# Patient Record
Sex: FEMALE | Race: WHITE | NOT HISPANIC OR LATINO
[De-identification: names, ages, dates, MRNs, and addresses within clinical notes are randomized per-mention and may not be internally consistent; named-entity substitution may affect disease eponyms.]

---

## 2017-12-07 ENCOUNTER — APPOINTMENT (OUTPATIENT)
Dept: ULTRASOUND IMAGING | Facility: CLINIC | Age: 62
End: 2017-12-07
Payer: COMMERCIAL

## 2017-12-07 ENCOUNTER — OUTPATIENT (OUTPATIENT)
Dept: OUTPATIENT SERVICES | Facility: HOSPITAL | Age: 62
LOS: 1 days | End: 2017-12-07
Payer: COMMERCIAL

## 2017-12-07 DIAGNOSIS — Z00.8 ENCOUNTER FOR OTHER GENERAL EXAMINATION: ICD-10-CM

## 2017-12-07 PROCEDURE — 76700 US EXAM ABDOM COMPLETE: CPT

## 2017-12-07 PROCEDURE — 76700 US EXAM ABDOM COMPLETE: CPT | Mod: 26

## 2018-04-17 ENCOUNTER — APPOINTMENT (OUTPATIENT)
Dept: ULTRASOUND IMAGING | Facility: CLINIC | Age: 63
End: 2018-04-17
Payer: COMMERCIAL

## 2018-04-17 ENCOUNTER — OUTPATIENT (OUTPATIENT)
Dept: OUTPATIENT SERVICES | Facility: HOSPITAL | Age: 63
LOS: 1 days | End: 2018-04-17
Payer: COMMERCIAL

## 2018-04-17 DIAGNOSIS — Z00.8 ENCOUNTER FOR OTHER GENERAL EXAMINATION: ICD-10-CM

## 2018-04-17 PROCEDURE — 76856 US EXAM PELVIC COMPLETE: CPT | Mod: 26

## 2018-04-17 PROCEDURE — 76856 US EXAM PELVIC COMPLETE: CPT

## 2019-10-21 ENCOUNTER — APPOINTMENT (OUTPATIENT)
Dept: CARDIOLOGY | Facility: CLINIC | Age: 64
End: 2019-10-21
Payer: COMMERCIAL

## 2019-10-21 VITALS
SYSTOLIC BLOOD PRESSURE: 125 MMHG | HEIGHT: 61 IN | BODY MASS INDEX: 27.19 KG/M2 | DIASTOLIC BLOOD PRESSURE: 78 MMHG | WEIGHT: 144 LBS | HEART RATE: 70 BPM | RESPIRATION RATE: 15 BRPM

## 2019-10-21 VITALS — BODY MASS INDEX: 27.19 KG/M2 | HEIGHT: 61 IN | WEIGHT: 144 LBS

## 2019-10-21 PROCEDURE — 99204 OFFICE O/P NEW MOD 45 MIN: CPT

## 2019-10-21 NOTE — REASON FOR VISIT
[Consultation] : a consultation regarding [Hyperlipidemia] : hyperlipidemia [FreeTextEntry2] : Lipid Consult

## 2019-10-21 NOTE — PHYSICAL EXAM
[General Appearance - Well Developed] : well developed [Well Groomed] : well groomed [Normal Appearance] : normal appearance [General Appearance - Well Nourished] : well nourished [No Deformities] : no deformities [General Appearance - In No Acute Distress] : no acute distress [Normal Conjunctiva] : the conjunctiva exhibited no abnormalities [Normal Oral Mucosa] : normal oral mucosa [Normal Oropharynx] : normal oropharynx [No Jugular Venous Daniel A Waves] : no jugular venous daniel A waves [Normal Jugular Venous V Waves Present] : normal jugular venous V waves present [Normal Jugular Venous A Waves Present] : normal jugular venous A waves present [Normal] : normal [No Precordial Heave] : no precordial heave was noted [Rhythm Regular] : regular [Normal Rate] : normal [Normal S1] : normal S1 [No Gallop] : no gallop heard [Normal S2] : normal S2 [S3] : no S3 [S4] : no S4 [I] : a grade 1 [Right Carotid Bruit] : no bruit heard over the right carotid [Left Carotid Bruit] : no bruit heard over the left carotid [Right Femoral Bruit] : no bruit heard over the right femoral artery [Left Femoral Bruit] : no bruit heard over the left femoral artery [No Abnormalities] : the abdominal aorta was not enlarged and no bruit was heard [2+] : right 2+ [No Pitting Edema] : no pitting edema present [Respiration, Rhythm And Depth] : normal respiratory rhythm and effort [Exaggerated Use Of Accessory Muscles For Inspiration] : no accessory muscle use [Auscultation Breath Sounds / Voice Sounds] : lungs were clear to auscultation bilaterally [Bowel Sounds] : normal bowel sounds [Gait - Sufficient For Exercise Testing] : the gait was sufficient for exercise testing [Abdomen Soft] : soft [Abnormal Walk] : normal gait [Cyanosis, Localized] : no localized cyanosis [Nail Clubbing] : no clubbing of the fingernails [Skin Color & Pigmentation] : normal skin color and pigmentation [Skin Turgor] : normal skin turgor [FreeTextEntry1] : No xanthomas [] : no rash [Oriented To Time, Place, And Person] : oriented to person, place, and time [Affect] : the affect was normal [Mood] : the mood was normal [Impaired Insight] : insight and judgment were intact [No Anxiety] : not feeling anxious

## 2019-10-21 NOTE — DISCUSSION/SUMMARY
[FreeTextEntry1] : This is a 64-year-old female with past medical history significant for hypothyroidism, dyspepsia, hypercholesterolemia, who comes in for a lipid consultation.  She reports her mother had high cholesterol.\par She has no history of rheumatic fever.  She does not drink excessive caffeine or alcohol.  She reports that her diet is reasonable.\par She has been on Zocor, Crestor, Lipitor and Pravachol associated with significant incapacitating muscle aches.  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 Normal TSH.\par The patient had blood work done April 16, 2013 which demonstrated an elevated C-reactive protein of 4.6, total cholesterol 280 mg/dL, LDL cholesterol calculated 173 mg/dL, HDL 51 mg/dL, triglycerides 281 mg/dL.  \par The patient has evidence for familial hypercholesterolemia. She has no physical stigmata of FH. I have advised the patient and  to have her children, and her own siblings tested for lipids.  She promises to do so.\par I recommended she start on Zetia 10 mg per day and will start her on PCSK-9 therapy based on her insurance coverage.  She does not wish to start on Zetia. She wants to be on one intervention at that time.  She understands she must have followup blood work done week 5, which is after the 2nd dose of PCSK 9 therapy.  Further recommendations will be made at that time.\par Her LDL cholesterol goal is less than 100 mg/dL.\par The patient is instructed to followup with her cardiologist and primary care physician.\par The patient will followup with me after her blood work is completed.

## 2019-10-22 ENCOUNTER — RX RENEWAL (OUTPATIENT)
Age: 64
End: 2019-10-22

## 2019-11-12 RX ORDER — ALIROCUMAB 150 MG/ML
150 INJECTION, SOLUTION SUBCUTANEOUS
Qty: 3 | Refills: 1 | Status: DISCONTINUED | COMMUNITY
Start: 2019-10-22 | End: 2019-11-12

## 2019-11-20 ENCOUNTER — APPOINTMENT (OUTPATIENT)
Dept: CARDIOLOGY | Facility: CLINIC | Age: 64
End: 2019-11-20
Payer: COMMERCIAL

## 2019-11-20 VITALS
SYSTOLIC BLOOD PRESSURE: 128 MMHG | WEIGHT: 144 LBS | HEART RATE: 68 BPM | DIASTOLIC BLOOD PRESSURE: 79 MMHG | RESPIRATION RATE: 15 BRPM | HEIGHT: 61 IN | BODY MASS INDEX: 27.19 KG/M2

## 2019-11-20 PROCEDURE — 99214 OFFICE O/P EST MOD 30 MIN: CPT

## 2019-12-04 ENCOUNTER — APPOINTMENT (OUTPATIENT)
Dept: CARDIOLOGY | Facility: CLINIC | Age: 64
End: 2019-12-04
Payer: COMMERCIAL

## 2019-12-04 VITALS
SYSTOLIC BLOOD PRESSURE: 126 MMHG | RESPIRATION RATE: 15 BRPM | HEIGHT: 61 IN | HEART RATE: 70 BPM | BODY MASS INDEX: 27.19 KG/M2 | DIASTOLIC BLOOD PRESSURE: 81 MMHG | WEIGHT: 144 LBS

## 2019-12-04 PROCEDURE — 99214 OFFICE O/P EST MOD 30 MIN: CPT

## 2019-12-09 ENCOUNTER — APPOINTMENT (OUTPATIENT)
Dept: CARDIOLOGY | Facility: CLINIC | Age: 64
End: 2019-12-09

## 2019-12-12 ENCOUNTER — LABORATORY RESULT (OUTPATIENT)
Age: 64
End: 2019-12-12

## 2020-05-10 ENCOUNTER — TRANSCRIPTION ENCOUNTER (OUTPATIENT)
Age: 65
End: 2020-05-10

## 2020-06-01 ENCOUNTER — APPOINTMENT (OUTPATIENT)
Dept: CARDIOLOGY | Facility: CLINIC | Age: 65
End: 2020-06-01

## 2020-06-12 ENCOUNTER — APPOINTMENT (OUTPATIENT)
Dept: CARDIOLOGY | Facility: CLINIC | Age: 65
End: 2020-06-12
Payer: COMMERCIAL

## 2020-06-12 VITALS
RESPIRATION RATE: 15 BRPM | HEART RATE: 74 BPM | HEIGHT: 61 IN | WEIGHT: 146 LBS | DIASTOLIC BLOOD PRESSURE: 79 MMHG | BODY MASS INDEX: 27.56 KG/M2 | SYSTOLIC BLOOD PRESSURE: 125 MMHG

## 2020-06-12 PROCEDURE — 99214 OFFICE O/P EST MOD 30 MIN: CPT

## 2020-07-07 ENCOUNTER — RESULT REVIEW (OUTPATIENT)
Age: 65
End: 2020-07-07

## 2020-07-15 ENCOUNTER — APPOINTMENT (OUTPATIENT)
Dept: MRI IMAGING | Facility: IMAGING CENTER | Age: 65
End: 2020-07-15
Payer: COMMERCIAL

## 2020-07-15 ENCOUNTER — OUTPATIENT (OUTPATIENT)
Dept: OUTPATIENT SERVICES | Facility: HOSPITAL | Age: 65
LOS: 1 days | End: 2020-07-15
Payer: COMMERCIAL

## 2020-07-15 ENCOUNTER — APPOINTMENT (OUTPATIENT)
Dept: SURGERY | Facility: CLINIC | Age: 65
End: 2020-07-15
Payer: COMMERCIAL

## 2020-07-15 DIAGNOSIS — Z00.8 ENCOUNTER FOR OTHER GENERAL EXAMINATION: ICD-10-CM

## 2020-07-15 PROCEDURE — 77049 MRI BREAST C-+ W/CAD BI: CPT | Mod: 26

## 2020-07-15 PROCEDURE — C8908: CPT

## 2020-07-15 PROCEDURE — 99205K: CUSTOM

## 2020-07-15 PROCEDURE — A9585: CPT

## 2020-07-15 PROCEDURE — C8937: CPT

## 2020-07-18 ENCOUNTER — RESULT REVIEW (OUTPATIENT)
Age: 65
End: 2020-07-18

## 2020-07-18 ENCOUNTER — TRANSCRIPTION ENCOUNTER (OUTPATIENT)
Age: 65
End: 2020-07-18

## 2020-07-18 ENCOUNTER — OUTPATIENT (OUTPATIENT)
Dept: OUTPATIENT SERVICES | Facility: HOSPITAL | Age: 65
LOS: 1 days | End: 2020-07-18
Payer: COMMERCIAL

## 2020-07-18 ENCOUNTER — APPOINTMENT (OUTPATIENT)
Dept: ULTRASOUND IMAGING | Facility: IMAGING CENTER | Age: 65
End: 2020-07-18
Payer: COMMERCIAL

## 2020-07-18 DIAGNOSIS — Z00.8 ENCOUNTER FOR OTHER GENERAL EXAMINATION: ICD-10-CM

## 2020-07-18 PROCEDURE — 88360 TUMOR IMMUNOHISTOCHEM/MANUAL: CPT

## 2020-07-18 PROCEDURE — 77065 DX MAMMO INCL CAD UNI: CPT | Mod: 26,RT

## 2020-07-18 PROCEDURE — 88305 TISSUE EXAM BY PATHOLOGIST: CPT | Mod: 26

## 2020-07-18 PROCEDURE — 88307 TISSUE EXAM BY PATHOLOGIST: CPT | Mod: 26

## 2020-07-18 PROCEDURE — 88307 TISSUE EXAM BY PATHOLOGIST: CPT

## 2020-07-18 PROCEDURE — 88305 TISSUE EXAM BY PATHOLOGIST: CPT

## 2020-07-18 PROCEDURE — 19084 BX BREAST ADD LESION US IMAG: CPT | Mod: RT

## 2020-07-18 PROCEDURE — 77065 DX MAMMO INCL CAD UNI: CPT

## 2020-07-18 PROCEDURE — 19084 BX BREAST ADD LESION US IMAG: CPT

## 2020-07-18 PROCEDURE — 19083 BX BREAST 1ST LESION US IMAG: CPT | Mod: RT

## 2020-07-18 PROCEDURE — A4648: CPT

## 2020-07-18 PROCEDURE — 19083 BX BREAST 1ST LESION US IMAG: CPT

## 2020-07-18 PROCEDURE — 88360 TUMOR IMMUNOHISTOCHEM/MANUAL: CPT | Mod: 26

## 2020-07-20 LAB — SURGICAL PATHOLOGY STUDY: SIGNIFICANT CHANGE UP

## 2020-07-21 DIAGNOSIS — Z01.818 ENCOUNTER FOR OTHER PREPROCEDURAL EXAMINATION: ICD-10-CM

## 2020-07-23 ENCOUNTER — RESULT REVIEW (OUTPATIENT)
Age: 65
End: 2020-07-23

## 2020-07-23 ENCOUNTER — APPOINTMENT (OUTPATIENT)
Dept: ULTRASOUND IMAGING | Facility: IMAGING CENTER | Age: 65
End: 2020-07-23
Payer: COMMERCIAL

## 2020-07-23 ENCOUNTER — OUTPATIENT (OUTPATIENT)
Dept: OUTPATIENT SERVICES | Facility: HOSPITAL | Age: 65
LOS: 1 days | End: 2020-07-23
Payer: COMMERCIAL

## 2020-07-23 VITALS
SYSTOLIC BLOOD PRESSURE: 126 MMHG | HEIGHT: 60 IN | DIASTOLIC BLOOD PRESSURE: 78 MMHG | HEART RATE: 63 BPM | OXYGEN SATURATION: 98 % | RESPIRATION RATE: 16 BRPM | WEIGHT: 141.98 LBS | TEMPERATURE: 98 F

## 2020-07-23 DIAGNOSIS — C50.911 MALIGNANT NEOPLASM OF UNSPECIFIED SITE OF RIGHT FEMALE BREAST: ICD-10-CM

## 2020-07-23 DIAGNOSIS — C50.919 MALIGNANT NEOPLASM OF UNSPECIFIED SITE OF UNSPECIFIED FEMALE BREAST: ICD-10-CM

## 2020-07-23 DIAGNOSIS — Z98.890 OTHER SPECIFIED POSTPROCEDURAL STATES: Chronic | ICD-10-CM

## 2020-07-23 DIAGNOSIS — Z90.710 ACQUIRED ABSENCE OF BOTH CERVIX AND UTERUS: Chronic | ICD-10-CM

## 2020-07-23 DIAGNOSIS — Z00.8 ENCOUNTER FOR OTHER GENERAL EXAMINATION: ICD-10-CM

## 2020-07-23 LAB
ANION GAP SERPL CALC-SCNC: 10 MMO/L — SIGNIFICANT CHANGE UP (ref 7–14)
BUN SERPL-MCNC: 11 MG/DL — SIGNIFICANT CHANGE UP (ref 7–23)
CALCIUM SERPL-MCNC: 10 MG/DL — SIGNIFICANT CHANGE UP (ref 8.4–10.5)
CHLORIDE SERPL-SCNC: 106 MMOL/L — SIGNIFICANT CHANGE UP (ref 98–107)
CO2 SERPL-SCNC: 27 MMOL/L — SIGNIFICANT CHANGE UP (ref 22–31)
CREAT SERPL-MCNC: 0.64 MG/DL — SIGNIFICANT CHANGE UP (ref 0.5–1.3)
GLUCOSE SERPL-MCNC: 98 MG/DL — SIGNIFICANT CHANGE UP (ref 70–99)
HCT VFR BLD CALC: 44.2 % — SIGNIFICANT CHANGE UP (ref 34.5–45)
HGB BLD-MCNC: 14.2 G/DL — SIGNIFICANT CHANGE UP (ref 11.5–15.5)
MCHC RBC-ENTMCNC: 28.6 PG — SIGNIFICANT CHANGE UP (ref 27–34)
MCHC RBC-ENTMCNC: 32.1 % — SIGNIFICANT CHANGE UP (ref 32–36)
MCV RBC AUTO: 89.1 FL — SIGNIFICANT CHANGE UP (ref 80–100)
NRBC # FLD: 0 K/UL — SIGNIFICANT CHANGE UP (ref 0–0)
PLATELET # BLD AUTO: 213 K/UL — SIGNIFICANT CHANGE UP (ref 150–400)
PMV BLD: 12.5 FL — SIGNIFICANT CHANGE UP (ref 7–13)
POTASSIUM SERPL-MCNC: 4.4 MMOL/L — SIGNIFICANT CHANGE UP (ref 3.5–5.3)
POTASSIUM SERPL-SCNC: 4.4 MMOL/L — SIGNIFICANT CHANGE UP (ref 3.5–5.3)
RBC # BLD: 4.96 M/UL — SIGNIFICANT CHANGE UP (ref 3.8–5.2)
RBC # FLD: 13.1 % — SIGNIFICANT CHANGE UP (ref 10.3–14.5)
SODIUM SERPL-SCNC: 143 MMOL/L — SIGNIFICANT CHANGE UP (ref 135–145)
WBC # BLD: 6.16 K/UL — SIGNIFICANT CHANGE UP (ref 3.8–10.5)
WBC # FLD AUTO: 6.16 K/UL — SIGNIFICANT CHANGE UP (ref 3.8–10.5)

## 2020-07-23 PROCEDURE — 19286 PERQ DEV BREAST ADD US IMAG: CPT | Mod: RT

## 2020-07-23 PROCEDURE — 19285 PERQ DEV BREAST 1ST US IMAG: CPT

## 2020-07-23 PROCEDURE — 19285 PERQ DEV BREAST 1ST US IMAG: CPT | Mod: RT

## 2020-07-23 PROCEDURE — 93010 ELECTROCARDIOGRAM REPORT: CPT

## 2020-07-23 PROCEDURE — C1739: CPT

## 2020-07-23 PROCEDURE — 19286 PERQ DEV BREAST ADD US IMAG: CPT

## 2020-07-23 RX ORDER — SODIUM CHLORIDE 9 MG/ML
1000 INJECTION, SOLUTION INTRAVENOUS
Refills: 0 | Status: DISCONTINUED | OUTPATIENT
Start: 2020-07-28 | End: 2020-08-12

## 2020-07-23 NOTE — H&P PST ADULT - HISTORY OF PRESENT ILLNESS
65 yrs old female with h/o mammo in July 2020 that showed ? mass in her right breast. Pt had sono, MRI, and biopsy that showed malignant neoplasm of right breast. Pt presents for preop eval to have right partial mastectomy with seed localization and axillary dissection.

## 2020-07-23 NOTE — H&P PST ADULT - NSANTHOSAYNRD_GEN_A_CORE
denies/No. VIET screening performed.  STOP BANG Legend: 0-2 = LOW Risk; 3-4 = INTERMEDIATE Risk; 5-8 = HIGH Risk

## 2020-07-23 NOTE — H&P PST ADULT - NSANTHBMIRD_ENT_A_CORE
Asthma, Pediatric  Asthma is a long-term (chronic) condition that causes recurrent swelling and narrowing of the airways. The airways are the passages that lead from the nose and mouth down into the lungs. When asthma symptoms get worse, it is called an asthma flare. When this happens, it can be difficult for your child to breathe. Asthma flares can range from minor to life-threatening.    Asthma cannot be cured, but medicines and lifestyle changes can help to control your child's asthma symptoms. It is important to keep your child's asthma well controlled in order to decrease how much this condition interferes with his or her daily life.    What are the causes?  The exact cause of asthma is not known. It is most likely caused by family (genetic) inheritance and exposure to a combination of environmental factors early in life.    There are many things that can bring on an asthma flare or make asthma symptoms worse (triggers). Common triggers include:    Mold.  Dust.  Smoke.  Outdoor air pollutants, such as engine exhaust.  Indoor air pollutants, such as aerosol sprays and fumes from household .  Strong odors.  Very cold, dry, or humid air.  Things that can cause allergy symptoms (allergens), such as pollen from grasses or trees and animal dander.  Household pests, including dust mites and cockroaches.  Stress or strong emotions.  Infections that affect the airways, such as common cold or flu.    What increases the risk?  Your child may have an increased risk of asthma if:    He or she has had certain types of repeated lung (respiratory) infections.  He or she has seasonal allergies or an allergic skin condition (eczema).  One or both parents have allergies or asthma.    What are the signs or symptoms?  Symptoms may vary depending on the child and his or her asthma flare triggers. Common symptoms include:    Wheezing.  Trouble breathing (shortness of breath).  Nighttime or early morning coughing.  Frequent or severe coughing with a common cold.  Chest tightness.  Difficulty talking in complete sentences during an asthma flare.  Straining to breathe.  Poor exercise tolerance.    How is this diagnosed?  Asthma is diagnosed with a medical history and physical exam. Tests that may be done include:    Lung function studies (spirometry).  Allergy tests.    How is this treated?  Treatment for asthma involves:    Identifying and avoiding your child’s asthma triggers.  Medicines. Two types of medicines are commonly used to treat asthma:    Controller medicines. These help prevent asthma symptoms from occurring. They are usually taken every day.  Fast-acting reliever or rescue medicines. These quickly relieve asthma symptoms. They are used as needed and provide short-term relief.    Your child’s health care provider will help you create a written plan for managing and treating your child's asthma flares (asthma action plan). This plan includes:    A list of your child’s asthma triggers and how to avoid them.  Information on when medicines should be taken and when to change their dosage.    An action plan also involves using a device that measures how well your child’s lungs are working (peak flow meter). Often, your child’s peak flow number will start to go down before you or your child recognizes asthma flare symptoms.    Follow these instructions at home:  General instructions     Give over-the-counter and prescription medicines only as told by your child’s health care provider.  Use a peak flow meter as told by your child’s health care provider. Record and keep track of your child's peak flow readings.  Understand and use the asthma action plan to address an asthma flare. Make sure that all people providing care for your child:    Have a copy of the asthma action plan.  Understand what to do during an asthma flare.  Have access to any needed medicines, if this applies.    Trigger Avoidance     Once your child’s asthma triggers have been identified, take actions to avoid them. This may include avoiding excessive or prolonged exposure to:    Dust and mold.    Dust and vacuum your home 1–2 times per week while your child is not home. Use a high-efficiency particulate arrestance (HEPA) vacuum, if possible.  Replace carpet with wood, tile, or vinyl sid, if possible.  Change your heating and air conditioning filter at least once a month. Use a HEPA filter, if possible.  Throw away plants if you see mold on them.  Clean bathrooms and arturo with bleach. Repaint the walls in these rooms with mold-resistant paint. Keep your child out of these rooms while you are cleaning and painting.  Limit your child's plush toys or stuffed animals to 1–2. Wash them monthly with hot water and dry them in a dryer.  Use allergy-proof bedding, including pillows, mattress covers, and box spring covers.  Wash bedding every week in hot water and dry it in a dryer.  Use blankets that are made of polyester or cotton.    Pet dander. Have your child avoid contact with any animals that he or she is allergic to.  Allergens and pollens from any grasses, trees, or other plants that your child is allergic to. Have your child avoid spending a lot of time outdoors when pollen counts are high, and on very windy days.  Foods that contain high amounts of sulfites.  Strong odors, chemicals, and fumes.  Smoke.    Do not allow your child to smoke. Talk to your child about the risks of smoking.  Have your child avoid exposure to smoke. This includes campfire smoke, forest fire smoke, and secondhand smoke from tobacco products. Do not smoke or allow others to smoke in your home or around your child.    Household pests and pest droppings, including dust mites and cockroaches.  Certain medicines, including NSAIDs. Always talk to your child’s health care provider before stopping or starting any new medicines.    Making sure that you, your child, and all household members wash their hands frequently will also help to control some triggers. If soap and water are not available, use hand .    Contact a health care provider if:  Image   Your child has wheezing, shortness of breath, or a cough that is not responding to medicines.  The mucus your child coughs up (sputum) is yellow, green, gray, bloody, or thicker than usual.  Your child’s medicines are causing side effects, such as a rash, itching, swelling, or trouble breathing.  Your child needs reliever medicines more often than 2–3 times per week.  Your child's peak flow measurement is at 50–79% of his or her personal best (yellow zone) after following his or her asthma action plan for 1 hour.  Your child has a fever.  Get help right away if:  Your child's peak flow is less than 50% of his or her personal best (red zone).  Your child is getting worse and does not respond to treatment during an asthma flare.  Your child is short of breath at rest or when doing very little physical activity.  Your child has difficulty eating, drinking, or talking.  Your child has chest pain.  Your child’s lips or fingernails look bluish.  Your child is light-headed or dizzy, or your child faints.  Your child who is younger than 3 months has a temperature of 100°F (38°C) or higher.  This information is not intended to replace advice given to you by your health care provider. Make sure you discuss any questions you have with your health care provider.
No

## 2020-07-23 NOTE — H&P PST ADULT - NSICDXPROBLEM_GEN_ALL_CORE_FT
PROBLEM DIAGNOSES  Problem: Breast cancer  Assessment and Plan: PROBLEM DIAGNOSES  Problem: Breast cancer  Assessment and Plan: Pt scheduled for right partial mastectomy with seed localization and axillary dissection.  labs pending, EKG in chart.   Preop instructions provided to pt   Famotidine and chlorhexidine scrubs provided to pt with instructions.

## 2020-07-24 PROBLEM — E66.9 OBESITY, UNSPECIFIED: Chronic | Status: ACTIVE | Noted: 2020-07-23

## 2020-07-24 PROBLEM — E55.9 VITAMIN D DEFICIENCY, UNSPECIFIED: Chronic | Status: ACTIVE | Noted: 2020-07-23

## 2020-07-24 PROBLEM — E03.9 HYPOTHYROIDISM, UNSPECIFIED: Chronic | Status: ACTIVE | Noted: 2020-07-23

## 2020-07-25 ENCOUNTER — APPOINTMENT (OUTPATIENT)
Dept: DISASTER EMERGENCY | Facility: CLINIC | Age: 65
End: 2020-07-25

## 2020-07-26 LAB — SARS-COV-2 N GENE NPH QL NAA+PROBE: NOT DETECTED

## 2020-07-28 ENCOUNTER — APPOINTMENT (OUTPATIENT)
Dept: NUCLEAR MEDICINE | Facility: HOSPITAL | Age: 65
End: 2020-07-28

## 2020-07-28 ENCOUNTER — RESULT REVIEW (OUTPATIENT)
Age: 65
End: 2020-07-28

## 2020-07-28 ENCOUNTER — OUTPATIENT (OUTPATIENT)
Dept: OUTPATIENT SERVICES | Facility: HOSPITAL | Age: 65
LOS: 1 days | Discharge: HOME | End: 2020-07-28
Payer: COMMERCIAL

## 2020-07-28 ENCOUNTER — APPOINTMENT (OUTPATIENT)
Dept: SURGERY | Facility: HOSPITAL | Age: 65
End: 2020-07-28

## 2020-07-28 VITALS
WEIGHT: 141.98 LBS | HEIGHT: 60 IN | RESPIRATION RATE: 18 BRPM | SYSTOLIC BLOOD PRESSURE: 139 MMHG | DIASTOLIC BLOOD PRESSURE: 77 MMHG | HEART RATE: 70 BPM | OXYGEN SATURATION: 97 % | TEMPERATURE: 99 F

## 2020-07-28 VITALS
TEMPERATURE: 98 F | RESPIRATION RATE: 14 BRPM | OXYGEN SATURATION: 98 % | SYSTOLIC BLOOD PRESSURE: 133 MMHG | DIASTOLIC BLOOD PRESSURE: 72 MMHG | HEART RATE: 50 BPM

## 2020-07-28 DIAGNOSIS — C50.911 MALIGNANT NEOPLASM OF UNSPECIFIED SITE OF RIGHT FEMALE BREAST: ICD-10-CM

## 2020-07-28 DIAGNOSIS — Z98.890 OTHER SPECIFIED POSTPROCEDURAL STATES: Chronic | ICD-10-CM

## 2020-07-28 DIAGNOSIS — Z90.710 ACQUIRED ABSENCE OF BOTH CERVIX AND UTERUS: Chronic | ICD-10-CM

## 2020-07-28 PROCEDURE — 19302K: CUSTOM | Mod: RT

## 2020-07-28 PROCEDURE — 76098 X-RAY EXAM SURGICAL SPECIMEN: CPT | Mod: 26,76

## 2020-07-28 PROCEDURE — 88342 IMHCHEM/IMCYTCHM 1ST ANTB: CPT | Mod: 26

## 2020-07-28 PROCEDURE — 88305 TISSUE EXAM BY PATHOLOGIST: CPT | Mod: 26

## 2020-07-28 PROCEDURE — 88341 IMHCHEM/IMCYTCHM EA ADD ANTB: CPT | Mod: 26

## 2020-07-28 PROCEDURE — 88307 TISSUE EXAM BY PATHOLOGIST: CPT | Mod: 26

## 2020-07-28 RX ORDER — FENTANYL CITRATE 50 UG/ML
50 INJECTION INTRAVENOUS
Refills: 0 | Status: DISCONTINUED | OUTPATIENT
Start: 2020-07-28 | End: 2020-07-28

## 2020-07-28 RX ORDER — SODIUM CHLORIDE 9 MG/ML
1000 INJECTION, SOLUTION INTRAVENOUS
Refills: 0 | Status: DISCONTINUED | OUTPATIENT
Start: 2020-07-28 | End: 2020-08-12

## 2020-07-28 RX ORDER — FENTANYL CITRATE 50 UG/ML
25 INJECTION INTRAVENOUS
Refills: 0 | Status: DISCONTINUED | OUTPATIENT
Start: 2020-07-28 | End: 2020-07-28

## 2020-07-28 RX ADMIN — FENTANYL CITRATE 50 MICROGRAM(S): 50 INJECTION INTRAVENOUS at 10:44

## 2020-07-28 RX ADMIN — SODIUM CHLORIDE 30 MILLILITER(S): 9 INJECTION, SOLUTION INTRAVENOUS at 08:07

## 2020-07-28 RX ADMIN — FENTANYL CITRATE 50 MICROGRAM(S): 50 INJECTION INTRAVENOUS at 10:23

## 2020-07-28 RX ADMIN — SODIUM CHLORIDE 30 MILLILITER(S): 9 INJECTION, SOLUTION INTRAVENOUS at 10:26

## 2020-07-28 NOTE — ASU DISCHARGE PLAN (ADULT/PEDIATRIC) - CARE PROVIDER_API CALL
Liza Navarrete  [facility]  [address]  [place], [place] [id number]  Phone: [phone]  Fax: [phone]  Follow Up Time:

## 2020-07-28 NOTE — ASU DISCHARGE PLAN (ADULT/PEDIATRIC) - ASU DC SPECIAL INSTRUCTIONSFT
ACTIVITY: Full activity, including driving next day as tolerated. No vigorous exercise. Wear bra as desired or tolerated.  BATHING: Remove outer bandage next day. Shower allowed. Pat wound dry.  MEDICATIONS: You may take one or two of the prescribed pain pills every four hours as needed. The discomfort should improve steadily. Switch to Extra-Strength Tylenol or similar medication (aspirin or ibuprofen) as needed.   BLEEDING: After surgery, some blood may escape from under the tape. It is of no consequence. The paper tape may fall off after several days. If not, Dr. Navarrete will remove it in her office.  BRUISING: As the days go by, black and blue areas may become more ticeable. These areas will disappear within several weeks. In addition, the area around the incision may feel very hard and look swollen. It is normal and it may take several months to subside.     CALL OFFICE:  1) If brisk bleeding is occuring through several new bandages  2) If you breast becomes warm or red, with or without pus  3) If you have a fever greater than 101F    FOLLOW UP: Please call Dr. Navarrete's office and make an appointment for approximately one week from now. The number is: [phone] ACTIVITY: Full activity, including driving next day as tolerated. No vigorous exercise. Wear bra as desired or tolerated.  BATHING: Remove outer bandage next day. Shower allowed. Pat wound dry.  MEDICATIONS: You may take one or two of the prescribed pain pills every four hours as needed. The discomfort should improve steadily. Switch to Extra-Strength Tylenol or similar medication (aspirin or ibuprofen) as needed.   BLEEDING: After surgery, some blood may escape from under the tape. It is of no consequence. The paper tape may fall off after several days. If not, Dr. Navarrete will remove it in her office.  BRUISING: As the days go by, black and blue areas may become more noticeable. These areas will disappear within several weeks. In addition, the area around the incision may feel very hard and look swollen. It is normal and it may take several months to subside.     CALL OFFICE:  1) If brisk bleeding is occuring through several new bandages  2) If you breast becomes warm or red, with or without pus  3) If you have a fever greater than 101F    FOLLOW UP: Please call Dr. Navarrete's office and make an appointment for approximately one week from now. The number is: [phone] ACTIVITY: Full activity, including driving next day as tolerated. No vigorous exercise. Wear bra as desired or tolerated.  BATHING: Remove outer bandage next day. Shower allowed. Pat wound dry.  MEDICATIONS: You may take one or two of the prescribed pain pills every four hours as needed. The discomfort should improve steadily. Switch to Extra-Strength Tylenol or similar medication (aspirin or ibuprofen) as needed.   BLEEDING: After surgery, some blood may escape from under the tape. It is of no consequence. The paper tape may fall off after several days. If not, Dr. Navarrete will remove it in her office.  BRUISING: As the days go by, black and blue areas may become more noticeable. These areas will disappear within several weeks. In addition, the area around the incision may feel very hard and look swollen. It is normal and it may take several months to subside.     You will be discharged with a LILLIAM drain.  You will need to empty it and record output accurately.  This was taught to you by the nursing staff.  Please DO NOT remove the LILLIAM drain.  It will be removed in the office by Dr. Navarrete.  Please bring accurate records of output to the office at your follow up appointment.     CALL OFFICE:  1) If brisk bleeding is occurring through several new bandages  2) If you breast becomes warm or red, with or without pus  3) If you have a fever greater than 101F    FOLLOW UP: Please call Dr. Navarrete's office and make an appointment within one week from now.  The number is: [phone]

## 2020-07-28 NOTE — ASU DISCHARGE PLAN (ADULT/PEDIATRIC) - NURSING INSTRUCTIONS
NO TYLENOL PRODUCTS BEFORE 3PM NO TYLENOL PRODUCTS BEFORE 3PM Wear bra for comfort and support (24/7). Use ice to reduce pain and swelling. Remove dressing as instructed by your doctor. Leave steri strips intact.  LILLIAM drain teaching and written instructions given. Patient with good understanding

## 2020-07-28 NOTE — ASU DISCHARGE PLAN (ADULT/PEDIATRIC) - PATIENT EDUCATION MATERIALS PROVIED
Other (specify)/Dr Navarrete's, LILLIAM literature Dr Navarrete's, LILLIAM literature and log sheet/Other (specify) Dr Navarrete's, LILLIAM literature and log sheet/Pre-printed instructions given for other (specify)/Other (specify)/Pre-printed instructions given for drain care

## 2020-07-28 NOTE — ASU DISCHARGE PLAN (ADULT/PEDIATRIC) - CALL YOUR DOCTOR IF YOU HAVE ANY OF THE FOLLOWING:
Pain not relieved by Medications/Swelling that gets worse/Bleeding that does not stop/Fever greater than (need to indicate Fahrenheit or Celsius)/Wound/Surgical Site with redness, or foul smelling discharge or pus Nausea and vomiting that does not stop/Inability to tolerate liquids or foods/Fever greater than (need to indicate Fahrenheit or Celsius)/Wound/Surgical Site with redness, or foul smelling discharge or pus/Pain not relieved by Medications/Swelling that gets worse/Bleeding that does not stop Unable to urinate/Wound/Surgical Site with redness, or foul smelling discharge or pus/Inability to tolerate liquids or foods/Fever greater than (need to indicate Fahrenheit or Celsius)/Pain not relieved by Medications/Nausea and vomiting that does not stop/Swelling that gets worse/Bleeding that does not stop

## 2020-07-30 ENCOUNTER — TRANSCRIPTION ENCOUNTER (OUTPATIENT)
Age: 65
End: 2020-07-30

## 2020-07-31 ENCOUNTER — OUTPATIENT (OUTPATIENT)
Dept: OUTPATIENT SERVICES | Facility: HOSPITAL | Age: 65
LOS: 1 days | Discharge: HOME | End: 2020-07-31
Payer: COMMERCIAL

## 2020-07-31 ENCOUNTER — OUTPATIENT (OUTPATIENT)
Dept: OUTPATIENT SERVICES | Facility: HOSPITAL | Age: 65
LOS: 1 days | Discharge: HOME | End: 2020-07-31

## 2020-07-31 DIAGNOSIS — Z90.710 ACQUIRED ABSENCE OF BOTH CERVIX AND UTERUS: Chronic | ICD-10-CM

## 2020-07-31 DIAGNOSIS — C50.919 MALIGNANT NEOPLASM OF UNSPECIFIED SITE OF UNSPECIFIED FEMALE BREAST: ICD-10-CM

## 2020-07-31 DIAGNOSIS — Z98.890 OTHER SPECIFIED POSTPROCEDURAL STATES: Chronic | ICD-10-CM

## 2020-07-31 LAB — SURGICAL PATHOLOGY STUDY: SIGNIFICANT CHANGE UP

## 2020-08-03 ENCOUNTER — APPOINTMENT (OUTPATIENT)
Dept: SURGERY | Facility: CLINIC | Age: 65
End: 2020-08-03
Payer: COMMERCIAL

## 2020-08-03 ENCOUNTER — APPOINTMENT (OUTPATIENT)
Dept: HEMATOLOGY ONCOLOGY | Facility: CLINIC | Age: 65
End: 2020-08-03
Payer: COMMERCIAL

## 2020-08-03 VITALS
WEIGHT: 143.74 LBS | TEMPERATURE: 98.9 F | HEART RATE: 75 BPM | HEIGHT: 60.98 IN | SYSTOLIC BLOOD PRESSURE: 133 MMHG | BODY MASS INDEX: 27.14 KG/M2 | DIASTOLIC BLOOD PRESSURE: 81 MMHG | OXYGEN SATURATION: 98 % | RESPIRATION RATE: 17 BRPM

## 2020-08-03 PROCEDURE — 99205 OFFICE O/P NEW HI 60 MIN: CPT

## 2020-08-03 PROCEDURE — 99024 POSTOP FOLLOW-UP VISIT: CPT

## 2020-08-04 NOTE — HISTORY OF PRESENT ILLNESS
[T: ___] : T[unfilled] [N: ___] : N[unfilled] [Disease: _____________________] : Disease: [unfilled] [AJCC Stage: ____] : AJCC Stage: [unfilled] [M: ___] : M[unfilled] [de-identified] : Please see dictated initial consult note scanned into [facility] [de-identified] : ER+ PA+ her 2 charlotte -

## 2020-08-10 ENCOUNTER — APPOINTMENT (OUTPATIENT)
Dept: SURGERY | Facility: CLINIC | Age: 65
End: 2020-08-10
Payer: COMMERCIAL

## 2020-08-10 PROCEDURE — 99024 POSTOP FOLLOW-UP VISIT: CPT

## 2020-08-16 ENCOUNTER — RESULT REVIEW (OUTPATIENT)
Age: 65
End: 2020-08-16

## 2020-08-18 ENCOUNTER — APPOINTMENT (OUTPATIENT)
Dept: HEMATOLOGY ONCOLOGY | Facility: CLINIC | Age: 65
End: 2020-08-18

## 2020-08-19 ENCOUNTER — APPOINTMENT (OUTPATIENT)
Dept: HEMATOLOGY ONCOLOGY | Facility: CLINIC | Age: 65
End: 2020-08-19
Payer: COMMERCIAL

## 2020-08-19 VITALS
RESPIRATION RATE: 17 BRPM | OXYGEN SATURATION: 98 % | HEIGHT: 60.98 IN | BODY MASS INDEX: 27.55 KG/M2 | SYSTOLIC BLOOD PRESSURE: 139 MMHG | TEMPERATURE: 98.7 F | HEART RATE: 80 BPM | DIASTOLIC BLOOD PRESSURE: 81 MMHG | WEIGHT: 145.95 LBS

## 2020-08-19 PROCEDURE — 99215 OFFICE O/P EST HI 40 MIN: CPT

## 2020-08-20 NOTE — PHYSICAL EXAM
[Fully active, able to carry on all pre-disease performance without restriction] : Status 0 - Fully active, able to carry on all pre-disease performance without restriction [Normal] : grossly intact [de-identified] : The right breast is status post reduction mastopexy as well as lumpectomy with well-healed scars; there is chronic nipple retraction w/o change, no skin dimpling, or palpable masses.  The left breast is status post reduction mastopexy with well-healed scar; there is chronic nipple retraction w/o change, no skin dimpling, or palpable masses.  The bilateral axillae are without adenopathy. Of note my initial consult note specified no nipple retraction and that was entered in error.

## 2020-08-20 NOTE — HISTORY OF PRESENT ILLNESS
[Disease: _____________________] : Disease: [unfilled] [M: ___] : M[unfilled] [T: ___] : T[unfilled] [N: ___] : N[unfilled] [AJCC Stage: ____] : AJCC Stage: [unfilled] [de-identified] : The patient's history of present illness began on 06/30/2020 when she had a routine mammogram with a finding of postsurgical changes compatible with her history of reduction mammoplasty; there was a focus of architectural distortion in the slightly lateral right breast at the level of the nipple, measuring approximately 10 mm with no associated microcalcifications, and no abnormal findings in the left breast; no axillary adenopathy was seen.  A right breast ultrasound performed on that same date demonstrated at the 9 o'clock axis 5 cm from the nipple, an irregularly marginated mass measuring 7 x 8 x 11 mm with ultrasound-guided core biopsy recommended; the left breast was unremarkable.  The patient then went on to have an ultrasound-guided core biopsy of the right breast on 07/18/2020 with a finding at the 8 o'clock axis 5 cm from nipple of an invasive moderately differentiated ductal carcinoma, Mp score 6/9 with invasive tumor measuring at least 1 cm with evidence of DCIS, with microcalcifications identified in the invasive carcinoma and in the DCIS, with no evidence of lymphovascular invasion; the right axillary biopsy on that same date demonstrated invasive mammary carcinoma with lobular features, with necrosis and scattered lymphocytic infiltrate.  Estrogen receptor returned positive greater than 90%, progesterone receptor positive greater than 90% HER-2/charlotte negative.  An MRI of the bilateral breasts demonstrated a 3 cm enlarged lymph node in the right axilla and no significant left axillary or internal mammary adenopathy; in the right breast, there was a 15 mm irregular enhancing mass with a second enhancing irregular mass located 2.5 cm anterior, medial, inferior from the index mass at the 8 o'clock axis, anterior depth and measuring 11 mm.  The patient ultimately saw Dr. Liza Navarrete and on 07/28/2020 underwent a right lumpectomy/sentinel lymph node biopsy with a finding of multifocal invasive carcinoma, moderately differentiated with tubulolobular features measuring 1.1 cm in addition to an invasive moderately differentiated carcinoma with tubulolobular features measuring 1.5 cm with multiple separate small foci of invasive carcinoma with single file arrangement of tumor cells E-cadherin positive, DCIS, LCIS, with further shave margins specifically in the right superior breast margin returning negative for carcinoma but with evidence of DCIS and a single isolated duct measuring 0.2 cm with margins negative for DCIS, an additional shave margins returning negative; in the right axilla, metastatic ductal carcinoma involved 3/7 lymph nodes with a carcinoma being E-cadherin positive. \par \par The patient was seen in initial consultation on 8/3/20 regarding further treatment recommendations.  \par \par A Mammaprint was sent off and returned with a high risk score. [de-identified] : ER+ MD+ Her 2 charlotte - [de-identified] : Pt was seen today to discuss adjuvant treatment recommendations in light of her high risk score on Mammaprint.\par \par She cont to deny all complaints, except anxiety surrounding her breast cancer.  She notes a good appetite, stable weight and excellent performance status.

## 2020-08-24 ENCOUNTER — OUTPATIENT (OUTPATIENT)
Dept: OUTPATIENT SERVICES | Facility: HOSPITAL | Age: 65
LOS: 1 days | End: 2020-08-24
Payer: COMMERCIAL

## 2020-08-24 DIAGNOSIS — Z11.59 ENCOUNTER FOR SCREENING FOR OTHER VIRAL DISEASES: ICD-10-CM

## 2020-08-24 DIAGNOSIS — Z98.890 OTHER SPECIFIED POSTPROCEDURAL STATES: Chronic | ICD-10-CM

## 2020-08-24 DIAGNOSIS — Z90.710 ACQUIRED ABSENCE OF BOTH CERVIX AND UTERUS: Chronic | ICD-10-CM

## 2020-08-24 LAB — SARS-COV-2 RNA SPEC QL NAA+PROBE: SIGNIFICANT CHANGE UP

## 2020-08-24 PROCEDURE — U0003: CPT

## 2020-08-25 ENCOUNTER — APPOINTMENT (OUTPATIENT)
Dept: HEMATOLOGY ONCOLOGY | Facility: CLINIC | Age: 65
End: 2020-08-25
Payer: COMMERCIAL

## 2020-08-25 ENCOUNTER — RESULT REVIEW (OUTPATIENT)
Age: 65
End: 2020-08-25

## 2020-08-25 LAB
BASOPHILS # BLD AUTO: 0.03 K/UL — SIGNIFICANT CHANGE UP (ref 0–0.2)
BASOPHILS NFR BLD AUTO: 0.4 % — SIGNIFICANT CHANGE UP (ref 0–2)
EOSINOPHIL # BLD AUTO: 0.13 K/UL — SIGNIFICANT CHANGE UP (ref 0–0.5)
EOSINOPHIL NFR BLD AUTO: 1.7 % — SIGNIFICANT CHANGE UP (ref 0–6)
HCT VFR BLD CALC: 44.7 % — SIGNIFICANT CHANGE UP (ref 34.5–45)
HGB BLD-MCNC: 14.2 G/DL — SIGNIFICANT CHANGE UP (ref 11.5–15.5)
IMM GRANULOCYTES NFR BLD AUTO: 0.3 % — SIGNIFICANT CHANGE UP (ref 0–1.5)
LYMPHOCYTES # BLD AUTO: 2.78 K/UL — SIGNIFICANT CHANGE UP (ref 1–3.3)
LYMPHOCYTES # BLD AUTO: 36.1 % — SIGNIFICANT CHANGE UP (ref 13–44)
MCHC RBC-ENTMCNC: 29.5 PG — SIGNIFICANT CHANGE UP (ref 27–34)
MCHC RBC-ENTMCNC: 31.8 GM/DL — LOW (ref 32–36)
MCV RBC AUTO: 92.7 FL — SIGNIFICANT CHANGE UP (ref 80–100)
MONOCYTES # BLD AUTO: 0.57 K/UL — SIGNIFICANT CHANGE UP (ref 0–0.9)
MONOCYTES NFR BLD AUTO: 7.4 % — SIGNIFICANT CHANGE UP (ref 2–14)
NEUTROPHILS # BLD AUTO: 4.17 K/UL — SIGNIFICANT CHANGE UP (ref 1.8–7.4)
NEUTROPHILS NFR BLD AUTO: 54.1 % — SIGNIFICANT CHANGE UP (ref 43–77)
NRBC # BLD: 0 /100 WBCS — SIGNIFICANT CHANGE UP (ref 0–0)
PLATELET # BLD AUTO: 229 K/UL — SIGNIFICANT CHANGE UP (ref 150–400)
RBC # BLD: 4.82 M/UL — SIGNIFICANT CHANGE UP (ref 3.8–5.2)
RBC # FLD: 13.2 % — SIGNIFICANT CHANGE UP (ref 10.3–14.5)
WBC # BLD: 7.7 K/UL — SIGNIFICANT CHANGE UP (ref 3.8–10.5)
WBC # FLD AUTO: 7.7 K/UL — SIGNIFICANT CHANGE UP (ref 3.8–10.5)

## 2020-08-25 PROCEDURE — 93010 ELECTROCARDIOGRAM REPORT: CPT

## 2020-08-25 PROCEDURE — 99214 OFFICE O/P EST MOD 30 MIN: CPT

## 2020-08-26 ENCOUNTER — OUTPATIENT (OUTPATIENT)
Dept: OUTPATIENT SERVICES | Facility: HOSPITAL | Age: 65
LOS: 1 days | Discharge: HOME | End: 2020-08-26

## 2020-08-26 ENCOUNTER — APPOINTMENT (OUTPATIENT)
Dept: CARDIOLOGY | Facility: CLINIC | Age: 65
End: 2020-08-26
Payer: COMMERCIAL

## 2020-08-26 DIAGNOSIS — Z98.890 OTHER SPECIFIED POSTPROCEDURAL STATES: Chronic | ICD-10-CM

## 2020-08-26 DIAGNOSIS — C50.919 MALIGNANT NEOPLASM OF UNSPECIFIED SITE OF UNSPECIFIED FEMALE BREAST: ICD-10-CM

## 2020-08-26 DIAGNOSIS — Z90.710 ACQUIRED ABSENCE OF BOTH CERVIX AND UTERUS: Chronic | ICD-10-CM

## 2020-08-26 PROCEDURE — 93306 TTE W/DOPPLER COMPLETE: CPT

## 2020-08-26 PROCEDURE — 93356 MYOCRD STRAIN IMG SPCKL TRCK: CPT

## 2020-08-27 ENCOUNTER — OUTPATIENT (OUTPATIENT)
Dept: OUTPATIENT SERVICES | Facility: HOSPITAL | Age: 65
LOS: 1 days | End: 2020-08-27
Payer: COMMERCIAL

## 2020-08-27 ENCOUNTER — RESULT REVIEW (OUTPATIENT)
Age: 65
End: 2020-08-27

## 2020-08-27 VITALS
TEMPERATURE: 99 F | HEART RATE: 84 BPM | SYSTOLIC BLOOD PRESSURE: 129 MMHG | DIASTOLIC BLOOD PRESSURE: 87 MMHG | HEIGHT: 61 IN | OXYGEN SATURATION: 97 % | WEIGHT: 141.1 LBS

## 2020-08-27 VITALS
RESPIRATION RATE: 20 BRPM | DIASTOLIC BLOOD PRESSURE: 82 MMHG | OXYGEN SATURATION: 97 % | SYSTOLIC BLOOD PRESSURE: 129 MMHG

## 2020-08-27 DIAGNOSIS — Z98.890 OTHER SPECIFIED POSTPROCEDURAL STATES: Chronic | ICD-10-CM

## 2020-08-27 DIAGNOSIS — Z90.710 ACQUIRED ABSENCE OF BOTH CERVIX AND UTERUS: Chronic | ICD-10-CM

## 2020-08-27 DIAGNOSIS — C50.911 MALIGNANT NEOPLASM OF UNSPECIFIED SITE OF RIGHT FEMALE BREAST: ICD-10-CM

## 2020-08-27 LAB
ALBUMIN SERPL ELPH-MCNC: 4.7 G/DL
ALP BLD-CCNC: 55 U/L
ALT SERPL-CCNC: 12 U/L
ANION GAP SERPL CALC-SCNC: 14 MMOL/L
APTT BLD: 31.4 SEC
AST SERPL-CCNC: 16 U/L
BILIRUB SERPL-MCNC: 0.6 MG/DL
BUN SERPL-MCNC: 14 MG/DL
CALCIUM SERPL-MCNC: 10.5 MG/DL
CHLORIDE SERPL-SCNC: 104 MMOL/L
CO2 SERPL-SCNC: 27 MMOL/L
CREAT SERPL-MCNC: 0.68 MG/DL
GLUCOSE SERPL-MCNC: 98 MG/DL
HBV CORE IGG+IGM SER QL: NONREACTIVE
HBV SURFACE AB SER QL: NONREACTIVE
HBV SURFACE AG SER QL: NONREACTIVE
HCV AB SER QL: NONREACTIVE
HCV S/CO RATIO: 0.34 S/CO
INR PPP: 1.06 RATIO
POTASSIUM SERPL-SCNC: 5.1 MMOL/L
PROT SERPL-MCNC: 7.7 G/DL
PT BLD: 12.5 SEC
SODIUM SERPL-SCNC: 145 MMOL/L

## 2020-08-27 PROCEDURE — 36561 INSERT TUNNELED CV CATH: CPT

## 2020-08-27 PROCEDURE — C1769: CPT

## 2020-08-27 PROCEDURE — 77001 FLUOROGUIDE FOR VEIN DEVICE: CPT | Mod: 26

## 2020-08-27 PROCEDURE — C1894: CPT

## 2020-08-27 PROCEDURE — 76937 US GUIDE VASCULAR ACCESS: CPT | Mod: 26

## 2020-08-27 PROCEDURE — 77001 FLUOROGUIDE FOR VEIN DEVICE: CPT

## 2020-08-27 PROCEDURE — C1788: CPT

## 2020-08-27 PROCEDURE — 76937 US GUIDE VASCULAR ACCESS: CPT

## 2020-08-27 RX ORDER — CHOLECALCIFEROL (VITAMIN D3) 125 MCG
0 CAPSULE ORAL
Qty: 0 | Refills: 0 | DISCHARGE

## 2020-08-27 NOTE — PROGRESS NOTE ADULT - SUBJECTIVE AND OBJECTIVE BOX
Interventional Radiology Brief Post Procedure Note    Procedure: Left chest wall port placement.     Operators: Gene Arizmendi MD    Anesthesia (type): Provided by anesthesiology.     Contrast: None.     EBL: Minimal.     Findings/Follow up Plan of Care: Successful left chest wall port placement.     Specimens Removed: None.     Implants: Left chest wall port.     Complications: No immediate complications.     Condition/Disposition: To recovery room.     Please call Interventional Radiology x [phone] with any questions, concerns, or issues.
Vascular & Interventional Radiology Pre-Procedure Note    Procedure Name: Port placement.     HPI: 65y Female with right sided breast cancer presenting for port placement.     Allergies: latex (Short breath)    Medications (Abx/Cardiac/Anticoagulation/Blood Products)      Data:  154.94  64  T(C): 37.3  HR: 84  BP: 129/87  RR: --  SpO2: 97%    Exam  Resting comfortably in bed.  AOx3.    -WBC 7.70 / HgB 14.2 / Hct 44.7 / Plt 229    Plan:   -65y Female presents for port placement. Procedure and risks discussed with patient and she is agreeable to proceed.   -Risks/Benefits/alternatives explained with the patient and/or healthcare proxy and witnessed informed consent obtained.

## 2020-08-27 NOTE — ASU DISCHARGE PLAN (ADULT/PEDIATRIC) - POST OP PHONE #
Please feel free to contact us at [phone] if any  problem arises. After 6PM, Monday through Friday on weekends and on holidays, please call [phone] and ask for the radiology resident on call to be paged.

## 2020-08-27 NOTE — ASU DISCHARGE PLAN (ADULT/PEDIATRIC) - ASU DC SPECIAL INSTRUCTIONSFT
Please contact your doctor immediately if you notice any signs or symptoms of infection. If you have any questions with the port please contact interventional radiology at [facility].

## 2020-08-27 NOTE — PRE-ANESTHESIA EVALUATION ADULT - NSANTHOSAYNRD_GEN_A_CORE
No. VIET screening performed.  STOP BANG Legend: 0-2 = LOW Risk; 3-4 = INTERMEDIATE Risk; 5-8 = HIGH Risk/denies

## 2020-09-01 ENCOUNTER — LABORATORY RESULT (OUTPATIENT)
Age: 65
End: 2020-09-01

## 2020-09-01 ENCOUNTER — APPOINTMENT (OUTPATIENT)
Dept: INFUSION THERAPY | Facility: HOSPITAL | Age: 65
End: 2020-09-01

## 2020-09-01 NOTE — END OF VISIT
[Time Spent: ___ minutes] : I have spent [unfilled] minutes of time on the encounter. [>50% of the face to face encounter time was spent on counseling and/or coordination of care for ___] : Greater than 50% of the face to face encounter time was spent on counseling and/or coordination of care for [unfilled] [FreeTextEntry3] : Pt seen examined and d/w PA> Agre with above A/P. Re the olanzapine study prev and again today revd the study, voluntary nature of participation, requirements, and answered her questions. She wishes to proceed on study.

## 2020-09-01 NOTE — HISTORY OF PRESENT ILLNESS
[Disease: _____________________] : Disease: [unfilled] [T: ___] : T[unfilled] [N: ___] : N[unfilled] [M: ___] : M[unfilled] [AJCC Stage: ____] : AJCC Stage: [unfilled] [de-identified] : The patient's history of present illness began on 06/30/2020 when she had a routine mammogram with a finding of postsurgical changes compatible with her history of reduction mammoplasty; there was a focus of architectural distortion in the slightly lateral right breast at the level of the nipple, measuring approximately 10 mm with no associated microcalcifications, and no abnormal findings in the left breast; no axillary adenopathy was seen.  A right breast ultrasound performed on that same date demonstrated at the 9 o'clock axis 5 cm from the nipple, an irregularly marginated mass measuring 7 x 8 x 11 mm with ultrasound-guided core biopsy recommended; the left breast was unremarkable.  The patient then went on to have an ultrasound-guided core biopsy of the right breast on 07/18/2020 with a finding at the 8 o'clock axis 5 cm from nipple of an invasive moderately differentiated ductal carcinoma, Mp score 6/9 with invasive tumor measuring at least 1 cm with evidence of DCIS, with microcalcifications identified in the invasive carcinoma and in the DCIS, with no evidence of lymphovascular invasion; the right axillary biopsy on that same date demonstrated invasive mammary carcinoma with lobular features, with necrosis and scattered lymphocytic infiltrate.  Estrogen receptor returned positive greater than 90%, progesterone receptor positive greater than 90% HER-2/charlotte negative.  An MRI of the bilateral breasts demonstrated a 3 cm enlarged lymph node in the right axilla and no significant left axillary or internal mammary adenopathy; in the right breast, there was a 15 mm irregular enhancing mass with a second enhancing irregular mass located 2.5 cm anterior, medial, inferior from the index mass at the 8 o'clock axis, anterior depth and measuring 11 mm.  The patient ultimately saw Dr. Liza Navarrete and on 07/28/2020 underwent a right lumpectomy/sentinel lymph node biopsy with a finding of multifocal invasive carcinoma, moderately differentiated with tubulolobular features measuring 1.1 cm in addition to an invasive moderately differentiated carcinoma with tubulolobular features measuring 1.5 cm with multiple separate small foci of invasive carcinoma with single file arrangement of tumor cells E-cadherin positive, DCIS, LCIS, with further shave margins specifically in the right superior breast margin returning negative for carcinoma but with evidence of DCIS and a single isolated duct measuring 0.2 cm with margins negative for DCIS, an additional shave margins returning negative; in the right axilla, metastatic ductal carcinoma involved 3/7 lymph nodes with a carcinoma being E-cadherin positive. \par \par The patient was seen in initial consultation on 8/3/20 regarding further treatment recommendations.  \par \par A Mammaprint was sent off and returned with a high risk score. An oncotype for node positive cancer was also sent off (unintentionally) which resulted in a recurrence score of 23 translating to 19%  risk of distant recurrence and an absolute benefit from chemotherapy. Results of both these tests were discussed with her extensively and she has decided to proceed with chemotherapy with dose dense doxorubicin/cyclophospmamide folllowed by dose dense paclitaxel every 2 weeks x 4 with Pegfilgrastim support.  [de-identified] : ER+ [place]+ Her 2 charlotte - [de-identified] : We had discussed the Mammaprint score and the implications of it with her at length at the time of the last visit. Treatment with Dose dense doxorubicin/cyclophosmide every 2 weeks followed by dose dense paclitaxel every 2 weeks x 4, both with pegfilgrastim support. She is here today with her daughter to discuss this further, and also to discuss [id number] Olanzapine antinausea sutdy. She had taken the consent home to read at the time of the last visit.\par She is here today with her daughter. No new issues/complaints. Remains with an excellent performance status.

## 2020-09-01 NOTE — PHYSICAL EXAM
[Fully active, able to carry on all pre-disease performance without restriction] : Status 0 - Fully active, able to carry on all pre-disease performance without restriction [Normal] : no peripheral adenopathy appreciated [de-identified] : The right breast is status post reduction mastopexy as well as lumpectomy with well-healed scars; there is chronic nipple retraction w/o change, no skin dimpling, or palpable masses.  The left breast is status post reduction mastopexy with well-healed scar; there is chronic nipple retraction w/o change, no skin dimpling, or palpable masses.  The bilateral axillae are without adenopathy. Of note my initial consult note specified no nipple retraction and that was entered in error.

## 2020-09-02 DIAGNOSIS — Z45.2 ENCOUNTER FOR ADJUSTMENT AND MANAGEMENT OF VASCULAR ACCESS DEVICE: ICD-10-CM

## 2020-09-02 DIAGNOSIS — C50.911 MALIGNANT NEOPLASM OF UNSPECIFIED SITE OF RIGHT FEMALE BREAST: ICD-10-CM

## 2020-09-04 ENCOUNTER — RESULT REVIEW (OUTPATIENT)
Age: 65
End: 2020-09-04

## 2020-09-04 ENCOUNTER — APPOINTMENT (OUTPATIENT)
Dept: INFUSION THERAPY | Facility: HOSPITAL | Age: 65
End: 2020-09-04

## 2020-09-04 ENCOUNTER — APPOINTMENT (OUTPATIENT)
Dept: HEMATOLOGY ONCOLOGY | Facility: CLINIC | Age: 65
End: 2020-09-04
Payer: COMMERCIAL

## 2020-09-04 VITALS
SYSTOLIC BLOOD PRESSURE: 133 MMHG | HEIGHT: 60.24 IN | WEIGHT: 138.89 LBS | OXYGEN SATURATION: 97 % | DIASTOLIC BLOOD PRESSURE: 84 MMHG | RESPIRATION RATE: 16 BRPM | BODY MASS INDEX: 26.91 KG/M2 | TEMPERATURE: 98.8 F | HEART RATE: 85 BPM

## 2020-09-04 DIAGNOSIS — M25.562 PAIN IN LEFT KNEE: ICD-10-CM

## 2020-09-04 LAB
BASOPHILS # BLD AUTO: 0.02 K/UL — SIGNIFICANT CHANGE UP (ref 0–0.2)
BASOPHILS NFR BLD AUTO: 0.1 % — SIGNIFICANT CHANGE UP (ref 0–2)
EOSINOPHIL # BLD AUTO: 0.01 K/UL — SIGNIFICANT CHANGE UP (ref 0–0.5)
EOSINOPHIL NFR BLD AUTO: 0.1 % — SIGNIFICANT CHANGE UP (ref 0–6)
HCT VFR BLD CALC: 42.5 % — SIGNIFICANT CHANGE UP (ref 34.5–45)
HGB BLD-MCNC: 13.9 G/DL — SIGNIFICANT CHANGE UP (ref 11.5–15.5)
IMM GRANULOCYTES NFR BLD AUTO: 0.7 % — SIGNIFICANT CHANGE UP (ref 0–1.5)
LYMPHOCYTES # BLD AUTO: 16.5 % — SIGNIFICANT CHANGE UP (ref 13–44)
LYMPHOCYTES # BLD AUTO: 2.47 K/UL — SIGNIFICANT CHANGE UP (ref 1–3.3)
MCHC RBC-ENTMCNC: 29 PG — SIGNIFICANT CHANGE UP (ref 27–34)
MCHC RBC-ENTMCNC: 32.7 GM/DL — SIGNIFICANT CHANGE UP (ref 32–36)
MCV RBC AUTO: 88.7 FL — SIGNIFICANT CHANGE UP (ref 80–100)
MONOCYTES # BLD AUTO: 1.09 K/UL — HIGH (ref 0–0.9)
MONOCYTES NFR BLD AUTO: 7.3 % — SIGNIFICANT CHANGE UP (ref 2–14)
NEUTROPHILS # BLD AUTO: 11.26 K/UL — HIGH (ref 1.8–7.4)
NEUTROPHILS NFR BLD AUTO: 75.3 % — SIGNIFICANT CHANGE UP (ref 43–77)
NRBC # BLD: 0 /100 WBCS — SIGNIFICANT CHANGE UP (ref 0–0)
PLATELET # BLD AUTO: 255 K/UL — SIGNIFICANT CHANGE UP (ref 150–400)
RBC # BLD: 4.79 M/UL — SIGNIFICANT CHANGE UP (ref 3.8–5.2)
RBC # FLD: 13.1 % — SIGNIFICANT CHANGE UP (ref 10.3–14.5)
WBC # BLD: 14.95 K/UL — HIGH (ref 3.8–10.5)
WBC # FLD AUTO: 14.95 K/UL — HIGH (ref 3.8–10.5)

## 2020-09-04 PROCEDURE — 99214 OFFICE O/P EST MOD 30 MIN: CPT

## 2020-09-08 DIAGNOSIS — Z51.89 ENCOUNTER FOR OTHER SPECIFIED AFTERCARE: ICD-10-CM

## 2020-09-08 DIAGNOSIS — Z51.11 ENCOUNTER FOR ANTINEOPLASTIC CHEMOTHERAPY: ICD-10-CM

## 2020-09-08 DIAGNOSIS — R11.2 NAUSEA WITH VOMITING, UNSPECIFIED: ICD-10-CM

## 2020-09-09 PROBLEM — M25.562 KNEE PAIN, LEFT: Status: ACTIVE | Noted: 2020-09-07

## 2020-09-09 NOTE — HISTORY OF PRESENT ILLNESS
[Disease: _____________________] : Disease: [unfilled] [T: ___] : T[unfilled] [N: ___] : N[unfilled] [M: ___] : M[unfilled] [AJCC Stage: ____] : AJCC Stage: [unfilled] [de-identified] : The patient's history of present illness began on 06/30/2020 when she had a routine mammogram with a finding of postsurgical changes compatible with her history of reduction mammoplasty; there was a focus of architectural distortion in the slightly lateral right breast at the level of the nipple, measuring approximately 10 mm with no associated microcalcifications, and no abnormal findings in the left breast; no axillary adenopathy was seen.  A right breast ultrasound performed on that same date demonstrated at the 9 o'clock axis 5 cm from the nipple, an irregularly marginated mass measuring 7 x 8 x 11 mm with ultrasound-guided core biopsy recommended; the left breast was unremarkable.  The patient then went on to have an ultrasound-guided core biopsy of the right breast on 07/18/2020 with a finding at the 8 o'clock axis 5 cm from nipple of an invasive moderately differentiated ductal carcinoma, Mp score 6/9 with invasive tumor measuring at least 1 cm with evidence of DCIS, with microcalcifications identified in the invasive carcinoma and in the DCIS, with no evidence of lymphovascular invasion; the right axillary biopsy on that same date demonstrated invasive mammary carcinoma with lobular features, with necrosis and scattered lymphocytic infiltrate.  Estrogen receptor returned positive greater than 90%, progesterone receptor positive greater than 90% HER-2/charlotte negative.  An MRI of the bilateral breasts demonstrated a 3 cm enlarged lymph node in the right axilla and no significant left axillary or internal mammary adenopathy; in the right breast, there was a 15 mm irregular enhancing mass with a second enhancing irregular mass located 2.5 cm anterior, medial, inferior from the index mass at the 8 o'clock axis, anterior depth and measuring 11 mm.  The patient ultimately saw Dr. Liza Navarrete and on 07/28/2020 underwent a right lumpectomy/sentinel lymph node biopsy with a finding of multifocal invasive carcinoma, moderately differentiated with tubulolobular features measuring 1.1 cm in addition to an invasive moderately differentiated carcinoma with tubulolobular features measuring 1.5 cm with multiple separate small foci of invasive carcinoma with single file arrangement of tumor cells E-cadherin positive, DCIS, LCIS, with further shave margins specifically in the right superior breast margin returning negative for carcinoma but with evidence of DCIS and a single isolated duct measuring 0.2 cm with margins negative for DCIS, an additional shave margins returning negative; in the right axilla, metastatic ductal carcinoma involved 3/7 lymph nodes with a carcinoma being E-cadherin positive. \par \par The patient was seen in initial consultation on 8/3/20 regarding further treatment recommendations.  \par \par A Mammaprint was sent off and returned with a high risk score. An oncotype for node positive cancer was also sent off (unintentionally) which resulted in a recurrence score of 23 translating to 19%  risk of distant recurrence and an absolute benefit from chemotherapy. Results of both these tests were discussed with her extensively and she has decided to proceed with chemotherapy with dose dense doxorubicin/cyclophospmamide folllowed by dose dense paclitaxel every 2 weeks x 4 with Pegfilgrastim support.  [de-identified] :  is here today to start treatment with  Dose dense doxorubicin/cyclophosphmide every 2 weeks followed by dose dense paclitaxel every 2 weeks x 4, both with pegfilgrastim, and  is also participating in the [id number] Olanzapine antinausea study.\par .She had called us yesterday with c/o pain and swelling in her L knee and subsequently saw her orthopedic surgeon. S/p fluid aspiration from the L knee (non-infectious)  per her report, and was given a steroid injections with symptomatic improvement post drainage.\par No other new issues. Some anxiety regarding starting chemotherapy. [de-identified] : ER+ [place]+ Her 2 charlotte -

## 2020-09-09 NOTE — PHYSICAL EXAM
[Fully active, able to carry on all pre-disease performance without restriction] : Status 0 - Fully active, able to carry on all pre-disease performance without restriction [Normal] : clear to auscultation bilaterally, no dullness, no wheezing [de-identified] : The right breast is status post reduction mastopexy as well as lumpectomy with well-healed scars; there is chronic nipple retraction w/o change, no skin dimpling, or palpable masses.  The left breast is status post reduction mastopexy with well-healed scar; there is chronic nipple retraction w/o change, no skin dimpling, or palpable masses.  The bilateral axillae are without adenopathy. Of note my initial consult note specified no nipple retraction and that was entered in error.  [de-identified] : mild restriction in ROM L knee

## 2020-09-09 NOTE — REASON FOR VISIT
[Follow-Up Visit] : a follow-up [Other: _____] : [unfilled] [Pre-Treatment Visit] : a pre-treatment [Research Visit] : a research  [FreeTextEntry2] : ER + breast cancer

## 2020-09-11 ENCOUNTER — RESULT REVIEW (OUTPATIENT)
Age: 65
End: 2020-09-11

## 2020-09-11 ENCOUNTER — APPOINTMENT (OUTPATIENT)
Dept: HEMATOLOGY ONCOLOGY | Facility: CLINIC | Age: 65
End: 2020-09-11
Payer: COMMERCIAL

## 2020-09-11 VITALS
BODY MASS INDEX: 26.43 KG/M2 | HEART RATE: 82 BPM | OXYGEN SATURATION: 98 % | DIASTOLIC BLOOD PRESSURE: 80 MMHG | WEIGHT: 140 LBS | TEMPERATURE: 98.9 F | HEIGHT: 61 IN | SYSTOLIC BLOOD PRESSURE: 112 MMHG | RESPIRATION RATE: 16 BRPM

## 2020-09-11 DIAGNOSIS — D70.2 OTHER DRUG-INDUCED AGRANULOCYTOSIS: ICD-10-CM

## 2020-09-11 DIAGNOSIS — Z87.09 PERSONAL HISTORY OF OTHER DISEASES OF THE RESPIRATORY SYSTEM: ICD-10-CM

## 2020-09-11 LAB
BASOPHILS # BLD AUTO: 0 K/UL — SIGNIFICANT CHANGE UP (ref 0–0.2)
BASOPHILS NFR BLD AUTO: 0 % — SIGNIFICANT CHANGE UP (ref 0–2)
EOSINOPHIL # BLD AUTO: 0.12 K/UL — SIGNIFICANT CHANGE UP (ref 0–0.5)
EOSINOPHIL NFR BLD AUTO: 5 % — SIGNIFICANT CHANGE UP (ref 0–6)
HCT VFR BLD CALC: 40.2 % — SIGNIFICANT CHANGE UP (ref 34.5–45)
HGB BLD-MCNC: 13.2 G/DL — SIGNIFICANT CHANGE UP (ref 11.5–15.5)
LYMPHOCYTES # BLD AUTO: 1.95 K/UL — SIGNIFICANT CHANGE UP (ref 1–3.3)
LYMPHOCYTES # BLD AUTO: 84 % — HIGH (ref 13–44)
MCHC RBC-ENTMCNC: 29.2 PG — SIGNIFICANT CHANGE UP (ref 27–34)
MCHC RBC-ENTMCNC: 32.8 G/DL — SIGNIFICANT CHANGE UP (ref 32–36)
MCV RBC AUTO: 88.9 FL — SIGNIFICANT CHANGE UP (ref 80–100)
MONOCYTES # BLD AUTO: 0.14 K/UL — SIGNIFICANT CHANGE UP (ref 0–0.9)
MONOCYTES NFR BLD AUTO: 6 % — SIGNIFICANT CHANGE UP (ref 2–14)
NEUTROPHILS # BLD AUTO: 0.12 K/UL — LOW (ref 1.8–7.4)
NEUTROPHILS NFR BLD AUTO: 5 % — LOW (ref 43–77)
NRBC # BLD: 0 /100 — SIGNIFICANT CHANGE UP (ref 0–0)
NRBC # BLD: SIGNIFICANT CHANGE UP /100 WBCS (ref 0–0)
PLAT MORPH BLD: NORMAL — SIGNIFICANT CHANGE UP
PLATELET # BLD AUTO: 204 K/UL — SIGNIFICANT CHANGE UP (ref 150–400)
RBC # BLD: 4.52 M/UL — SIGNIFICANT CHANGE UP (ref 3.8–5.2)
RBC # FLD: 12.7 % — SIGNIFICANT CHANGE UP (ref 10.3–14.5)
RBC BLD AUTO: SIGNIFICANT CHANGE UP
WBC # BLD: 2.32 K/UL — LOW (ref 3.8–10.5)
WBC # FLD AUTO: 2.32 K/UL — LOW (ref 3.8–10.5)

## 2020-09-11 PROCEDURE — 99214 OFFICE O/P EST MOD 30 MIN: CPT

## 2020-09-11 RX ORDER — DEXAMETHASONE 4 MG/1
4 TABLET ORAL
Qty: 40 | Refills: 0 | Status: DISCONTINUED | COMMUNITY
Start: 2020-09-03 | End: 2020-09-11

## 2020-09-11 NOTE — END OF VISIT
[>50% of the face to face encounter time was spent on counseling and/or coordination of care for ___] : Greater than 50% of the face to face encounter time was spent on counseling and/or coordination of care for [unfilled] [FreeTextEntry3] : Seen and d/w

## 2020-09-11 NOTE — REVIEW OF SYSTEMS
[Negative] : Endocrine [FreeTextEntry2] : as above [FreeTextEntry4] : as above [FreeTextEntry9] : as above [de-identified] : as above

## 2020-09-11 NOTE — PHYSICAL EXAM
[Restricted in physically strenuous activity but ambulatory and able to carry out work of a light or sedentary nature] : Status 1- Restricted in physically strenuous activity but ambulatory and able to carry out work of a light or sedentary nature, e.g., light house work, office work [Normal] : normoactive bowel sounds, soft and nontender, no hepatosplenomegaly or masses appreciated [de-identified] : erythematous pharynx [de-identified] : The right breast is status post reduction mastopexy as well as lumpectomy with well-healed scars; there is chronic nipple retraction w/o change, no skin dimpling, or palpable masses.  The left breast is status post reduction mastopexy with well-healed scar; there is chronic nipple retraction w/o change, no skin dimpling, or palpable masses.  The bilateral axillae are without adenopathy. Of note my initial consult note specified no nipple retraction and that was entered in error.  [de-identified] : L ankle: + 1 + edema laterally, restricted ROM  secondary to pain

## 2020-09-11 NOTE — HISTORY OF PRESENT ILLNESS
[Disease: _____________________] : Disease: [unfilled] [T: ___] : T[unfilled] [N: ___] : N[unfilled] [M: ___] : M[unfilled] [AJCC Stage: ____] : AJCC Stage: [unfilled] [de-identified] : The patient's history of present illness began on 06/30/2020 when she had a routine mammogram with a finding of postsurgical changes compatible with her history of reduction mammoplasty; there was a focus of architectural distortion in the slightly lateral right breast at the level of the nipple, measuring approximately 10 mm with no associated microcalcifications, and no abnormal findings in the left breast; no axillary adenopathy was seen.  A right breast ultrasound performed on that same date demonstrated at the 9 o'clock axis 5 cm from the nipple, an irregularly marginated mass measuring 7 x 8 x 11 mm with ultrasound-guided core biopsy recommended; the left breast was unremarkable.  The patient then went on to have an ultrasound-guided core biopsy of the right breast on 07/18/2020 with a finding at the 8 o'clock axis 5 cm from nipple of an invasive moderately differentiated ductal carcinoma, Mp score 6/9 with invasive tumor measuring at least 1 cm with evidence of DCIS, with microcalcifications identified in the invasive carcinoma and in the DCIS, with no evidence of lymphovascular invasion; the right axillary biopsy on that same date demonstrated invasive mammary carcinoma with lobular features, with necrosis and scattered lymphocytic infiltrate.  Estrogen receptor returned positive greater than 90%, progesterone receptor positive greater than 90% HER-2/charlotte negative.  An MRI of the bilateral breasts demonstrated a 3 cm enlarged lymph node in the right axilla and no significant left axillary or internal mammary adenopathy; in the right breast, there was a 15 mm irregular enhancing mass with a second enhancing irregular mass located 2.5 cm anterior, medial, inferior from the index mass at the 8 o'clock axis, anterior depth and measuring 11 mm.  The patient ultimately saw Dr. Liza Navarrete and on 07/28/2020 underwent a right lumpectomy/sentinel lymph node biopsy with a finding of multifocal invasive carcinoma, moderately differentiated with tubulolobular features measuring 1.1 cm in addition to an invasive moderately differentiated carcinoma with tubulolobular features measuring 1.5 cm with multiple separate small foci of invasive carcinoma with single file arrangement of tumor cells E-cadherin positive, DCIS, LCIS, with further shave margins specifically in the right superior breast margin returning negative for carcinoma but with evidence of DCIS and a single isolated duct measuring 0.2 cm with margins negative for DCIS, an additional shave margins returning negative; in the right axilla, metastatic ductal carcinoma involved 3/7 lymph nodes with a carcinoma being E-cadherin positive. \par \par The patient was seen in initial consultation on 8/3/20 regarding further treatment recommendations.  \par \par A Mammaprint was sent off and returned with a high risk score. An oncotype for node positive cancer was also sent off (unintentionally) which resulted in a recurrence score of 23 translating to 19%  risk of distant recurrence and an absolute benefit from chemotherapy. Results of both these tests were discussed with her extensively and she has decided to proceed with chemotherapy with dose dense doxorubicin/cyclophospmamide folllowed by dose dense paclitaxel every 2 weeks x 4 with Pegfilgrastim support.  [de-identified] : ER+ [place]+ Her 2 charlotte - [de-identified] : 9/4/20  started on  treatment with  Dose dense doxorubicin/cyclophosphmide every 2 weeks followed by dose dense paclitaxel every 2 weeks x 4, both with pegfilgrastim,\par Withdrew participation  from  [id number] Olanzapine antinausea study on 9/7/20 C1D4\par Called today with the following complaints:\par 1. Sore throat x 2 days\par 2. Felt "very cold" yesterday, denies any shaking chills or fever\par 3. increasing pain in the L ankle. She saw a podiatrist earlier today, was told that she has likely tendonitis of the Achilles tendon and was prescribed a boot and OTC anti inflammatories\par C/o being increasingly fatigued over the last couple of days. She also has symtpoms of reflux/heart burn, which have increased since starting treatment. She has only been taking the prescribed nexium once a week.

## 2020-09-11 NOTE — REASON FOR VISIT
[Follow-Up Visit] : a follow-up [Urgent Visit] : an urgent  [FreeTextEntry2] : ER + breast cancer, Sore throat, fatigue

## 2020-09-14 ENCOUNTER — APPOINTMENT (OUTPATIENT)
Dept: ORTHOPEDIC SURGERY | Facility: CLINIC | Age: 65
End: 2020-09-14

## 2020-09-17 LAB — BACTERIA THROAT CULT: NORMAL

## 2020-09-21 ENCOUNTER — APPOINTMENT (OUTPATIENT)
Dept: MRI IMAGING | Facility: CLINIC | Age: 65
End: 2020-09-21

## 2020-09-21 ENCOUNTER — OUTPATIENT (OUTPATIENT)
Dept: OUTPATIENT SERVICES | Facility: HOSPITAL | Age: 65
LOS: 1 days | End: 2020-09-21
Payer: COMMERCIAL

## 2020-09-21 DIAGNOSIS — Z90.710 ACQUIRED ABSENCE OF BOTH CERVIX AND UTERUS: Chronic | ICD-10-CM

## 2020-09-21 DIAGNOSIS — Z00.8 ENCOUNTER FOR OTHER GENERAL EXAMINATION: ICD-10-CM

## 2020-09-21 DIAGNOSIS — Z98.890 OTHER SPECIFIED POSTPROCEDURAL STATES: Chronic | ICD-10-CM

## 2020-09-21 PROCEDURE — 73721 MRI JNT OF LWR EXTRE W/O DYE: CPT | Mod: 26,LT

## 2020-09-21 PROCEDURE — 73721 MRI JNT OF LWR EXTRE W/O DYE: CPT

## 2020-09-22 ENCOUNTER — RESULT REVIEW (OUTPATIENT)
Age: 65
End: 2020-09-22

## 2020-09-22 ENCOUNTER — APPOINTMENT (OUTPATIENT)
Dept: HEMATOLOGY ONCOLOGY | Facility: CLINIC | Age: 65
End: 2020-09-22
Payer: COMMERCIAL

## 2020-09-22 ENCOUNTER — APPOINTMENT (OUTPATIENT)
Dept: INFUSION THERAPY | Facility: HOSPITAL | Age: 65
End: 2020-09-22

## 2020-09-22 VITALS
RESPIRATION RATE: 16 BRPM | SYSTOLIC BLOOD PRESSURE: 133 MMHG | TEMPERATURE: 97.2 F | HEART RATE: 81 BPM | WEIGHT: 144.82 LBS | OXYGEN SATURATION: 98 % | BODY MASS INDEX: 27.37 KG/M2 | DIASTOLIC BLOOD PRESSURE: 83 MMHG

## 2020-09-22 LAB
BASOPHILS # BLD AUTO: 0.07 K/UL — SIGNIFICANT CHANGE UP (ref 0–0.2)
BASOPHILS NFR BLD AUTO: 0.6 % — SIGNIFICANT CHANGE UP (ref 0–2)
EOSINOPHIL # BLD AUTO: 0.01 K/UL — SIGNIFICANT CHANGE UP (ref 0–0.5)
EOSINOPHIL NFR BLD AUTO: 0.1 % — SIGNIFICANT CHANGE UP (ref 0–6)
HCT VFR BLD CALC: 38 % — SIGNIFICANT CHANGE UP (ref 34.5–45)
HGB BLD-MCNC: 12.2 G/DL — SIGNIFICANT CHANGE UP (ref 11.5–15.5)
IMM GRANULOCYTES NFR BLD AUTO: 3.2 % — HIGH (ref 0–1.5)
LYMPHOCYTES # BLD AUTO: 25.2 % — SIGNIFICANT CHANGE UP (ref 13–44)
LYMPHOCYTES # BLD AUTO: 3.04 K/UL — SIGNIFICANT CHANGE UP (ref 1–3.3)
MCHC RBC-ENTMCNC: 29.2 PG — SIGNIFICANT CHANGE UP (ref 27–34)
MCHC RBC-ENTMCNC: 32.1 G/DL — SIGNIFICANT CHANGE UP (ref 32–36)
MCV RBC AUTO: 90.9 FL — SIGNIFICANT CHANGE UP (ref 80–100)
MONOCYTES # BLD AUTO: 0.96 K/UL — HIGH (ref 0–0.9)
MONOCYTES NFR BLD AUTO: 8 % — SIGNIFICANT CHANGE UP (ref 2–14)
NEUTROPHILS # BLD AUTO: 7.59 K/UL — HIGH (ref 1.8–7.4)
NEUTROPHILS NFR BLD AUTO: 62.9 % — SIGNIFICANT CHANGE UP (ref 43–77)
NRBC # BLD: 0 /100 WBCS — SIGNIFICANT CHANGE UP (ref 0–0)
PLATELET # BLD AUTO: 453 K/UL — HIGH (ref 150–400)
RBC # BLD: 4.18 M/UL — SIGNIFICANT CHANGE UP (ref 3.8–5.2)
RBC # FLD: 14.4 % — SIGNIFICANT CHANGE UP (ref 10.3–14.5)
WBC # BLD: 12.05 K/UL — HIGH (ref 3.8–10.5)
WBC # FLD AUTO: 12.05 K/UL — HIGH (ref 3.8–10.5)

## 2020-09-22 PROCEDURE — 99214 OFFICE O/P EST MOD 30 MIN: CPT

## 2020-09-25 ENCOUNTER — APPOINTMENT (OUTPATIENT)
Dept: HEMATOLOGY ONCOLOGY | Facility: CLINIC | Age: 65
End: 2020-09-25
Payer: COMMERCIAL

## 2020-09-25 PROCEDURE — 99442: CPT

## 2020-09-25 NOTE — END OF VISIT
[>50% of the face to face encounter time was spent on counseling and/or coordination of care for ___] : Greater than 50% of the face to face encounter time was spent on counseling and/or coordination of care for [unfilled] [FreeTextEntry3] : d/w  [Time Spent: ___ minutes] : I have spent [unfilled] minutes of time on the encounter.

## 2020-09-25 NOTE — REASON FOR VISIT
[Follow-Up Visit] : a follow-up [Pre-Treatment Visit] : a pre-treatment [FreeTextEntry2] : ER + breast cancer

## 2020-09-25 NOTE — REVIEW OF SYSTEMS
[Negative] : Psychiatric [FreeTextEntry2] : as above [FreeTextEntry4] : as above [FreeTextEntry9] : as above

## 2020-09-25 NOTE — HISTORY OF PRESENT ILLNESS
[Disease: _____________________] : Disease: [unfilled] [T: ___] : T[unfilled] [N: ___] : N[unfilled] [M: ___] : M[unfilled] [AJCC Stage: ____] : AJCC Stage: [unfilled] [de-identified] : The patient's history of present illness began on 06/30/2020 when she had a routine mammogram with a finding of postsurgical changes compatible with her history of reduction mammoplasty; there was a focus of architectural distortion in the slightly lateral right breast at the level of the nipple, measuring approximately 10 mm with no associated microcalcifications, and no abnormal findings in the left breast; no axillary adenopathy was seen.  A right breast ultrasound performed on that same date demonstrated at the 9 o'clock axis 5 cm from the nipple, an irregularly marginated mass measuring 7 x 8 x 11 mm with ultrasound-guided core biopsy recommended; the left breast was unremarkable.  The patient then went on to have an ultrasound-guided core biopsy of the right breast on 07/18/2020 with a finding at the 8 o'clock axis 5 cm from nipple of an invasive moderately differentiated ductal carcinoma, Mp score 6/9 with invasive tumor measuring at least 1 cm with evidence of DCIS, with microcalcifications identified in the invasive carcinoma and in the DCIS, with no evidence of lymphovascular invasion; the right axillary biopsy on that same date demonstrated invasive mammary carcinoma with lobular features, with necrosis and scattered lymphocytic infiltrate.  Estrogen receptor returned positive greater than 90%, progesterone receptor positive greater than 90% HER-2/charlotte negative.  An MRI of the bilateral breasts demonstrated a 3 cm enlarged lymph node in the right axilla and no significant left axillary or internal mammary adenopathy; in the right breast, there was a 15 mm irregular enhancing mass with a second enhancing irregular mass located 2.5 cm anterior, medial, inferior from the index mass at the 8 o'clock axis, anterior depth and measuring 11 mm.  The patient ultimately saw Dr. Liza Navarrete and on 07/28/2020 underwent a right lumpectomy/sentinel lymph node biopsy with a finding of multifocal invasive carcinoma, moderately differentiated with tubulolobular features measuring 1.1 cm in addition to an invasive moderately differentiated carcinoma with tubulolobular features measuring 1.5 cm with multiple separate small foci of invasive carcinoma with single file arrangement of tumor cells E-cadherin positive, DCIS, LCIS, with further shave margins specifically in the right superior breast margin returning negative for carcinoma but with evidence of DCIS and a single isolated duct measuring 0.2 cm with margins negative for DCIS, an additional shave margins returning negative; in the right axilla, metastatic ductal carcinoma involved 3/7 lymph nodes with a carcinoma being E-cadherin positive. \par \par The patient was seen in initial consultation on 8/3/20 regarding further treatment recommendations.  \par \par A Mammaprint was sent off and returned with a high risk score. An oncotype for node positive cancer was also sent off (unintentionally) which resulted in a recurrence score of 23 translating to 19%  risk of distant recurrence and an absolute benefit from chemotherapy. Results of both these tests were discussed with her extensively and she has decided to proceed with chemotherapy with dose dense doxorubicin/cyclophospmamide folllowed by dose dense paclitaxel every 2 weeks x 4 with Pegfilgrastim support.  [de-identified] : ER+ [place]+ Her 2 charlotte - [de-identified] : 9/4/20  started on  treatment with  Dose dense doxorubicin/cyclophosphmide every 2 weeks followed by dose dense paclitaxel every 2 weeks x 4, both with pegfilgrastim,\par Withdrew participation  from  [id number] Olanzapine antinausea study on 9/7/20 C1D4\par She was seen for an urgent visit on C1D8 with c/o fatigue, sore throat, "feeling cold". Was found to have an ANC of 0.12, subsequently had developed a low grade temp, chills. Was seen by her podiatrist and was prescribed Keflex for 1 week which she is on at the present time. Denies any further fever/chills/sob.\par L ankle is still in a boot, with significant reduction in pain symptoms.\par Notes a good appetite, stable weight and performance status back to baseline.

## 2020-09-25 NOTE — PHYSICAL EXAM
[Restricted in physically strenuous activity but ambulatory and able to carry out work of a light or sedentary nature] : Status 1- Restricted in physically strenuous activity but ambulatory and able to carry out work of a light or sedentary nature, e.g., light house work, office work [Normal] : affect appropriate [de-identified] : The right breast is status post reduction mastopexy as well as lumpectomy with well-healed scars; there is chronic nipple retraction w/o change, no skin dimpling, or palpable masses.  The left breast is status post reduction mastopexy with well-healed scar; there is chronic nipple retraction w/o change, no skin dimpling, or palpable masses.  The bilateral axillae are without adenopathy. Of note my initial consult note specified no nipple retraction and that was entered in error.  [de-identified] : L ankle: + 1 + edema laterally, restricted ROM

## 2020-09-28 ENCOUNTER — OUTPATIENT (OUTPATIENT)
Dept: OUTPATIENT SERVICES | Facility: HOSPITAL | Age: 65
LOS: 1 days | Discharge: HOME | End: 2020-09-28

## 2020-09-28 DIAGNOSIS — Z98.890 OTHER SPECIFIED POSTPROCEDURAL STATES: Chronic | ICD-10-CM

## 2020-09-28 DIAGNOSIS — Z90.710 ACQUIRED ABSENCE OF BOTH CERVIX AND UTERUS: Chronic | ICD-10-CM

## 2020-09-28 DIAGNOSIS — C50.919 MALIGNANT NEOPLASM OF UNSPECIFIED SITE OF UNSPECIFIED FEMALE BREAST: ICD-10-CM

## 2020-10-06 ENCOUNTER — RESULT REVIEW (OUTPATIENT)
Age: 65
End: 2020-10-06

## 2020-10-06 ENCOUNTER — APPOINTMENT (OUTPATIENT)
Dept: INFUSION THERAPY | Facility: HOSPITAL | Age: 65
End: 2020-10-06

## 2020-10-06 ENCOUNTER — APPOINTMENT (OUTPATIENT)
Dept: HEMATOLOGY ONCOLOGY | Facility: CLINIC | Age: 65
End: 2020-10-06
Payer: COMMERCIAL

## 2020-10-06 VITALS
WEIGHT: 143.98 LBS | HEART RATE: 85 BPM | OXYGEN SATURATION: 97 % | BODY MASS INDEX: 27.21 KG/M2 | SYSTOLIC BLOOD PRESSURE: 125 MMHG | DIASTOLIC BLOOD PRESSURE: 82 MMHG | TEMPERATURE: 97.3 F | RESPIRATION RATE: 16 BRPM

## 2020-10-06 DIAGNOSIS — M67.88 OTHER SPECIFIED DISORDERS OF SYNOVIUM AND TENDON, OTHER SITE: ICD-10-CM

## 2020-10-06 LAB
BASOPHILS # BLD AUTO: 0 K/UL — SIGNIFICANT CHANGE UP (ref 0–0.2)
BASOPHILS NFR BLD AUTO: 0 % — SIGNIFICANT CHANGE UP (ref 0–2)
EOSINOPHIL # BLD AUTO: 0 K/UL — SIGNIFICANT CHANGE UP (ref 0–0.5)
EOSINOPHIL NFR BLD AUTO: 0 % — SIGNIFICANT CHANGE UP (ref 0–6)
HCT VFR BLD CALC: 36.7 % — SIGNIFICANT CHANGE UP (ref 34.5–45)
HGB BLD-MCNC: 12 G/DL — SIGNIFICANT CHANGE UP (ref 11.5–15.5)
LYMPHOCYTES # BLD AUTO: 24 % — SIGNIFICANT CHANGE UP (ref 13–44)
LYMPHOCYTES # BLD AUTO: 4.08 K/UL — HIGH (ref 1–3.3)
MCHC RBC-ENTMCNC: 29.7 PG — SIGNIFICANT CHANGE UP (ref 27–34)
MCHC RBC-ENTMCNC: 32.7 G/DL — SIGNIFICANT CHANGE UP (ref 32–36)
MCV RBC AUTO: 90.8 FL — SIGNIFICANT CHANGE UP (ref 80–100)
MONOCYTES # BLD AUTO: 1.7 K/UL — HIGH (ref 0–0.9)
MONOCYTES NFR BLD AUTO: 10 % — SIGNIFICANT CHANGE UP (ref 2–14)
NEUTROPHILS # BLD AUTO: 11.22 K/UL — HIGH (ref 1.8–7.4)
NEUTROPHILS NFR BLD AUTO: 66 % — SIGNIFICANT CHANGE UP (ref 43–77)
NRBC # BLD: 0 /100 — SIGNIFICANT CHANGE UP (ref 0–0)
NRBC # BLD: SIGNIFICANT CHANGE UP /100 WBCS (ref 0–0)
PLAT MORPH BLD: NORMAL — SIGNIFICANT CHANGE UP
PLATELET # BLD AUTO: 170 K/UL — SIGNIFICANT CHANGE UP (ref 150–400)
RBC # BLD: 4.04 M/UL — SIGNIFICANT CHANGE UP (ref 3.8–5.2)
RBC # FLD: 15.9 % — HIGH (ref 10.3–14.5)
RBC BLD AUTO: SIGNIFICANT CHANGE UP
WBC # BLD: 17 K/UL — HIGH (ref 3.8–10.5)
WBC # FLD AUTO: 17 K/UL — HIGH (ref 3.8–10.5)

## 2020-10-06 PROCEDURE — 99214 OFFICE O/P EST MOD 30 MIN: CPT

## 2020-10-06 RX ORDER — ESOMEPRAZOLE MAGNESIUM 20 MG/1
20 CAPSULE, DELAYED RELEASE ORAL
Refills: 0 | Status: DISCONTINUED | COMMUNITY
Start: 2020-06-15 | End: 2020-10-06

## 2020-10-07 DIAGNOSIS — Z51.11 ENCOUNTER FOR ANTINEOPLASTIC CHEMOTHERAPY: ICD-10-CM

## 2020-10-07 DIAGNOSIS — R11.2 NAUSEA WITH VOMITING, UNSPECIFIED: ICD-10-CM

## 2020-10-07 DIAGNOSIS — Z51.89 ENCOUNTER FOR OTHER SPECIFIED AFTERCARE: ICD-10-CM

## 2020-10-07 PROBLEM — M67.88 ACHILLES TENDINOSIS: Status: ACTIVE | Noted: 2020-09-25

## 2020-10-07 NOTE — REASON FOR VISIT
[Follow-Up Visit] : a follow-up [Pre-Treatment Visit] : a pre-treatment [FreeTextEntry2] : ER + breast cancer on adjuvant chemotherapy

## 2020-10-07 NOTE — PHYSICAL EXAM
[Restricted in physically strenuous activity but ambulatory and able to carry out work of a light or sedentary nature] : Status 1- Restricted in physically strenuous activity but ambulatory and able to carry out work of a light or sedentary nature, e.g., light house work, office work [Normal] : affect appropriate [de-identified] : The right breast is status post reduction mastopexy as well as lumpectomy with well-healed scars; there is chronic nipple retraction w/o change, no skin dimpling, or palpable masses.  The left breast is status post reduction mastopexy with well-healed scar; there is chronic nipple retraction w/o change, no skin dimpling, or palpable masses.  The bilateral axillae are without adenopathy. Of note my initial consult note specified no nipple retraction and that was entered in error.  [de-identified] : L ankle: + 1 + edema laterally, restricted ROM  L LE in a boot

## 2020-10-07 NOTE — REVIEW OF SYSTEMS
[Negative] : ENT [FreeTextEntry2] : as above [FreeTextEntry7] : as above [FreeTextEntry9] : as above

## 2020-10-07 NOTE — END OF VISIT
[Time Spent: ___ minutes] : I have spent [unfilled] minutes of time on the encounter. [>50% of the face to face encounter time was spent on counseling and/or coordination of care for ___] : Greater than 50% of the face to face encounter time was spent on counseling and/or coordination of care for [unfilled] [FreeTextEntry3] : d/w

## 2020-10-07 NOTE — HISTORY OF PRESENT ILLNESS
[Disease: _____________________] : Disease: [unfilled] [T: ___] : T[unfilled] [N: ___] : N[unfilled] [M: ___] : M[unfilled] [AJCC Stage: ____] : AJCC Stage: [unfilled] [de-identified] : The patient's history of present illness began on 06/30/2020 when she had a routine mammogram with a finding of postsurgical changes compatible with her history of reduction mammoplasty; there was a focus of architectural distortion in the slightly lateral right breast at the level of the nipple, measuring approximately 10 mm with no associated microcalcifications, and no abnormal findings in the left breast; no axillary adenopathy was seen.  A right breast ultrasound performed on that same date demonstrated at the 9 o'clock axis 5 cm from the nipple, an irregularly marginated mass measuring 7 x 8 x 11 mm with ultrasound-guided core biopsy recommended; the left breast was unremarkable.  The patient then went on to have an ultrasound-guided core biopsy of the right breast on 07/18/2020 with a finding at the 8 o'clock axis 5 cm from nipple of an invasive moderately differentiated ductal carcinoma, Mp score 6/9 with invasive tumor measuring at least 1 cm with evidence of DCIS, with microcalcifications identified in the invasive carcinoma and in the DCIS, with no evidence of lymphovascular invasion; the right axillary biopsy on that same date demonstrated invasive mammary carcinoma with lobular features, with necrosis and scattered lymphocytic infiltrate.  Estrogen receptor returned positive greater than 90%, progesterone receptor positive greater than 90% HER-2/charlotte negative.  An MRI of the bilateral breasts demonstrated a 3 cm enlarged lymph node in the right axilla and no significant left axillary or internal mammary adenopathy; in the right breast, there was a 15 mm irregular enhancing mass with a second enhancing irregular mass located 2.5 cm anterior, medial, inferior from the index mass at the 8 o'clock axis, anterior depth and measuring 11 mm.  The patient ultimately saw Dr. Liza Navarrete and on 07/28/2020 underwent a right lumpectomy/sentinel lymph node biopsy with a finding of multifocal invasive carcinoma, moderately differentiated with tubulolobular features measuring 1.1 cm in addition to an invasive moderately differentiated carcinoma with tubulolobular features measuring 1.5 cm with multiple separate small foci of invasive carcinoma with single file arrangement of tumor cells E-cadherin positive, DCIS, LCIS, with further shave margins specifically in the right superior breast margin returning negative for carcinoma but with evidence of DCIS and a single isolated duct measuring 0.2 cm with margins negative for DCIS, an additional shave margins returning negative; in the right axilla, metastatic ductal carcinoma involved 3/7 lymph nodes with a carcinoma being E-cadherin positive. \par \par The patient was seen in initial consultation on 8/3/20 regarding further treatment recommendations.  \par \par A Mammaprint was sent off and returned with a high risk score. An oncotype for node positive cancer was also sent off (unintentionally) which resulted in a recurrence score of 23 translating to 19%  risk of distant recurrence and an absolute benefit from chemotherapy. Results of both these tests were discussed with her extensively and she has decided to proceed with chemotherapy with dose dense doxorubicin/cyclophospmamide folllowed by dose dense paclitaxel every 2 weeks x 4 with Pegfilgrastim support.  [de-identified] : ER+ [place]+ Her 2 charlotte - [de-identified] : 9/4/20  started on  treatment with  Dose dense doxorubicin/cyclophosphmide every 2 weeks followed by dose dense paclitaxel every 2 weeks x 4, both with pegfilgrastim,\par Withdrew participation  from  [id number] Olanzapine antinausea study on 9/7/20 C1D4\par S/p Cycle #2. With significant Nausea for the first 7 days following treatment. She took zofran initially without any relief in symptoms. Did not take compazine as it did not benefit her during the previous cycle. Reglan was prescribed by the on-call team, which she did not take either. We had then talked on the phone and started her on dexamethasone. Took 2mg tab daily x 3 days with relief in symptoms. HAs mild ongoing fatigue. Very frustrated with the Tendinitis in the L foot. Still wearing the orthotic. Hesitant taking antiinflammatories.\par Appetite ok, weight stable, and a restricted performance status secondary to the L foot pain.

## 2020-10-14 ENCOUNTER — LABORATORY RESULT (OUTPATIENT)
Age: 65
End: 2020-10-14

## 2020-10-19 ENCOUNTER — APPOINTMENT (OUTPATIENT)
Dept: CARDIOLOGY | Facility: CLINIC | Age: 65
End: 2020-10-19
Payer: COMMERCIAL

## 2020-10-19 VITALS
HEART RATE: 80 BPM | SYSTOLIC BLOOD PRESSURE: 120 MMHG | DIASTOLIC BLOOD PRESSURE: 85 MMHG | HEIGHT: 61 IN | RESPIRATION RATE: 16 BRPM | WEIGHT: 146 LBS | BODY MASS INDEX: 27.56 KG/M2

## 2020-10-19 PROCEDURE — 99072 ADDL SUPL MATRL&STAF TM PHE: CPT

## 2020-10-19 PROCEDURE — 99213 OFFICE O/P EST LOW 20 MIN: CPT

## 2020-10-20 ENCOUNTER — APPOINTMENT (OUTPATIENT)
Dept: INFUSION THERAPY | Facility: HOSPITAL | Age: 65
End: 2020-10-20

## 2020-10-20 ENCOUNTER — RESULT REVIEW (OUTPATIENT)
Age: 65
End: 2020-10-20

## 2020-10-20 ENCOUNTER — LABORATORY RESULT (OUTPATIENT)
Age: 65
End: 2020-10-20

## 2020-10-20 ENCOUNTER — APPOINTMENT (OUTPATIENT)
Dept: HEMATOLOGY ONCOLOGY | Facility: CLINIC | Age: 65
End: 2020-10-20
Payer: COMMERCIAL

## 2020-10-20 VITALS
BODY MASS INDEX: 27.37 KG/M2 | HEART RATE: 106 BPM | SYSTOLIC BLOOD PRESSURE: 126 MMHG | OXYGEN SATURATION: 96 % | WEIGHT: 144.82 LBS | DIASTOLIC BLOOD PRESSURE: 86 MMHG | RESPIRATION RATE: 16 BRPM | TEMPERATURE: 97.2 F

## 2020-10-20 DIAGNOSIS — R11.2 NAUSEA WITH VOMITING, UNSPECIFIED: ICD-10-CM

## 2020-10-20 LAB
BASOPHILS # BLD AUTO: 0 K/UL — SIGNIFICANT CHANGE UP (ref 0–0.2)
BASOPHILS NFR BLD AUTO: 0 % — SIGNIFICANT CHANGE UP (ref 0–2)
BLASTS # FLD: 1 % — HIGH (ref 0–0)
EOSINOPHIL # BLD AUTO: 0 K/UL — SIGNIFICANT CHANGE UP (ref 0–0.5)
EOSINOPHIL NFR BLD AUTO: 0 % — SIGNIFICANT CHANGE UP (ref 0–6)
HCT VFR BLD CALC: 36.8 % — SIGNIFICANT CHANGE UP (ref 34.5–45)
HGB BLD-MCNC: 11.9 G/DL — SIGNIFICANT CHANGE UP (ref 11.5–15.5)
LYMPHOCYTES # BLD AUTO: 30 % — SIGNIFICANT CHANGE UP (ref 13–44)
LYMPHOCYTES # BLD AUTO: 4.71 K/UL — HIGH (ref 1–3.3)
MCHC RBC-ENTMCNC: 30.1 PG — SIGNIFICANT CHANGE UP (ref 27–34)
MCHC RBC-ENTMCNC: 32.3 G/DL — SIGNIFICANT CHANGE UP (ref 32–36)
MCV RBC AUTO: 92.9 FL — SIGNIFICANT CHANGE UP (ref 80–100)
MONOCYTES # BLD AUTO: 1.57 K/UL — HIGH (ref 0–0.9)
MONOCYTES NFR BLD AUTO: 10 % — SIGNIFICANT CHANGE UP (ref 2–14)
NEUTROPHILS # BLD AUTO: 9.26 K/UL — HIGH (ref 1.8–7.4)
NEUTROPHILS NFR BLD AUTO: 59 % — SIGNIFICANT CHANGE UP (ref 43–77)
NRBC # BLD: 3 /100 — HIGH (ref 0–0)
NRBC # BLD: SIGNIFICANT CHANGE UP /100 WBCS (ref 0–0)
PLAT MORPH BLD: NORMAL — SIGNIFICANT CHANGE UP
PLATELET # BLD AUTO: 273 K/UL — SIGNIFICANT CHANGE UP (ref 150–400)
RBC # BLD: 3.96 M/UL — SIGNIFICANT CHANGE UP (ref 3.8–5.2)
RBC # FLD: 17.9 % — HIGH (ref 10.3–14.5)
RBC BLD AUTO: SIGNIFICANT CHANGE UP
WBC # BLD: 15.7 K/UL — HIGH (ref 3.8–10.5)
WBC # FLD AUTO: 15.7 K/UL — HIGH (ref 3.8–10.5)

## 2020-10-20 PROCEDURE — 99072 ADDL SUPL MATRL&STAF TM PHE: CPT

## 2020-10-20 PROCEDURE — 99214 OFFICE O/P EST MOD 30 MIN: CPT | Mod: 25

## 2020-10-20 RX ORDER — PROCHLORPERAZINE MALEATE 10 MG/1
10 TABLET ORAL EVERY 6 HOURS
Qty: 30 | Refills: 0 | Status: COMPLETED | COMMUNITY
Start: 2020-09-03 | End: 2020-10-20

## 2020-10-20 RX ORDER — ALPRAZOLAM 0.5 MG/1
0.5 TABLET ORAL TWICE DAILY
Qty: 30 | Refills: 0 | Status: COMPLETED | COMMUNITY
Start: 2020-09-02 | End: 2020-10-20

## 2020-10-20 RX ORDER — ESOMEPRAZOLE MAGNESIUM 40 MG/1
40 CAPSULE, DELAYED RELEASE ORAL DAILY
Qty: 30 | Refills: 3 | Status: COMPLETED | COMMUNITY
Start: 2020-10-06 | End: 2020-10-20

## 2020-10-20 RX ORDER — OLANZAPINE 5 MG/1
5 TABLET, FILM COATED ORAL
Qty: 5 | Refills: 30 | Status: COMPLETED | COMMUNITY
Start: 2020-09-25 | End: 2020-10-20

## 2020-10-20 RX ORDER — ONDANSETRON 8 MG/1
8 TABLET ORAL EVERY 8 HOURS
Qty: 30 | Refills: 6 | Status: COMPLETED | COMMUNITY
Start: 2020-09-17 | End: 2020-10-20

## 2020-10-20 RX ORDER — METOCLOPRAMIDE 10 MG/1
10 TABLET ORAL EVERY 6 HOURS
Qty: 120 | Refills: 0 | Status: COMPLETED | COMMUNITY
Start: 2020-09-24 | End: 2020-10-20

## 2020-10-20 RX ORDER — APREPITANT 80 MG/1
80 CAPSULE ORAL
Qty: 6 | Refills: 3 | Status: COMPLETED | COMMUNITY
Start: 2020-09-22 | End: 2020-10-20

## 2020-10-20 RX ORDER — LORAZEPAM 0.5 MG/1
0.5 TABLET ORAL
Qty: 30 | Refills: 0 | Status: COMPLETED | COMMUNITY
Start: 2020-10-06 | End: 2020-10-20

## 2020-10-20 NOTE — HISTORY OF PRESENT ILLNESS
[Disease: _____________________] : Disease: [unfilled] [T: ___] : T[unfilled] [N: ___] : N[unfilled] [M: ___] : M[unfilled] [AJCC Stage: ____] : AJCC Stage: [unfilled] [de-identified] : The patient's history of present illness began on 06/30/2020 when she had a routine mammogram with a finding of postsurgical changes compatible with her history of reduction mammoplasty; there was a focus of architectural distortion in the slightly lateral right breast at the level of the nipple, measuring approximately 10 mm with no associated microcalcifications, and no abnormal findings in the left breast; no axillary adenopathy was seen.  A right breast ultrasound performed on that same date demonstrated at the 9 o'clock axis 5 cm from the nipple, an irregularly marginated mass measuring 7 x 8 x 11 mm with ultrasound-guided core biopsy recommended; the left breast was unremarkable.  The patient then went on to have an ultrasound-guided core biopsy of the right breast on 07/18/2020 with a finding at the 8 o'clock axis 5 cm from nipple of an invasive moderately differentiated ductal carcinoma, Mp score 6/9 with invasive tumor measuring at least 1 cm with evidence of DCIS, with microcalcifications identified in the invasive carcinoma and in the DCIS, with no evidence of lymphovascular invasion; the right axillary biopsy on that same date demonstrated invasive mammary carcinoma with lobular features, with necrosis and scattered lymphocytic infiltrate.  Estrogen receptor returned positive greater than 90%, progesterone receptor positive greater than 90% HER-2/charlotte negative.  An MRI of the bilateral breasts demonstrated a 3 cm enlarged lymph node in the right axilla and no significant left axillary or internal mammary adenopathy; in the right breast, there was a 15 mm irregular enhancing mass with a second enhancing irregular mass located 2.5 cm anterior, medial, inferior from the index mass at the 8 o'clock axis, anterior depth and measuring 11 mm.  The patient ultimately saw Dr. Liza Navarrete and on 07/28/2020 underwent a right lumpectomy/sentinel lymph node biopsy with a finding of multifocal invasive carcinoma, moderately differentiated with tubulolobular features measuring 1.1 cm in addition to an invasive moderately differentiated carcinoma with tubulolobular features measuring 1.5 cm with multiple separate small foci of invasive carcinoma with single file arrangement of tumor cells E-cadherin positive, DCIS, LCIS, with further shave margins specifically in the right superior breast margin returning negative for carcinoma but with evidence of DCIS and a single isolated duct measuring 0.2 cm with margins negative for DCIS, an additional shave margins returning negative; in the right axilla, metastatic ductal carcinoma involved 3/7 lymph nodes with a carcinoma being E-cadherin positive. \par \par The patient was seen in initial consultation on 8/3/20 regarding further treatment recommendations.  \par \par A Mammaprint was sent off and returned with a high risk score. An oncotype for node positive cancer was also sent off (unintentionally) which resulted in a recurrence score of 23 translating to 19%  risk of distant recurrence and an absolute benefit from chemotherapy. Results of both these tests were discussed with her extensively and she has decided to proceed with chemotherapy with dose dense doxorubicin/cyclophospmamide folllowed by dose dense paclitaxel every 2 weeks x 4 with Pegfilgrastim support.  [de-identified] : ER+ ME+ Her 2 charlotte - [de-identified] : 9/4/20  started on  treatment with  Dose dense doxorubicin/cyclophosphmide every 2 weeks followed by dose dense paclitaxel every 2 weeks x 4, both with pegfilgrastim,\par Withdrew participation  from  [id number] Olanzapine antinausea study on 9/7/20 C1D4\par \par S/p Cycle #3/4. \par \par Again had significant nausea for the first 7 days following treatment; she has not benefitted from prior antiemetics / was not keen on taking them . She took dex daily x 3 days with modest benefit at best.  Has mild ongoing fatigue. Very frustrated with the Tendinitis in the L foot; improving but still with swollen even after brace is now off. \par \par Appetite ok, weight stable, and a restricted performance status secondary to the L foot pain.

## 2020-10-20 NOTE — PHYSICAL EXAM
[Restricted in physically strenuous activity but ambulatory and able to carry out work of a light or sedentary nature] : Status 1- Restricted in physically strenuous activity but ambulatory and able to carry out work of a light or sedentary nature, e.g., light house work, office work [Normal] : affect appropriate [de-identified] : The right breast is status post reduction mastopexy as well as lumpectomy with well-healed scars; there is chronic nipple retraction w/o change, no skin dimpling, or palpable masses.  The left breast is status post reduction mastopexy with well-healed scar; there is chronic nipple retraction w/o change, no skin dimpling, or palpable masses.  The bilateral axillae are without adenopathy. Of note my initial consult note specified no nipple retraction and that was entered in error.  [de-identified] : L ankle: + 1 + edema laterally, restricted ROM  L LE in a boot

## 2020-10-20 NOTE — REVIEW OF SYSTEMS
[Negative] : Endocrine [FreeTextEntry2] : as above [FreeTextEntry9] : as above [FreeTextEntry7] : as above

## 2020-10-28 ENCOUNTER — OUTPATIENT (OUTPATIENT)
Dept: OUTPATIENT SERVICES | Facility: HOSPITAL | Age: 65
LOS: 1 days | Discharge: HOME | End: 2020-10-28

## 2020-10-28 DIAGNOSIS — C50.919 MALIGNANT NEOPLASM OF UNSPECIFIED SITE OF UNSPECIFIED FEMALE BREAST: ICD-10-CM

## 2020-10-28 DIAGNOSIS — Z98.890 OTHER SPECIFIED POSTPROCEDURAL STATES: Chronic | ICD-10-CM

## 2020-10-28 DIAGNOSIS — Z90.710 ACQUIRED ABSENCE OF BOTH CERVIX AND UTERUS: Chronic | ICD-10-CM

## 2020-10-30 ENCOUNTER — APPOINTMENT (OUTPATIENT)
Dept: HEMATOLOGY ONCOLOGY | Facility: CLINIC | Age: 65
End: 2020-10-30
Payer: COMMERCIAL

## 2020-10-30 PROCEDURE — 99212 OFFICE O/P EST SF 10 MIN: CPT | Mod: 95

## 2020-11-02 RX ORDER — CEPHALEXIN 500 MG/1
500 CAPSULE ORAL
Qty: 14 | Refills: 0 | Status: DISCONTINUED | COMMUNITY
Start: 2020-10-20 | End: 2020-11-02

## 2020-11-02 NOTE — END OF VISIT
[FreeTextEntry3] : d/w  [Time Spent: ___ minutes] : I have spent [unfilled] minutes of time on the encounter. [>50% of the face to face encounter time was spent on counseling and/or coordination of care for ___] : Greater than 50% of the face to face encounter time was spent on counseling and/or coordination of care for [unfilled]

## 2020-11-02 NOTE — REVIEW OF SYSTEMS
[Negative] : Endocrine [FreeTextEntry2] : as above [FreeTextEntry7] : as above [de-identified] : as above

## 2020-11-02 NOTE — HISTORY OF PRESENT ILLNESS
[Disease: _____________________] : Disease: [unfilled] [T: ___] : T[unfilled] [N: ___] : N[unfilled] [M: ___] : M[unfilled] [AJCC Stage: ____] : AJCC Stage: [unfilled] [de-identified] : The patient's history of present illness began on 06/30/2020 when she had a routine mammogram with a finding of postsurgical changes compatible with her history of reduction mammoplasty; there was a focus of architectural distortion in the slightly lateral right breast at the level of the nipple, measuring approximately 10 mm with no associated microcalcifications, and no abnormal findings in the left breast; no axillary adenopathy was seen.  A right breast ultrasound performed on that same date demonstrated at the 9 o'clock axis 5 cm from the nipple, an irregularly marginated mass measuring 7 x 8 x 11 mm with ultrasound-guided core biopsy recommended; the left breast was unremarkable.  The patient then went on to have an ultrasound-guided core biopsy of the right breast on 07/18/2020 with a finding at the 8 o'clock axis 5 cm from nipple of an invasive moderately differentiated ductal carcinoma, Mp score 6/9 with invasive tumor measuring at least 1 cm with evidence of DCIS, with microcalcifications identified in the invasive carcinoma and in the DCIS, with no evidence of lymphovascular invasion; the right axillary biopsy on that same date demonstrated invasive mammary carcinoma with lobular features, with necrosis and scattered lymphocytic infiltrate.  Estrogen receptor returned positive greater than 90%, progesterone receptor positive greater than 90% HER-2/charlotte negative.  An MRI of the bilateral breasts demonstrated a 3 cm enlarged lymph node in the right axilla and no significant left axillary or internal mammary adenopathy; in the right breast, there was a 15 mm irregular enhancing mass with a second enhancing irregular mass located 2.5 cm anterior, medial, inferior from the index mass at the 8 o'clock axis, anterior depth and measuring 11 mm.  The patient ultimately saw Dr. Liza Navarrete and on 07/28/2020 underwent a right lumpectomy/sentinel lymph node biopsy with a finding of multifocal invasive carcinoma, moderately differentiated with tubulolobular features measuring 1.1 cm in addition to an invasive moderately differentiated carcinoma with tubulolobular features measuring 1.5 cm with multiple separate small foci of invasive carcinoma with single file arrangement of tumor cells E-cadherin positive, DCIS, LCIS, with further shave margins specifically in the right superior breast margin returning negative for carcinoma but with evidence of DCIS and a single isolated duct measuring 0.2 cm with margins negative for DCIS, an additional shave margins returning negative; in the right axilla, metastatic ductal carcinoma involved 3/7 lymph nodes with a carcinoma being E-cadherin positive. \par \par The patient was seen in initial consultation on 8/3/20 regarding further treatment recommendations.  \par \par A Mammaprint was sent off and returned with a high risk score. An oncotype for node positive cancer was also sent off (unintentionally) which resulted in a recurrence score of 23 translating to 19%  risk of distant recurrence and an absolute benefit from chemotherapy. Results of both these tests were discussed with her extensively and she has decided to proceed with chemotherapy with dose dense doxorubicin/cyclophospmamide folllowed by dose dense paclitaxel every 2 weeks x 4 with Pegfilgrastim support.  [de-identified] : ER+ CT+ Her 2 charlotte - [de-identified] : 9/4/20  started on  treatment with  Dose dense doxorubicin/cyclophosphmide every 2 weeks followed by dose dense paclitaxel every 2 weeks x 4, both with pegfilgrastim,\par Withdrew participation  from  [id number] Olanzapine antinausea study on 9/7/20 C1D4\par \par She was seen on 10/20/20 for pre-treatment visit (#4 AC). AT the time she had erythema and tenderness around the port site, and was prescribed a course of keflex which she completed yesterday. Called today with concerns as she still has erythema around the port site, albeit improved. Denies any pain or drainage from the area, and no fever or chills. \par Again had significant nausea for the first 7 days following treatment; she has not benefitted from prior antiemetics / was not keen on taking them . She took dex daily x 3 days with modest benefit at best.  Has mild ongoing fatigue.\par \par

## 2020-11-02 NOTE — REASON FOR VISIT
[Home] : at home, [unfilled] , at the time of the visit. [Verbal consent obtained from patient] : the patient, [unfilled] [Medical Office: ([facility])___] : at the medical office located in  [Follow-Up Visit] : a follow-up [Pre-Treatment Visit] : a pre-treatment [FreeTextEntry2] : ER + breast cancer on adjuvant chemotherapy

## 2020-11-02 NOTE — PHYSICAL EXAM
[Restricted in physically strenuous activity but ambulatory and able to carry out work of a light or sedentary nature] : Status 1- Restricted in physically strenuous activity but ambulatory and able to carry out work of a light or sedentary nature, e.g., light house work, office work [Normal] : affect appropriate [de-identified] : + erythema aorund the port site, appears to have a scab centrally.

## 2020-11-03 ENCOUNTER — RESULT REVIEW (OUTPATIENT)
Age: 65
End: 2020-11-03

## 2020-11-03 ENCOUNTER — APPOINTMENT (OUTPATIENT)
Dept: INFUSION THERAPY | Facility: HOSPITAL | Age: 65
End: 2020-11-03

## 2020-11-03 ENCOUNTER — APPOINTMENT (OUTPATIENT)
Dept: HEMATOLOGY ONCOLOGY | Facility: CLINIC | Age: 65
End: 2020-11-03
Payer: COMMERCIAL

## 2020-11-03 VITALS
TEMPERATURE: 97.3 F | RESPIRATION RATE: 16 BRPM | OXYGEN SATURATION: 97 % | DIASTOLIC BLOOD PRESSURE: 92 MMHG | BODY MASS INDEX: 27.21 KG/M2 | HEART RATE: 115 BPM | WEIGHT: 144 LBS | SYSTOLIC BLOOD PRESSURE: 145 MMHG

## 2020-11-03 LAB
BASOPHILS # BLD AUTO: 0 K/UL — SIGNIFICANT CHANGE UP (ref 0–0.2)
BASOPHILS NFR BLD AUTO: 0 % — SIGNIFICANT CHANGE UP (ref 0–2)
EOSINOPHIL # BLD AUTO: 0 K/UL — SIGNIFICANT CHANGE UP (ref 0–0.5)
EOSINOPHIL NFR BLD AUTO: 0 % — SIGNIFICANT CHANGE UP (ref 0–6)
HCT VFR BLD CALC: 36.6 % — SIGNIFICANT CHANGE UP (ref 34.5–45)
HGB BLD-MCNC: 11.9 G/DL — SIGNIFICANT CHANGE UP (ref 11.5–15.5)
LYMPHOCYTES # BLD AUTO: 11 % — LOW (ref 13–44)
LYMPHOCYTES # BLD AUTO: 3.55 K/UL — HIGH (ref 1–3.3)
MCHC RBC-ENTMCNC: 29.8 PG — SIGNIFICANT CHANGE UP (ref 27–34)
MCHC RBC-ENTMCNC: 32.5 G/DL — SIGNIFICANT CHANGE UP (ref 32–36)
MCV RBC AUTO: 91.5 FL — SIGNIFICANT CHANGE UP (ref 80–100)
MONOCYTES # BLD AUTO: 0.97 K/UL — HIGH (ref 0–0.9)
MONOCYTES NFR BLD AUTO: 3 % — SIGNIFICANT CHANGE UP (ref 2–14)
MYELOCYTES NFR BLD: 1 % — HIGH (ref 0–0)
NEUTROPHILS # BLD AUTO: 27.44 K/UL — HIGH (ref 1.8–7.4)
NEUTROPHILS NFR BLD AUTO: 82 % — HIGH (ref 43–77)
NEUTS BAND # BLD: 3 % — SIGNIFICANT CHANGE UP (ref 0–8)
NRBC # BLD: 0 /100 — SIGNIFICANT CHANGE UP (ref 0–0)
NRBC # BLD: SIGNIFICANT CHANGE UP /100 WBCS (ref 0–0)
PLAT MORPH BLD: NORMAL — SIGNIFICANT CHANGE UP
PLATELET # BLD AUTO: 255 K/UL — SIGNIFICANT CHANGE UP (ref 150–400)
RBC # BLD: 4 M/UL — SIGNIFICANT CHANGE UP (ref 3.8–5.2)
RBC # FLD: 18.6 % — HIGH (ref 10.3–14.5)
RBC BLD AUTO: SIGNIFICANT CHANGE UP
WBC # BLD: 32.28 K/UL — HIGH (ref 3.8–10.5)
WBC # FLD AUTO: 32.28 K/UL — HIGH (ref 3.8–10.5)

## 2020-11-03 PROCEDURE — 99072 ADDL SUPL MATRL&STAF TM PHE: CPT

## 2020-11-03 PROCEDURE — 99214 OFFICE O/P EST MOD 30 MIN: CPT

## 2020-11-04 ENCOUNTER — LABORATORY RESULT (OUTPATIENT)
Age: 65
End: 2020-11-04

## 2020-11-04 ENCOUNTER — APPOINTMENT (OUTPATIENT)
Dept: DISASTER EMERGENCY | Facility: CLINIC | Age: 65
End: 2020-11-04

## 2020-11-04 ENCOUNTER — NON-APPOINTMENT (OUTPATIENT)
Age: 65
End: 2020-11-04

## 2020-11-04 LAB
APTT BLD: 26.6 SEC
INR PPP: 1.05 RATIO
PT BLD: 12.4 SEC

## 2020-11-04 NOTE — HISTORY OF PRESENT ILLNESS
[Disease: _____________________] : Disease: [unfilled] [T: ___] : T[unfilled] [N: ___] : N[unfilled] [M: ___] : M[unfilled] [AJCC Stage: ____] : AJCC Stage: [unfilled] [de-identified] : The patient's history of present illness began on 06/30/2020 when she had a routine mammogram with a finding of postsurgical changes compatible with her history of reduction mammoplasty; there was a focus of architectural distortion in the slightly lateral right breast at the level of the nipple, measuring approximately 10 mm with no associated microcalcifications, and no abnormal findings in the left breast; no axillary adenopathy was seen.  A right breast ultrasound performed on that same date demonstrated at the 9 o'clock axis 5 cm from the nipple, an irregularly marginated mass measuring 7 x 8 x 11 mm with ultrasound-guided core biopsy recommended; the left breast was unremarkable.  The patient then went on to have an ultrasound-guided core biopsy of the right breast on 07/18/2020 with a finding at the 8 o'clock axis 5 cm from nipple of an invasive moderately differentiated ductal carcinoma, Mp score 6/9 with invasive tumor measuring at least 1 cm with evidence of DCIS, with microcalcifications identified in the invasive carcinoma and in the DCIS, with no evidence of lymphovascular invasion; the right axillary biopsy on that same date demonstrated invasive mammary carcinoma with lobular features, with necrosis and scattered lymphocytic infiltrate.  Estrogen receptor returned positive greater than 90%, progesterone receptor positive greater than 90% HER-2/charlotte negative.  An MRI of the bilateral breasts demonstrated a 3 cm enlarged lymph node in the right axilla and no significant left axillary or internal mammary adenopathy; in the right breast, there was a 15 mm irregular enhancing mass with a second enhancing irregular mass located 2.5 cm anterior, medial, inferior from the index mass at the 8 o'clock axis, anterior depth and measuring 11 mm.  The patient ultimately saw Dr. Liza Navarrete and on 07/28/2020 underwent a right lumpectomy/sentinel lymph node biopsy with a finding of multifocal invasive carcinoma, moderately differentiated with tubulolobular features measuring 1.1 cm in addition to an invasive moderately differentiated carcinoma with tubulolobular features measuring 1.5 cm with multiple separate small foci of invasive carcinoma with single file arrangement of tumor cells E-cadherin positive, DCIS, LCIS, with further shave margins specifically in the right superior breast margin returning negative for carcinoma but with evidence of DCIS and a single isolated duct measuring 0.2 cm with margins negative for DCIS, an additional shave margins returning negative; in the right axilla, metastatic ductal carcinoma involved 3/7 lymph nodes with a carcinoma being E-cadherin positive. \par \par The patient was seen in initial consultation on 8/3/20 regarding further treatment recommendations.  \par \par A Mammaprint was sent off and returned with a high risk score. An oncotype for node positive cancer was also sent off (unintentionally) which resulted in a recurrence score of 23 translating to 19%  risk of distant recurrence and an absolute benefit from chemotherapy. Results of both these tests were discussed with her extensively and she has decided to proceed with chemotherapy with dose dense doxorubicin/cyclophospmamide folllowed by dose dense paclitaxel every 2 weeks x 4 with Pegfilgrastim support. \par \par 9/4/20  started on  treatment with  Dose dense doxorubicin/cyclophosphmide every 2 weeks followed by dose dense paclitaxel every 2 weeks x 4, both with pegfilgrastim,\par Withdrew participation  from  [id number] Olanzapine antinausea study on 9/7/20 C1D4 [de-identified] : ER+ [place]+ Her 2 charlotte - [de-identified] : 9/4/20  started on  treatment with  Dose dense doxorubicin/cyclophosphmide every 2 weeks followed by dose dense paclitaxel every 2 weeks x 4, both with pegfilgrastim,\par Withdrew participation  from  [id number] Olanzapine antinausea study on 9/7/20 C1D4\par \par She was seen on 10/20/20 for pre-treatment visit (#4 AC). At the time she had erythema and tenderness around the port site, and was prescribed a course of keflex which she completed yesterday. Called on 10/30/20 with concerns as she still had erythema around the port site, albeit improved. Denies any pain or drainage from the area, and no fever or chills.  \par \par COmes in today for dd paclitaxel / Onpro 1/4. \par \par Took dex premeds last juanita and again this morning, and both times with food, with mild GI distress. \par \par She c/o moderate fatigue. She c/o persistent nausea x 7 days after AC #4 and anxiety re what to expect with paclitaxel and concern re taking more dexamethasone. \par \par

## 2020-11-04 NOTE — REVIEW OF SYSTEMS
[Anxiety] : anxiety [Negative] : Endocrine [FreeTextEntry2] : as above [FreeTextEntry7] : as above [de-identified] : as above

## 2020-11-04 NOTE — PHYSICAL EXAM
[Restricted in physically strenuous activity but ambulatory and able to carry out work of a light or sedentary nature] : Status 1- Restricted in physically strenuous activity but ambulatory and able to carry out work of a light or sedentary nature, e.g., light house work, office work [Normal] : affect appropriate [de-identified] : The right breast is status post reduction mastopexy as well as lumpectomy with well-healed scars; there is chronic nipple retraction w/o change, no skin dimpling, or palpable masses. The left breast is status post reduction mastopexy with well-healed scar; there is chronic nipple retraction w/o change, no skin dimpling, or palpable masses. The bilateral axillae are without adenopathy.  [de-identified] : skin breakdown overlying lower half of mediport site with mediport visble on exam. No surrounding erythema or drainage or warmth noted

## 2020-11-06 ENCOUNTER — OUTPATIENT (OUTPATIENT)
Dept: OUTPATIENT SERVICES | Facility: HOSPITAL | Age: 65
LOS: 1 days | End: 2020-11-06
Payer: COMMERCIAL

## 2020-11-06 ENCOUNTER — RESULT REVIEW (OUTPATIENT)
Age: 65
End: 2020-11-06

## 2020-11-06 DIAGNOSIS — C50.919 MALIGNANT NEOPLASM OF UNSPECIFIED SITE OF UNSPECIFIED FEMALE BREAST: ICD-10-CM

## 2020-11-06 DIAGNOSIS — Z90.710 ACQUIRED ABSENCE OF BOTH CERVIX AND UTERUS: Chronic | ICD-10-CM

## 2020-11-06 DIAGNOSIS — Z98.890 OTHER SPECIFIED POSTPROCEDURAL STATES: Chronic | ICD-10-CM

## 2020-11-06 PROCEDURE — 36590 REMOVAL TUNNELED CV CATH: CPT

## 2020-11-06 PROCEDURE — 77001 FLUOROGUIDE FOR VEIN DEVICE: CPT | Mod: 26,GC

## 2020-11-09 ENCOUNTER — RESULT REVIEW (OUTPATIENT)
Age: 65
End: 2020-11-09

## 2020-11-09 ENCOUNTER — OUTPATIENT (OUTPATIENT)
Dept: OUTPATIENT SERVICES | Facility: HOSPITAL | Age: 65
LOS: 1 days | End: 2020-11-09
Payer: COMMERCIAL

## 2020-11-09 VITALS
RESPIRATION RATE: 16 BRPM | SYSTOLIC BLOOD PRESSURE: 122 MMHG | OXYGEN SATURATION: 99 % | TEMPERATURE: 99 F | DIASTOLIC BLOOD PRESSURE: 90 MMHG | HEART RATE: 112 BPM

## 2020-11-09 DIAGNOSIS — C50.911 MALIGNANT NEOPLASM OF UNSPECIFIED SITE OF RIGHT FEMALE BREAST: ICD-10-CM

## 2020-11-09 DIAGNOSIS — Z90.710 ACQUIRED ABSENCE OF BOTH CERVIX AND UTERUS: Chronic | ICD-10-CM

## 2020-11-09 DIAGNOSIS — Z98.890 OTHER SPECIFIED POSTPROCEDURAL STATES: Chronic | ICD-10-CM

## 2020-11-09 PROCEDURE — 36573 INSJ PICC RS&I 5 YR+: CPT | Mod: 58

## 2020-11-09 PROCEDURE — 36573 INSJ PICC RS&I 5 YR+: CPT

## 2020-11-09 NOTE — PROGRESS NOTE ADULT - SUBJECTIVE AND OBJECTIVE BOX
Interventional Radiology Brief Post Procedure Note    Procedure: PICC placement    Operators: Gene Arizmendi MD    Anesthesia (type): Local lidocaine    Contrast: None.     EBL: Minimal.     Findings/Follow up Plan of Care: Successful left basilic vein PICC placement via the left basilic vein measuring 41 cm. Dressing over left chest wall redressed using sterile technique.     Specimens Removed: None.     Implants: 41.5 cm single lumen PICC.     Complications: No immediate complications.     Condition/Disposition: To recovery room. PICC can be used immediately.     Please call Interventional Radiology x [phone] with any questions, concerns, or issues.

## 2020-11-09 NOTE — ASU DISCHARGE PLAN (ADULT/PEDIATRIC) - NURSING INSTRUCTIONS
Please feel free to contact us at [phone] if any problems arise. After 6PM, Monday through Friday, on weekends and on holidays, please call [phone] and ask for the radiology resident on call to be paged.

## 2020-11-09 NOTE — ASU DISCHARGE PLAN (ADULT/PEDIATRIC) - ASU DC SPECIAL INSTRUCTIONSFT
PICC Placement    Discharge Instructions  - You have had a PICC left implanted in your arm.   - The PICC is ready for use.  - You may shower as long as the PICC and dressing remains dry.  -  No soaking or swimming until the PICC is removed or when the site is completely healed.  - Keep the area covered and dry for as long as the PICC remains in. It may be removed and changed by a nurse as needed.  - Do not perform any heavy lifting or put tension on the area for the next week or until the site is healed.  - You may resume your normal diet.  - You may resume your normal medications however you should wait 48 hours before restarting aspirin, plavix, or blood thinners.  - It is normal to experience some pain over the site for the next few days. You may take apply ice to the area (20 minutes on, 20 minutes off) and take Tylenol for that pain. Do not take more frequently than every 6 hours and do not exceed more than 3000mg of Tylenol in a 24 hour period.    Notify your primary physician and/or Interventional Radiology IMMEDIATELY if you experience any of the following       - Fever of 101F or 38C       - Chills or Rigors/ Shakes       - Swelling and/or Redness in the area around the port       - Worsening Pain       - Blood soaked bandages or worsening bleeding       - Lightheadedness and/or dizziness upon standing       - Chest Pain/ Tightness       - Shortness of Breath       - Difficulty walking    If you have a problem that you believe requires IMMEDIATE attention, please go to your NEAREST Emergency Room. If you believe your problem can safely wait until you speak to a physician, please call Interventional Radiology for any concerns.    During Normal Weekday Business Hours- You can contact the Interventional Radiology department during normal business hours via telephone.  During Evenings and Weekends- If you need to contact Interventional Radiology during off hours, do so by calling the hospital and requesting to be connected to the Interventional Radiologist on call.

## 2020-11-09 NOTE — PROGRESS NOTE ADULT - SUBJECTIVE AND OBJECTIVE BOX
Vascular & Interventional Radiology Pre-Procedure Note    Procedure Name: PICC line placement    HPI: 65y Female with recently removed port. PICC line placement is requested.     Allergies: latex (Short breath)    Medications (Abx/Cardiac/Anticoagulation/Blood Products)    Data:    T(C): 37.1  HR: 112  BP: 122/90  RR: 16  SpO2: 99%    Plan:   -65y Female presents for PICC placement. Discussed with Dr. William nursing staff and they indicate they would like single lumen PICC. Procedure and risks discussed with patient and she is agreeable to proceed.     -Risks/Benefits/alternatives explained with the patient and/or healthcare proxy and witnessed informed consent obtained.

## 2020-11-11 NOTE — ASU PREOP CHECKLIST - RESPIRATORY RATE (BREATHS/MIN)
65571 (CPT®)]            Endoscopist: Leighton Alvarez MD, [facility], [facility]    Monitored anesthesia care:Judy Coffey 16

## 2020-11-12 DIAGNOSIS — Z45.2 ENCOUNTER FOR ADJUSTMENT AND MANAGEMENT OF VASCULAR ACCESS DEVICE: ICD-10-CM

## 2020-11-12 DIAGNOSIS — C50.911 MALIGNANT NEOPLASM OF UNSPECIFIED SITE OF RIGHT FEMALE BREAST: ICD-10-CM

## 2020-11-13 ENCOUNTER — APPOINTMENT (OUTPATIENT)
Dept: INFUSION THERAPY | Facility: HOSPITAL | Age: 65
End: 2020-11-13

## 2020-11-13 ENCOUNTER — APPOINTMENT (OUTPATIENT)
Dept: HEMATOLOGY ONCOLOGY | Facility: CLINIC | Age: 65
End: 2020-11-13

## 2020-11-13 DIAGNOSIS — C50.919 MALIGNANT NEOPLASM OF UNSPECIFIED SITE OF UNSPECIFIED FEMALE BREAST: ICD-10-CM

## 2020-11-13 DIAGNOSIS — Z45.2 ENCOUNTER FOR ADJUSTMENT AND MANAGEMENT OF VASCULAR ACCESS DEVICE: ICD-10-CM

## 2020-11-17 ENCOUNTER — RESULT REVIEW (OUTPATIENT)
Age: 65
End: 2020-11-17

## 2020-11-17 ENCOUNTER — APPOINTMENT (OUTPATIENT)
Dept: HEMATOLOGY ONCOLOGY | Facility: CLINIC | Age: 65
End: 2020-11-17
Payer: COMMERCIAL

## 2020-11-17 ENCOUNTER — APPOINTMENT (OUTPATIENT)
Dept: INFUSION THERAPY | Facility: HOSPITAL | Age: 65
End: 2020-11-17

## 2020-11-17 VITALS
OXYGEN SATURATION: 97 % | RESPIRATION RATE: 16 BRPM | HEART RATE: 111 BPM | BODY MASS INDEX: 28.62 KG/M2 | SYSTOLIC BLOOD PRESSURE: 143 MMHG | DIASTOLIC BLOOD PRESSURE: 90 MMHG | TEMPERATURE: 97 F | WEIGHT: 151.46 LBS

## 2020-11-17 DIAGNOSIS — K21.9 GASTRO-ESOPHAGEAL REFLUX DISEASE W/OUT ESOPHAGITIS: ICD-10-CM

## 2020-11-17 LAB
BASOPHILS # BLD AUTO: 0.01 K/UL — SIGNIFICANT CHANGE UP (ref 0–0.2)
BASOPHILS NFR BLD AUTO: 0.1 % — SIGNIFICANT CHANGE UP (ref 0–2)
EOSINOPHIL # BLD AUTO: 0 K/UL — SIGNIFICANT CHANGE UP (ref 0–0.5)
EOSINOPHIL NFR BLD AUTO: 0 % — SIGNIFICANT CHANGE UP (ref 0–6)
HCT VFR BLD CALC: 38.3 % — SIGNIFICANT CHANGE UP (ref 34.5–45)
HGB BLD-MCNC: 12.2 G/DL — SIGNIFICANT CHANGE UP (ref 11.5–15.5)
IMM GRANULOCYTES NFR BLD AUTO: 0.7 % — SIGNIFICANT CHANGE UP (ref 0–1.5)
LYMPHOCYTES # BLD AUTO: 1.32 K/UL — SIGNIFICANT CHANGE UP (ref 1–3.3)
LYMPHOCYTES # BLD AUTO: 11.2 % — LOW (ref 13–44)
MCHC RBC-ENTMCNC: 29.5 PG — SIGNIFICANT CHANGE UP (ref 27–34)
MCHC RBC-ENTMCNC: 31.9 G/DL — LOW (ref 32–36)
MCV RBC AUTO: 92.5 FL — SIGNIFICANT CHANGE UP (ref 80–100)
MONOCYTES # BLD AUTO: 0.41 K/UL — SIGNIFICANT CHANGE UP (ref 0–0.9)
MONOCYTES NFR BLD AUTO: 3.5 % — SIGNIFICANT CHANGE UP (ref 2–14)
NEUTROPHILS # BLD AUTO: 9.98 K/UL — HIGH (ref 1.8–7.4)
NEUTROPHILS NFR BLD AUTO: 84.5 % — HIGH (ref 43–77)
NRBC # BLD: 0 /100 WBCS — SIGNIFICANT CHANGE UP (ref 0–0)
PLATELET # BLD AUTO: 351 K/UL — SIGNIFICANT CHANGE UP (ref 150–400)
RBC # BLD: 4.14 M/UL — SIGNIFICANT CHANGE UP (ref 3.8–5.2)
RBC # FLD: 16.5 % — HIGH (ref 10.3–14.5)
WBC # BLD: 11.8 K/UL — HIGH (ref 3.8–10.5)
WBC # FLD AUTO: 11.8 K/UL — HIGH (ref 3.8–10.5)

## 2020-11-17 PROCEDURE — 99214 OFFICE O/P EST MOD 30 MIN: CPT

## 2020-11-18 DIAGNOSIS — R11.2 NAUSEA WITH VOMITING, UNSPECIFIED: ICD-10-CM

## 2020-11-18 DIAGNOSIS — Z51.89 ENCOUNTER FOR OTHER SPECIFIED AFTERCARE: ICD-10-CM

## 2020-11-18 DIAGNOSIS — Z51.11 ENCOUNTER FOR ANTINEOPLASTIC CHEMOTHERAPY: ICD-10-CM

## 2020-11-18 PROBLEM — K21.9 GERD (GASTROESOPHAGEAL REFLUX DISEASE): Status: ACTIVE | Noted: 2020-09-11

## 2020-11-18 NOTE — REVIEW OF SYSTEMS
[Anxiety] : anxiety [Negative] : Endocrine [FreeTextEntry2] : as above [FreeTextEntry7] : as above [de-identified] : as above

## 2020-11-18 NOTE — HISTORY OF PRESENT ILLNESS
[Disease: _____________________] : Disease: [unfilled] [T: ___] : T[unfilled] [N: ___] : N[unfilled] [M: ___] : M[unfilled] [AJCC Stage: ____] : AJCC Stage: [unfilled] [de-identified] : The patient's history of present illness began on 06/30/2020 when she had a routine mammogram with a finding of postsurgical changes compatible with her history of reduction mammoplasty; there was a focus of architectural distortion in the slightly lateral right breast at the level of the nipple, measuring approximately 10 mm with no associated microcalcifications, and no abnormal findings in the left breast; no axillary adenopathy was seen.  A right breast ultrasound performed on that same date demonstrated at the 9 o'clock axis 5 cm from the nipple, an irregularly marginated mass measuring 7 x 8 x 11 mm with ultrasound-guided core biopsy recommended; the left breast was unremarkable.  The patient then went on to have an ultrasound-guided core biopsy of the right breast on 07/18/2020 with a finding at the 8 o'clock axis 5 cm from nipple of an invasive moderately differentiated ductal carcinoma, Mp score 6/9 with invasive tumor measuring at least 1 cm with evidence of DCIS, with microcalcifications identified in the invasive carcinoma and in the DCIS, with no evidence of lymphovascular invasion; the right axillary biopsy on that same date demonstrated invasive mammary carcinoma with lobular features, with necrosis and scattered lymphocytic infiltrate.  Estrogen receptor returned positive greater than 90%, progesterone receptor positive greater than 90% HER-2/charlotte negative.  An MRI of the bilateral breasts demonstrated a 3 cm enlarged lymph node in the right axilla and no significant left axillary or internal mammary adenopathy; in the right breast, there was a 15 mm irregular enhancing mass with a second enhancing irregular mass located 2.5 cm anterior, medial, inferior from the index mass at the 8 o'clock axis, anterior depth and measuring 11 mm.  The patient ultimately saw Dr. Liza Navarrete and on 07/28/2020 underwent a right lumpectomy/sentinel lymph node biopsy with a finding of multifocal invasive carcinoma, moderately differentiated with tubulolobular features measuring 1.1 cm in addition to an invasive moderately differentiated carcinoma with tubulolobular features measuring 1.5 cm with multiple separate small foci of invasive carcinoma with single file arrangement of tumor cells E-cadherin positive, DCIS, LCIS, with further shave margins specifically in the right superior breast margin returning negative for carcinoma but with evidence of DCIS and a single isolated duct measuring 0.2 cm with margins negative for DCIS, an additional shave margins returning negative; in the right axilla, metastatic ductal carcinoma involved 3/7 lymph nodes with a carcinoma being E-cadherin positive. \par \par The patient was seen in initial consultation on 8/3/20 regarding further treatment recommendations.  \par \par A Mammaprint was sent off and returned with a high risk score. An oncotype for node positive cancer was also sent off (unintentionally) which resulted in a recurrence score of 23 translating to 19%  risk of distant recurrence and an absolute benefit from chemotherapy. Results of both these tests were discussed with her extensively and she has decided to proceed with chemotherapy with dose dense doxorubicin/cyclophospmamide folllowed by dose dense paclitaxel every 2 weeks x 4 with Pegfilgrastim support. \par \par 9/4/20  started on  treatment with  Dose dense doxorubicin/cyclophosphmide every 2 weeks followed by dose dense paclitaxel every 2 weeks x 4, both with pegfilgrastim,\par Withdrew participation  from  [id number] Olanzapine antinausea study on 9/7/20 C1D4 [de-identified] : ER+ [place]+ Her 2 [place] - [de-identified] : 9/4/20  started on  treatment with  Dose dense doxorubicin/cyclophosphmide every 2 weeks followed by dose dense paclitaxel every 2 weeks x 4, both with pegfilgrastim,\par Withdrew participation  from  [id number] Olanzapine antinausea study on 9/7/20 C1D4\par She was scheduled to start treatment with dd paclitaxel on 11/3/20 however was noted to have skin breakdown with exposed mediport. Consequently had the mediport removed on 11/6/20 and a piccline placed on 11/9/20. Tolerated both procedures well. She is here today (11/17/20) to start on treatment with DD paclitaxel/ onpro\par Frustrated for having to have the mediport removed. continues with low grade nausea, and symptoms c/w reflux. She has increased her Nexium to 40mg daily this week.. \par Took dex premeds last juanita and again this morning, and both times with food, \par Appetite is ok, weight stable and a stable performance status.\par

## 2020-11-18 NOTE — PHYSICAL EXAM
[Restricted in physically strenuous activity but ambulatory and able to carry out work of a light or sedentary nature] : Status 1- Restricted in physically strenuous activity but ambulatory and able to carry out work of a light or sedentary nature, e.g., light house work, office work [Normal] : affect appropriate [de-identified] : L single lumen picc line in place [de-identified] : The right breast is status post reduction mastopexy as well as lumpectomy with well-healed scars; there is chronic nipple retraction w/o change, no skin dimpling, or palpable masses. The left breast is status post reduction mastopexy with well-healed scar; there is chronic nipple retraction w/o change, no skin dimpling, or palpable masses. The bilateral axillae are without adenopathy.  [de-identified] : L chest wall mediport site dressing dry with no surrounding erythema, edema or tenderness

## 2020-11-18 NOTE — END OF VISIT
[Time Spent: ___ minutes] : I have spent [unfilled] minutes of time on the encounter. [>50% of the face to face encounter time was spent on counseling and/or coordination of care for ___] : Greater than 50% of the face to face encounter time was spent on counseling and/or coordination of care for [unfilled] [FreeTextEntry3] : d/ W

## 2020-11-25 ENCOUNTER — OUTPATIENT (OUTPATIENT)
Dept: OUTPATIENT SERVICES | Facility: HOSPITAL | Age: 65
LOS: 1 days | Discharge: HOME | End: 2020-11-25

## 2020-11-25 DIAGNOSIS — C50.919 MALIGNANT NEOPLASM OF UNSPECIFIED SITE OF UNSPECIFIED FEMALE BREAST: ICD-10-CM

## 2020-11-25 DIAGNOSIS — Z98.890 OTHER SPECIFIED POSTPROCEDURAL STATES: Chronic | ICD-10-CM

## 2020-11-25 DIAGNOSIS — Z90.710 ACQUIRED ABSENCE OF BOTH CERVIX AND UTERUS: Chronic | ICD-10-CM

## 2020-12-01 ENCOUNTER — RESULT REVIEW (OUTPATIENT)
Age: 65
End: 2020-12-01

## 2020-12-01 ENCOUNTER — APPOINTMENT (OUTPATIENT)
Dept: HEMATOLOGY ONCOLOGY | Facility: CLINIC | Age: 65
End: 2020-12-01
Payer: COMMERCIAL

## 2020-12-01 ENCOUNTER — APPOINTMENT (OUTPATIENT)
Dept: INFUSION THERAPY | Facility: HOSPITAL | Age: 65
End: 2020-12-01

## 2020-12-01 VITALS
RESPIRATION RATE: 17 BRPM | DIASTOLIC BLOOD PRESSURE: 88 MMHG | SYSTOLIC BLOOD PRESSURE: 131 MMHG | TEMPERATURE: 97.3 F | BODY MASS INDEX: 27.18 KG/M2 | HEIGHT: 61 IN | HEART RATE: 109 BPM | OXYGEN SATURATION: 98 % | WEIGHT: 143.98 LBS

## 2020-12-01 LAB
BASOPHILS # BLD AUTO: 0.02 K/UL — SIGNIFICANT CHANGE UP (ref 0–0.2)
BASOPHILS NFR BLD AUTO: 0.2 % — SIGNIFICANT CHANGE UP (ref 0–2)
EOSINOPHIL # BLD AUTO: 0 K/UL — SIGNIFICANT CHANGE UP (ref 0–0.5)
EOSINOPHIL NFR BLD AUTO: 0 % — SIGNIFICANT CHANGE UP (ref 0–6)
HCT VFR BLD CALC: 39.6 % — SIGNIFICANT CHANGE UP (ref 34.5–45)
HGB BLD-MCNC: 13 G/DL — SIGNIFICANT CHANGE UP (ref 11.5–15.5)
IMM GRANULOCYTES NFR BLD AUTO: 1.1 % — SIGNIFICANT CHANGE UP (ref 0–1.5)
LYMPHOCYTES # BLD AUTO: 1.25 K/UL — SIGNIFICANT CHANGE UP (ref 1–3.3)
LYMPHOCYTES # BLD AUTO: 11 % — LOW (ref 13–44)
MCHC RBC-ENTMCNC: 29.9 PG — SIGNIFICANT CHANGE UP (ref 27–34)
MCHC RBC-ENTMCNC: 32.8 G/DL — SIGNIFICANT CHANGE UP (ref 32–36)
MCV RBC AUTO: 91 FL — SIGNIFICANT CHANGE UP (ref 80–100)
MONOCYTES # BLD AUTO: 0.16 K/UL — SIGNIFICANT CHANGE UP (ref 0–0.9)
MONOCYTES NFR BLD AUTO: 1.4 % — LOW (ref 2–14)
NEUTROPHILS # BLD AUTO: 9.81 K/UL — HIGH (ref 1.8–7.4)
NEUTROPHILS NFR BLD AUTO: 86.3 % — HIGH (ref 43–77)
NRBC # BLD: 0 /100 WBCS — SIGNIFICANT CHANGE UP (ref 0–0)
PLATELET # BLD AUTO: 333 K/UL — SIGNIFICANT CHANGE UP (ref 150–400)
RBC # BLD: 4.35 M/UL — SIGNIFICANT CHANGE UP (ref 3.8–5.2)
RBC # FLD: 15.8 % — HIGH (ref 10.3–14.5)
WBC # BLD: 11.37 K/UL — HIGH (ref 3.8–10.5)
WBC # FLD AUTO: 11.37 K/UL — HIGH (ref 3.8–10.5)

## 2020-12-01 PROCEDURE — 99214 OFFICE O/P EST MOD 30 MIN: CPT

## 2020-12-01 PROCEDURE — 99072 ADDL SUPL MATRL&STAF TM PHE: CPT

## 2020-12-02 ENCOUNTER — NON-APPOINTMENT (OUTPATIENT)
Age: 65
End: 2020-12-02

## 2020-12-02 DIAGNOSIS — Z51.11 ENCOUNTER FOR ANTINEOPLASTIC CHEMOTHERAPY: ICD-10-CM

## 2020-12-02 DIAGNOSIS — R11.2 NAUSEA WITH VOMITING, UNSPECIFIED: ICD-10-CM

## 2020-12-02 NOTE — PHYSICAL EXAM
[Restricted in physically strenuous activity but ambulatory and able to carry out work of a light or sedentary nature] : Status 1- Restricted in physically strenuous activity but ambulatory and able to carry out work of a light or sedentary nature, e.g., light house work, office work [Normal] : affect appropriate [de-identified] : L single lumen picc line in place [de-identified] : The right breast is status post reduction mastopexy as well as lumpectomy with well-healed scars; there is chronic nipple retraction w/o change, no skin dimpling, or palpable masses. The left breast is status post reduction mastopexy with well-healed scar; there is chronic nipple retraction w/o change, no skin dimpling, or palpable masses. The bilateral axillae are without adenopathy.  [de-identified] : L chest wall mediport incision healing well

## 2020-12-02 NOTE — HISTORY OF PRESENT ILLNESS
[Disease: _____________________] : Disease: [unfilled] [T: ___] : T[unfilled] [N: ___] : N[unfilled] [M: ___] : M[unfilled] [AJCC Stage: ____] : AJCC Stage: [unfilled] [de-identified] : The patient's history of present illness began on 06/30/2020 when she had a routine mammogram with a finding of postsurgical changes compatible with her history of reduction mammoplasty; there was a focus of architectural distortion in the slightly lateral right breast at the level of the nipple, measuring approximately 10 mm with no associated microcalcifications, and no abnormal findings in the left breast; no axillary adenopathy was seen.  A right breast ultrasound performed on that same date demonstrated at the 9 o'clock axis 5 cm from the nipple, an irregularly marginated mass measuring 7 x 8 x 11 mm with ultrasound-guided core biopsy recommended; the left breast was unremarkable.  The patient then went on to have an ultrasound-guided core biopsy of the right breast on 07/18/2020 with a finding at the 8 o'clock axis 5 cm from nipple of an invasive moderately differentiated ductal carcinoma, Mp score 6/9 with invasive tumor measuring at least 1 cm with evidence of DCIS, with microcalcifications identified in the invasive carcinoma and in the DCIS, with no evidence of lymphovascular invasion; the right axillary biopsy on that same date demonstrated invasive mammary carcinoma with lobular features, with necrosis and scattered lymphocytic infiltrate.  Estrogen receptor returned positive greater than 90%, progesterone receptor positive greater than 90% HER-2/charlotte negative.  An MRI of the bilateral breasts demonstrated a 3 cm enlarged lymph node in the right axilla and no significant left axillary or internal mammary adenopathy; in the right breast, there was a 15 mm irregular enhancing mass with a second enhancing irregular mass located 2.5 cm anterior, medial, inferior from the index mass at the 8 o'clock axis, anterior depth and measuring 11 mm.  The patient ultimately saw Dr. Liza Navarrete and on 07/28/2020 underwent a right lumpectomy/sentinel lymph node biopsy with a finding of multifocal invasive carcinoma, moderately differentiated with tubulolobular features measuring 1.1 cm in addition to an invasive moderately differentiated carcinoma with tubulolobular features measuring 1.5 cm with multiple separate small foci of invasive carcinoma with single file arrangement of tumor cells E-cadherin positive, DCIS, LCIS, with further shave margins specifically in the right superior breast margin returning negative for carcinoma but with evidence of DCIS and a single isolated duct measuring 0.2 cm with margins negative for DCIS, an additional shave margins returning negative; in the right axilla, metastatic ductal carcinoma involved 3/7 lymph nodes with a carcinoma being E-cadherin positive. \par \par The patient was seen in initial consultation on 8/3/20 regarding further treatment recommendations.  \par \par A Mammaprint was sent off and returned with a high risk score. An oncotype for node positive cancer was also sent off (unintentionally) which resulted in a recurrence score of 23 translating to 19%  risk of distant recurrence and an absolute benefit from chemotherapy. Results of both these tests were discussed with her extensively and she has decided to proceed with chemotherapy with dose dense doxorubicin/cyclophospmamide folllowed by dose dense paclitaxel every 2 weeks x 4 with Pegfilgrastim support. \par \par 9/4/20  started on  treatment with  Dose dense doxorubicin/cyclophosphmide every 2 weeks followed by dose dense paclitaxel every 2 weeks x 4, both with pegfilgrastim,\par Withdrew participation  from  [id number] Olanzapine antinausea study on 9/7/20 C1D4 [de-identified] : ER+ [place]+ Her 2 [place] - [de-identified] : 9/4/20  started on  treatment with  Dose dense doxorubicin/cyclophosphmide every 2 weeks followed by dose dense paclitaxel every 2 weeks x 4, both with pegfilgrastim,\par Withdrew participation  from  [id number] Olanzapine antinausea study on 9/7/20 C1D4\par Now on paclitaxel every 2 weeks with onpro. s/p cycle 1. \par 1. Significant bone pain from days 1- 6. No relief with tylenol. She was prescribed tramadol on D4 which relief in symptoms\par 2. transient symptoms of peripheral neuropathy in b/l upper and lower extremities\par took 12 mg of dexamethasone last night and this am. \par Mild fatigue. Appette ok, remains with a stable performance status.\par She now has a L Picc line, being flushed daily/[facility] care

## 2020-12-10 ENCOUNTER — APPOINTMENT (OUTPATIENT)
Dept: HEMATOLOGY ONCOLOGY | Facility: CLINIC | Age: 65
End: 2020-12-10
Payer: COMMERCIAL

## 2020-12-10 VITALS
DIASTOLIC BLOOD PRESSURE: 83 MMHG | HEART RATE: 98 BPM | TEMPERATURE: 98.6 F | RESPIRATION RATE: 16 BRPM | BODY MASS INDEX: 27.21 KG/M2 | SYSTOLIC BLOOD PRESSURE: 122 MMHG | OXYGEN SATURATION: 98 % | WEIGHT: 144 LBS

## 2020-12-10 DIAGNOSIS — Z79.899 OTHER LONG TERM (CURRENT) DRUG THERAPY: ICD-10-CM

## 2020-12-10 PROCEDURE — 99072 ADDL SUPL MATRL&STAF TM PHE: CPT

## 2020-12-10 PROCEDURE — 99214 OFFICE O/P EST MOD 30 MIN: CPT

## 2020-12-11 ENCOUNTER — RESULT REVIEW (OUTPATIENT)
Age: 65
End: 2020-12-11

## 2020-12-11 ENCOUNTER — APPOINTMENT (OUTPATIENT)
Dept: INFUSION THERAPY | Facility: HOSPITAL | Age: 65
End: 2020-12-11

## 2020-12-11 ENCOUNTER — LABORATORY RESULT (OUTPATIENT)
Age: 65
End: 2020-12-11

## 2020-12-11 LAB
BASOPHILS # BLD AUTO: 0 K/UL — SIGNIFICANT CHANGE UP (ref 0–0.2)
BASOPHILS NFR BLD AUTO: 0 % — SIGNIFICANT CHANGE UP (ref 0–2)
BLASTS # FLD: 1 % — HIGH (ref 0–0)
BUN SERPL-MCNC: 8 MG/DL — SIGNIFICANT CHANGE UP (ref 7–23)
CA-I BLDA-SCNC: 1.17 MMOL/L — SIGNIFICANT CHANGE UP (ref 1.12–1.3)
CHLORIDE SERPL-SCNC: 105 MMOL/L — SIGNIFICANT CHANGE UP (ref 96–108)
CO2 SERPL-SCNC: 24 MMOL/L — SIGNIFICANT CHANGE UP (ref 22–31)
CREAT SERPL-MCNC: 0.6 MG/DL — SIGNIFICANT CHANGE UP (ref 0.5–1.3)
EOSINOPHIL # BLD AUTO: 0.32 K/UL — SIGNIFICANT CHANGE UP (ref 0–0.5)
EOSINOPHIL NFR BLD AUTO: 1 % — SIGNIFICANT CHANGE UP (ref 0–6)
GLUCOSE SERPL-MCNC: 103 MG/DL — HIGH (ref 70–99)
HCT VFR BLD CALC: 35.5 % — SIGNIFICANT CHANGE UP (ref 34.5–45)
HGB BLD-MCNC: 11.1 G/DL — LOW (ref 11.5–15.5)
LYMPHOCYTES # BLD AUTO: 13 % — SIGNIFICANT CHANGE UP (ref 13–44)
LYMPHOCYTES # BLD AUTO: 4.14 K/UL — HIGH (ref 1–3.3)
MCHC RBC-ENTMCNC: 29.8 PG — SIGNIFICANT CHANGE UP (ref 27–34)
MCHC RBC-ENTMCNC: 31.3 G/DL — LOW (ref 32–36)
MCV RBC AUTO: 95.2 FL — SIGNIFICANT CHANGE UP (ref 80–100)
MONOCYTES # BLD AUTO: 0.95 K/UL — HIGH (ref 0–0.9)
MONOCYTES NFR BLD AUTO: 3 % — SIGNIFICANT CHANGE UP (ref 2–14)
NEUTROPHILS # BLD AUTO: 26.1 K/UL — HIGH (ref 1.8–7.4)
NEUTROPHILS NFR BLD AUTO: 82 % — HIGH (ref 43–77)
NRBC # BLD: 0 /100 — SIGNIFICANT CHANGE UP (ref 0–0)
NRBC # BLD: SIGNIFICANT CHANGE UP /100 WBCS (ref 0–0)
PLAT MORPH BLD: NORMAL — SIGNIFICANT CHANGE UP
PLATELET # BLD AUTO: 205 K/UL — SIGNIFICANT CHANGE UP (ref 150–400)
POTASSIUM SERPL-MCNC: 3.6 MMOL/L — SIGNIFICANT CHANGE UP (ref 3.5–5.3)
POTASSIUM SERPL-SCNC: 3.6 MMOL/L — SIGNIFICANT CHANGE UP (ref 3.5–5.3)
RBC # BLD: 3.73 M/UL — LOW (ref 3.8–5.2)
RBC # FLD: 15.9 % — HIGH (ref 10.3–14.5)
RBC BLD AUTO: SIGNIFICANT CHANGE UP
SODIUM SERPL-SCNC: 140 MMOL/L — SIGNIFICANT CHANGE UP (ref 135–145)
WBC # BLD: 31.83 K/UL — HIGH (ref 3.8–10.5)
WBC # FLD AUTO: 31.83 K/UL — HIGH (ref 3.8–10.5)

## 2020-12-14 ENCOUNTER — APPOINTMENT (OUTPATIENT)
Dept: INFUSION THERAPY | Facility: HOSPITAL | Age: 65
End: 2020-12-14

## 2020-12-14 DIAGNOSIS — E86.0 DEHYDRATION: ICD-10-CM

## 2020-12-15 ENCOUNTER — APPOINTMENT (OUTPATIENT)
Dept: HEMATOLOGY ONCOLOGY | Facility: CLINIC | Age: 65
End: 2020-12-15
Payer: COMMERCIAL

## 2020-12-15 ENCOUNTER — APPOINTMENT (OUTPATIENT)
Dept: INFUSION THERAPY | Facility: HOSPITAL | Age: 65
End: 2020-12-15

## 2020-12-15 DIAGNOSIS — G47.9 SLEEP DISORDER, UNSPECIFIED: ICD-10-CM

## 2020-12-15 DIAGNOSIS — M89.8X9 OTHER SPECIFIED DISORDERS OF BONE, UNSPECIFIED SITE: ICD-10-CM

## 2020-12-15 LAB
MANUAL DIF COMMENT BLD-IMP: SIGNIFICANT CHANGE UP

## 2020-12-15 PROCEDURE — 99205 OFFICE O/P NEW HI 60 MIN: CPT | Mod: 95

## 2020-12-15 NOTE — ASSESSMENT
[FreeTextEntry1] : 65yoF with:\par \par 1. R breast cancer - On Taxol, Med Onc follow up. \par \par 2. CIPN - Medical cannabis certification completed today. Provided cannabis education, overview of state program, and discussed adverse effects in detail. Counseled that vaporized cannabis is not the preferred route of administration due to the fact that both short-term and long-term risks associated with vaporizing oils are not yet fully understood. Recommend starting with 1:1 THC:CBD formulation at low dose of THC (~2mg/dose).\par \par 3. Treatment-related pains - discussed how medical cannabis can be of benefit. \par \par 4. Trouble sleeping 2/2 CIPN - should likely improve with medicinal cannabis. \par \par Follow up in 1 month or as needed, call sooner with questions or issues.

## 2020-12-15 NOTE — HISTORY OF PRESENT ILLNESS
[Home] : at home, [unfilled] , at the time of the visit. [Medical Office: ([facility])___] : at the medical office located in  [Verbal consent obtained from patient] : the patient, [unfilled] [FreeTextEntry1] : 65yoF with R breast cancer (ER+/[place]+) presents for initial palliative visit, referred by Dr. William.  \par \par On 7/28/20 patient underwent R lumpectomy with sentinel lymph node biopsy. On 9/4/20 she started treatment with Dose dense doxorubicin/cyclophosphamide every 2 weeks followed by dose dense paclitaxel every 2 weeks x 4, both with pegfilgrastim,\par Now on paclitaxel every 2 weeks with onpro.\par \par Main issue for which she presents today is peripheral neuropathy that is localized to the soles of her feet and her fingertips.  She describes a tingling and numbness along the soles that is also traveling up to the top of her feet.  Her R thumb is more numb than the rest of her fingers\par \par She noticed that the neuropathy began shortly after initiating chemotherapy. \par \par Was started on Gabapentin 100mg last week which she uptitrated over the course of 4 days. She describes that it did nothing for the neuropathy and she did not find it to be helpful either. \par \par ROS:\par +nausea\par +post-treatment related body pains that lasts for about 10 days. Pain can be severe. She has tried using Tramadol 50mg but did not find it to be helpful. \par +occasional constipation - this is mainly 2/2 tramadol\par +trouble sleeping - this is 2/2 neuropathy  - has tried using 2.5mg zolpidem which helps her get a few hours of sleep. \par +dysgeusia, appetite is off \par \par Of note, patient had her mediport taken out last month due to an infection and now has a L PICC. \par \par Patient is , lives with her . Has two adult children. Does not work presently. \par \par I-Stop Ref#: [id number]

## 2020-12-18 ENCOUNTER — APPOINTMENT (OUTPATIENT)
Dept: HEMATOLOGY ONCOLOGY | Facility: CLINIC | Age: 65
End: 2020-12-18
Payer: COMMERCIAL

## 2020-12-18 VITALS
BODY MASS INDEX: 28.93 KG/M2 | HEIGHT: 60.94 IN | DIASTOLIC BLOOD PRESSURE: 84 MMHG | OXYGEN SATURATION: 98 % | SYSTOLIC BLOOD PRESSURE: 127 MMHG | WEIGHT: 153.22 LBS | HEART RATE: 83 BPM | RESPIRATION RATE: 16 BRPM | TEMPERATURE: 97.2 F

## 2020-12-18 PROCEDURE — 99214 OFFICE O/P EST MOD 30 MIN: CPT

## 2020-12-18 PROCEDURE — 99072 ADDL SUPL MATRL&STAF TM PHE: CPT

## 2020-12-19 PROBLEM — Z79.899 ON ANTINEOPLASTIC CHEMOTHERAPY: Status: ACTIVE | Noted: 2020-09-25

## 2020-12-19 NOTE — PHYSICAL EXAM
[Ambulatory and capable of all self care but unable to carry out any work activities] : Status 2- Ambulatory and capable of all self care but unable to carry out any work activities. Up and about more than 50% of waking hours [Normal] : affect appropriate [de-identified] : L single lumen picc line in place [de-identified] : The right breast is status post reduction mastopexy as well as lumpectomy with well-healed scars; there is chronic nipple retraction w/o change, no skin dimpling, or palpable masses. The left breast is status post reduction mastopexy with well-healed scar; there is chronic nipple retraction w/o change, no skin dimpling, or palpable masses. The bilateral axillae are without adenopathy.  [de-identified] : L chest wall mediport incision healing well

## 2020-12-19 NOTE — END OF VISIT
Unable to reach Vlad's mother again by phone, will try again later. [Time Spent: ___ minutes] : I have spent [unfilled] minutes of time on the encounter. [>50% of the face to face encounter time was spent on counseling and/or coordination of care for ___] : Greater than 50% of the face to face encounter time was spent on counseling and/or coordination of care for [unfilled] [FreeTextEntry3] : d/ W

## 2020-12-19 NOTE — HISTORY OF PRESENT ILLNESS
[Disease: _____________________] : Disease: [unfilled] [T: ___] : T[unfilled] [N: ___] : N[unfilled] [M: ___] : M[unfilled] [AJCC Stage: ____] : AJCC Stage: [unfilled] [de-identified] : The patient's history of present illness began on 06/30/2020 when she had a routine mammogram with a finding of postsurgical changes compatible with her history of reduction mammoplasty; there was a focus of architectural distortion in the slightly lateral right breast at the level of the nipple, measuring approximately 10 mm with no associated microcalcifications, and no abnormal findings in the left breast; no axillary adenopathy was seen.  A right breast ultrasound performed on that same date demonstrated at the 9 o'clock axis 5 cm from the nipple, an irregularly marginated mass measuring 7 x 8 x 11 mm with ultrasound-guided core biopsy recommended; the left breast was unremarkable.  The patient then went on to have an ultrasound-guided core biopsy of the right breast on 07/18/2020 with a finding at the 8 o'clock axis 5 cm from nipple of an invasive moderately differentiated ductal carcinoma, Mp score 6/9 with invasive tumor measuring at least 1 cm with evidence of DCIS, with microcalcifications identified in the invasive carcinoma and in the DCIS, with no evidence of lymphovascular invasion; the right axillary biopsy on that same date demonstrated invasive mammary carcinoma with lobular features, with necrosis and scattered lymphocytic infiltrate.  Estrogen receptor returned positive greater than 90%, progesterone receptor positive greater than 90% HER-2/charlotte negative.  An MRI of the bilateral breasts demonstrated a 3 cm enlarged lymph node in the right axilla and no significant left axillary or internal mammary adenopathy; in the right breast, there was a 15 mm irregular enhancing mass with a second enhancing irregular mass located 2.5 cm anterior, medial, inferior from the index mass at the 8 o'clock axis, anterior depth and measuring 11 mm.  The patient ultimately saw Dr. Liza Navarrete and on 07/28/2020 underwent a right lumpectomy/sentinel lymph node biopsy with a finding of multifocal invasive carcinoma, moderately differentiated with tubulolobular features measuring 1.1 cm in addition to an invasive moderately differentiated carcinoma with tubulolobular features measuring 1.5 cm with multiple separate small foci of invasive carcinoma with single file arrangement of tumor cells E-cadherin positive, DCIS, LCIS, with further shave margins specifically in the right superior breast margin returning negative for carcinoma but with evidence of DCIS and a single isolated duct measuring 0.2 cm with margins negative for DCIS, an additional shave margins returning negative; in the right axilla, metastatic ductal carcinoma involved 3/7 lymph nodes with a carcinoma being E-cadherin positive. \par \par The patient was seen in initial consultation on 8/3/20 regarding further treatment recommendations.  \par \par A Mammaprint was sent off and returned with a high risk score. An oncotype for node positive cancer was also sent off (unintentionally) which resulted in a recurrence score of 23 translating to 19%  risk of distant recurrence and an absolute benefit from chemotherapy. Results of both these tests were discussed with her extensively and she has decided to proceed with chemotherapy with dose dense doxorubicin/cyclophospmamide folllowed by dose dense paclitaxel every 2 weeks x 4 with Pegfilgrastim support. \par \par 9/4/20  started on  treatment with  Dose dense doxorubicin/cyclophosphmide every 2 weeks followed by dose dense paclitaxel every 2 weeks x 4, both with pegfilgrastim,\par Withdrew participation  from  [id number] Olanzapine antinausea study on 9/7/20 C1D4 [de-identified] : ER+ [place]+ Her 2 charlotte - [de-identified] : 9/4/20  started on  treatment with  Dose dense doxorubicin/cyclophosphmide every 2 weeks followed by dose dense paclitaxel every 2 weeks x 4, both with pegfilgrastim,\par \par Withdrew participation  from  [id number] Olanzapine antinausea study on 9/7/20 C1D4\par \par Now on paclitaxel every 2 weeks with onpro. s/p cycle 2.\par  \par Seen 12/10/20. Had sig bone pain from days 1- 7. She took tramadol with moderate relief of bone pain. She much worsened peripheral neuropathy especially in her feet > R thumb>>other fingertips. It was painful for her to walk any distance. She felt off balance but had not fallen. She was able to  perform tasks with her hands although it is uncomfortable.  \par \par Now reports persistent significant PN feet > fingers  but minimally better and somewhat less painful. Still feels off balance but has not fallen. \par \par Seen by Dr Negrito Davis and going through process of cannabis certification - pt has not proceeded yet with getting cannabis. \par \par She a L Picc line, being flushed daily/[facility]

## 2020-12-19 NOTE — PHYSICAL EXAM
[Ambulatory and capable of all self care but unable to carry out any work activities] : Status 2- Ambulatory and capable of all self care but unable to carry out any work activities. Up and about more than 50% of waking hours [Normal] : affect appropriate [de-identified] : L single lumen picc line in place [de-identified] : The right breast is status post reduction mastopexy as well as lumpectomy with well-healed scars; there is chronic nipple retraction w/o change, no skin dimpling, or palpable masses. The left breast is status post reduction mastopexy with well-healed scar; there is chronic nipple retraction w/o change, no skin dimpling, or palpable masses. The bilateral axillae are without adenopathy.  [de-identified] : L chest wall mediport incision healing well

## 2020-12-22 ENCOUNTER — APPOINTMENT (OUTPATIENT)
Dept: INFUSION THERAPY | Facility: HOSPITAL | Age: 65
End: 2020-12-22

## 2020-12-23 PROBLEM — Z87.09 HISTORY OF SORE THROAT: Status: RESOLVED | Noted: 2020-09-11 | Resolved: 2020-12-23

## 2020-12-28 ENCOUNTER — OUTPATIENT (OUTPATIENT)
Dept: OUTPATIENT SERVICES | Facility: HOSPITAL | Age: 65
LOS: 1 days | Discharge: HOME | End: 2020-12-28
Payer: COMMERCIAL

## 2020-12-28 ENCOUNTER — APPOINTMENT (OUTPATIENT)
Dept: RADIATION ONCOLOGY | Facility: CLINIC | Age: 65
End: 2020-12-28
Payer: COMMERCIAL

## 2020-12-28 VITALS
RESPIRATION RATE: 15 BRPM | BODY MASS INDEX: 28.46 KG/M2 | SYSTOLIC BLOOD PRESSURE: 127 MMHG | TEMPERATURE: 95.8 F | WEIGHT: 150.35 LBS | DIASTOLIC BLOOD PRESSURE: 85 MMHG | OXYGEN SATURATION: 98 % | HEART RATE: 79 BPM

## 2020-12-28 DIAGNOSIS — Z86.018 PERSONAL HISTORY OF OTHER BENIGN NEOPLASM: ICD-10-CM

## 2020-12-28 DIAGNOSIS — Z98.890 OTHER SPECIFIED POSTPROCEDURAL STATES: Chronic | ICD-10-CM

## 2020-12-28 DIAGNOSIS — E78.5 HYPERLIPIDEMIA, UNSPECIFIED: ICD-10-CM

## 2020-12-28 DIAGNOSIS — Z78.9 OTHER SPECIFIED HEALTH STATUS: ICD-10-CM

## 2020-12-28 DIAGNOSIS — Z90.710 ACQUIRED ABSENCE OF BOTH CERVIX AND UTERUS: Chronic | ICD-10-CM

## 2020-12-28 PROCEDURE — 99072 ADDL SUPL MATRL&STAF TM PHE: CPT

## 2020-12-28 PROCEDURE — 99204 OFFICE O/P NEW MOD 45 MIN: CPT

## 2020-12-28 PROCEDURE — 77263 THER RADIOLOGY TX PLNG CPLX: CPT

## 2020-12-28 RX ORDER — ESOMEPRAZOLE MAGNESIUM 40 MG/1
40 CAPSULE, DELAYED RELEASE ORAL TWICE DAILY
Qty: 30 | Refills: 1 | Status: DISCONTINUED | COMMUNITY
Start: 2020-10-20 | End: 2020-12-28

## 2020-12-28 RX ORDER — GABAPENTIN 100 MG/1
100 CAPSULE ORAL
Qty: 60 | Refills: 0 | Status: DISCONTINUED | COMMUNITY
Start: 2020-12-10 | End: 2020-12-28

## 2020-12-28 RX ORDER — TRAMADOL HYDROCHLORIDE 50 MG/1
50 TABLET, COATED ORAL
Qty: 40 | Refills: 0 | Status: DISCONTINUED | COMMUNITY
Start: 2020-11-20 | End: 2020-12-28

## 2020-12-28 RX ORDER — DEXAMETHASONE 4 MG/1
4 TABLET ORAL
Qty: 50 | Refills: 0 | Status: DISCONTINUED | COMMUNITY
Start: 2020-10-20 | End: 2020-12-28

## 2020-12-28 RX ORDER — LIDOCAINE AND PRILOCAINE 25; 25 MG/G; MG/G
2.5-2.5 CREAM TOPICAL
Qty: 1 | Refills: 0 | Status: DISCONTINUED | COMMUNITY
Start: 2020-09-04 | End: 2020-12-28

## 2020-12-29 ENCOUNTER — NON-APPOINTMENT (OUTPATIENT)
Age: 65
End: 2020-12-29

## 2020-12-29 ENCOUNTER — APPOINTMENT (OUTPATIENT)
Dept: HEMATOLOGY ONCOLOGY | Facility: CLINIC | Age: 65
End: 2020-12-29

## 2020-12-29 ENCOUNTER — APPOINTMENT (OUTPATIENT)
Dept: INFUSION THERAPY | Facility: HOSPITAL | Age: 65
End: 2020-12-29

## 2020-12-29 PROCEDURE — 77333 RADIATION TREATMENT AID(S): CPT | Mod: 26

## 2020-12-29 PROCEDURE — 77290 THER RAD SIMULAJ FIELD CPLX: CPT | Mod: 26

## 2021-01-05 ENCOUNTER — APPOINTMENT (OUTPATIENT)
Dept: INFUSION THERAPY | Facility: HOSPITAL | Age: 66
End: 2021-01-05

## 2021-01-05 NOTE — PHYSICAL EXAM
[Sclera] : the sclera and conjunctiva were normal [Hearing Threshold Finger Rub Not [place]] : hearing was normal [] : no respiratory distress [Motor Tone] : muscle strength and tone were normal [Oriented To Time, Place, And Person] : oriented to person, place, and time [Heart Rate And Rhythm] : heart rate and rhythm were normal [Breast Abnormal Lactation (Galactorrhea)] : no nipple discharge [No UE Edema] : there is no upper extremity edema [Supraclavicular Lymph Nodes Enlarged Bilaterally] : supraclavicular [Axillary Lymph Nodes Enlarged Bilaterally] : axillary [Normal] : no focal deficits [de-identified] : Right lumpectomy and axillary scars are well healed, no erythema, no hyperpigmentation.  [de-identified] : left CW port site, mild erythema

## 2021-01-05 NOTE — LETTER CLOSING
[Consult Closing:] : Thank you for allowing me to participate in the care of this patient.  If you have any questions, please do not hesitate to contact me. [Sincerely yours,] : Sincerely yours, [FreeTextEntry3] : Evette Garcia MD\par

## 2021-01-05 NOTE — REVIEW OF SYSTEMS
[Patient Intake Form Reviewed] : Patient intake form was reviewed [Night Sweats] : night sweats [Fatigue] : fatigue [Skin Wound] : skin wound [Anxiety] : anxiety [Joint Pain] : joint pain [Fever] : no fever [Chills] : no chills [Visual Disturbances] : no visual disturbances [Loss of Hearing] : no loss of hearing [Chest Pain] : no chest pain [Palpitations] : no palpitations [Shortness Of Breath] : no shortness of breath [Wheezing] : no wheezing [Abdominal Pain] : no abdominal pain [Urinary Frequency] : no change in urinary frequency [Nocturia] : no nocturia [Confused] : no confusion [Dizziness] : no dizziness [Hot Flashes] : no hot flashes [Easy Bleeding] : no tendency for easy bleeding [Easy Bruising] : no tendency for easy bruising [Swollen Glands] : no swollen glands [de-identified] : as noted in HPI [de-identified] : as noted in HPI [FreeTextEntry1] : latex

## 2021-01-05 NOTE — HISTORY OF PRESENT ILLNESS
[FreeTextEntry1] : Ms. Franco is a 65 year old woman recently diagnosed with right breast cancer, with prior surgical history of reduction mammoplasty in 2015. She had been undergoing routine annual screening mammography.  \par \par Her screening mammogram and sonogram 6/30/20 showed a focus of architectural distortion in the lateral right breast measuring approximately 10mm, and an irregular 7 x 8 x 11 mm mass at 9:00 5 cm from the nipple in the right breast. Right breast biopsy on 07/07/20 demonstrated a moderately differentiated, Mp 6/9, ER+/KS+/HER2 negative, invasive ductal carcinoma measuring at least 1cm and with a focal DCIS component, solid type with intermediate nuclear grade. \par \par She underwent an MRI of the breast on 7/15/20 showing the recently diagnosed malignancy in the right breast at 9:00. There was an additional suspicious enhancing irregular mass located 2.5 cm anterior, medial, inferior from the index mass at the 8 o'clock axis, anterior depth and measuring 11 mm. and a 3 cm enlarged lymph node in the right axilla. There were no suspicious findings in the left breast.  Core biopsy of the right breast lesion at 8:00 on 7/18/20 revealed invasive moderately differentiated carcinoma, similar to the previous biopsy. Biopsy of the right axillary lymph node revealed invasive mammary carcinoma with lobular features and necrosis.\par \par She underwent right lumpectomy and SLNB on 7/28/20. Pathology demonstrated multifocal ER+/KS+/Her2- invasive breast cancer with tubular lobular features, with the largest measuring 1.5cm and 1cm, and multiple satellite lesions. DCIS was present, low to intermediate nuclear grade. Superior margin contained DCIS located 2 mm from the final superior margin. Additional medial, inferior, and lateral margins were benign. The two larger tumors were ER>90%, KS >90% and HER2 negative. Right axillary contents contained metastatic carcinoma involving 3/7 lymph nodes. Extranodal extension was focally present around 2 lymph nodes. The largest metastatic deposit measured 1.5 cm. The metastatic deposit in the lymph node was ER >90%, KS 50% and HER2 negative. There was no lymphovascular invasion. Oncotype Dx recurrence score was 23. \par \par She received adjuvant chemotherapy with ddAC-T initiated on 9/4/20 and completed treatment 2 weeks ago. \par \par Today: Notes fatigue, anxiety, lower back pain and neuropathy in feet. Denies breast pain but Continues to have tenderness involving the port site. \par \par

## 2021-01-06 PROCEDURE — 77334 RADIATION TREATMENT AID(S): CPT | Mod: 26

## 2021-01-06 PROCEDURE — 77295 3-D RADIOTHERAPY PLAN: CPT | Mod: 26

## 2021-01-06 PROCEDURE — 77300 RADIATION THERAPY DOSE PLAN: CPT | Mod: 26

## 2021-01-11 ENCOUNTER — APPOINTMENT (OUTPATIENT)
Dept: SURGERY | Facility: CLINIC | Age: 66
End: 2021-01-11
Payer: COMMERCIAL

## 2021-01-11 PROCEDURE — 99213K: CUSTOM

## 2021-01-12 ENCOUNTER — APPOINTMENT (OUTPATIENT)
Dept: HEMATOLOGY ONCOLOGY | Facility: CLINIC | Age: 66
End: 2021-01-12

## 2021-01-12 ENCOUNTER — APPOINTMENT (OUTPATIENT)
Dept: INFUSION THERAPY | Facility: HOSPITAL | Age: 66
End: 2021-01-12

## 2021-01-12 PROCEDURE — 77280 THER RAD SIMULAJ FIELD SMPL: CPT | Mod: 26

## 2021-01-14 ENCOUNTER — NON-APPOINTMENT (OUTPATIENT)
Age: 66
End: 2021-01-14

## 2021-01-14 ENCOUNTER — TRANSCRIPTION ENCOUNTER (OUTPATIENT)
Age: 66
End: 2021-01-14

## 2021-01-19 ENCOUNTER — APPOINTMENT (OUTPATIENT)
Dept: INFUSION THERAPY | Facility: HOSPITAL | Age: 66
End: 2021-01-19

## 2021-01-21 ENCOUNTER — NON-APPOINTMENT (OUTPATIENT)
Age: 66
End: 2021-01-21

## 2021-01-22 PROCEDURE — 77427 RADIATION TX MANAGEMENT X5: CPT

## 2021-01-22 NOTE — HISTORY OF PRESENT ILLNESS
[FreeTextEntry1] : Ms. Franco is a 65 year-old woman with stage IIA, [id number] invasive carcinoma with tubulo-lobular features of the right breast, ER/TN positive and HER2 negative. She underwent lumpectomy with negative margins and had 3 out of 7 involved axillary lymph nodes associated with focal extranodal extensions. She recently complete adjuvant chemotherapy. She is now receiving adjuvant radiation therapy to the right breast and regional lymph nodes. \par \par She is feeling generally well. She denies fatigue and pain. She has not noted any skin reactions. She is using Aquaphor on the skin.

## 2021-01-22 NOTE — PHYSICAL EXAM
[Normal] : well developed, well nourished, in no acute distress [de-identified] : Right lumpectomy and axillary scars are well healed, no erythema, no hyperpigmentation.

## 2021-01-22 NOTE — REVIEW OF SYSTEMS
[Alopecia: Grade 0] : Alopecia: Grade 0 [Pruritus: Grade 0] : Pruritus: Grade 0 [Skin Atrophy: Grade 0] : Skin Atrophy: Grade 0 [Skin Hyperpigmentation: Grade 0] : Skin Hyperpigmentation: Grade 0 [Dermatitis Radiation: Grade 0] : Dermatitis Radiation: Grade 0 [Skin Induration: Grade 0] : Skin Induration: Grade 0

## 2021-01-22 NOTE — DISEASE MANAGEMENT
[Pathological] : TNM Stage: p [IIA] : IIA [TTNM] : 1c [NTNM] : 1a [MTNM] : 0 [de-identified] : 1062cGy [de-identified] : 5240cGy [de-identified] : right breast and regional nodes

## 2021-01-28 ENCOUNTER — NON-APPOINTMENT (OUTPATIENT)
Age: 66
End: 2021-01-28

## 2021-01-29 PROCEDURE — 77427 RADIATION TX MANAGEMENT X5: CPT

## 2021-01-29 NOTE — DISEASE MANAGEMENT
[Pathological] : TNM Stage: p [IIA] : IIA [TTNM] : 1c [NTNM] : 1a [MTNM] : 0 [de-identified] : [id number] [de-identified] : 5240cGy [de-identified] : right breast and regional nodes

## 2021-01-29 NOTE — HISTORY OF PRESENT ILLNESS
[FreeTextEntry1] : Ms. Franco is a 65 year-old woman with stage IIA, [id number] invasive carcinoma with tubulo-lobular features of the right breast, ER/ID positive and HER2 negative. She underwent lumpectomy with negative margins and had 3 out of 7 involved axillary lymph nodes associated with focal extranodal extensions. She recently complete adjuvant chemotherapy. She is now receiving adjuvant radiation therapy to the right breast and regional lymph nodes. \par \par She is feeling generally well. She reports fatigue and intermittent breast pain. She has not noted any skin reactions. She is using Biafine and Aquaphor on the skin.

## 2021-01-29 NOTE — VITALS
[Least Pain Intensity: 0/10] : 0/10 [90: Able to carry normal activity; minor signs or symptoms of disease.] : 90: Able to carry normal activity; minor signs or symptoms of disease.  [Pain Duration: ___] : Pain duration: [unfilled] [Pain Location: ___] : Pain Location: [unfilled] [Maximal Pain Intensity: 0/10] : 0/10

## 2021-01-29 NOTE — PHYSICAL EXAM
[Normal] : well developed, well nourished, in no acute distress [de-identified] : Right lumpectomy and axillary scars are well healed, no erythema, no hyperpigmentation.

## 2021-02-04 ENCOUNTER — NON-APPOINTMENT (OUTPATIENT)
Age: 66
End: 2021-02-04

## 2021-02-04 PROCEDURE — 77280 THER RAD SIMULAJ FIELD SMPL: CPT | Mod: 26

## 2021-02-04 NOTE — REVIEW OF SYSTEMS
[Alopecia: Grade 0] : Alopecia: Grade 0 [Skin Atrophy: Grade 0] : Skin Atrophy: Grade 0 [Skin Induration: Grade 0] : Skin Induration: Grade 0 [Pruritus: Grade 1 - Mild or localized; topical intervention indicated] : Pruritus: Grade 1 - Mild or localized; topical intervention indicated [Skin Hyperpigmentation: Grade 1 - Hyperpigmentation covering <10% BSA; no psychosocial impact] : Skin Hyperpigmentation: Grade 1 - Hyperpigmentation covering <10% BSA; no psychosocial impact [Dermatitis Radiation: Grade 1 - Faint erythema or dry desquamation] : Dermatitis Radiation: Grade 1 - Faint erythema or dry desquamation

## 2021-02-09 PROCEDURE — 77427 RADIATION TX MANAGEMENT X5: CPT

## 2021-02-11 ENCOUNTER — NON-APPOINTMENT (OUTPATIENT)
Age: 66
End: 2021-02-11

## 2021-02-11 NOTE — VITALS
[Pain Description/Quality: ___] : Pain description/quality: [unfilled] [Pain Duration: ___] : Pain duration: [unfilled] [NSAID/Non-Opioid] : NSAID/Non-Opioid [Maximal Pain Intensity: 8/10] : 8/10 [Least Pain Intensity: 0/10] : 0/10 [Pain Location: ___] : Pain Location: [unfilled] [Pain Interferes with ADLs] : Pain does not interfere with activities of daily living [80: Normal activity with effort; some signs or symptoms of disease.] : 80: Normal activity with effort; some signs or symptoms of disease.

## 2021-02-11 NOTE — PHYSICAL EXAM
[Normal] : well developed, well nourished, in no acute distress [de-identified] : Right lumpectomy and axillary scars are well healed, mild to moderate erythema

## 2021-02-11 NOTE — DISEASE MANAGEMENT
[Pathological] : TNM Stage: p [IIA] : IIA [TTNM] : 1c [NTNM] : 1a [MTNM] : 0 [de-identified] : 6874cUi [de-identified] : 5240cGy [de-identified] : right breast and regional nodes

## 2021-02-11 NOTE — DISEASE MANAGEMENT
[Pathological] : TNM Stage: p [IIA] : IIA [TTNM] : 1c [NTNM] : 1a [MTNM] : 0 [de-identified] : 4040cGv [de-identified] : 5240cGy [de-identified] : right breast and regional nodes

## 2021-02-11 NOTE — HISTORY OF PRESENT ILLNESS
[FreeTextEntry1] : Ms. Franco is a 65 year-old woman with stage IIA, [id number] invasive carcinoma with tubulo-lobular features of the right breast, ER/CO positive and HER2 negative. She underwent lumpectomy with negative margins and had 3 out of 7 involved axillary lymph nodes associated with focal extranodal extensions. She recently complete adjuvant chemotherapy. She is now receiving adjuvant radiation therapy to the right breast and regional lymph nodes. \par \par She is feeling generally well. She reports fatigue and intermittent breast pain. She has noted mild redness and itching of the skin. She is using Biafine and Aquaphor on the skin.

## 2021-02-11 NOTE — PHYSICAL EXAM
[Normal] : well developed, well nourished, in no acute distress [de-identified] : Right lumpectomy and axillary scars are well healed, mild erythema

## 2021-02-16 PROCEDURE — 77427 RADIATION TX MANAGEMENT X5: CPT

## 2021-02-17 ENCOUNTER — NON-APPOINTMENT (OUTPATIENT)
Age: 66
End: 2021-02-17

## 2021-02-17 NOTE — DISCUSSION/SUMMARY
Patient:   CRYSTAL BEAUCHAMP            MRN: [id number]            FIN: [id number]              Age:   32 years     Sex:  FEMALE     :  [birth year]   Associated Diagnoses:   Acute pancreatitis   Author:   NORMA GARCÍA     CC: abd pain  HPI: 31y/o female prsents with severe epigasstric abd pain over the past weekw/ acute worsening over the past day prior to prsentaiotn, assocaited ith nausea and NBNB emesis, states worsens with food but unable to toelrate much. denies fevers, chills, prior episdoes, trouble with stools,  symptoms. Otherwise denies any other ROS>        Review of Systems   Constitutional:  Negative except as documented in history of present illness.   Eye:  Negative except as documented in history of present illness.   Ear/Nose/Mouth/Throat:  Negative except as documented in history of present illness.   Cardiovascular:  Negative except as documented in history of present illness.   Respiratory:  Negative except as documented in history of present illness.   Gastrointestinal:  Negative except as documented in history of present illness.   Genitourinary:  Negative except as documented in history of present illness.   Musculoskeletal:  Negative except as documented in history of present illness.   Hematology/Lymphatics:  Negative except as documented in history of present illness.   Neurologic:  Negative except as documented in history of present illness.   Endocrine:  Negative except as documented in history of present illness.   Allergy/Immunologic:  Negative except as documented in history of present illness.   Psychiatric:  Negative except as documented in history of present illness.      Histories   Past Med History: Past Medical History   Diabetes  H/O: blood transfusion  Transfusion reaction    Family History:    GRANDMOTHER  Diabetes mellitus type 2    Procedure History:     section ().    Social History       Alcohol  Details: Alcohol Abuse in Household: No.  Use:  [Cancer Type / Location / Histology Subtype: ________] : Cancer Type / Location / Histology Subtype: [unfilled] Current.  If current Alcohol user: More than 5 (M) or 4 (F) drinks within a couple of hours? No.  Substance Abuse  Details: Substance Abuse in Household: No.  Use: Current.  Type: Marijuana.  Tobacco  Details: Smoker in Houshold: No.  Smoked/Smokeless Tobacco Last 30 Days: No.  Smoking Tobacco Use: Never smoker.  Smokeless Tobacco Use Never.  .        Health Status   Allergies: Allergies (ST)   Allergies (1) Active Reaction  NKA None Documented    Current medications:    Medications (17) Active  Scheduled: (4)  Heparin 5,000 unit/1 mL inj MDV  5,000 unit 1 mL, Subcutaneous, Q8H  insulin glargine  30 unit 0.3 mL, Subcutaneous, Daily  Insulin human lispro 1 unit/0.01 mL inj  2-10 unit, Subcutaneous, QID [with meals & HS]  Insulin human lispro 1 unit/0.01 mL inj  10 unit 0.1 mL, Subcutaneous, TID [with meals]  Continuous: (1)  Sodium Chloride 0.9% 1,000 mL  1,000 mL, IV, 125 mL/hr  PRN: (12)  Acetaminophen 500 mg tab  500 mg 1 tab, Oral, Q6H  Dextrose (glucose) 40% 15 gm/37.5 gm oral gel UD  15 gm, Oral, As Directed PRN  Dextrose (glucose) 50% 25 gm/50 mL syringe  12.5 gm 25 mL, IV Push, As Directed PRN  DiphenhydrAMINE 25 mg cap  25 mg 1 cap, Oral, Q Bedtime  Glucagon 1 mg/1 mL emergency kit SDV  1 mg 1 mL, IM, As Directed PRN  HydrALAZINE 20 mg/1 mL inj SDV  10 mg 0.5 mL, Slow IV Push, Q8H  HydrALAZINE 20 mg/1 mL inj SDV  10 mg 0.5 mL, Slow IV Push, Q6H  Morphine PF 2 mg/1 mL inj SDV  2 mg 1 mL, IV Push, Q4H  Morphine PF 4 mg/1 mL inj SDV  4 mg 1 mL, IV Push, Q4H  Ondansetron 4 mg/2 mL inj SDV  4 mg 2 mL, Slow IV Push, Q6H  Ondansetron 4 mg/2 mL inj SDV  4 mg 2 mL, Slow IV Push, Q6H  Senna-docusate sodium 8.6-50 mg tab  1 tab, Oral, Daily      Physical Examination   VS/Measurements     Vitals between:   05-JUN-2020 10:40:25   TO   06-JUN-2020 10:40:25                   LAST RESULT MINIMUM MAXIMUM  Temperature 37 36.1 37  Heart Rate 125 81 125  Respiratory Rate 15 15 28  NISBP           154 154 195  NIDBP           99  [Diagnosis Date (year): ____] : Diagnosis Date (year): [unfilled] 99 143  NIMBP           129 129 144  SpO2                    95 95 99    General:  No acute distress, Appearance.    Eye:  Pupils are equal, round and reactive to light.    HENT:  Normocephalic, Atraumatic.    Respiratory:  Lungs are clear to auscultation, Respirations are non-labored, Breath sounds are equal, Symmetrical chest wall expansion.   Cardiovascular:  Normal rate, Regular rhythm, Capillary refill, Good pulses equal in all extremities, Normal peripheral perfusion.   Gastrointestinal:  soft, mild tenderness in RUQ, normal bowel sounds.   Integumentary:  Warm, Dry.      Impression and Plan   31y/o female presnts with severe sepsis 2/2 acute pancreeatitis and acute cholecystisi in setting of adrenal mass  ACute pancreatitis - possibly choledocholithaisis - no significnat hx of alcohol use  - Ordered MRCP  - GI consulted  Acute cholecystiti  - Surgeryc sonulted  - NPO  - IV fluids  adrenal mass  - onc consulted  - will eval further with MRI  Type 2 diabetes w/ hyperglycemia  - sliding scale  - accuchecks QHS, diabetic diet  HTN - stable  - continue home meds  - Low Na diet  Advanced directives  & outpatient care planning  - I Spent 50min evaluating and coordinating care of patient.   - reviewed consultants recommendations, labs and imaging        Dx and Plan:  Diagnosis     Acute pancreatitis ([id number], Discharge, Medical).     .   [I] : I [Surgery] : Surgery: Yes [Surgery Date(s) (year): ____] : Surgery Date(s) (year): [unfilled] [Surgical Procedure / Location / Findings: _________] : Surgical Procedure / Location / Findings: [unfilled] [Radiation] : Radiation: Yes [Body Area Treated: _________] : Body Area Treated: [unfilled] [End Date (year): ____] : End Date (year): [unfilled] [Systemic Therapy (chemotherapy, hormonal therapy, other)] : Systemic Therapy (chemotherapy, hormonal therapy, other): Yes [Genetic / hereditary risk factor(s) or predisposing conditions: __________] : Genetic / hereditary risk factor(s) or predisposing conditions: [unfilled] [Genetic Counseling] : Genetic Counseling: Yes [Need for onging (adjuvant) treatment for cancer?] : Need for onging (adjuvant) treatment for cancer? Yes [Primary care physician] : primary care physician [Colonoscopy] : colonoscopy [Mammogram] : Mammogram [Skin Checks] : skin checks [PAP Test] : PAP test [Bone Density Test] : bone density test [Cholesterol Test] : cholesterol test [Annual Flu Shot] : annual flu shot [Cardiac Toxicity] : cardiac toxicity [Anxiety and Depression] : anxiety and depression [Cognitive Function] : cognitive function [Sexual Function] : sexual function [Sleep Disorders] : sleep disorders [Other: _____] : [unfilled] [Emotional and mental health] : Emotional and mental health [Memory or Concentration Loss] : Memory or concentration loss [Fatigue] : Fatigue [Sexual Functioning] : Sexual functioning [Alcohol use] : Alcohol use [Weigh Management (loss / gain)] : Weight management (loss / gain) [Diet] : Diet [Sun screen use] : Sun screen use [Physical activity] : Physical activity [Path to Wellness Survivorship Program] : Path to Wellness Survivorship Program [Psycho-social Emotional Support ([place])] : Psycho-social Emotional Support  (please call SW at [phone]) [Nutritional and Wellness Counseling ([place])] : Nutritional and Wellness Counseling (please call Nutrition office at [phone]) [[facility] Breast Cancer Hotline] : [facility] Breast Cancer Hotline [Cancer Care] : Cancer Care [American Cancer Society] : the American Cancer Society [Other: ______] : [unfilled] [FreeTextEntry1] : adjuvant dose dense AC- T\par doxorubicin every 2 weeks x 4 cycles  completed 10/2020  cumulative dose 400mg/240mg/m2\par cyclophosphamide every 2 weeks x 4 cycles  completed 10/2020\par paclitaxel every 2 weeks x 2 cycles  completed  12/1/2020  last 2 planned cycles held for peripheral neuropathy [FreeTextEntry2] : anastrazole (aromatase inhibitor)  initiated 2/2021\par Potential side effects include : joint pain, hot flaashes, vaginal dryness, mood changes, sleep disturbance,\par elevation in blood pressure and cholesterol level, thinning of hair and bones, fatigue

## 2021-03-08 ENCOUNTER — APPOINTMENT (OUTPATIENT)
Dept: CARDIOLOGY | Facility: CLINIC | Age: 66
End: 2021-03-08
Payer: COMMERCIAL

## 2021-03-08 ENCOUNTER — NON-APPOINTMENT (OUTPATIENT)
Age: 66
End: 2021-03-08

## 2021-03-08 VITALS
DIASTOLIC BLOOD PRESSURE: 84 MMHG | RESPIRATION RATE: 16 BRPM | WEIGHT: 148 LBS | BODY MASS INDEX: 29.06 KG/M2 | HEIGHT: 60 IN | SYSTOLIC BLOOD PRESSURE: 118 MMHG | HEART RATE: 72 BPM

## 2021-03-08 PROCEDURE — 99214 OFFICE O/P EST MOD 30 MIN: CPT

## 2021-03-08 PROCEDURE — 93000 ELECTROCARDIOGRAM COMPLETE: CPT

## 2021-03-08 PROCEDURE — 99072 ADDL SUPL MATRL&STAF TM PHE: CPT

## 2021-03-08 NOTE — DISCUSSION/SUMMARY
[FreeTextEntry1] : Dr. Barragan-(PRIOR VISIT and PMH WITH Dr. Barragan): \par This is a 65-year-old female with past medical history significant for hypothyroidism, dyspepsia, currently undergoing chemotherapy for breast cancer, familial hypercholesterolemia, who comes in for a lipid follow up evaluation.  She denies chest pain, shortness of breath, dizziness or syncope.\par \par The patient had blood work done October 14, 2020 which demonstrated cholesterol 134, HDL 62, calculated LDL 54, hemoglobin A1c of 5.8, slightly elevated TSH of 5.47, triglycerides 91 mg/dL.  She has normal liver function tests.\par \par Patient understands she must follow-up with her primary care physician regarding the elevation of her TSH.\par The patient is going to see the cardio genetics team regarding FH cascade evaluation.  She continues on her biweekly injection with her Repatha.\par The patient has had no side effects from her injections.  She also is being referred to the cardiogenic steam at her request.\par \par She reports her mother had high cholesterol.\par She has no history of rheumatic fever.  She does not drink excessive caffeine or alcohol.  She reports that her diet is reasonable.\par \par Blood work done October 8, 2019 demonstrated a total cholesterol of 328 mg/dL,  mg/dL, HDL 52 mg/dL, triglycerides 230 mg/dL.  These findings are consistent with heterozygous familial hypercholesterolemia.\par   \par She is also instructed to followup with her primary care physician and her own cardiologist for overall medical care and cardiac care.\par Lifestyle modification was reinforced.\par She has been on Zocor, Crestor, Lipitor and Pravachol associated with significant incapacitating muscle aches.  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 Normal TSH.\par \par The patient had blood work done April 16, 2013 which demonstrated an elevated C-reactive protein of 4.6, total cholesterol 280 mg/dL, LDL cholesterol calculated 173 mg/dL, HDL 51 mg/dL, triglycerides 281 mg/dL.  The patient has evidence for familial hypercholesterolemia. She has no physical stigmata of FH. I have advised the patient and  to have her children, and her own siblings tested for lipids.  She promises to do so.\par Her LDL cholesterol goal is less than 100 mg/dL.\par The patient is instructed to followup with her cardiologist and primary care physician.\par The patient will followup with me after her blood work is completed. no

## 2021-03-08 NOTE — PHYSICAL EXAM
[General Appearance - Well Developed] : well developed [Normal Appearance] : normal appearance [Well Groomed] : well groomed [General Appearance - Well Nourished] : well nourished [No Deformities] : no deformities [General Appearance - In No Acute Distress] : no acute distress [Normal Conjunctiva] : the conjunctiva exhibited no abnormalities [Normal Oral Mucosa] : normal oral mucosa [Normal Oropharynx] : normal oropharynx [Normal Jugular Venous A Waves Present] : normal jugular venous A waves present [Normal Jugular Venous V Waves Present] : normal jugular venous V waves present [No Jugular Venous Daniel A Waves] : no jugular venous daniel A waves [Respiration, Rhythm And Depth] : normal respiratory rhythm and effort [Exaggerated Use Of Accessory Muscles For Inspiration] : no accessory muscle use [Auscultation Breath Sounds / Voice Sounds] : lungs were clear to auscultation bilaterally [Bowel Sounds] : normal bowel sounds [Abdomen Soft] : soft [Abnormal Walk] : normal gait [Gait - Sufficient For Exercise Testing] : the gait was sufficient for exercise testing [Nail Clubbing] : no clubbing of the fingernails [Cyanosis, Localized] : no localized cyanosis [Skin Color & Pigmentation] : normal skin color and pigmentation [Skin Turgor] : normal skin turgor [] : no rash [Oriented To Time, Place, And Person] : oriented to person, place, and time [Impaired Insight] : insight and judgment were intact [Affect] : the affect was normal [Mood] : the mood was normal [No Anxiety] : not feeling anxious [5th Left ICS - MCL] : palpated at the 5th LICS in the midclavicular line [Normal] : normal [No Precordial Heave] : no precordial heave was noted [Normal Rate] : normal [Rhythm Regular] : regular [Normal S1] : normal S1 [Normal S2] : normal S2 [No Gallop] : no gallop heard [I] : a grade 1 [2+] : left 2+ [No Abnormalities] : the abdominal aorta was not enlarged and no bruit was heard [No Pitting Edema] : no pitting edema present [FreeTextEntry1] : No xanthomas [S3] : no S3 [S4] : no S4 [Right Carotid Bruit] : no bruit heard over the right carotid [Left Carotid Bruit] : no bruit heard over the left carotid [Right Femoral Bruit] : no bruit heard over the right femoral artery [Left Femoral Bruit] : no bruit heard over the left femoral artery

## 2021-03-08 NOTE — REASON FOR VISIT
[Hyperlipidemia] : hyperlipidemia [Follow-Up - Clinic] : a clinic follow-up of [FreeTextEntry2] : Lipid Follow Up [FreeTextEntry1] : This is a 65 year year old female here today for follow up cardiac evaluation. \par She has a past medical history significant for  hypothyroidism, dyspepsia, currently undergoing chemotherapy for breast cancer, familial hypercholesterolemia.\par \par Today she is feeling generally well and does not have any complaints at this time. \par She denies fever, chills, weight loss, malaise, rash, alteration bowel habits, weakness, abdominal  pain, bloating, changes in urination, visual disturbances, chest pain, headaches, dizziness, heart palpitations, recent episodes of syncope or falls at this time.\par \par

## 2021-03-08 NOTE — ASSESSMENT
[FreeTextEntry1] : This is a 65 year year old female here today for follow up cardiac evaluation. \par She has a past medical history significant for  hypothyroidism, dyspepsia, currently undergoing chemotherapy for breast cancer, familial hypercholesterolemia.\par \par -Pt is stable from a cardiac standpoint and does not have any complaints at this time. She states that she finished her chemo/radiation about 2 weeks ago\par \par -Cardiac risk factors include HLD.\par \par BLOOD PRESSURE:\par -BP is well controlled in today's visit.\par \par BLOOD WORK:\par -New blood work was done, 03/01/2021 demonstrated Cholesterol of 179, HDL 54 and LDL of 93. Pt currently on Repatha.\par -She has been on Zocor, Crestor, Lipitor and Pravachol associated with significant incapacitating muscle aches.  All symptoms were relieved after discontinuing the medication.\par -This patient is clearly statin intolerant.\par \par CHOLESTEROL CONTROL:\par -Patient will continue the advised  TLC diet and to continue follow-up for treatment of hyperlipidemia and repeat blood testing with diet and exercise. I have discussed different exercises and the importance of maintenance of optimal body weight. The importance of staying within guidelines and recommendations was stressed to the patient today and they acknowledged that they understand this to me verbally.\par \par  -Ms. MARTIN was educated and advised that failure to follow-up with my medical recommendations to lower cholesterol can result in severe health consequences therefore, they will continuing a low saturated and low fat diet and to avoid excessive carbohydrates to help reduce triglycerides and that lowering LDL levels is associated with a significant decrease in serious cardiac events including but not limited to heart attack stroke and overall death. We will continue lipid lowering agents as advised based on blood test results and the patient understands to call if they develop severe muscle discomfort or if they have a reddish tinted discoloration in their urine.\par \par \par TESTING/REPORTS:\par -EKG done Mar 08, 2021 which demonstrated regular sinus rhythm with nonspecific ST-T wave changes, BPM of 72.\par \par PLAN:\par -The patient will continue with he Repatha injections.\par -She will follow up with her Cardiologist.\par -She will follow up with her Oncologist.\par \par I have discussed the plan of care with Ms. GRETEL MARTIN  and she  will follow up in 3 months. She is compliant with all of her medications.\par \par The patient understands that aerobic exercises must be increased to minutes 4 times/week and a detailed discussion of lifestyle modification was done today. \par The patient has a good understanding of the diagnosis, treatment plan and lifestyle modification. \par She will contact me at the office for any questions with their care or any changes in their health status.\par \par The plan of care was discussed with supervising physician, Dr. Barragan while present in the office at the time of the visit. \par \par Tamar ABRAHAM

## 2021-03-16 ENCOUNTER — OUTPATIENT (OUTPATIENT)
Dept: OUTPATIENT SERVICES | Facility: HOSPITAL | Age: 66
LOS: 1 days | Discharge: HOME | End: 2021-03-16

## 2021-03-16 DIAGNOSIS — Z90.710 ACQUIRED ABSENCE OF BOTH CERVIX AND UTERUS: Chronic | ICD-10-CM

## 2021-03-16 DIAGNOSIS — C50.919 MALIGNANT NEOPLASM OF UNSPECIFIED SITE OF UNSPECIFIED FEMALE BREAST: ICD-10-CM

## 2021-03-16 DIAGNOSIS — Z98.890 OTHER SPECIFIED POSTPROCEDURAL STATES: Chronic | ICD-10-CM

## 2021-03-19 ENCOUNTER — APPOINTMENT (OUTPATIENT)
Dept: HEMATOLOGY ONCOLOGY | Facility: CLINIC | Age: 66
End: 2021-03-19
Payer: COMMERCIAL

## 2021-03-19 VITALS
OXYGEN SATURATION: 99 % | RESPIRATION RATE: 17 BRPM | WEIGHT: 148.81 LBS | HEART RATE: 76 BPM | DIASTOLIC BLOOD PRESSURE: 82 MMHG | SYSTOLIC BLOOD PRESSURE: 143 MMHG | HEIGHT: 60.04 IN | TEMPERATURE: 97.7 F | BODY MASS INDEX: 28.84 KG/M2

## 2021-03-19 DIAGNOSIS — Z00.00 ENCOUNTER FOR GENERAL ADULT MEDICAL EXAMINATION W/OUT ABNORMAL FINDINGS: ICD-10-CM

## 2021-03-19 PROCEDURE — 99072 ADDL SUPL MATRL&STAF TM PHE: CPT

## 2021-03-19 PROCEDURE — 99214 OFFICE O/P EST MOD 30 MIN: CPT

## 2021-03-19 RX ORDER — ANASTROZOLE TABLETS 1 MG/1
1 TABLET ORAL DAILY
Qty: 90 | Refills: 3 | Status: DISCONTINUED | COMMUNITY
Start: 2020-12-18 | End: 2021-03-19

## 2021-03-22 NOTE — REVIEW OF SYSTEMS
[Anxiety] : anxiety [Negative] : Endocrine [FreeTextEntry2] : as above [FreeTextEntry9] : as above [de-identified] : as above

## 2021-03-22 NOTE — HISTORY OF PRESENT ILLNESS
[Disease: _____________________] : Disease: [unfilled] [T: ___] : T[unfilled] [N: ___] : N[unfilled] [M: ___] : M[unfilled] [AJCC Stage: ____] : AJCC Stage: [unfilled] [de-identified] : The patient's history of present illness began on 06/30/2020 when she had a routine mammogram with a finding of postsurgical changes compatible with her history of reduction mammoplasty; there was a focus of architectural distortion in the slightly lateral right breast at the level of the nipple, measuring approximately 10 mm with no associated microcalcifications, and no abnormal findings in the left breast; no axillary adenopathy was seen.  A right breast ultrasound performed on that same date demonstrated at the 9 o'clock axis 5 cm from the nipple, an irregularly marginated mass measuring 7 x 8 x 11 mm with ultrasound-guided core biopsy recommended; the left breast was unremarkable.  The patient then went on to have an ultrasound-guided core biopsy of the right breast on 07/18/2020 with a finding at the 8 o'clock axis 5 cm from nipple of an invasive moderately differentiated ductal carcinoma, Mp score 6/9 with invasive tumor measuring at least 1 cm with evidence of DCIS, with microcalcifications identified in the invasive carcinoma and in the DCIS, with no evidence of lymphovascular invasion; the right axillary biopsy on that same date demonstrated invasive mammary carcinoma with lobular features, with necrosis and scattered lymphocytic infiltrate.  Estrogen receptor returned positive greater than 90%, progesterone receptor positive greater than 90% HER-2/charlotte negative.  An MRI of the bilateral breasts demonstrated a 3 cm enlarged lymph node in the right axilla and no significant left axillary or internal mammary adenopathy; in the right breast, there was a 15 mm irregular enhancing mass with a second enhancing irregular mass located 2.5 cm anterior, medial, inferior from the index mass at the 8 o'clock axis, anterior depth and measuring 11 mm.  The patient ultimately saw Dr. Liza Navarrete and on 07/28/2020 underwent a right lumpectomy/sentinel lymph node biopsy with a finding of multifocal invasive carcinoma, moderately differentiated with tubulolobular features measuring 1.1 cm in addition to an invasive moderately differentiated carcinoma with tubulolobular features measuring 1.5 cm with multiple separate small foci of invasive carcinoma with single file arrangement of tumor cells E-cadherin positive, DCIS, LCIS, with further shave margins specifically in the right superior breast margin returning negative for carcinoma but with evidence of DCIS and a single isolated duct measuring 0.2 cm with margins negative for DCIS, an additional shave margins returning negative; in the right axilla, metastatic ductal carcinoma involved 3/7 lymph nodes with a carcinoma being E-cadherin positive. \par \par The patient was seen in initial consultation on 8/3/20 regarding further treatment recommendations.  \par \par A Mammaprint was sent off and returned with a high risk score. An Oncotype for node positive cancer was also sent off (unintentionally) which resulted in a recurrence score of 23 translating to 19%  risk of distant recurrence and an absolute benefit from chemotherapy. Results of both these tests were discussed with her extensively and she has decided to proceed with chemotherapy with dose dense doxorubicin/cyclophosphamide followed by dose dense paclitaxel every 2 weeks x 4 with Pegfilgrastim support. \par \par 9/4/20  started on  treatment with  Dose dense doxorubicin/cyclophosphamide every 2 weeks followed by dose dense paclitaxel every 2 weeks x 4, both with pegfilgrastim,\par Withdrew participation  from  [id number] Olanzapine antinausea study on 9/7/20 C1D4\par \par S/P DD AC x 4 then DD Taxol #3 on 12/1/20. She developed gradually worsening CIPN and ultimately had gr 3 CIPN that did not resolve and DD Taxol # 4 / 4 was held / discontinued.   [de-identified] : ER+ MS+ Her 2 charlotte - [de-identified] : Here today for routine f/u. \par \par In the interim:\par \par She completed adjuvant radiation - 4240cGy to R breast and regional LNs with a 1,000 boost to the R breast tumor bed under the care of Dr Evette Garcia.   \par \par She notes persistent CIPN of her feet but has improved. Can walk easily, rare momentary sense of loss of balance / no falls and no change in activities. More comfortable now (grade 1). \par \par She started anastrozole 1 week ago and stopped it 3 days later secondary to "heart beating fast, nausea, did not feel good, joint pain" and these symptoms subsequently fully resolved. \par \par She notes a good appetite, stable weight and excellent performance status.  \par \par mammo/sono 7/20

## 2021-03-22 NOTE — PHYSICAL EXAM
[Restricted in physically strenuous activity but ambulatory and able to carry out work of a light or sedentary nature] : Status 1- Restricted in physically strenuous activity but ambulatory and able to carry out work of a light or sedentary nature, e.g., light house work, office work [Normal] : affect appropriate [de-identified] : The right breast is status post reduction mastopexy as well as lumpectomy with well-healed scars; there is chronic nipple retraction w/o change, no skin dimpling, or palpable masses. The left breast is status post reduction mastopexy with well-healed scar; there is chronic nipple retraction w/o change, no skin dimpling, or palpable masses. The bilateral axillae are without adenopathy.  [de-identified] : L chest wall mediport incision healing well

## 2021-03-24 ENCOUNTER — APPOINTMENT (OUTPATIENT)
Dept: RADIATION ONCOLOGY | Facility: CLINIC | Age: 66
End: 2021-03-24
Payer: COMMERCIAL

## 2021-03-24 VITALS — RESPIRATION RATE: 17 BRPM | HEART RATE: 72 BPM | DIASTOLIC BLOOD PRESSURE: 90 MMHG | SYSTOLIC BLOOD PRESSURE: 138 MMHG

## 2021-03-24 PROCEDURE — 99024 POSTOP FOLLOW-UP VISIT: CPT

## 2021-03-24 NOTE — PHYSICAL EXAM
[] : no respiratory distress [Breast Palpation Mass] : no palpable masses [Breast Abnormal Lactation (Galactorrhea)] : no nipple discharge [No UE Edema] : there is no upper extremity edema [Normal] : well developed, well nourished, in no acute distress [Heart Rate And Rhythm] : heart rate and rhythm were normal [Supraclavicular Lymph Nodes Enlarged Bilaterally] : supraclavicular [Axillary Lymph Nodes Enlarged Bilaterally] : axillary [de-identified] : R breast s/p lumpectomy with well-healed scars.  Nipple retraction and residual dried skin and hyperpigmentation, no palpable masses.   [de-identified] : left arm edema

## 2021-03-24 NOTE — HISTORY OF PRESENT ILLNESS
[FreeTextEntry1] : Ms. 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 She underwent lumpectomy with negative margins and had 3 out of 7 involved axillary lymph nodes associated with focal extranodal extensions. She has now completed adjuvant radiation therapy to the right breast and regional lymph nodes, a dose of 5,240 cGy from 1/13/2021 - 2/16/2021.\par \par She comes today for a post-treatment evaluation.  She continues to use Biafene on the skin.  Intermittent shooting pains in the right breast, about 5/10, no pain medication required.  Sensitivity to the nipple and it looks dark. She was started on anastrazole by Dr. William. She was unable to tolerate (took for 3 days) due to side effects, including nausea and difficulty sleeping. Plan to start on exemestane.\par \par PMR left arm lymphedema from [facility]. \par Dr. Navarrete mammo sono 6 months, MRI 1 yr\par

## 2021-03-24 NOTE — PHYSICAL EXAM
[] : no respiratory distress [Breast Palpation Mass] : no palpable masses [Breast Abnormal Lactation (Galactorrhea)] : no nipple discharge [No UE Edema] : there is no upper extremity edema [Normal] : well developed, well nourished, in no acute distress [Heart Rate And Rhythm] : heart rate and rhythm were normal [Supraclavicular Lymph Nodes Enlarged Bilaterally] : supraclavicular [Axillary Lymph Nodes Enlarged Bilaterally] : axillary [de-identified] : R breast s/p lumpectomy with well-healed scars.  Nipple retraction and residual dried skin and hyperpigmentation, no palpable masses.   [de-identified] : left arm edema

## 2021-03-24 NOTE — HISTORY OF PRESENT ILLNESS
[FreeTextEntry1] : Ms. 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 She underwent lumpectomy with negative margins and had 3 out of 7 involved axillary lymph nodes associated with focal extranodal extensions. She has now completed adjuvant radiation therapy to the right breast and regional lymph nodes, a dose of 5,240 cGy from 1/13/2021 - 2/16/2021.\par \par She comes today for a post-treatment evaluation.  She continues to use Biafene on the skin.  Intermittent shooting pains in the right breast, about 5/10, no pain medication required.  Sensitivity to the nipple and it looks dark. She was started on anastrazole by Dr. William. She was unable to tolerate (took for 3 days) due to side effects, including nausea and difficulty sleeping. Plan to start on exemestane.\par \par PMR left arm lymphedema from [facility]. \par Dr. Navarrete mammo sono 6 months, MRI 1 yr\par

## 2021-03-24 NOTE — VITALS
[Maximal Pain Intensity: 5/10] : 5/10 [Least Pain Intensity: 0/10] : 0/10 [Pain Description/Quality: ___] : Pain description/quality: [unfilled] [Pain Duration: ___] : Pain duration: [unfilled] [Pain Location: ___] : Pain Location: [unfilled] [NoTreatment Scheduled] : no treatment scheduled [80: Normal activity with effort; some signs or symptoms of disease.] : 80: Normal activity with effort; some signs or symptoms of disease.  [Pain Interferes with ADLs] : Pain does not interfere with activities of daily living

## 2021-03-24 NOTE — REVIEW OF SYSTEMS
[Negative] : Musculoskeletal [Alopecia: Grade 0] : Alopecia: Grade 0 [Pruritus: Grade 0] : Pruritus: Grade 0 [Skin Atrophy: Grade 0] : Skin Atrophy: Grade 0 [Skin Hyperpigmentation: Grade 1 - Hyperpigmentation covering <10% BSA; no psychosocial impact] : Skin Hyperpigmentation: Grade 1 - Hyperpigmentation covering <10% BSA; no psychosocial impact [Skin Induration: Grade 0] : Skin Induration: Grade 0 [Dermatitis Radiation: Grade 0] : Dermatitis Radiation: Grade 0

## 2021-04-01 ENCOUNTER — OUTPATIENT (OUTPATIENT)
Dept: OUTPATIENT SERVICES | Facility: HOSPITAL | Age: 66
LOS: 1 days | End: 2021-04-01
Payer: COMMERCIAL

## 2021-04-01 ENCOUNTER — RESULT REVIEW (OUTPATIENT)
Age: 66
End: 2021-04-01

## 2021-04-01 ENCOUNTER — APPOINTMENT (OUTPATIENT)
Dept: RADIOLOGY | Facility: IMAGING CENTER | Age: 66
End: 2021-04-01
Payer: COMMERCIAL

## 2021-04-01 DIAGNOSIS — Z98.890 OTHER SPECIFIED POSTPROCEDURAL STATES: Chronic | ICD-10-CM

## 2021-04-01 DIAGNOSIS — Z90.710 ACQUIRED ABSENCE OF BOTH CERVIX AND UTERUS: Chronic | ICD-10-CM

## 2021-04-01 DIAGNOSIS — Z00.00 ENCOUNTER FOR GENERAL ADULT MEDICAL EXAMINATION WITHOUT ABNORMAL FINDINGS: ICD-10-CM

## 2021-04-01 DIAGNOSIS — Z00.8 ENCOUNTER FOR OTHER GENERAL EXAMINATION: ICD-10-CM

## 2021-04-01 PROCEDURE — 77080 DXA BONE DENSITY AXIAL: CPT | Mod: 26

## 2021-04-01 PROCEDURE — 77080 DXA BONE DENSITY AXIAL: CPT

## 2021-04-05 ENCOUNTER — APPOINTMENT (OUTPATIENT)
Dept: PHYSICAL MEDICINE AND REHAB | Facility: CLINIC | Age: 66
End: 2021-04-05

## 2021-04-07 ENCOUNTER — APPOINTMENT (OUTPATIENT)
Dept: PHYSICAL MEDICINE AND REHAB | Facility: CLINIC | Age: 66
End: 2021-04-07
Payer: COMMERCIAL

## 2021-04-07 PROCEDURE — 99205 OFFICE O/P NEW HI 60 MIN: CPT

## 2021-04-07 PROCEDURE — 99072 ADDL SUPL MATRL&STAF TM PHE: CPT

## 2021-04-07 NOTE — REVIEW OF SYSTEMS
[FreeTextEntry9] : intermittent back pain radiating down LLE (none today), + LUE swelling [de-identified] : +

## 2021-04-07 NOTE — HISTORY OF PRESENT ILLNESS
[FreeTextEntry1] : Chrissy Franco is a 66 year-old woman with pmh hyothyroid, GERD, HLD, R breast ca dx June 2020,  s/p lumpectomy and axilllary lymph node sampling (3 of 7 LN involved), now s/p chemo and RT to R breast and regional LN.  She was started on anastrazole but was unable to tolerate and switched to exemestane.  She presents for initial PM&R visit today for LUE swelling.  Pt had port removed around Dec 2020, and had PICC placed in LUE.  PT had swelling LUE after PICC placement. PICC was removed after 1 week.  She states the swelling has persisted and is constant, does not change throughout the day, but overall has been improving slowly. She is right handed. She denies pain in the arm but reports 6 month history of L shoulder pain.   She tried medical marijuana but is afraid to take it because she is afraid it will prevent her from sleeping.  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 She denies pain but feels discomfort and sensitivity in b/l feet, states she had symptoms in the hands as well which has since resolved. \par \par She attends PT for lumbar back pain radiating down LLE.  Pt has been to two sessions so far but reports improvement.   Pt states she was offered epidural in the past but she declined it.\par She reports her radicular leg pain is 10/10 at times. \par \par 10 cm above elbow: 27.5 L and R b/l\par \par 10 cm below elbow: 23.5 on the R,  24 cm on the L

## 2021-04-07 NOTE — ASSESSMENT
[FreeTextEntry1] : 67 yo F p/w LUE swelling s/p PICC placement several months ago\par - will order LUE ultrasound to rule out dvt\par - pt to continue normal activity, ROM, if doppler negative would recommend compression, consider lymphedema therapy \par -patient is currently receiving therapy for lumbar back pain (reports history of disc herniations, imaging per patient 2-3 years old), improving with PT, denies pain today\par -patient does not wish to take medication for radicular back pain, neuropathy, or L shoulder pain\par -to follow up after dopplers.  once swelling improves would also benefit from Sozo measurement for RUE to monitor for lymphedema surveillance. \par -follow up in 4 weeks

## 2021-04-07 NOTE — PHYSICAL EXAM
[FreeTextEntry1] : GEN: AAOx3, NAD.\par PSYCH: Normal mood and affect. Responds appropriately to commands.\par EYES: Sclerae Anicteric. No discharge. EOMI.\par RESP: Breathing unlabored.\par CV: DP pulses 2+ and equal. No varicosities noted.\par EXT: mild swelling L forearm  \par 10 cm above elbow: 27.5 L and R b/l\par 10 cm below elbow: 23.5 on the R,  24 cm on the L\par SKIN: No lesions noted.\par STRENGTH: 5/5 bilateral \par TONE: Normal, No clonus.\par REFLEXES: 2+ symmetric patella \par SENS: Grossly intact to light touch bilateral lower extremities.\par INSP: Spine alignment is midline, with no evidence of scoliosis.\par STANCE: No Trendelenburg with single leg stance.\par GAIT: Non antalgic, normal reciprocating heel to toe\par HIP ROM: Full and pain free bilaterally.\par SHOULDER: ROM: full ROM R shoulder abduction and flexion. LUE limited to 170 degrees abduction \par PALP: There is no tenderness over the midline spinous processes, paravertebral muscles, SIJ, or greater trochanters bilaterally. no tenderness in b/l upper extremities or axilla except for mild anterior tenderness L shoulder\par SPECIAL: SLR test negative bilaterally. FADIR NOMI negative bilaterally.   Johnson and Neer's neg b/l\par \par

## 2021-04-13 ENCOUNTER — NON-APPOINTMENT (OUTPATIENT)
Age: 66
End: 2021-04-13

## 2021-04-13 RX ORDER — EXEMESTANE 25 MG/1
25 TABLET, FILM COATED ORAL
Qty: 30 | Refills: 6 | Status: DISCONTINUED | COMMUNITY
Start: 2021-03-19 | End: 2021-04-13

## 2021-05-03 ENCOUNTER — APPOINTMENT (OUTPATIENT)
Dept: RADIATION ONCOLOGY | Facility: CLINIC | Age: 66
End: 2021-05-03

## 2021-06-10 ENCOUNTER — RESULT REVIEW (OUTPATIENT)
Age: 66
End: 2021-06-10

## 2021-06-30 ENCOUNTER — NON-APPOINTMENT (OUTPATIENT)
Age: 66
End: 2021-06-30

## 2021-07-21 ENCOUNTER — OUTPATIENT (OUTPATIENT)
Dept: OUTPATIENT SERVICES | Facility: HOSPITAL | Age: 66
LOS: 1 days | Discharge: HOME | End: 2021-07-21

## 2021-07-21 DIAGNOSIS — Z98.890 OTHER SPECIFIED POSTPROCEDURAL STATES: Chronic | ICD-10-CM

## 2021-07-21 DIAGNOSIS — Z90.710 ACQUIRED ABSENCE OF BOTH CERVIX AND UTERUS: Chronic | ICD-10-CM

## 2021-07-21 DIAGNOSIS — C50.919 MALIGNANT NEOPLASM OF UNSPECIFIED SITE OF UNSPECIFIED FEMALE BREAST: ICD-10-CM

## 2021-07-23 ENCOUNTER — APPOINTMENT (OUTPATIENT)
Dept: HEMATOLOGY ONCOLOGY | Facility: CLINIC | Age: 66
End: 2021-07-23
Payer: COMMERCIAL

## 2021-07-23 VITALS
WEIGHT: 144.18 LBS | DIASTOLIC BLOOD PRESSURE: 86 MMHG | HEIGHT: 60.04 IN | BODY MASS INDEX: 27.94 KG/M2 | OXYGEN SATURATION: 99 % | RESPIRATION RATE: 17 BRPM | TEMPERATURE: 97.2 F | HEART RATE: 68 BPM | SYSTOLIC BLOOD PRESSURE: 123 MMHG

## 2021-07-23 PROCEDURE — 99072 ADDL SUPL MATRL&STAF TM PHE: CPT

## 2021-07-23 PROCEDURE — 99214 OFFICE O/P EST MOD 30 MIN: CPT

## 2021-07-23 RX ORDER — ANASTROZOLE TABLETS 1 MG/1
1 TABLET ORAL
Refills: 0 | Status: DISCONTINUED | COMMUNITY
End: 2021-07-23

## 2021-07-23 NOTE — REVIEW OF SYSTEMS
[Anxiety] : anxiety [Negative] : Endocrine [FreeTextEntry2] : as above [FreeTextEntry9] : as above [de-identified] : as above

## 2021-07-23 NOTE — END OF VISIT
[FreeTextEntry3] : seen and d/ W \par Patient being seen as per physician's primary plan of care.\par  [Time Spent: ___ minutes] : I have spent [unfilled] minutes of time on the encounter. [>50% of the face to face encounter time was spent on counseling and/or coordination of care for ___] : Greater than 50% of the face to face encounter time was spent on counseling and/or coordination of care for [unfilled]

## 2021-07-23 NOTE — PHYSICAL EXAM
[Restricted in physically strenuous activity but ambulatory and able to carry out work of a light or sedentary nature] : Status 1- Restricted in physically strenuous activity but ambulatory and able to carry out work of a light or sedentary nature, e.g., light house work, office work [Normal] : affect appropriate [de-identified] : The right breast is status post reduction mastopexy as well as lumpectomy with well-healed scars; there is chronic nipple retraction w/o change, no skin dimpling, or palpable masses. The left breast is status post reduction mastopexy with well-healed scar; there is chronic nipple retraction w/o change, no skin dimpling, or palpable masses. The bilateral axillae are without adenopathy.  [de-identified] : L chest wall mediport incision healing well

## 2021-07-23 NOTE — HISTORY OF PRESENT ILLNESS
[Disease: _____________________] : Disease: [unfilled] [T: ___] : T[unfilled] [N: ___] : N[unfilled] [M: ___] : M[unfilled] [AJCC Stage: ____] : AJCC Stage: [unfilled] [de-identified] : The patient's history of present illness began on 06/30/2020 when she had a routine mammogram with a finding of postsurgical changes compatible with her history of reduction mammoplasty; there was a focus of architectural distortion in the slightly lateral right breast at the level of the nipple, measuring approximately 10 mm with no associated microcalcifications, and no abnormal findings in the left breast; no axillary adenopathy was seen.  A right breast ultrasound performed on that same date demonstrated at the 9 o'clock axis 5 cm from the nipple, an irregularly marginated mass measuring 7 x 8 x 11 mm with ultrasound-guided core biopsy recommended; the left breast was unremarkable.  The patient then went on to have an ultrasound-guided core biopsy of the right breast on 07/18/2020 with a finding at the 8 o'clock axis 5 cm from nipple of an invasive moderately differentiated ductal carcinoma, Energy score 6/9 with invasive tumor measuring at least 1 cm with evidence of DCIS, with microcalcifications identified in the invasive carcinoma and in the DCIS, with no evidence of lymphovascular invasion; the right axillary biopsy on that same date demonstrated invasive mammary carcinoma with lobular features, with necrosis and scattered lymphocytic infiltrate.  Estrogen receptor returned positive greater than 90%, progesterone receptor positive greater than 90% HER-2/charlotte negative.  An MRI of the bilateral breasts demonstrated a 3 cm enlarged lymph node in the right axilla and no significant left axillary or internal mammary adenopathy; in the right breast, there was a 15 mm irregular enhancing mass with a second enhancing irregular mass located 2.5 cm anterior, medial, inferior from the index mass at the 8 o'clock axis, anterior depth and measuring 11 mm.  The patient ultimately saw Dr. Liza Navarrete and on 07/28/2020 underwent a right lumpectomy/sentinel lymph node biopsy with a finding of multifocal invasive carcinoma, moderately differentiated with tubulolobular features measuring 1.1 cm in addition to an invasive moderately differentiated carcinoma with tubulolobular features measuring 1.5 cm with multiple separate small foci of invasive carcinoma with single file arrangement of tumor cells E-cadherin positive, DCIS, LCIS, with further shave margins specifically in the right superior breast margin returning negative for carcinoma but with evidence of DCIS and a single isolated duct measuring 0.2 cm with margins negative for DCIS, an additional shave margins returning negative; in the right axilla, metastatic ductal carcinoma involved 3/7 lymph nodes with a carcinoma being E-cadherin positive. \par \par The patient was seen in initial consultation on 8/3/20 regarding further treatment recommendations.  \par \par A Mammaprint was sent off and returned with a high risk score. An Oncotype for node positive cancer was also sent off (unintentionally) which resulted in a recurrence score of 23 translating to 19%  risk of distant recurrence and an absolute benefit from chemotherapy. Results of both these tests were discussed with her extensively and she has decided to proceed with chemotherapy with dose dense doxorubicin/cyclophosphamide followed by dose dense paclitaxel every 2 weeks x 4 with Pegfilgrastim support. \par \par 9/4/20  started on  treatment with  Dose dense doxorubicin/cyclophosphamide every 2 weeks followed by dose dense paclitaxel every 2 weeks x 4, both with pegfilgrastim,\par Withdrew participation  from  [id number] Olanzapine antinausea study on 9/7/20 C1D4\par \par S/P DD AC x 4 then DD Taxol #3 on 12/1/20. She developed gradually worsening CIPN and ultimately had gr 3 CIPN that did not resolve and DD Taxol # 4 / 4 was held / discontinued.  \par \par 1/13/2021 - 2/16/2021She completed adjuvant radiation - 4240cGy to R breast and regional LNs with a 1,000 boost to the R breast tumor bed under the care of Dr Evette Garcia.    [de-identified] : ER+ MT+ Her 2 charlotte - [de-identified] : Here today for routine f/u. She was on exemestane, which was held on 6/30/21 secondary to complaints of "generally not feeling well", and joint stiffness. \par She has bee off the drug since then, and now with the following:\par - feels less fatigued\par - less/no bloated\par - not feeling generalized weakness which she experienced while on the exemestane. \par Continues with the joint stiffness which may be attributed to the Repatha\par scheduled for f/up Mammo/sono next week\par She notes persistent CIPN of her feet but has improved. Can walk easily, rare momentary sense of loss of balance / no falls and no change in activities. More comfortable now (grade 1). \par She notes a good appetite, stable weight and excellent performance status.  \par \par mammo/sono 7/20

## 2021-07-30 ENCOUNTER — RESULT REVIEW (OUTPATIENT)
Age: 66
End: 2021-07-30

## 2021-07-30 ENCOUNTER — OUTPATIENT (OUTPATIENT)
Dept: OUTPATIENT SERVICES | Facility: HOSPITAL | Age: 66
LOS: 1 days | End: 2021-07-30
Payer: COMMERCIAL

## 2021-07-30 ENCOUNTER — APPOINTMENT (OUTPATIENT)
Dept: MAMMOGRAPHY | Facility: IMAGING CENTER | Age: 66
End: 2021-07-30
Payer: COMMERCIAL

## 2021-07-30 ENCOUNTER — APPOINTMENT (OUTPATIENT)
Dept: ULTRASOUND IMAGING | Facility: IMAGING CENTER | Age: 66
End: 2021-07-30
Payer: COMMERCIAL

## 2021-07-30 DIAGNOSIS — Z98.890 OTHER SPECIFIED POSTPROCEDURAL STATES: Chronic | ICD-10-CM

## 2021-07-30 DIAGNOSIS — Z90.710 ACQUIRED ABSENCE OF BOTH CERVIX AND UTERUS: Chronic | ICD-10-CM

## 2021-07-30 DIAGNOSIS — Z00.8 ENCOUNTER FOR OTHER GENERAL EXAMINATION: ICD-10-CM

## 2021-07-30 PROCEDURE — 76641 ULTRASOUND BREAST COMPLETE: CPT

## 2021-07-30 PROCEDURE — 77066 DX MAMMO INCL CAD BI: CPT

## 2021-07-30 PROCEDURE — G0279: CPT | Mod: 26

## 2021-07-30 PROCEDURE — 76641 ULTRASOUND BREAST COMPLETE: CPT | Mod: 26,50

## 2021-07-30 PROCEDURE — G0279: CPT

## 2021-07-30 PROCEDURE — 77066 DX MAMMO INCL CAD BI: CPT | Mod: 26

## 2021-08-20 ENCOUNTER — APPOINTMENT (OUTPATIENT)
Dept: HEMATOLOGY ONCOLOGY | Facility: CLINIC | Age: 66
End: 2021-08-20
Payer: COMMERCIAL

## 2021-08-20 VITALS
WEIGHT: 143.3 LBS | TEMPERATURE: 96.4 F | OXYGEN SATURATION: 98 % | RESPIRATION RATE: 17 BRPM | BODY MASS INDEX: 27.95 KG/M2 | HEART RATE: 70 BPM | SYSTOLIC BLOOD PRESSURE: 129 MMHG | DIASTOLIC BLOOD PRESSURE: 88 MMHG

## 2021-08-20 DIAGNOSIS — H53.8 OTHER VISUAL DISTURBANCES: ICD-10-CM

## 2021-08-20 DIAGNOSIS — R11.0 NAUSEA: ICD-10-CM

## 2021-08-20 DIAGNOSIS — T45.1X5A NAUSEA: ICD-10-CM

## 2021-08-20 DIAGNOSIS — R51.9 HEADACHE, UNSPECIFIED: ICD-10-CM

## 2021-08-20 PROCEDURE — 99214 OFFICE O/P EST MOD 30 MIN: CPT

## 2021-08-20 RX ORDER — OMEPRAZOLE 40 MG/1
40 CAPSULE, DELAYED RELEASE ORAL
Qty: 30 | Refills: 3 | Status: DISCONTINUED | COMMUNITY
Start: 2021-03-19 | End: 2021-08-20

## 2021-08-20 RX ORDER — ESOMEPRAZOLE MAGNESIUM 40 MG/1
40 CAPSULE, DELAYED RELEASE ORAL DAILY
Qty: 30 | Refills: 2 | Status: DISCONTINUED | COMMUNITY
Start: 2021-03-30 | End: 2021-08-20

## 2021-08-20 NOTE — REVIEW OF SYSTEMS
[Anxiety] : anxiety [Negative] : Psychiatric [FreeTextEntry2] : as above [FreeTextEntry9] : as above [de-identified] : as above

## 2021-08-20 NOTE — HISTORY OF PRESENT ILLNESS
[Disease: _____________________] : Disease: [unfilled] [T: ___] : T[unfilled] [N: ___] : N[unfilled] [M: ___] : M[unfilled] [AJCC Stage: ____] : AJCC Stage: [unfilled] [de-identified] : The patient's history of present illness began on 06/30/2020 when she had a routine mammogram with a finding of postsurgical changes compatible with her history of reduction mammoplasty; there was a focus of architectural distortion in the slightly lateral right breast at the level of the nipple, measuring approximately 10 mm with no associated microcalcifications, and no abnormal findings in the left breast; no axillary adenopathy was seen.  A right breast ultrasound performed on that same date demonstrated at the 9 o'clock axis 5 cm from the nipple, an irregularly marginated mass measuring 7 x 8 x 11 mm with ultrasound-guided core biopsy recommended; the left breast was unremarkable.  The patient then went on to have an ultrasound-guided core biopsy of the right breast on 07/18/2020 with a finding at the 8 o'clock axis 5 cm from nipple of an invasive moderately differentiated ductal carcinoma, Sloan score 6/9 with invasive tumor measuring at least 1 cm with evidence of DCIS, with microcalcifications identified in the invasive carcinoma and in the DCIS, with no evidence of lymphovascular invasion; the right axillary biopsy on that same date demonstrated invasive mammary carcinoma with lobular features, with necrosis and scattered lymphocytic infiltrate.  Estrogen receptor returned positive greater than 90%, progesterone receptor positive greater than 90% HER-2/charlotte negative.  An MRI of the bilateral breasts demonstrated a 3 cm enlarged lymph node in the right axilla and no significant left axillary or internal mammary adenopathy; in the right breast, there was a 15 mm irregular enhancing mass with a second enhancing irregular mass located 2.5 cm anterior, medial, inferior from the index mass at the 8 o'clock axis, anterior depth and measuring 11 mm.  The patient ultimately saw Dr. Liza Navarrete and on 07/28/2020 underwent a right lumpectomy/sentinel lymph node biopsy with a finding of multifocal invasive carcinoma, moderately differentiated with tubulolobular features measuring 1.1 cm in addition to an invasive moderately differentiated carcinoma with tubulolobular features measuring 1.5 cm with multiple separate small foci of invasive carcinoma with single file arrangement of tumor cells E-cadherin positive, DCIS, LCIS, with further shave margins specifically in the right superior breast margin returning negative for carcinoma but with evidence of DCIS and a single isolated duct measuring 0.2 cm with margins negative for DCIS, an additional shave margins returning negative; in the right axilla, metastatic ductal carcinoma involved 3/7 lymph nodes with a carcinoma being E-cadherin positive. \par \par The patient was seen in initial consultation on 8/3/20 regarding further treatment recommendations.  \par \par A Mammaprint was sent off and returned with a high risk score. An Oncotype for node positive cancer was also sent off (unintentionally) which resulted in a recurrence score of 23 translating to 19%  risk of distant recurrence and an absolute benefit from chemotherapy. Results of both these tests were discussed with her extensively and she has decided to proceed with chemotherapy with dose dense doxorubicin/cyclophosphamide followed by dose dense paclitaxel every 2 weeks x 4 with Pegfilgrastim support. \par \par 9/4/20  started on  treatment with  Dose dense doxorubicin/cyclophosphamide every 2 weeks followed by dose dense paclitaxel every 2 weeks x 4, both with pegfilgrastim,\par Withdrew participation  from  [id number] Olanzapine antinausea study on 9/7/20 C1D4\par \par S/P DD AC x 4 then DD Taxol #3 on 12/1/20. She developed gradually worsening CIPN and ultimately had gr 3 CIPN that did not resolve and DD Taxol # 4 / 4 was held / discontinued.  \par \par 1/13/2021 - 2/16/2021She completed adjuvant radiation - 4240cGy to R breast and regional LNs with a 1,000 boost to the R breast tumor bed under the care of Dr Evette Garcia. \par Started on Anastrozole in 2/2021. Which was subsequently discontinued due to arthralgias. Started on exemestane in 6/2021. Unable to tolerate. Subsequently switched to tamoxifen 20mg daily 7/23/2021. [de-identified] : ER+ MD+ Her 2 charlotte - [de-identified] : Started on tamoxifen in 7/23/21.\par Came in today for an urgent evaluation with complaints of 1 week of\par 1. Frontal headache\par 2. early morning nausea\par 3. blurred vision in both eyes\par No other neurological symptoms. Denies any gait or balance issues. No bladder/bowel issues.\par Appetite is ok, notes a stable weight and an excellent performance status.\par She notes persistent CIPN of her feet but has improved.  \par \par mammo/sono 7/20

## 2021-08-20 NOTE — PHYSICAL EXAM
[Restricted in physically strenuous activity but ambulatory and able to carry out work of a light or sedentary nature] : Status 1- Restricted in physically strenuous activity but ambulatory and able to carry out work of a light or sedentary nature, e.g., light house work, office work [Normal] : affect appropriate [de-identified] : A&) x 3, speech fluent. Gait/balance stable. NEg romberg/pronator. Motor 5/5 thru out. sens intact to touch/pain.

## 2021-08-20 NOTE — END OF VISIT
[Time Spent: ___ minutes] : I have spent [unfilled] minutes of time on the encounter. [>50% of the face to face encounter time was spent on counseling and/or coordination of care for ___] : Greater than 50% of the face to face encounter time was spent on counseling and/or coordination of care for [unfilled] [FreeTextEntry3] : seen and d/ W \par Patient being seen as per physician's primary plan of care.\par

## 2021-08-23 ENCOUNTER — RESULT REVIEW (OUTPATIENT)
Age: 66
End: 2021-08-23

## 2021-08-24 ENCOUNTER — NON-APPOINTMENT (OUTPATIENT)
Age: 66
End: 2021-08-24

## 2021-09-03 ENCOUNTER — NON-APPOINTMENT (OUTPATIENT)
Age: 66
End: 2021-09-03

## 2021-09-07 ENCOUNTER — TRANSCRIPTION ENCOUNTER (OUTPATIENT)
Age: 66
End: 2021-09-07

## 2021-09-13 ENCOUNTER — NON-APPOINTMENT (OUTPATIENT)
Age: 66
End: 2021-09-13

## 2021-10-04 ENCOUNTER — APPOINTMENT (OUTPATIENT)
Dept: DERMATOLOGY | Facility: CLINIC | Age: 66
End: 2021-10-04

## 2021-10-08 ENCOUNTER — APPOINTMENT (OUTPATIENT)
Dept: CARDIOLOGY | Facility: CLINIC | Age: 66
End: 2021-10-08

## 2021-10-11 ENCOUNTER — LABORATORY RESULT (OUTPATIENT)
Age: 66
End: 2021-10-11

## 2021-10-12 ENCOUNTER — NON-APPOINTMENT (OUTPATIENT)
Age: 66
End: 2021-10-12

## 2021-10-12 ENCOUNTER — APPOINTMENT (OUTPATIENT)
Dept: CARDIOLOGY | Facility: CLINIC | Age: 66
End: 2021-10-12
Payer: COMMERCIAL

## 2021-10-12 VITALS
RESPIRATION RATE: 16 BRPM | DIASTOLIC BLOOD PRESSURE: 84 MMHG | HEIGHT: 60 IN | WEIGHT: 143 LBS | SYSTOLIC BLOOD PRESSURE: 118 MMHG | HEART RATE: 68 BPM | TEMPERATURE: 97.2 F | OXYGEN SATURATION: 98 % | BODY MASS INDEX: 28.07 KG/M2

## 2021-10-12 DIAGNOSIS — R94.31 ABNORMAL ELECTROCARDIOGRAM [ECG] [EKG]: ICD-10-CM

## 2021-10-12 PROCEDURE — 93000 ELECTROCARDIOGRAM COMPLETE: CPT

## 2021-10-12 PROCEDURE — 99214 OFFICE O/P EST MOD 30 MIN: CPT | Mod: 25

## 2021-10-12 NOTE — DISCUSSION/SUMMARY
[FreeTextEntry1] : Dr. Barragan-(PRIOR VISIT and PMH WITH Dr. Barragan): \par This is a 65-year-old female with past medical history significant for hypothyroidism, dyspepsia, currently undergoing chemotherapy for breast cancer, familial hypercholesterolemia, who comes in for a lipid follow up evaluation.  She denies chest pain, shortness of breath, dizziness or syncope.\par \par The patient had blood work done October 14, 2020 which demonstrated cholesterol 134, HDL 62, calculated LDL 54, hemoglobin A1c of 5.8, slightly elevated TSH of 5.47, triglycerides 91 mg/dL.  She has normal liver function tests.\par \par Patient understands she must follow-up with her primary care physician regarding the elevation of her TSH.\par \par The patient is going to see the cardio genetics team regarding FH cascade evaluation.  She continues on her biweekly injection with her Repatha.\par The patient has had no side effects from her injections.  She also is being referred to the cardiogenic steam at her request.\par \par She reports her mother had high cholesterol.\par She has no history of rheumatic fever.  She does not drink excessive caffeine or alcohol.  She reports that her diet is reasonable.\par \par Blood work done October 8, 2019 demonstrated a total cholesterol of 328 mg/dL,  mg/dL, HDL 52 mg/dL, triglycerides 230 mg/dL.  These findings are consistent with heterozygous familial hypercholesterolemia.\par   \par She is also instructed to followup with her primary care physician and her own cardiologist for overall medical care and cardiac care.\par Lifestyle modification was reinforced.\par She has been on Zocor, Crestor, Lipitor and Pravachol associated with significant incapacitating muscle aches.  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 Normal TSH.\par \par The patient had blood work done April 16, 2013 which demonstrated an elevated C-reactive protein of 4.6, total cholesterol 280 mg/dL, LDL cholesterol calculated 173 mg/dL, HDL 51 mg/dL, triglycerides 281 mg/dL.  The patient has evidence for familial hypercholesterolemia. She has no physical stigmata of FH. I have advised the patient and  to have her children, and her own siblings tested for lipids.  She promises to do so.\par Her LDL cholesterol goal is less than 100 mg/dL.\par The patient is instructed to followup with her cardiologist and primary care physician.\par The patient will followup with me after her blood work is completed.

## 2021-10-12 NOTE — REASON FOR VISIT
[Follow-Up - Clinic] : a clinic follow-up of [Hyperlipidemia] : hyperlipidemia [FreeTextEntry2] : Lipid Follow Up [FreeTextEntry1] : Hypercholesterolemia

## 2021-10-12 NOTE — ASSESSMENT
[FreeTextEntry1] : This is a 66 year year old female here today for follow up cardiac evaluation. \par She has a past medical history significant for  hypothyroidism, dyspepsia, currently undergoing chemotherapy for breast cancer, familial hypercholesterolemia.\par \par Today she is feeling generally well and does not have any complaints at this time. She is here for cholesterol management and is currently on Repatha. her last injection was on 10/4/2021. She states that the injection before that done on 9/6/2021 gave her a reaction on her abdomen however, she had not experienced this reaction prior or after. She now gives herself injections on her thighs instead of her abdomen. She is on Tamoxifen for management of her breast CA and does follow up with her oncologist.\par \par -Pt is stable from a cardiac standpoint and does not have any complaints at this time. She states that she finished her chemo/radiation about 2 weeks ago.\par \par -She reports her mother had high cholesterol. She has no history of rheumatic fever.  She does not drink excessive caffeine or alcohol.\par \par BLOOD PRESSURE:\par -BP is well controlled in today's visit.\par \par BLOOD WORK:\par -New blood work was done today, 10/12/2021  to evaluate lipid profile, CBC, BMP, hepatic function, A1C and TSH. \par -Pt currently on Repatha.\par -She has been on Zocor, Crestor, Lipitor and Pravachol associated with significant incapacitating muscle aches.  All symptoms were relieved after discontinuing the medication.\par -This patient is clearly statin intolerant.\par \par CHOLESTEROL CONTROL:\par -Patient will continue the advised  TLC diet and to continue follow-up for treatment of hyperlipidemia and repeat blood testing with diet and exercise. I have discussed different exercises and the importance of maintenance of optimal body weight. The importance of staying within guidelines and recommendations was stressed to the patient today and they acknowledged that they understand this to me verbally.\par \par  -Ms. MARTIN was educated and advised that failure to follow-up with my medical recommendations to lower cholesterol can result in severe health consequences therefore, they will continuing a low saturated and low fat diet and to avoid excessive carbohydrates to help reduce triglycerides and that lowering LDL levels is associated with a significant decrease in serious cardiac events including but not limited to heart attack stroke and overall death. We will continue lipid lowering agents as advised based on blood test results and the patient understands to call if they develop severe muscle discomfort or if they have a reddish tinted discoloration in their urine.\par \par TESTING/REPORTS:\par -EKG done 10/12/2021  which demonstrated regular sinus rhythm with nonspecific ST-T wave changes, BPM of 68.\par \par PLAN:\par -The patient will continue with he Repatha injections.\par -She will follow up with her Cardiologist.\par -She will follow up with her Oncologist.\par \par I have discussed the plan of care with Ms. GRETEL MARTIN  and she  will follow up in 3 months. She is compliant with all of her medications.\par \par The patient understands that aerobic exercises must be increased to minutes 4 times/week and a detailed discussion of lifestyle modification was done today. \par The patient has a good understanding of the diagnosis, treatment plan and lifestyle modification. \par She will contact me at the office for any questions with their care or any changes in their health status.\par \par The plan of care was discussed with supervising physician, Dr. Barragan while present in the office at the time of the visit. \par \par Tamar ABRAHAM

## 2021-10-12 NOTE — PHYSICAL EXAM
[Well Developed] : well developed [Well Nourished] : well nourished [No Acute Distress] : no acute distress [Normal Venous Pressure] : normal venous pressure [No Carotid Bruit] : no carotid bruit [Normal S1, S2] : normal S1, S2 [No Murmur] : no murmur [No Rub] : no rub [Clear Lung Fields] : clear lung fields [Good Air Entry] : good air entry [No Respiratory Distress] : no respiratory distress  [Soft] : abdomen soft [Non Tender] : non-tender [No Masses/organomegaly] : no masses/organomegaly [Normal Bowel Sounds] : normal bowel sounds [Normal Gait] : normal gait [No Edema] : no edema [No Cyanosis] : no cyanosis [No Clubbing] : no clubbing [No Varicosities] : no varicosities [No Rash] : no rash [No Skin Lesions] : no skin lesions [Moves all extremities] : moves all extremities [No Focal Deficits] : no focal deficits [Normal Speech] : normal speech [Alert and Oriented] : alert and oriented [Normal memory] : normal memory [General Appearance - Well Developed] : well developed [Normal Appearance] : normal appearance [Well Groomed] : well groomed [General Appearance - Well Nourished] : well nourished [No Deformities] : no deformities [General Appearance - In No Acute Distress] : no acute distress [Normal Conjunctiva] : the conjunctiva exhibited no abnormalities [Normal Oral Mucosa] : normal oral mucosa [Normal Oropharynx] : normal oropharynx [Normal Jugular Venous A Waves Present] : normal jugular venous A waves present [Normal Jugular Venous V Waves Present] : normal jugular venous V waves present [No Jugular Venous Daniel A Waves] : no jugular venous daniel A waves [Respiration, Rhythm And Depth] : normal respiratory rhythm and effort [Exaggerated Use Of Accessory Muscles For Inspiration] : no accessory muscle use [Auscultation Breath Sounds / Voice Sounds] : lungs were clear to auscultation bilaterally [Bowel Sounds] : normal bowel sounds [Abdomen Soft] : soft [Abnormal Walk] : normal gait [Gait - Sufficient For Exercise Testing] : the gait was sufficient for exercise testing [Nail Clubbing] : no clubbing of the fingernails [Cyanosis, Localized] : no localized cyanosis [Skin Color & Pigmentation] : normal skin color and pigmentation [Skin Turgor] : normal skin turgor [] : no rash [Oriented To Time, Place, And Person] : oriented to person, place, and time [Impaired Insight] : insight and judgment were intact [Affect] : the affect was normal [Mood] : the mood was normal [No Anxiety] : not feeling anxious [5th Left ICS - MCL] : palpated at the 5th LICS in the midclavicular line [Normal] : normal [No Precordial Heave] : no precordial heave was noted [Normal Rate] : normal [Rhythm Regular] : regular [Normal S1] : normal S1 [Normal S2] : normal S2 [No Gallop] : no gallop heard [I] : a grade 1 [2+] : left 2+ [No Abnormalities] : the abdominal aorta was not enlarged and no bruit was heard [No Pitting Edema] : no pitting edema present [FreeTextEntry1] : No xanthomas [S3] : no S3 [S4] : no S4 [Right Carotid Bruit] : no bruit heard over the right carotid [Left Carotid Bruit] : no bruit heard over the left carotid [Right Femoral Bruit] : no bruit heard over the right femoral artery [Left Femoral Bruit] : no bruit heard over the left femoral artery

## 2021-10-21 ENCOUNTER — RX RENEWAL (OUTPATIENT)
Age: 66
End: 2021-10-21

## 2021-11-04 ENCOUNTER — OUTPATIENT (OUTPATIENT)
Dept: OUTPATIENT SERVICES | Facility: HOSPITAL | Age: 66
LOS: 1 days | End: 2021-11-04
Payer: COMMERCIAL

## 2021-11-04 ENCOUNTER — APPOINTMENT (OUTPATIENT)
Dept: ULTRASOUND IMAGING | Facility: CLINIC | Age: 66
End: 2021-11-04
Payer: COMMERCIAL

## 2021-11-04 DIAGNOSIS — Z00.8 ENCOUNTER FOR OTHER GENERAL EXAMINATION: ICD-10-CM

## 2021-11-04 DIAGNOSIS — Z90.710 ACQUIRED ABSENCE OF BOTH CERVIX AND UTERUS: Chronic | ICD-10-CM

## 2021-11-04 DIAGNOSIS — Z98.890 OTHER SPECIFIED POSTPROCEDURAL STATES: Chronic | ICD-10-CM

## 2021-11-04 PROCEDURE — 76700 US EXAM ABDOM COMPLETE: CPT

## 2021-11-04 PROCEDURE — 76700 US EXAM ABDOM COMPLETE: CPT | Mod: 26

## 2021-11-21 ENCOUNTER — OUTPATIENT (OUTPATIENT)
Dept: OUTPATIENT SERVICES | Facility: HOSPITAL | Age: 66
LOS: 1 days | Discharge: HOME | End: 2021-11-21

## 2021-11-21 DIAGNOSIS — Z90.710 ACQUIRED ABSENCE OF BOTH CERVIX AND UTERUS: Chronic | ICD-10-CM

## 2021-11-21 DIAGNOSIS — C50.919 MALIGNANT NEOPLASM OF UNSPECIFIED SITE OF UNSPECIFIED FEMALE BREAST: ICD-10-CM

## 2021-11-21 DIAGNOSIS — Z98.890 OTHER SPECIFIED POSTPROCEDURAL STATES: Chronic | ICD-10-CM

## 2021-11-24 ENCOUNTER — APPOINTMENT (OUTPATIENT)
Dept: HEMATOLOGY ONCOLOGY | Facility: CLINIC | Age: 66
End: 2021-11-24
Payer: COMMERCIAL

## 2021-11-24 VITALS
DIASTOLIC BLOOD PRESSURE: 80 MMHG | HEIGHT: 60 IN | OXYGEN SATURATION: 98 % | WEIGHT: 149.67 LBS | TEMPERATURE: 97.1 F | BODY MASS INDEX: 29.38 KG/M2 | HEART RATE: 79 BPM | RESPIRATION RATE: 16 BRPM | SYSTOLIC BLOOD PRESSURE: 121 MMHG

## 2021-11-24 DIAGNOSIS — R42 DIZZINESS AND GIDDINESS: ICD-10-CM

## 2021-11-24 PROCEDURE — 99214 OFFICE O/P EST MOD 30 MIN: CPT

## 2021-11-24 NOTE — HISTORY OF PRESENT ILLNESS
[Disease: _____________________] : Disease: [unfilled] [T: ___] : T[unfilled] [N: ___] : N[unfilled] [M: ___] : M[unfilled] [AJCC Stage: ____] : AJCC Stage: [unfilled] [de-identified] : The patient's history of present illness began on 06/30/2020 when she had a routine mammogram with a finding of postsurgical changes compatible with her history of reduction mammoplasty; there was a focus of architectural distortion in the slightly lateral right breast at the level of the nipple, measuring approximately 10 mm with no associated microcalcifications, and no abnormal findings in the left breast; no axillary adenopathy was seen.  A right breast ultrasound performed on that same date demonstrated at the 9 o'clock axis 5 cm from the nipple, an irregularly marginated mass measuring 7 x 8 x 11 mm with ultrasound-guided core biopsy recommended; the left breast was unremarkable.  The patient then went on to have an ultrasound-guided core biopsy of the right breast on 07/18/2020 with a finding at the 8 o'clock axis 5 cm from nipple of an invasive moderately differentiated ductal carcinoma, Washington score 6/9 with invasive tumor measuring at least 1 cm with evidence of DCIS, with microcalcifications identified in the invasive carcinoma and in the DCIS, with no evidence of lymphovascular invasion; the right axillary biopsy on that same date demonstrated invasive mammary carcinoma with lobular features, with necrosis and scattered lymphocytic infiltrate.  Estrogen receptor returned positive greater than 90%, progesterone receptor positive greater than 90% HER-2/charlotte negative.  An MRI of the bilateral breasts demonstrated a 3 cm enlarged lymph node in the right axilla and no significant left axillary or internal mammary adenopathy; in the right breast, there was a 15 mm irregular enhancing mass with a second enhancing irregular mass located 2.5 cm anterior, medial, inferior from the index mass at the 8 o'clock axis, anterior depth and measuring 11 mm.  The patient ultimately saw Dr. Liza Navarrete and on 07/28/2020 underwent a right lumpectomy/sentinel lymph node biopsy with a finding of multifocal invasive carcinoma, moderately differentiated with tubulolobular features measuring 1.1 cm in addition to an invasive moderately differentiated carcinoma with tubulolobular features measuring 1.5 cm with multiple separate small foci of invasive carcinoma with single file arrangement of tumor cells E-cadherin positive, DCIS, LCIS, with further shave margins specifically in the right superior breast margin returning negative for carcinoma but with evidence of DCIS and a single isolated duct measuring 0.2 cm with margins negative for DCIS, an additional shave margins returning negative; in the right axilla, metastatic ductal carcinoma involved 3/7 lymph nodes with a carcinoma being E-cadherin positive. \par \par The patient was seen in initial consultation on 8/3/20 regarding further treatment recommendations.  \par \par A Mammaprint was sent off and returned with a high risk score. An Oncotype for node positive cancer was also sent off (unintentionally) which resulted in a recurrence score of 23 translating to 19%  risk of distant recurrence and an absolute benefit from chemotherapy. Results of both these tests were discussed with her extensively and she has decided to proceed with chemotherapy with dose dense doxorubicin/cyclophosphamide followed by dose dense paclitaxel every 2 weeks x 4 with Pegfilgrastim support. \par \par 9/4/20  started on  treatment with  Dose dense doxorubicin/cyclophosphamide every 2 weeks followed by dose dense paclitaxel every 2 weeks x 4, both with pegfilgrastim,\par Withdrew participation  from  [id number] Olanzapine antinausea study on 9/7/20 C1D4\par \par S/P DD AC x 4 then DD Taxol #3 on 12/1/20. She developed gradually worsening CIPN and ultimately had gr 3 CIPN that did not resolve and DD Taxol # 4 / 4 was held / discontinued.  \par \par 1/13/2021 - 2/16/2021She completed adjuvant radiation - 4240cGy to R breast and regional LNs with a 1,000 boost to the R breast tumor bed under the care of Dr Evette Garcia. \par Started on Anastrozole in 2/2021. Which was subsequently discontinued due to arthralgias. Started on exemestane in 6/2021. Unable to tolerate. Subsequently switched to tamoxifen 20mg daily 7/23/2021. [de-identified] : ER+ CO+ Her 2 charlotte - [de-identified] : Started on tamoxifen in 7/23/21.(She was on anastrozole and then exemestane before that but they were discontinued due to joint ache). \par \par Around the time when she started tamoxifen, she noticed hair thinning and more hairs in bath. She states that this is slowly resolving.  She also reports vertigo which is intermittent and positional for the last few months an dis now starting to slowly improve as well. \par  \par She has G1 fatigue and G1 PN which is slowing improving. \par \par mammo/sono 7/21 -BI-RADS2\par Breast MRI is scheduled next week by Dr. Navarrete\par \par DEXA 4/21 osteopenia

## 2021-11-24 NOTE — END OF VISIT
[Time Spent: ___ minutes] : I have spent [unfilled] minutes of time on the encounter. [>50% of the face to face encounter time was spent on counseling and/or coordination of care for ___] : Greater than 50% of the face to face encounter time was spent on counseling and/or coordination of care for [unfilled] [FreeTextEntry3] : Pt seen examined and d/w fellow. Agree with A/P as above

## 2021-11-24 NOTE — REVIEW OF SYSTEMS
[Anxiety] : anxiety [Negative] : Endocrine [FreeTextEntry2] : as above [FreeTextEntry9] : as above [de-identified] : vita [de-identified] : as above

## 2021-12-09 ENCOUNTER — OUTPATIENT (OUTPATIENT)
Dept: OUTPATIENT SERVICES | Facility: HOSPITAL | Age: 66
LOS: 1 days | End: 2021-12-09
Payer: COMMERCIAL

## 2021-12-09 ENCOUNTER — APPOINTMENT (OUTPATIENT)
Dept: MRI IMAGING | Facility: IMAGING CENTER | Age: 66
End: 2021-12-09
Payer: COMMERCIAL

## 2021-12-09 ENCOUNTER — RESULT REVIEW (OUTPATIENT)
Age: 66
End: 2021-12-09

## 2021-12-09 DIAGNOSIS — Z00.8 ENCOUNTER FOR OTHER GENERAL EXAMINATION: ICD-10-CM

## 2021-12-09 DIAGNOSIS — Z98.890 OTHER SPECIFIED POSTPROCEDURAL STATES: Chronic | ICD-10-CM

## 2021-12-09 DIAGNOSIS — Z90.710 ACQUIRED ABSENCE OF BOTH CERVIX AND UTERUS: Chronic | ICD-10-CM

## 2021-12-09 PROCEDURE — C8937: CPT

## 2021-12-09 PROCEDURE — 77049 MRI BREAST C-+ W/CAD BI: CPT | Mod: 26

## 2021-12-09 PROCEDURE — C8908: CPT

## 2021-12-09 PROCEDURE — A9585: CPT

## 2021-12-15 ENCOUNTER — APPOINTMENT (OUTPATIENT)
Dept: CARDIOLOGY | Facility: CLINIC | Age: 66
End: 2021-12-15

## 2022-02-08 ENCOUNTER — APPOINTMENT (OUTPATIENT)
Dept: CARDIOLOGY | Facility: CLINIC | Age: 67
End: 2022-02-08

## 2022-03-14 ENCOUNTER — APPOINTMENT (OUTPATIENT)
Dept: SURGERY | Facility: CLINIC | Age: 67
End: 2022-03-14
Payer: COMMERCIAL

## 2022-03-14 PROCEDURE — 99213K: CUSTOM

## 2022-03-21 ENCOUNTER — OUTPATIENT (OUTPATIENT)
Dept: OUTPATIENT SERVICES | Facility: HOSPITAL | Age: 67
LOS: 1 days | Discharge: HOME | End: 2022-03-21

## 2022-03-21 DIAGNOSIS — Z90.710 ACQUIRED ABSENCE OF BOTH CERVIX AND UTERUS: Chronic | ICD-10-CM

## 2022-03-21 DIAGNOSIS — C50.919 MALIGNANT NEOPLASM OF UNSPECIFIED SITE OF UNSPECIFIED FEMALE BREAST: ICD-10-CM

## 2022-03-21 DIAGNOSIS — Z98.890 OTHER SPECIFIED POSTPROCEDURAL STATES: Chronic | ICD-10-CM

## 2022-03-23 ENCOUNTER — APPOINTMENT (OUTPATIENT)
Dept: HEMATOLOGY ONCOLOGY | Facility: CLINIC | Age: 67
End: 2022-03-23
Payer: COMMERCIAL

## 2022-03-23 VITALS
RESPIRATION RATE: 17 BRPM | DIASTOLIC BLOOD PRESSURE: 77 MMHG | BODY MASS INDEX: 28.92 KG/M2 | HEART RATE: 80 BPM | HEIGHT: 60.04 IN | TEMPERATURE: 97.7 F | OXYGEN SATURATION: 98 % | SYSTOLIC BLOOD PRESSURE: 111 MMHG | WEIGHT: 149.25 LBS

## 2022-03-23 DIAGNOSIS — M25.50 PAIN IN UNSPECIFIED JOINT: ICD-10-CM

## 2022-03-23 DIAGNOSIS — M85.80 OTHER SPECIFIED DISORDERS OF BONE DENSITY AND STRUCTURE, UNSPECIFIED SITE: ICD-10-CM

## 2022-03-23 DIAGNOSIS — Z79.810 LONG TERM (CURRENT) USE OF SELECTIVE ESTROGEN RECEPTOR MODULATORS (SERMS): ICD-10-CM

## 2022-03-23 DIAGNOSIS — T45.1X5A DRUG-INDUCED POLYNEUROPATHY: ICD-10-CM

## 2022-03-23 DIAGNOSIS — G62.0 DRUG-INDUCED POLYNEUROPATHY: ICD-10-CM

## 2022-03-23 DIAGNOSIS — T45.1X5A PAIN IN UNSPECIFIED JOINT: ICD-10-CM

## 2022-03-23 PROCEDURE — 99214 OFFICE O/P EST MOD 30 MIN: CPT

## 2022-03-24 PROBLEM — M25.50 AROMATASE INHIBITOR-ASSOCIATED ARTHRALGIA: Status: ACTIVE | Noted: 2021-03-22

## 2022-03-24 PROBLEM — Z79.810 USE OF TAMOXIFEN (NOLVADEX): Status: ACTIVE | Noted: 2021-11-24

## 2022-03-24 PROBLEM — M85.80 OSTEOPENIA, UNSPECIFIED LOCATION: Status: ACTIVE | Noted: 2022-03-24

## 2022-03-24 RX ORDER — LORAZEPAM 2 MG/1
2 TABLET ORAL
Qty: 2 | Refills: 0 | Status: DISCONTINUED | COMMUNITY
Start: 2021-11-24 | End: 2022-03-24

## 2022-03-24 NOTE — REVIEW OF SYSTEMS
[Anxiety] : anxiety [Negative] : Integumentary [FreeTextEntry2] : as above [FreeTextEntry9] : as above [de-identified] : as above

## 2022-03-24 NOTE — HISTORY OF PRESENT ILLNESS
[Disease: _____________________] : Disease: [unfilled] [T: ___] : T[unfilled] [N: ___] : N[unfilled] [M: ___] : M[unfilled] [AJCC Stage: ____] : AJCC Stage: [unfilled] [de-identified] : The patient's history of present illness began on 06/30/2020 when she had a routine mammogram with a finding of postsurgical changes compatible with her history of reduction mammoplasty; there was a focus of architectural distortion in the slightly lateral right breast at the level of the nipple, measuring approximately 10 mm with no associated microcalcifications, and no abnormal findings in the left breast; no axillary adenopathy was seen.  A right breast ultrasound performed on that same date demonstrated at the 9 o'clock axis 5 cm from the nipple, an irregularly marginated mass measuring 7 x 8 x 11 mm with ultrasound-guided core biopsy recommended; the left breast was unremarkable.  The patient then went on to have an ultrasound-guided core biopsy of the right breast on 07/18/2020 with a finding at the 8 o'clock axis 5 cm from nipple of an invasive moderately differentiated ductal carcinoma, Montgomery score 6/9 with invasive tumor measuring at least 1 cm with evidence of DCIS, with microcalcifications identified in the invasive carcinoma and in the DCIS, with no evidence of lymphovascular invasion; the right axillary biopsy on that same date demonstrated invasive mammary carcinoma with lobular features, with necrosis and scattered lymphocytic infiltrate.  Estrogen receptor returned positive greater than 90%, progesterone receptor positive greater than 90% HER-2/charlotte negative.  An MRI of the bilateral breasts demonstrated a 3 cm enlarged lymph node in the right axilla and no significant left axillary or internal mammary adenopathy; in the right breast, there was a 15 mm irregular enhancing mass with a second enhancing irregular mass located 2.5 cm anterior, medial, inferior from the index mass at the 8 o'clock axis, anterior depth and measuring 11 mm.  The patient ultimately saw Dr. Liza Navarrete and on 07/28/2020 underwent a right lumpectomy/sentinel lymph node biopsy with a finding of multifocal invasive carcinoma, moderately differentiated with tubulolobular features measuring 1.1 cm in addition to an invasive moderately differentiated carcinoma with tubulolobular features measuring 1.5 cm with multiple separate small foci of invasive carcinoma with single file arrangement of tumor cells E-cadherin positive, DCIS, LCIS, with further shave margins specifically in the right superior breast margin returning negative for carcinoma but with evidence of DCIS and a single isolated duct measuring 0.2 cm with margins negative for DCIS, an additional shave margins returning negative; in the right axilla, metastatic ductal carcinoma involved 3/7 lymph nodes with a carcinoma being E-cadherin positive. \par \par The patient was seen in initial consultation on 8/3/20 regarding further treatment recommendations.  \par \par A Mammaprint was sent off and returned with a high risk score. An Oncotype for node positive cancer was also sent off (unintentionally) which resulted in a recurrence score of 23 translating to 19%  risk of distant recurrence and an absolute benefit from chemotherapy. Results of both these tests were discussed with her extensively and she has decided to proceed with chemotherapy with dose dense doxorubicin/cyclophosphamide followed by dose dense paclitaxel every 2 weeks x 4 with Pegfilgrastim support. \par \par 9/4/20  started on  treatment with  Dose dense doxorubicin/cyclophosphamide every 2 weeks followed by dose dense paclitaxel every 2 weeks x 4, both with pegfilgrastim,\par Withdrew participation  from  [id number] Olanzapine antinausea study on 9/7/20 C1D4\par \par S/P DD AC x 4 then DD Taxol #3 on 12/1/20. She developed gradually worsening CIPN and ultimately had gr 3 CIPN that did not resolve and DD Taxol # 4 / 4 was held / discontinued.  \par \par 1/13/2021 - 2/16/2021She completed adjuvant radiation - 4240cGy to R breast and regional LNs with a 1,000 boost to the R breast tumor bed under the care of Dr Evette Garcia. \par Started on Anastrozole in 2/2021. Which was subsequently discontinued due to arthralgias. Started on exemestane in 6/2021. Unable to tolerate. Subsequently switched to tamoxifen 20mg daily 7/23/2021. [de-identified] : ER+ NE+ Her 2 charlotte - [de-identified] : Started on tamoxifen in 7/23/21.\par \par Denies any treatment related side effects. \par \par Today, she complains about weight gain - questions whether from diciknson. She gained approx. 6 lbs since the diagnosis and cannot lose it. She does exercise and dance regularly and is on a "heathy diet." \par \par Her positional vertigo is better.. She never tried meclizine which she was prev prescribed. \par  \par Otherwise, the patient feels well and denies headache/dizziness/lightheadedness out of ordinary, any new pain, N/V/D/C, appetite loss, or fever.\par  \par \par mammo/sono 7/21 -BI-RADS2\par Breast MRI 12/2021 neg\par DEXA 4/21 osteopenia

## 2022-03-24 NOTE — PHYSICAL EXAM
[Normal] : affect appropriate [Fully active, able to carry on all pre-disease performance without restriction] : Status 0 - Fully active, able to carry on all pre-disease performance without restriction [de-identified] : Right; s/p reduction mastopexy as well as lumpectomy with well-healed scars, + chronic nipple retraction w/o change, no skin dimpling, or palpable masses. Left breast; s/p mastopexy with well-healed scar + chronic nipple retraction w/o change, no skin dimpling, or palpable masses. The bilateral axillae are without adenopathy.

## 2022-03-28 ENCOUNTER — LABORATORY RESULT (OUTPATIENT)
Age: 67
End: 2022-03-28

## 2022-03-28 ENCOUNTER — NON-APPOINTMENT (OUTPATIENT)
Age: 67
End: 2022-03-28

## 2022-03-28 ENCOUNTER — APPOINTMENT (OUTPATIENT)
Dept: CARDIOLOGY | Facility: CLINIC | Age: 67
End: 2022-03-28
Payer: COMMERCIAL

## 2022-03-28 VITALS
SYSTOLIC BLOOD PRESSURE: 123 MMHG | RESPIRATION RATE: 17 BRPM | BODY MASS INDEX: 28.47 KG/M2 | HEIGHT: 60 IN | TEMPERATURE: 97.3 F | OXYGEN SATURATION: 98 % | HEART RATE: 80 BPM | DIASTOLIC BLOOD PRESSURE: 79 MMHG | WEIGHT: 145 LBS

## 2022-03-28 PROCEDURE — 93000 ELECTROCARDIOGRAM COMPLETE: CPT

## 2022-03-28 PROCEDURE — 99214 OFFICE O/P EST MOD 30 MIN: CPT

## 2022-03-28 RX ORDER — MECLIZINE HYDROCHLORIDE 25 MG/1
25 TABLET ORAL 3 TIMES DAILY
Qty: 30 | Refills: 1 | Status: COMPLETED | COMMUNITY
Start: 2021-11-24 | End: 2022-03-28

## 2022-03-28 RX ORDER — ZOLPIDEM TARTRATE 5 MG/1
5 TABLET ORAL
Qty: 30 | Refills: 0 | Status: COMPLETED | COMMUNITY
Start: 2022-03-23 | End: 2022-03-28

## 2022-03-28 NOTE — REASON FOR VISIT
[Follow-Up - Clinic] : a clinic follow-up of [Hyperlipidemia] : hyperlipidemia [CV Risk Factors and Non-Cardiac Disease] : CV risk factors and non-cardiac disease [FreeTextEntry2] : Lipid Follow Up [FreeTextEntry1] : Hypercholesterolemia

## 2022-03-28 NOTE — DISCUSSION/SUMMARY
[FreeTextEntry1] : This is a 67-year-old female with past medical history significant for hypothyroidism, dyspepsia, currently undergoing chemotherapy for breast cancer, familial hypercholesterolemia, who comes in for a lipid follow up evaluation.  She denies chest pain, shortness of breath, dizziness or syncope.\par The patient reports that she has not seen a cardiologist in over 3 years and is transferring her care to [facility] from [facility].\par Electrocardiogram done March 28, 2022 demonstrate normal sinus rhythm rate of 78 bpm is otherwise unremarkable.\par She will have blood work done today for lipid panel.  She will continue on her current dose of Repatha 140 mg subcutaneously twice per month.\par Blood work done October 13, 2021 demonstrated a cholesterol 134, direct LDL of 52 mg/dL, HDL 53 mg/dL, triglycerides 174 mg/dL, non-HDL cholesterol 81 mg/dL.\par Patient will also continue on her current diet and walking program.\par \par Patient wishes to establish cardiac care with us as well given the fact she is transferring care to [facility].\par \par She reports her mother had high cholesterol.\par She has no history of rheumatic fever.  She does not drink excessive caffeine or alcohol.  She reports that her diet is reasonable.\par \par Blood work done October 8, 2019 demonstrated a total cholesterol of 328 mg/dL,  mg/dL, HDL 52 mg/dL, triglycerides 230 mg/dL.  These findings are consistent with heterozygous familial hypercholesterolemia.\par   \par She has been on Zocor, Crestor, Lipitor and Pravachol associated with significant incapacitating muscle aches.  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 Normal TSH.\par \par The patient had blood work done April 16, 2013 which demonstrated an elevated C-reactive protein of 4.6, total cholesterol 280 mg/dL, LDL cholesterol calculated 173 mg/dL, HDL 51 mg/dL, triglycerides 281 mg/dL.  The patient has evidence for familial hypercholesterolemia. She has no physical stigmata of FH. I have advised the patient and  to have her children, and her own siblings tested for lipids.  She promises to do so.\par \par The patient understands that aerobic exercises must be increased to 40 minutes 4 times per week. A detailed discussion of lifestyle modification was done today. The patient has a good understanding of the diagnosis, and treatment plan. Lifestyle modification was also outlined.

## 2022-04-18 ENCOUNTER — NON-APPOINTMENT (OUTPATIENT)
Age: 67
End: 2022-04-18

## 2022-04-19 ENCOUNTER — LABORATORY RESULT (OUTPATIENT)
Age: 67
End: 2022-04-19

## 2022-04-19 ENCOUNTER — NON-APPOINTMENT (OUTPATIENT)
Age: 67
End: 2022-04-19

## 2022-04-22 ENCOUNTER — NON-APPOINTMENT (OUTPATIENT)
Age: 67
End: 2022-04-22

## 2022-04-27 ENCOUNTER — APPOINTMENT (OUTPATIENT)
Dept: DERMATOLOGY | Facility: CLINIC | Age: 67
End: 2022-04-27
Payer: COMMERCIAL

## 2022-04-27 DIAGNOSIS — D22.9 MELANOCYTIC NEVI, UNSPECIFIED: ICD-10-CM

## 2022-04-27 DIAGNOSIS — Z12.83 ENCOUNTER FOR SCREENING FOR MALIGNANT NEOPLASM OF SKIN: ICD-10-CM

## 2022-04-27 DIAGNOSIS — D48.5 NEOPLASM OF UNCERTAIN BEHAVIOR OF SKIN: ICD-10-CM

## 2022-04-27 DIAGNOSIS — L57.0 ACTINIC KERATOSIS: ICD-10-CM

## 2022-04-27 DIAGNOSIS — L91.0 HYPERTROPHIC SCAR: ICD-10-CM

## 2022-04-27 PROCEDURE — 99204 OFFICE O/P NEW MOD 45 MIN: CPT | Mod: 25

## 2022-04-27 PROCEDURE — 11900 INJECT SKIN LESIONS </W 7: CPT

## 2022-04-28 ENCOUNTER — APPOINTMENT (OUTPATIENT)
Dept: CARDIOLOGY | Facility: CLINIC | Age: 67
End: 2022-04-28
Payer: COMMERCIAL

## 2022-04-28 PROCEDURE — 97802 MEDICAL NUTRITION INDIV IN: CPT

## 2022-05-02 ENCOUNTER — RX RENEWAL (OUTPATIENT)
Age: 67
End: 2022-05-02

## 2022-05-05 ENCOUNTER — OUTPATIENT (OUTPATIENT)
Dept: OUTPATIENT SERVICES | Facility: HOSPITAL | Age: 67
LOS: 1 days | End: 2022-05-05
Payer: COMMERCIAL

## 2022-05-05 ENCOUNTER — APPOINTMENT (OUTPATIENT)
Dept: CT IMAGING | Facility: IMAGING CENTER | Age: 67
End: 2022-05-05
Payer: COMMERCIAL

## 2022-05-05 DIAGNOSIS — Z90.710 ACQUIRED ABSENCE OF BOTH CERVIX AND UTERUS: Chronic | ICD-10-CM

## 2022-05-05 DIAGNOSIS — Z98.890 OTHER SPECIFIED POSTPROCEDURAL STATES: Chronic | ICD-10-CM

## 2022-05-05 DIAGNOSIS — Z00.8 ENCOUNTER FOR OTHER GENERAL EXAMINATION: ICD-10-CM

## 2022-05-05 PROCEDURE — 74177 CT ABD & PELVIS W/CONTRAST: CPT | Mod: 26

## 2022-05-05 PROCEDURE — 74177 CT ABD & PELVIS W/CONTRAST: CPT

## 2022-05-06 ENCOUNTER — APPOINTMENT (OUTPATIENT)
Dept: CT IMAGING | Facility: IMAGING CENTER | Age: 67
End: 2022-05-06
Payer: COMMERCIAL

## 2022-05-06 ENCOUNTER — OUTPATIENT (OUTPATIENT)
Dept: OUTPATIENT SERVICES | Facility: HOSPITAL | Age: 67
LOS: 1 days | End: 2022-05-06
Payer: SELF-PAY

## 2022-05-06 DIAGNOSIS — Z00.8 ENCOUNTER FOR OTHER GENERAL EXAMINATION: ICD-10-CM

## 2022-05-06 DIAGNOSIS — Z90.710 ACQUIRED ABSENCE OF BOTH CERVIX AND UTERUS: Chronic | ICD-10-CM

## 2022-05-06 DIAGNOSIS — Z98.890 OTHER SPECIFIED POSTPROCEDURAL STATES: Chronic | ICD-10-CM

## 2022-05-06 PROCEDURE — 72192 CT PELVIS W/O DYE: CPT

## 2022-05-06 PROCEDURE — 72192 CT PELVIS W/O DYE: CPT | Mod: 26

## 2022-05-12 ENCOUNTER — APPOINTMENT (OUTPATIENT)
Dept: SURGERY | Facility: CLINIC | Age: 67
End: 2022-05-12
Payer: COMMERCIAL

## 2022-05-12 VITALS
WEIGHT: 145 LBS | BODY MASS INDEX: 25.69 KG/M2 | DIASTOLIC BLOOD PRESSURE: 83 MMHG | HEIGHT: 63 IN | TEMPERATURE: 96.9 F | OXYGEN SATURATION: 99 % | RESPIRATION RATE: 16 BRPM | HEART RATE: 72 BPM | SYSTOLIC BLOOD PRESSURE: 122 MMHG

## 2022-05-12 DIAGNOSIS — K36 OTHER APPENDICITIS: ICD-10-CM

## 2022-05-12 PROCEDURE — 99204 OFFICE O/P NEW MOD 45 MIN: CPT

## 2022-05-12 RX ORDER — BUTENAFINE HYDROCHLORIDE 10 MG/G
1 CREAM TOPICAL DAILY
Qty: 1 | Refills: 6 | Status: DISCONTINUED | COMMUNITY
Start: 2022-04-27 | End: 2022-05-12

## 2022-05-12 NOTE — PHYSICAL EXAM
[Normal Breath Sounds] : Normal breath sounds [Normal Heart Sounds] : normal heart sounds [No Rash or Lesion] : No rash or lesion [Calm] : calm [de-identified] : No distress [de-identified] : Sclera anicteric [de-identified] : Supple [de-identified] : Soft nontender and nondistended.  There is a well-healed Pfannenstiel incision.  There are no masses. [de-identified] : No clubbing cyanosis or edema [de-identified] : Cranial nerves II through XII grossly intact

## 2022-05-12 NOTE — DATA REVIEWED
[FreeTextEntry1] : I reviewed the CAT scan performed on May 6.  It demonstrated a thickened fluid-filled appendix without inflammatory changes.  I also reviewed the lipid profile drawn on April 19.

## 2022-05-12 NOTE — ASSESSMENT
[FreeTextEntry1] : This patient has an abnormal appendix on CAT scan.  I have offered this patient a laparoscopic appendectomy.  The procedure as well as risks(including, but not limited to bleeding, infection, injury to hollow viscus), benefits (pain relief), and alternatives were explained to the patient.\par \par Please advise me on the safety of general anesthesia for this pleasant patient

## 2022-05-12 NOTE — HISTORY OF PRESENT ILLNESS
[de-identified] : This 67-year-old woman complains of pain in the right lower quadrant for 3 days.  She was seen by her internist who sent her for blood work and found an elevated CRP..  She underwent a CAT scan of the abdomen and pelvis which revealed a thickened fluid-filled appendix with no inflammatory changes.  Her pain resolved thereafter.  She has had no pain since.  She is tolerating regular diet and having normal bowel movements.

## 2022-05-12 NOTE — CONSULT LETTER
[Dear  ___] : Dear  [unfilled], [Consult Letter:] : I had the pleasure of evaluating your patient, [unfilled]. [Please see my note below.] : Please see my note below. [Consult Closing:] : Thank you very much for allowing me to participate in the care of this patient.  If you have any questions, please do not hesitate to contact me. [Sincerely,] : Sincerely, [FreeTextEntry3] : Darío Ascencio MD, FACS\par [facility]\par [facility]\par [address]\par [place]  [address]\par Tel: [phone]\par Email: [email or web address] \par  [DrBin  ___] : Dr. RICHARDSON

## 2022-05-26 ENCOUNTER — APPOINTMENT (OUTPATIENT)
Dept: CARDIOLOGY | Facility: CLINIC | Age: 67
End: 2022-05-26
Payer: COMMERCIAL

## 2022-05-26 ENCOUNTER — NON-APPOINTMENT (OUTPATIENT)
Age: 67
End: 2022-05-26

## 2022-05-26 VITALS
SYSTOLIC BLOOD PRESSURE: 120 MMHG | DIASTOLIC BLOOD PRESSURE: 81 MMHG | BODY MASS INDEX: 25.87 KG/M2 | HEART RATE: 81 BPM | OXYGEN SATURATION: 98 % | TEMPERATURE: 98.4 F | HEIGHT: 63 IN | WEIGHT: 146 LBS

## 2022-05-26 DIAGNOSIS — R93.89 ABNORMAL FINDINGS ON DIAGNOSTIC IMAGING OF OTHER SPECIFIED BODY STRUCTURES: ICD-10-CM

## 2022-05-26 PROCEDURE — 99214 OFFICE O/P EST MOD 30 MIN: CPT

## 2022-05-26 PROCEDURE — 93000 ELECTROCARDIOGRAM COMPLETE: CPT

## 2022-05-26 NOTE — HISTORY OF PRESENT ILLNESS
[FreeTextEntry1] : This is a 66yo female with PMH of GERD, Hypothyroidism, breast cancer, Osteopenia who presents today for cardiac check-up. Patient states she has been seeing Dr. Barragan for HLD. Started on Repatha, last lab work done in April 2022 found cholesterol had decreased significantly. Patient has no cardiac issues or concerns for today. Patient denies chest pain, shortness of breath, palpitations, dizziness, vision changes, n/v, abdominal pain, changes in bowel/bladder habits,  or appetite.

## 2022-05-26 NOTE — PHYSICAL EXAM
[Well Developed] : well developed [Well Nourished] : well nourished [No Acute Distress] : no acute distress [Normal Conjunctiva] : normal conjunctiva [Normal Venous Pressure] : normal venous pressure [No Carotid Bruit] : no carotid bruit [Normal S1, S2] : normal S1, S2 [No Rub] : no rub [No Gallop] : no gallop [Clear Lung Fields] : clear lung fields [Good Air Entry] : good air entry [No Respiratory Distress] : no respiratory distress  [Soft] : abdomen soft [Non Tender] : non-tender [No Masses/organomegaly] : no masses/organomegaly [Normal Bowel Sounds] : normal bowel sounds [Normal Gait] : normal gait [No Edema] : no edema [No Cyanosis] : no cyanosis [No Clubbing] : no clubbing [No Varicosities] : no varicosities [No Rash] : no rash [No Skin Lesions] : no skin lesions [Moves all extremities] : moves all extremities [No Focal Deficits] : no focal deficits [Normal Speech] : normal speech [Alert and Oriented] : alert and oriented [Normal memory] : normal memory [de-identified] : 1/6 systolic murmur

## 2022-06-01 ENCOUNTER — OUTPATIENT (OUTPATIENT)
Dept: OUTPATIENT SERVICES | Facility: HOSPITAL | Age: 67
LOS: 1 days | End: 2022-06-01
Payer: COMMERCIAL

## 2022-06-01 VITALS
WEIGHT: 147.05 LBS | DIASTOLIC BLOOD PRESSURE: 83 MMHG | OXYGEN SATURATION: 98 % | RESPIRATION RATE: 16 BRPM | HEIGHT: 62 IN | TEMPERATURE: 99 F | HEART RATE: 65 BPM | SYSTOLIC BLOOD PRESSURE: 139 MMHG

## 2022-06-01 DIAGNOSIS — Z90.710 ACQUIRED ABSENCE OF BOTH CERVIX AND UTERUS: Chronic | ICD-10-CM

## 2022-06-01 DIAGNOSIS — Z01.818 ENCOUNTER FOR OTHER PREPROCEDURAL EXAMINATION: ICD-10-CM

## 2022-06-01 DIAGNOSIS — Z98.890 OTHER SPECIFIED POSTPROCEDURAL STATES: Chronic | ICD-10-CM

## 2022-06-01 DIAGNOSIS — Z91.040 LATEX ALLERGY STATUS: ICD-10-CM

## 2022-06-01 DIAGNOSIS — K37 UNSPECIFIED APPENDICITIS: ICD-10-CM

## 2022-06-01 DIAGNOSIS — Z11.52 ENCOUNTER FOR SCREENING FOR COVID-19: ICD-10-CM

## 2022-06-01 DIAGNOSIS — K36 OTHER APPENDICITIS: ICD-10-CM

## 2022-06-01 LAB — SARS-COV-2 RNA SPEC QL NAA+PROBE: SIGNIFICANT CHANGE UP

## 2022-06-01 PROCEDURE — U0003: CPT

## 2022-06-01 PROCEDURE — C9803: CPT

## 2022-06-01 PROCEDURE — G0463: CPT

## 2022-06-01 PROCEDURE — U0005: CPT

## 2022-06-01 RX ORDER — APREPITANT 80 MG/1
40 CAPSULE ORAL ONCE
Refills: 0 | Status: DISCONTINUED | OUTPATIENT
Start: 2022-06-03 | End: 2022-06-17

## 2022-06-01 RX ORDER — CEFOTETAN DISODIUM 1 G
2 VIAL (EA) INJECTION ONCE
Refills: 0 | Status: DISCONTINUED | OUTPATIENT
Start: 2022-06-03 | End: 2022-06-17

## 2022-06-01 RX ORDER — CHLORHEXIDINE GLUCONATE 213 G/1000ML
1 SOLUTION TOPICAL ONCE
Refills: 0 | Status: DISCONTINUED | OUTPATIENT
Start: 2022-06-03 | End: 2022-06-17

## 2022-06-01 NOTE — H&P PST ADULT - PROBLEM SELECTOR PLAN 1
Pt is scheduled for a Laparoscopic Appendectomy with Dr. Verde on 6/3/22  Covid PCR test scheduled for 6/1/22 after PST at [facility]  CBC, BMP from recent blood work on 5/31/22 with PCP (Results in chart)  Emend ordered pre-operatively for history of PONV DOS

## 2022-06-01 NOTE — H&P PST ADULT - ACTIVITY
Walking x 45 minutes daily, [place] [employer]ancing once per week x 2.5 hours, ADLs, Grocery Shopping, 1 Flight of Stairs

## 2022-06-01 NOTE — H&P PST ADULT - FALL HARM RISK - HARM RISK INTERVENTIONS

## 2022-06-01 NOTE — H&P PST ADULT - NEGATIVE GASTROINTESTINAL SYMPTOMS
no nausea/no vomiting/no diarrhea/no constipation/no change in bowel habits/no abdominal pain/no melena/no hematochezia/no jaundice

## 2022-06-01 NOTE — H&P PST ADULT - HISTORY OF PRESENT ILLNESS
67 year old female with PMH GERD, Hypothyroidism, R Breast CA s/p Right Breast Lumpectomy (8/2021) and Chemotherapy/Radiation (Last Tx 12/2021) with c/o RLQ abdominal pain x 3 days. Her PCP sent her for blood work and noted an elevated CRP s/p CT A/P revealed a thickened fluid-filled appendix with no inflammatory changes. Pt denies any current abdominal pain, N/V, fever or chills. Pt now presents to [facility] for scheduled Laparoscopic Appendectomy with Dr. Verde on 6/3/22.    Covid PCR test scheduled for 6/1/22 after PST at [facility]  Covid vaccine Moderna 2nd dose received 2/16/21; Moderna booster received 12/14/21  No recent hospitalizations over the last 3 months  Denies recent travel, exposure or Covid symptoms  67 year old female with PMH GERD, Hypothyroidism, R Breast CA s/p Right Breast Lumpectomy (8/2021) and Chemotherapy/Radiation (Last Tx 12/2021) reports recent c/o RLQ abdominal pain x 3 days. Her PCP sent her for blood work and noted an elevated CRP s/p CT A/P revealed a thickened fluid-filled appendix with no inflammatory changes. Pt denies any current abdominal pain, N/V, fever or chills. Pt now presents to [facility] for scheduled Laparoscopic Appendectomy with Dr. Verde on 6/3/22.    Covid PCR test scheduled for 6/1/22 after PST at [facility]  Covid vaccine Moderna 2nd dose received 2/16/21; Moderna booster received 12/14/21  No recent hospitalizations over the last 3 months  Denies recent travel, exposure or Covid symptoms

## 2022-06-01 NOTE — H&P PST ADULT - NSICDXPASTSURGICALHX_GEN_ALL_CORE_FT
PAST SURGICAL HISTORY:  H/O elbow surgery Right side - 50+ years ago    H/O: hysterectomy 2005 2/2 myoma    S/P bilateral breast reduction 2013    S/P lumpectomy, right breast 8/2021 2/2 right breast CA

## 2022-06-02 ENCOUNTER — TRANSCRIPTION ENCOUNTER (OUTPATIENT)
Age: 67
End: 2022-06-02

## 2022-06-02 ENCOUNTER — APPOINTMENT (OUTPATIENT)
Dept: CARDIOLOGY | Facility: CLINIC | Age: 67
End: 2022-06-02
Payer: COMMERCIAL

## 2022-06-02 VITALS
SYSTOLIC BLOOD PRESSURE: 128 MMHG | HEART RATE: 78 BPM | DIASTOLIC BLOOD PRESSURE: 81 MMHG | HEIGHT: 62 IN | WEIGHT: 147.05 LBS | OXYGEN SATURATION: 97 %

## 2022-06-02 PROBLEM — K21.9 GASTRO-ESOPHAGEAL REFLUX DISEASE WITHOUT ESOPHAGITIS: Chronic | Status: ACTIVE | Noted: 2022-06-01

## 2022-06-02 PROBLEM — C50.919 MALIGNANT NEOPLASM OF UNSPECIFIED SITE OF UNSPECIFIED FEMALE BREAST: Chronic | Status: ACTIVE | Noted: 2020-07-23

## 2022-06-02 PROBLEM — Z87.39 PERSONAL HISTORY OF OTHER DISEASES OF THE MUSCULOSKELETAL SYSTEM AND CONNECTIVE TISSUE: Chronic | Status: ACTIVE | Noted: 2022-06-01

## 2022-06-02 PROBLEM — I34.0 NONRHEUMATIC MITRAL (VALVE) INSUFFICIENCY: Chronic | Status: ACTIVE | Noted: 2022-06-01

## 2022-06-02 PROCEDURE — 93306 TTE W/DOPPLER COMPLETE: CPT

## 2022-06-02 RX ORDER — TAMOXIFEN CITRATE 20 MG/1
20 TABLET, FILM COATED ORAL DAILY
Qty: 1 | Refills: 3 | Status: DISCONTINUED | COMMUNITY
Start: 2021-07-23 | End: 2022-06-02

## 2022-06-02 NOTE — PRE-ANESTHESIA EVALUATION ADULT - NSANTHPMHFT_GEN_ALL_CORE
67 year old female with PMH GERD, Hypothyroidism, R Breast CA s/p Right Breast Lumpectomy (8/2021) and Chemotherapy/Radiation (Last Tx 12/2021) reports recent c/o RLQ abdominal pain x 3 days. Her PCP sent her for blood work and noted an elevated CRP s/p CT A/P revealed a thickened fluid-filled appendix with no inflammatory changes. Pt denies any current abdominal pain, N/V, fever or chills. Pt now presents to PST for scheduled Laparoscopic Appendectomy with Dr. Verde on 6/3/22.    Lat echo 2020 showing normal LV systolic function, mild/moderate regurg 67 year old female with PMH GERD, Hypothyroidism, R Breast CA s/p Right Breast Lumpectomy (8/2021) and Chemotherapy/Radiation (Last Tx 12/2021) reports recent c/o RLQ abdominal pain x 3 days. Her PCP sent her for blood work and noted an elevated CRP s/p CT A/P revealed a thickened fluid-filled appendix with no inflammatory changes. Pt denies any current abdominal pain, N/V, fever or chills. Pt now presents to PST for scheduled Laparoscopic Appendectomy with Dr. eVrde on 6/3/22.    History of ponv.  Has GERD, no issues laying flat    Lat echo 2020 showing normal LV systolic function, mild/moderate regurg

## 2022-06-03 ENCOUNTER — TRANSCRIPTION ENCOUNTER (OUTPATIENT)
Age: 67
End: 2022-06-03

## 2022-06-03 ENCOUNTER — APPOINTMENT (OUTPATIENT)
Dept: SURGERY | Facility: HOSPITAL | Age: 67
End: 2022-06-03
Payer: COMMERCIAL

## 2022-06-03 ENCOUNTER — OUTPATIENT (OUTPATIENT)
Dept: OUTPATIENT SERVICES | Facility: HOSPITAL | Age: 67
LOS: 1 days | End: 2022-06-03
Payer: COMMERCIAL

## 2022-06-03 ENCOUNTER — RESULT REVIEW (OUTPATIENT)
Age: 67
End: 2022-06-03

## 2022-06-03 VITALS
RESPIRATION RATE: 12 BRPM | HEART RATE: 62 BPM | OXYGEN SATURATION: 94 % | DIASTOLIC BLOOD PRESSURE: 80 MMHG | TEMPERATURE: 97 F | SYSTOLIC BLOOD PRESSURE: 122 MMHG

## 2022-06-03 DIAGNOSIS — K36 OTHER APPENDICITIS: ICD-10-CM

## 2022-06-03 DIAGNOSIS — Z98.890 OTHER SPECIFIED POSTPROCEDURAL STATES: Chronic | ICD-10-CM

## 2022-06-03 DIAGNOSIS — Z90.710 ACQUIRED ABSENCE OF BOTH CERVIX AND UTERUS: Chronic | ICD-10-CM

## 2022-06-03 PROCEDURE — 88304 TISSUE EXAM BY PATHOLOGIST: CPT | Mod: 26

## 2022-06-03 PROCEDURE — 44970 LAPAROSCOPY APPENDECTOMY: CPT

## 2022-06-03 DEVICE — SURGICEL POWDER 3 GRAMS: Type: IMPLANTABLE DEVICE | Site: ABDOMINAL | Status: FUNCTIONAL

## 2022-06-03 DEVICE — CLIP APPLIER COVIDIEN ENDOCLIP III 5MM: Type: IMPLANTABLE DEVICE | Site: ABDOMINAL | Status: FUNCTIONAL

## 2022-06-03 DEVICE — STAPLER COVIDIEN TRI-STAPLE 60MM PURPLE RELOAD: Type: IMPLANTABLE DEVICE | Site: ABDOMINAL | Status: FUNCTIONAL

## 2022-06-03 RX ORDER — FENTANYL CITRATE 50 UG/ML
25 INJECTION INTRAVENOUS
Refills: 0 | Status: DISCONTINUED | OUTPATIENT
Start: 2022-06-03 | End: 2022-06-03

## 2022-06-03 RX ORDER — SODIUM CHLORIDE 9 MG/ML
1000 INJECTION, SOLUTION INTRAVENOUS
Refills: 0 | Status: DISCONTINUED | OUTPATIENT
Start: 2022-06-03 | End: 2022-06-03

## 2022-06-03 RX ORDER — ACETAMINOPHEN 500 MG
975 TABLET ORAL ONCE
Refills: 0 | Status: COMPLETED | OUTPATIENT
Start: 2022-06-03 | End: 2022-06-03

## 2022-06-03 RX ORDER — OXYCODONE HYDROCHLORIDE 5 MG/1
1 TABLET ORAL
Qty: 6 | Refills: 0
Start: 2022-06-03

## 2022-06-03 RX ORDER — ONDANSETRON 8 MG/1
4 TABLET, FILM COATED ORAL
Refills: 0 | Status: DISCONTINUED | OUTPATIENT
Start: 2022-06-03 | End: 2022-06-03

## 2022-06-03 RX ORDER — LIDOCAINE HCL 20 MG/ML
0.2 VIAL (ML) INJECTION ONCE
Refills: 0 | Status: DISCONTINUED | OUTPATIENT
Start: 2022-06-03 | End: 2022-06-03

## 2022-06-03 RX ORDER — SODIUM CHLORIDE 9 MG/ML
3 INJECTION INTRAMUSCULAR; INTRAVENOUS; SUBCUTANEOUS EVERY 8 HOURS
Refills: 0 | Status: DISCONTINUED | OUTPATIENT
Start: 2022-06-03 | End: 2022-06-03

## 2022-06-03 RX ADMIN — FENTANYL CITRATE 25 MICROGRAM(S): 50 INJECTION INTRAVENOUS at 13:00

## 2022-06-03 RX ADMIN — Medication 975 MILLIGRAM(S): at 17:15

## 2022-06-03 RX ADMIN — FENTANYL CITRATE 25 MICROGRAM(S): 50 INJECTION INTRAVENOUS at 13:15

## 2022-06-03 RX ADMIN — SODIUM CHLORIDE 75 MILLILITER(S): 9 INJECTION, SOLUTION INTRAVENOUS at 13:03

## 2022-06-03 NOTE — BRIEF OPERATIVE NOTE - NSICDXBRIEFOPLAUNCH_GEN_ALL_CORE
<--- Click to Launch ICDx for PreOp, PostOp and Procedure Ear Wedge Repair Text: A wedge excision was completed by carrying down an excision through the full thickness of the ear and cartilage with an inward facing Burow's triangle. The wound was then closed in a layered fashion.

## 2022-06-03 NOTE — ASU DISCHARGE PLAN (ADULT/PEDIATRIC) - ASU DC SPECIAL INSTRUCTIONSFT
WOUND CARE:  Please keep incisions clean and dry. Please do not Scrub or rub incisions. Do not use lotion or powder on incisions.   BATHING: You may shower and/or sponge bathe. You may use warm soapy water in the shower and rinse, pat dry.  ACTIVITY: No heavy lifting or straining. Otherwise, you may return to your usual level of physical activity. If you are taking narcotic pain medication DO NOT drive a car, operate machinery or make important decisions.  DIET: Return to your usual diet.  NOTIFY YOUR SURGEON IF YOU HAVE: any bleeding that does not stop, any pus draining from your wound(s), any fever (over 100.4 F) persistent nausea/vomiting, or if your pain is not controlled on your discharge pain medications, unable to urinate.  Please follow up with your primary care physician in one week regarding your hospitalization, bring copies of your discharge paperwork.  Please follow up with your surgeon, Dr. Verde

## 2022-06-03 NOTE — ASU PATIENT PROFILE, ADULT - NSICDXPASTMEDICALHX_GEN_ALL_CORE_FT
PAST MEDICAL HISTORY:  Breast cancer right breast CA s/p right breast lumpectomy (8/2021) and chemo/radiation treatment    GERD (gastroesophageal reflux disease) Nexium PRN    H/O osteoporosis on Repatha 2x per month    Hypothyroidism     Moderate mitral regurgitation Mild-To-Moderate - On echo report from 8/2020    Obesity     Other appendicitis Pending Laparoscopic Appendectomy with Dr. Verde on 6/3/22    Vitamin D deficiency

## 2022-06-03 NOTE — ASU DISCHARGE PLAN (ADULT/PEDIATRIC) - CARE PROVIDER_API CALL
Darío Verde)  Surgery  [address], [address]  [place] [address]  Phone: [phone]  Fax: [phone]  Follow Up Time: 2 weeks

## 2022-06-03 NOTE — BRIEF OPERATIVE NOTE - OPERATION/FINDINGS
Laparoscopic appendectomy. appendix identified and freed up. taken with purple load stapler. hemostasis achieved.

## 2022-06-03 NOTE — ASU PATIENT PROFILE, ADULT - PAIN SCALE PREFERRED, PROFILE
numerical 0-10 Complex Repair And W Plasty Text: The defect edges were debeveled with a #15 scalpel blade.  The primary defect was closed partially with a complex linear closure.  Given the location of the remaining defect, shape of the defect and the proximity to free margins a W plasty was deemed most appropriate for complete closure of the defect.  Using a sterile surgical marker, an appropriate advancement flap was drawn incorporating the defect and placing the expected incisions within the relaxed skin tension lines where possible.    The area thus outlined was incised deep to adipose tissue with a #15 scalpel blade.  The skin margins were undermined to an appropriate distance in all directions utilizing iris scissors.

## 2022-06-03 NOTE — BRIEF OPERATIVE NOTE - ANTIBIOTIC PROTOCOL
SUBJECTIVE:  Catarino Corona is a 20 year old male who sustained a left hand thumb injury   Mechanism of injury: slammed in car door this morning  Immediate symptoms: immediate pain. Bleeding under nail  Symptoms have been worsening since that time.   Prior history of related problems: no prior problems with this area in the past.  Pain 7/10  Using just ice    OBJECTIVE:  BP (!) 139/91   Pulse 81   Temp 99.4  F (37.4  C)   Wt 145.9 kg (321 lb 9.6 oz)   SpO2 98%   BMI 42.72 kg/m    Appearance: in no apparent distress.  Hand exam: normal exam, no swelling, tenderness, instability; ligaments intact, FROM all hand, wrist, finger joints.    X-ray: no fracture or dislocation noted.    ASSESSMENT:  (S69.92XA) Thumb injury, left, initial encounter  (primary encounter diagnosis)    Plan:   Splint applied  XR Finger Left G/E 2 Views  Orthopedic  Referral to follow up  Ibuprofen ice suggested    Catarino to follow up with Primary Care provider regarding elevated blood pressure.        DOV Ambriz CNP    
Followed protocol

## 2022-06-03 NOTE — ASU DISCHARGE PLAN (ADULT/PEDIATRIC) - NS MD DC FALL RISK RISK
For information on Fall & Injury Prevention, visit: [email or web address] OR  [email or web address] OR  https://www.cdc.gov/steadi/patient.html

## 2022-06-05 ENCOUNTER — TRANSCRIPTION ENCOUNTER (OUTPATIENT)
Age: 67
End: 2022-06-05

## 2022-06-06 ENCOUNTER — RESULT REVIEW (OUTPATIENT)
Age: 67
End: 2022-06-06

## 2022-06-06 ENCOUNTER — INPATIENT (INPATIENT)
Facility: HOSPITAL | Age: 67
LOS: 13 days | Discharge: HOME | DRG: 853 | End: 2022-06-20
Attending: SURGERY | Admitting: SURGERY
Payer: COMMERCIAL

## 2022-06-06 ENCOUNTER — NON-APPOINTMENT (OUTPATIENT)
Age: 67
End: 2022-06-06

## 2022-06-06 VITALS
DIASTOLIC BLOOD PRESSURE: 70 MMHG | RESPIRATION RATE: 22 BRPM | SYSTOLIC BLOOD PRESSURE: 89 MMHG | WEIGHT: 145.06 LBS | HEART RATE: 139 BPM | HEIGHT: 62 IN | OXYGEN SATURATION: 94 % | TEMPERATURE: 98 F

## 2022-06-06 DIAGNOSIS — Z90.49 ACQUIRED ABSENCE OF OTHER SPECIFIED PARTS OF DIGESTIVE TRACT: Chronic | ICD-10-CM

## 2022-06-06 DIAGNOSIS — Z90.710 ACQUIRED ABSENCE OF BOTH CERVIX AND UTERUS: Chronic | ICD-10-CM

## 2022-06-06 DIAGNOSIS — Z98.890 OTHER SPECIFIED POSTPROCEDURAL STATES: Chronic | ICD-10-CM

## 2022-06-06 DIAGNOSIS — K65.9 PERITONITIS, UNSPECIFIED: ICD-10-CM

## 2022-06-06 LAB
ALBUMIN SERPL ELPH-MCNC: 3.4 G/DL — SIGNIFICANT CHANGE UP (ref 3.3–5)
ALP SERPL-CCNC: 56 U/L — SIGNIFICANT CHANGE UP (ref 40–120)
ALT FLD-CCNC: 18 U/L — SIGNIFICANT CHANGE UP (ref 10–45)
ANION GAP SERPL CALC-SCNC: 10 MMOL/L — SIGNIFICANT CHANGE UP (ref 5–17)
ANION GAP SERPL CALC-SCNC: 14 MMOL/L — SIGNIFICANT CHANGE UP (ref 5–17)
ANION GAP SERPL CALC-SCNC: 16 MMOL/L — SIGNIFICANT CHANGE UP (ref 5–17)
APTT BLD: 27 SEC — LOW (ref 27.5–35.5)
APTT BLD: 29 SEC — SIGNIFICANT CHANGE UP (ref 27.5–35.5)
AST SERPL-CCNC: 24 U/L — SIGNIFICANT CHANGE UP (ref 10–40)
BASE EXCESS BLDV CALC-SCNC: -0.9 MMOL/L — SIGNIFICANT CHANGE UP (ref -2–2)
BASOPHILS # BLD AUTO: 0 K/UL — SIGNIFICANT CHANGE UP (ref 0–0.2)
BASOPHILS NFR BLD AUTO: 0 % — SIGNIFICANT CHANGE UP (ref 0–2)
BILIRUB SERPL-MCNC: 0.8 MG/DL — SIGNIFICANT CHANGE UP (ref 0.2–1.2)
BLD GP AB SCN SERPL QL: NEGATIVE — SIGNIFICANT CHANGE UP
BUN SERPL-MCNC: 13 MG/DL — SIGNIFICANT CHANGE UP (ref 7–23)
BUN SERPL-MCNC: 13 MG/DL — SIGNIFICANT CHANGE UP (ref 7–23)
BUN SERPL-MCNC: 17 MG/DL — SIGNIFICANT CHANGE UP (ref 7–23)
CA-I SERPL-SCNC: 1.24 MMOL/L — SIGNIFICANT CHANGE UP (ref 1.15–1.33)
CALCIUM SERPL-MCNC: 10.5 MG/DL — SIGNIFICANT CHANGE UP (ref 8.4–10.5)
CALCIUM SERPL-MCNC: 7.7 MG/DL — LOW (ref 8.4–10.5)
CALCIUM SERPL-MCNC: 7.7 MG/DL — LOW (ref 8.4–10.5)
CHLORIDE BLDV-SCNC: 99 MMOL/L — SIGNIFICANT CHANGE UP (ref 96–108)
CHLORIDE SERPL-SCNC: 103 MMOL/L — SIGNIFICANT CHANGE UP (ref 96–108)
CHLORIDE SERPL-SCNC: 106 MMOL/L — SIGNIFICANT CHANGE UP (ref 96–108)
CHLORIDE SERPL-SCNC: 97 MMOL/L — SIGNIFICANT CHANGE UP (ref 96–108)
CO2 BLDV-SCNC: 26 MMOL/L — SIGNIFICANT CHANGE UP (ref 22–26)
CO2 SERPL-SCNC: 17 MMOL/L — LOW (ref 22–31)
CO2 SERPL-SCNC: 20 MMOL/L — LOW (ref 22–31)
CO2 SERPL-SCNC: 21 MMOL/L — LOW (ref 22–31)
CREAT SERPL-MCNC: 0.53 MG/DL — SIGNIFICANT CHANGE UP (ref 0.5–1.3)
CREAT SERPL-MCNC: 0.53 MG/DL — SIGNIFICANT CHANGE UP (ref 0.5–1.3)
CREAT SERPL-MCNC: 0.71 MG/DL — SIGNIFICANT CHANGE UP (ref 0.5–1.3)
EGFR: 100 ML/MIN/1.73M2 — SIGNIFICANT CHANGE UP
EGFR: 100 ML/MIN/1.73M2 — SIGNIFICANT CHANGE UP
EGFR: 92 ML/MIN/1.73M2 — SIGNIFICANT CHANGE UP
EOSINOPHIL # BLD AUTO: 0.13 K/UL — SIGNIFICANT CHANGE UP (ref 0–0.5)
EOSINOPHIL NFR BLD AUTO: 2.6 % — SIGNIFICANT CHANGE UP (ref 0–6)
GAS PNL BLDA: SIGNIFICANT CHANGE UP
GAS PNL BLDA: SIGNIFICANT CHANGE UP
GAS PNL BLDV: 129 MMOL/L — LOW (ref 136–145)
GAS PNL BLDV: SIGNIFICANT CHANGE UP
GAS PNL BLDV: SIGNIFICANT CHANGE UP
GLUCOSE BLDV-MCNC: 132 MG/DL — HIGH (ref 70–99)
GLUCOSE SERPL-MCNC: 124 MG/DL — HIGH (ref 70–99)
GLUCOSE SERPL-MCNC: 131 MG/DL — HIGH (ref 70–99)
GLUCOSE SERPL-MCNC: 135 MG/DL — HIGH (ref 70–99)
HCO3 BLDV-SCNC: 24 MMOL/L — SIGNIFICANT CHANGE UP (ref 22–29)
HCT VFR BLD CALC: 37.9 % — SIGNIFICANT CHANGE UP (ref 34.5–45)
HCT VFR BLD CALC: 41.1 % — SIGNIFICANT CHANGE UP (ref 34.5–45)
HCT VFR BLD CALC: 47.7 % — HIGH (ref 34.5–45)
HCT VFR BLDA CALC: 48 % — HIGH (ref 34.5–46.5)
HGB BLD CALC-MCNC: 15.9 G/DL — SIGNIFICANT CHANGE UP (ref 11.7–16.1)
HGB BLD-MCNC: 12.1 G/DL — SIGNIFICANT CHANGE UP (ref 11.5–15.5)
HGB BLD-MCNC: 13.6 G/DL — SIGNIFICANT CHANGE UP (ref 11.5–15.5)
HGB BLD-MCNC: 15.3 G/DL — SIGNIFICANT CHANGE UP (ref 11.5–15.5)
INR BLD: 1.3 RATIO — HIGH (ref 0.88–1.16)
INR BLD: 1.34 RATIO — HIGH (ref 0.88–1.16)
LACTATE BLDV-MCNC: 3.1 MMOL/L — HIGH (ref 0.7–2)
LYMPHOCYTES # BLD AUTO: 1.1 K/UL — SIGNIFICANT CHANGE UP (ref 1–3.3)
LYMPHOCYTES # BLD AUTO: 22.8 % — SIGNIFICANT CHANGE UP (ref 13–44)
MAGNESIUM SERPL-MCNC: 1.9 MG/DL — SIGNIFICANT CHANGE UP (ref 1.6–2.6)
MAGNESIUM SERPL-MCNC: 1.9 MG/DL — SIGNIFICANT CHANGE UP (ref 1.6–2.6)
MANUAL SMEAR VERIFICATION: SIGNIFICANT CHANGE UP
MCHC RBC-ENTMCNC: 28.8 PG — SIGNIFICANT CHANGE UP (ref 27–34)
MCHC RBC-ENTMCNC: 29.1 PG — SIGNIFICANT CHANGE UP (ref 27–34)
MCHC RBC-ENTMCNC: 29.8 PG — SIGNIFICANT CHANGE UP (ref 27–34)
MCHC RBC-ENTMCNC: 31.9 GM/DL — LOW (ref 32–36)
MCHC RBC-ENTMCNC: 32.1 GM/DL — SIGNIFICANT CHANGE UP (ref 32–36)
MCHC RBC-ENTMCNC: 33.1 GM/DL — SIGNIFICANT CHANGE UP (ref 32–36)
MCV RBC AUTO: 89.9 FL — SIGNIFICANT CHANGE UP (ref 80–100)
MCV RBC AUTO: 90.2 FL — SIGNIFICANT CHANGE UP (ref 80–100)
MCV RBC AUTO: 90.9 FL — SIGNIFICANT CHANGE UP (ref 80–100)
MONOCYTES # BLD AUTO: 0.25 K/UL — SIGNIFICANT CHANGE UP (ref 0–0.9)
MONOCYTES NFR BLD AUTO: 5.3 % — SIGNIFICANT CHANGE UP (ref 2–14)
NEUTROPHILS # BLD AUTO: 3.33 K/UL — SIGNIFICANT CHANGE UP (ref 1.8–7.4)
NEUTROPHILS NFR BLD AUTO: 61.4 % — SIGNIFICANT CHANGE UP (ref 43–77)
NEUTS BAND # BLD: 7.9 % — SIGNIFICANT CHANGE UP (ref 0–8)
NRBC # BLD: 0 /100 WBCS — SIGNIFICANT CHANGE UP (ref 0–0)
NRBC # BLD: 0 /100 WBCS — SIGNIFICANT CHANGE UP (ref 0–0)
NRBC # BLD: 1 /100 — HIGH (ref 0–0)
PCO2 BLDV: 41 MMHG — SIGNIFICANT CHANGE UP (ref 39–42)
PH BLDV: 7.38 — SIGNIFICANT CHANGE UP (ref 7.32–7.43)
PHOSPHATE SERPL-MCNC: 4 MG/DL — SIGNIFICANT CHANGE UP (ref 2.5–4.5)
PHOSPHATE SERPL-MCNC: 4 MG/DL — SIGNIFICANT CHANGE UP (ref 2.5–4.5)
PLAT MORPH BLD: NORMAL — SIGNIFICANT CHANGE UP
PLATELET # BLD AUTO: 261 K/UL — SIGNIFICANT CHANGE UP (ref 150–400)
PLATELET # BLD AUTO: 262 K/UL — SIGNIFICANT CHANGE UP (ref 150–400)
PLATELET # BLD AUTO: 266 K/UL — SIGNIFICANT CHANGE UP (ref 150–400)
PO2 BLDV: 27 MMHG — SIGNIFICANT CHANGE UP (ref 25–45)
POTASSIUM BLDV-SCNC: 5.2 MMOL/L — HIGH (ref 3.5–5.1)
POTASSIUM SERPL-MCNC: 4.1 MMOL/L — SIGNIFICANT CHANGE UP (ref 3.5–5.3)
POTASSIUM SERPL-MCNC: 4.1 MMOL/L — SIGNIFICANT CHANGE UP (ref 3.5–5.3)
POTASSIUM SERPL-MCNC: 4.5 MMOL/L — SIGNIFICANT CHANGE UP (ref 3.5–5.3)
POTASSIUM SERPL-SCNC: 4.1 MMOL/L — SIGNIFICANT CHANGE UP (ref 3.5–5.3)
POTASSIUM SERPL-SCNC: 4.1 MMOL/L — SIGNIFICANT CHANGE UP (ref 3.5–5.3)
POTASSIUM SERPL-SCNC: 4.5 MMOL/L — SIGNIFICANT CHANGE UP (ref 3.5–5.3)
PROT SERPL-MCNC: 7.6 G/DL — SIGNIFICANT CHANGE UP (ref 6–8.3)
PROTHROM AB SERPL-ACNC: 15 SEC — HIGH (ref 10.5–13.4)
PROTHROM AB SERPL-ACNC: 15.4 SEC — HIGH (ref 10.5–13.4)
RBC # BLD: 4.2 M/UL — SIGNIFICANT CHANGE UP (ref 3.8–5.2)
RBC # BLD: 4.57 M/UL — SIGNIFICANT CHANGE UP (ref 3.8–5.2)
RBC # BLD: 5.25 M/UL — HIGH (ref 3.8–5.2)
RBC # FLD: 13.3 % — SIGNIFICANT CHANGE UP (ref 10.3–14.5)
RBC # FLD: 13.4 % — SIGNIFICANT CHANGE UP (ref 10.3–14.5)
RBC # FLD: 13.5 % — SIGNIFICANT CHANGE UP (ref 10.3–14.5)
RBC BLD AUTO: SIGNIFICANT CHANGE UP
RH IG SCN BLD-IMP: POSITIVE — SIGNIFICANT CHANGE UP
SAO2 % BLDV: 36 % — LOW (ref 67–88)
SARS-COV-2 RNA SPEC QL NAA+PROBE: SIGNIFICANT CHANGE UP
SODIUM SERPL-SCNC: 134 MMOL/L — LOW (ref 135–145)
SODIUM SERPL-SCNC: 134 MMOL/L — LOW (ref 135–145)
SODIUM SERPL-SCNC: 136 MMOL/L — SIGNIFICANT CHANGE UP (ref 135–145)
WBC # BLD: 4.81 K/UL — SIGNIFICANT CHANGE UP (ref 3.8–10.5)
WBC # BLD: 5.49 K/UL — SIGNIFICANT CHANGE UP (ref 3.8–10.5)
WBC # BLD: 5.9 K/UL — SIGNIFICANT CHANGE UP (ref 3.8–10.5)
WBC # FLD AUTO: 4.81 K/UL — SIGNIFICANT CHANGE UP (ref 3.8–10.5)
WBC # FLD AUTO: 5.49 K/UL — SIGNIFICANT CHANGE UP (ref 3.8–10.5)
WBC # FLD AUTO: 5.9 K/UL — SIGNIFICANT CHANGE UP (ref 3.8–10.5)

## 2022-06-06 PROCEDURE — 74177 CT ABD & PELVIS W/CONTRAST: CPT | Mod: 26,MA

## 2022-06-06 PROCEDURE — 88307 TISSUE EXAM BY PATHOLOGIST: CPT | Mod: 26

## 2022-06-06 PROCEDURE — 71045 X-RAY EXAM CHEST 1 VIEW: CPT | Mod: 26

## 2022-06-06 PROCEDURE — 71045 X-RAY EXAM CHEST 1 VIEW: CPT | Mod: 26,77

## 2022-06-06 PROCEDURE — 99291 CRITICAL CARE FIRST HOUR: CPT

## 2022-06-06 DEVICE — STAPLER COVIDIEN GIA 80-3.0MM PURPLE: Type: IMPLANTABLE DEVICE | Status: FUNCTIONAL

## 2022-06-06 DEVICE — STAPLER COVIDIEN GIA 80-3.0MM PURPLE RELOAD: Type: IMPLANTABLE DEVICE | Status: FUNCTIONAL

## 2022-06-06 DEVICE — SEALANT VISTASEAL FIBRIN HUMAN 4ML 4/PK: Type: IMPLANTABLE DEVICE | Status: FUNCTIONAL

## 2022-06-06 DEVICE — STAPLER COVIDIEN TA 90 BLUE: Type: IMPLANTABLE DEVICE | Status: FUNCTIONAL

## 2022-06-06 RX ORDER — DEXMEDETOMIDINE HYDROCHLORIDE IN 0.9% SODIUM CHLORIDE 4 UG/ML
0.6 INJECTION INTRAVENOUS
Qty: 200 | Refills: 0 | Status: DISCONTINUED | OUTPATIENT
Start: 2022-06-06 | End: 2022-06-06

## 2022-06-06 RX ORDER — ONDANSETRON 8 MG/1
4 TABLET, FILM COATED ORAL ONCE
Refills: 0 | Status: COMPLETED | OUTPATIENT
Start: 2022-06-06 | End: 2022-06-06

## 2022-06-06 RX ORDER — LEVOTHYROXINE SODIUM 125 MCG
38 TABLET ORAL AT BEDTIME
Refills: 0 | Status: DISCONTINUED | OUTPATIENT
Start: 2022-06-06 | End: 2022-06-08

## 2022-06-06 RX ORDER — ACETAMINOPHEN 500 MG
1000 TABLET ORAL EVERY 6 HOURS
Refills: 0 | Status: COMPLETED | OUTPATIENT
Start: 2022-06-06 | End: 2022-06-07

## 2022-06-06 RX ORDER — MAGNESIUM SULFATE 500 MG/ML
2 VIAL (ML) INJECTION ONCE
Refills: 0 | Status: COMPLETED | OUTPATIENT
Start: 2022-06-06 | End: 2022-06-06

## 2022-06-06 RX ORDER — MORPHINE SULFATE 50 MG/1
4 CAPSULE, EXTENDED RELEASE ORAL ONCE
Refills: 0 | Status: DISCONTINUED | OUTPATIENT
Start: 2022-06-06 | End: 2022-06-06

## 2022-06-06 RX ORDER — PHENYLEPHRINE HYDROCHLORIDE 10 MG/ML
0.5 INJECTION INTRAVENOUS
Qty: 40 | Refills: 0 | Status: DISCONTINUED | OUTPATIENT
Start: 2022-06-06 | End: 2022-06-07

## 2022-06-06 RX ORDER — SODIUM CHLORIDE 9 MG/ML
1000 INJECTION, SOLUTION INTRAVENOUS ONCE
Refills: 0 | Status: COMPLETED | OUTPATIENT
Start: 2022-06-06 | End: 2022-06-06

## 2022-06-06 RX ORDER — SODIUM CHLORIDE 9 MG/ML
1000 INJECTION INTRAMUSCULAR; INTRAVENOUS; SUBCUTANEOUS ONCE
Refills: 0 | Status: COMPLETED | OUTPATIENT
Start: 2022-06-06 | End: 2022-06-06

## 2022-06-06 RX ORDER — LEVOTHYROXINE SODIUM 125 MCG
37 TABLET ORAL AT BEDTIME
Refills: 0 | Status: DISCONTINUED | OUTPATIENT
Start: 2022-06-06 | End: 2022-06-06

## 2022-06-06 RX ORDER — SODIUM CHLORIDE 9 MG/ML
1000 INJECTION, SOLUTION INTRAVENOUS
Refills: 0 | Status: DISCONTINUED | OUTPATIENT
Start: 2022-06-06 | End: 2022-06-06

## 2022-06-06 RX ORDER — PIPERACILLIN AND TAZOBACTAM 4; .5 G/20ML; G/20ML
3.38 INJECTION, POWDER, LYOPHILIZED, FOR SOLUTION INTRAVENOUS EVERY 8 HOURS
Refills: 0 | Status: DISCONTINUED | OUTPATIENT
Start: 2022-06-06 | End: 2022-06-06

## 2022-06-06 RX ORDER — ONDANSETRON 8 MG/1
4 TABLET, FILM COATED ORAL ONCE
Refills: 0 | Status: DISCONTINUED | OUTPATIENT
Start: 2022-06-06 | End: 2022-06-06

## 2022-06-06 RX ORDER — PIPERACILLIN AND TAZOBACTAM 4; .5 G/20ML; G/20ML
3.38 INJECTION, POWDER, LYOPHILIZED, FOR SOLUTION INTRAVENOUS ONCE
Refills: 0 | Status: COMPLETED | OUTPATIENT
Start: 2022-06-06 | End: 2022-06-06

## 2022-06-06 RX ORDER — PIPERACILLIN AND TAZOBACTAM 4; .5 G/20ML; G/20ML
3.38 INJECTION, POWDER, LYOPHILIZED, FOR SOLUTION INTRAVENOUS EVERY 8 HOURS
Refills: 0 | Status: DISCONTINUED | OUTPATIENT
Start: 2022-06-06 | End: 2022-06-08

## 2022-06-06 RX ORDER — LEVOTHYROXINE SODIUM 125 MCG
25 TABLET ORAL AT BEDTIME
Refills: 0 | Status: DISCONTINUED | OUTPATIENT
Start: 2022-06-06 | End: 2022-06-06

## 2022-06-06 RX ORDER — PANTOPRAZOLE SODIUM 20 MG/1
40 TABLET, DELAYED RELEASE ORAL DAILY
Refills: 0 | Status: DISCONTINUED | OUTPATIENT
Start: 2022-06-06 | End: 2022-06-08

## 2022-06-06 RX ORDER — ENOXAPARIN SODIUM 100 MG/ML
40 INJECTION SUBCUTANEOUS EVERY 24 HOURS
Refills: 0 | Status: DISCONTINUED | OUTPATIENT
Start: 2022-06-06 | End: 2022-06-08

## 2022-06-06 RX ORDER — SODIUM CHLORIDE 9 MG/ML
2000 INJECTION, SOLUTION INTRAVENOUS ONCE
Refills: 0 | Status: DISCONTINUED | OUTPATIENT
Start: 2022-06-06 | End: 2022-06-06

## 2022-06-06 RX ORDER — SODIUM CHLORIDE 9 MG/ML
1000 INJECTION, SOLUTION INTRAVENOUS
Refills: 0 | Status: DISCONTINUED | OUTPATIENT
Start: 2022-06-06 | End: 2022-06-08

## 2022-06-06 RX ORDER — HYDROMORPHONE HYDROCHLORIDE 2 MG/ML
0.5 INJECTION INTRAMUSCULAR; INTRAVENOUS; SUBCUTANEOUS EVERY 4 HOURS
Refills: 0 | Status: DISCONTINUED | OUTPATIENT
Start: 2022-06-06 | End: 2022-06-08

## 2022-06-06 RX ORDER — PIPERACILLIN AND TAZOBACTAM 4; .5 G/20ML; G/20ML
3.38 INJECTION, POWDER, LYOPHILIZED, FOR SOLUTION INTRAVENOUS ONCE
Refills: 0 | Status: DISCONTINUED | OUTPATIENT
Start: 2022-06-06 | End: 2022-06-06

## 2022-06-06 RX ORDER — ACETAMINOPHEN 500 MG
1000 TABLET ORAL EVERY 6 HOURS
Refills: 0 | Status: DISCONTINUED | OUTPATIENT
Start: 2022-06-06 | End: 2022-06-06

## 2022-06-06 RX ORDER — CHLORHEXIDINE GLUCONATE 213 G/1000ML
15 SOLUTION TOPICAL EVERY 12 HOURS
Refills: 0 | Status: DISCONTINUED | OUTPATIENT
Start: 2022-06-06 | End: 2022-06-06

## 2022-06-06 RX ORDER — PANTOPRAZOLE SODIUM 20 MG/1
40 TABLET, DELAYED RELEASE ORAL DAILY
Refills: 0 | Status: DISCONTINUED | OUTPATIENT
Start: 2022-06-06 | End: 2022-06-06

## 2022-06-06 RX ADMIN — Medication 400 MILLIGRAM(S): at 23:13

## 2022-06-06 RX ADMIN — SODIUM CHLORIDE 100 MILLILITER(S): 9 INJECTION, SOLUTION INTRAVENOUS at 19:15

## 2022-06-06 RX ADMIN — SODIUM CHLORIDE 1000 MILLILITER(S): 9 INJECTION INTRAMUSCULAR; INTRAVENOUS; SUBCUTANEOUS at 09:30

## 2022-06-06 RX ADMIN — ENOXAPARIN SODIUM 40 MILLIGRAM(S): 100 INJECTION SUBCUTANEOUS at 21:59

## 2022-06-06 RX ADMIN — SODIUM CHLORIDE 1000 MILLILITER(S): 9 INJECTION INTRAMUSCULAR; INTRAVENOUS; SUBCUTANEOUS at 09:18

## 2022-06-06 RX ADMIN — SODIUM CHLORIDE 4000 MILLILITER(S): 9 INJECTION, SOLUTION INTRAVENOUS at 18:47

## 2022-06-06 RX ADMIN — Medication 400 MILLIGRAM(S): at 12:30

## 2022-06-06 RX ADMIN — PIPERACILLIN AND TAZOBACTAM 25 GRAM(S): 4; .5 INJECTION, POWDER, LYOPHILIZED, FOR SOLUTION INTRAVENOUS at 21:59

## 2022-06-06 RX ADMIN — DEXMEDETOMIDINE HYDROCHLORIDE IN 0.9% SODIUM CHLORIDE 9.87 MICROGRAM(S)/KG/HR: 4 INJECTION INTRAVENOUS at 19:15

## 2022-06-06 RX ADMIN — SODIUM CHLORIDE 100 MILLILITER(S): 9 INJECTION, SOLUTION INTRAVENOUS at 12:57

## 2022-06-06 RX ADMIN — Medication 1000 MILLIGRAM(S): at 23:45

## 2022-06-06 RX ADMIN — Medication 25 GRAM(S): at 23:12

## 2022-06-06 RX ADMIN — MORPHINE SULFATE 4 MILLIGRAM(S): 50 CAPSULE, EXTENDED RELEASE ORAL at 10:09

## 2022-06-06 RX ADMIN — PIPERACILLIN AND TAZOBACTAM 25 GRAM(S): 4; .5 INJECTION, POWDER, LYOPHILIZED, FOR SOLUTION INTRAVENOUS at 13:30

## 2022-06-06 RX ADMIN — ONDANSETRON 4 MILLIGRAM(S): 8 TABLET, FILM COATED ORAL at 10:09

## 2022-06-06 RX ADMIN — PANTOPRAZOLE SODIUM 40 MILLIGRAM(S): 20 TABLET, DELAYED RELEASE ORAL at 12:20

## 2022-06-06 RX ADMIN — PIPERACILLIN AND TAZOBACTAM 200 GRAM(S): 4; .5 INJECTION, POWDER, LYOPHILIZED, FOR SOLUTION INTRAVENOUS at 09:45

## 2022-06-06 RX ADMIN — PHENYLEPHRINE HYDROCHLORIDE 12.3 MICROGRAM(S)/KG/MIN: 10 INJECTION INTRAVENOUS at 22:00

## 2022-06-06 RX ADMIN — Medication 38 MICROGRAM(S): at 21:59

## 2022-06-06 RX ADMIN — CHLORHEXIDINE GLUCONATE 15 MILLILITER(S): 213 SOLUTION TOPICAL at 18:24

## 2022-06-06 NOTE — ED PROVIDER NOTE - ATTENDING CONTRIBUTION TO CARE
I performed a history and physical exam of the patient and discussed their management with the resident. I reviewed the resident's note and agree with the documented findings and plan of care.  Anuradha Roman MD

## 2022-06-06 NOTE — ED PROVIDER NOTE - OBJECTIVE STATEMENT
66 y/o F with PMH hypothyroidism presenting to the ED with abdominal pain. Patient is post-op appendectomy on 6/3. Endorses abdominal pain and distention since being discharged. Also endorses nauseas and states she has not been able to tolerate PO. No fever or chills. No urinary complaints. No CP, cough, or SOB.

## 2022-06-06 NOTE — CONSULT NOTE ADULT - ASSESSMENT
66yo F with Hx hypothyroidism and breast CA (s/p R breast lumpectomy 8/2021, XRT) and recent appendectomy 6/3 who presents with severe abdominal pain, found to have pneumoperitoneum and loculated fluid collection c/f perforation. Patient was taken emergently to OR for exlap ___, left in discontinuity with Abthera. SICU consulted for vent management and open abdomen.     PLAN:  Neuro:   - Pain control:     Respiratory:  - Monitor respiratory status    Cardiovascular:  - Monitor vitals signs      Gastrointestinal: perforated viscus  - Diet: NPO  - NGT to LCWS  - C/w Abthera vac    Genitourinary/Renal:  - IVF:   - Monitor UOP  - Trend electrolytes on BMP, replete PRN    Heme:  - Trend H/H on CBC  - VTE ppx:     ID:   - Abx: Zosyn  - Trend WBC on CBC  - Monitor fever curve    Endocrine:  - Trend glucose on BMP  - FS/MISS q6/ACqHS  - Insulin:     Lines:   - PIV x 2    Labs:   - CBC, BMP, PT/PTT/INR qD    Code Status: Full Code  Dispo: SICU -> RTOR 6/7      Melissa Miles, PGY-2  Surgical ICU  #[id number]  66yo F with Hx hypothyroidism and breast CA (s/p R breast lumpectomy 8/2021, XRT) and recent appendectomy 6/3 who presents with severe abdominal pain, found to have pneumoperitoneum and loculated fluid collection c/f perforation. Patient was taken emergently to OR for exlap ___, left in discontinuity with Abthera. SICU consulted for vent management and open abdomen.     PLAN:  Neuro:   - sedated with precedex  - Pain control: IV Tylenol, PRN dilaudid    Respiratory:  - remains intubated postop, PRVC 500/12/5/50%  - follow up postop CXR  - will SBT in attempt to extubate  - AM CXR    Cardiovascular:  pressor requirements intraop  - given 1L bolus LR postop  - lactate cleared  - Monitor vitals signs      Gastrointestinal: perforated viscus  - Diet: NPO  - NGT to LCWS  - C/w Abthera vac  - plan to RTOR Wednesday 6/8    Genitourinary/Renal:  - IVF: LR @100  - maintain Diaz catheter  - Monitor UOP  - Trend electrolytes on BMP, replete PRN    Heme:  - Trend H/H on CBC  - VTE ppx: LVX    ID:   - Abx: Zosyn  - Trend WBC on CBC  - Monitor fever curve    Endocrine:  - continue Synthroid IV 25ug  - FS q6 while NPO    Lines:   - PIV x 2    Labs:   - CBC, BMP, PT/PTT/INR qD    Code Status: Full Code  Dispo: SICU -> RTOR 6/8     66yo F with Hx hypothyroidism and breast CA (s/p R breast lumpectomy 8/2021, XRT) and recent appendectomy 6/3 who presents with severe abdominal pain, found to have pneumoperitoneum and loculated fluid collection c/f perforation. Patient was taken emergently to OR for exlap, found to have TI perforation with gross spillage of intestinal contents s/p washout, ileocolic resection with primary anastomosis, left open with Abthera. SICU consulted for vent management and open abdomen.     PLAN:  Neuro:   - sedated with precedex  - Pain control: IV Tylenol, PRN dilaudid    Respiratory:  - remains intubated postop, PRVC 500/12/5/50%  - follow up postop CXR  - will SBT in attempt to extubate  - AM CXR    Cardiovascular:  pressor requirements intraop  - given 1L bolus LR postop  - lactate cleared  - Monitor vitals signs      Gastrointestinal: perforated viscus  - Diet: NPO  - NGT to LCWS  - C/w Abthera vac  - plan to RTOR Wednesday 6/8    Genitourinary/Renal:  - IVF: LR @100  - maintain Diaz catheter  - Monitor UOP  - Trend electrolytes on BMP, replete PRN    Heme:  - Trend H/H on CBC  - VTE ppx: LVX    ID:   - Abx: Zosyn  - Trend WBC on CBC  - Monitor fever curve    Endocrine:  - continue Synthroid IV 25ug  - FS q6 while NPO    Lines:   - PIV x 2    Labs:   - CBC, BMP, PT/PTT/INR qD    Code Status: Full Code  Dispo: SICU -> RTOR 6/8     68yo F with Hx hypothyroidism and breast CA (s/p R breast lumpectomy 8/2021, XRT) and recent appendectomy 6/3 who presents with severe abdominal pain, found to have pneumoperitoneum and loculated fluid collection c/f perforation. Patient was taken emergently to OR for exlap, found to have TI perforation with gross spillage of intestinal contents s/p washout, ileocolic resection with primary anastomosis, left open with Abthera. SICU consulted for vent management and open abdomen.     PLAN:  Neuro:   - sedated with precedex  - Pain control: IV Tylenol, PRN dilaudid    Respiratory:  - remains intubated postop, PRVC 500/12/5/50%  - follow up postop CXR  - will SBT in attempt to extubate  - AM CXR    Cardiovascular:  currently on levo  - given 1L bolus LR postop  - wean levo as tolerated  - lactate cleared postop  - Monitor vitals signs      Gastrointestinal: perforated viscus  - Diet: NPO  - NGT to LCWS  - C/w Abthera vac  - plan to RTOR Wednesday 6/8    Genitourinary/Renal:  - IVF: LR @100  - maintain Diaz catheter  - Monitor UOP  - Trend electrolytes on BMP, replete PRN    Heme:  - Trend H/H on CBC  - VTE ppx: LVX    ID:   - Abx: Zosyn  - Trend WBC on CBC  - Monitor fever curve    Endocrine:  - continue Synthroid IV 38ug  - FS q6 while NPO    Lines:   - PIV x 2    Labs:   - CBC, BMP q6, PT/PTT/INR qD    Code Status: Full Code  Dispo: SICU -> RTOR 6/8

## 2022-06-06 NOTE — H&P ADULT - NSICDXPASTSURGICALHX_GEN_ALL_CORE_FT
PAST SURGICAL HISTORY:  H/O elbow surgery Right side - 50+ years ago    H/O: hysterectomy 2005 2/2 myoma    S/P bilateral breast reduction 2013    S/P laparoscopic appendectomy 6/3/2022    S/P lumpectomy, right breast 8/2021 2/2 right breast CA

## 2022-06-06 NOTE — AIRWAY REMOVAL NOTE  ADULT & PEDS - ARTIFICAL AIRWAY REMOVAL COMMENTS
Pt. Written order for extubation verified. The patient was identified by full name and birth date compared to the identification band. Present during the procedure was MELANIA Lloyd.

## 2022-06-06 NOTE — H&P ADULT - NSHPPHYSICALEXAM_GEN_ALL_CORE
Vital Signs Last 24 Hrs  T(C): 36.8 (06 Jun 2022 10:55), Max: 36.8 (06 Jun 2022 09:15)  T(F): 98.3 (06 Jun 2022 10:55), Max: 98.3 (06 Jun 2022 09:15)  HR: 118 (06 Jun 2022 10:55) (118 - 140)  BP: 126/79 (06 Jun 2022 10:55) (89/70 - 143/90)  BP(mean): 88 (06 Jun 2022 10:40) (88 - 88)  RR: 26 (06 Jun 2022 10:55) (22 - 30)  SpO2: 96% (06 Jun 2022 10:55) (94% - 97%)    Gen: Resting in stretcher, appears uncomfortable  Resp: Tachypneic to 40's, satting well on rm air, bilat chest rise  CV: tachy to 120's, Hypotensive to SBP 80's responded to 112 after fluid   ABD: Diffusely tender in all quadrants, abdomen is softly distended, no rebound or guarding

## 2022-06-06 NOTE — H&P ADULT - NSHPLABSRESULTS_GEN_ALL_CORE
LABS:                          15.3   4.81  )-----------( 266      ( 06 Jun 2022 10:09 )             47.7     06-06    134<L>  |  97  |  17  ----------------------------<  124<H>  4.5   |  21<L>  |  0.71    Ca    10.5      06 Jun 2022 10:09    TPro  7.6  /  Alb  3.4  /  TBili  0.8  /  DBili  x   /  AST  24  /  ALT  18  /  AlkPhos  56  06-06    PT/INR - ( 06 Jun 2022 10:09 )   PT: 15.0 sec;   INR: 1.30 ratio         PTT - ( 06 Jun 2022 10:09 )  PTT:27.0 sec      < from: CT Abdomen and Pelvis w/ IV Cont (06.06.22 @ 10:42) >    FINDINGS:  LOWER CHEST: Small right pleural effusion with associated compressive   atelectasis. Linear atelectasis at the left lung base.    LIVER: Within normal limits.  BILE DUCTS: Normal caliber.  GALLBLADDER: Within normal limits.  SPLEEN: Within normal limits.  PANCREAS: Within normal limits.  ADRENALS: Within normal limits.  KIDNEYS/URETERS: Small right renal cyst.    BLADDER: Intraluminal air consistent with recent instrumentation.  REPRODUCTIVE ORGANS: Hysterectomy.    BOWEL: Small hiatal hernia. Status post appendectomy. No bowel   obstruction.  PERITONEUM: Large amount of free air greater than expected for   postoperative day 3. Loculated fluid in the right hemiabdomen containing   droplets of air as well as additional loculated fluid in the pelvis   without discrete well-formed collection. Enhancement of the peritoneal   lining in the pelvis consistent with peritonitis.  VESSELS: Atherosclerotic changes.  RETROPERITONEUM/LYMPH NODES: No lymphadenopathy.  ABDOMINAL WALL: Within normal limits.  BONES: Degenerative changes. Mild compression deformity of T12 and L1.    IMPRESSION:  Large amount of free air greater than expected for postoperative day 3.   Loculated fluid in the right hemiabdomen and pelvis with enhancement of   the peritoneal lining concerning for peritonitis.    < end of copied text >

## 2022-06-06 NOTE — ED PROVIDER NOTE - PHYSICAL EXAMINATION
GENERAL: Awake, alert, NAD  HEENT: NC/AT, moist mucous membranes  LUNGS: CTAB, no wheezes or crackles   CARDIAC: tachycardic, regular rhythm, no m/r/g  ABDOMEN: firm, distended, +diffuse abd tenderness, +guarding, +peritoneal signs  EXT: No edema, no calf tenderness, 2+ DP pulses bilaterally, no deformities.  NEURO: A&Ox3. Moving all extremities.  SKIN: Warm and dry. No rash.  PSYCH: Normal affect.

## 2022-06-06 NOTE — PATIENT PROFILE ADULT - NSPROPTRIGHTBILLOFRIGHTS_GEN_A_NUR
Post-Partum Day Number 2 Progress Note Eva Beth Assessment: Doing well, post partum day 2 Plan: 1. Discharge home today 2. Follow up in office in 6 weeks with Norman Mitchell, Lebron Linares,* 
3. Post partum activity advised, diet as tolerated 4. Discharge Medications: ibuprofen, percocet and medications prior to admission Information for the patient's :  Shady Guo [[id number]] Vaginal, Spontaneous Patient doing well without significant complaint. Voiding without difficulty, normal lochia. Vitals: 
Visit Vitals /76 Pulse 97 Temp 98.2 °F (36.8 °C) Resp 18 Ht 5' 2\" (1.575 m) Wt 79.8 kg (176 lb) SpO2 98% Breastfeeding? Unknown BMI 32.19 kg/m² Temp (24hrs), Av °F (36.7 °C), Min:97.8 °F (36.6 °C), Max:98.2 °F (36.8 °C) Exam:    
    Patient without distress. Abdomen soft, fundus firm, nontender Lower extremities are negative for swelling, cords or tenderness. Labs:  
 
Lab Results Component Value Date/Time WBC 16.9 (H) 02/10/2019 12:45 AM  
 HGB 10.8 (L) 02/10/2019 12:45 AM  
 HCT 33.1 (L) 02/10/2019 12:45 AM  
 PLATELET 149  12:45 AM  
 
 
No results found for this or any previous visit (from the past 24 hour(s)). patient

## 2022-06-06 NOTE — PATIENT PROFILE ADULT - FALL HARM RISK - HARM RISK INTERVENTIONS

## 2022-06-06 NOTE — ED ADULT NURSE REASSESSMENT NOTE - NS ED NURSE REASSESS COMMENT FT1
Pt had lumpectomy 2 years ago for rt sided breast ca, per Sheba ULRICH ok to look for access on rt side as patient is septic.

## 2022-06-06 NOTE — H&P ADULT - ASSESSMENT
67F POD3 from [facility] marelyy coming into the ED with clinical and imaging findings concerning for peritonitis    Admit to Dr. Smith  NPO/IVF  IR consult for fluid drainage, will hold VTE ppx for the mean time  Zosyn  OR on hold  Discussed with patient the risk that she may need to go to OR   Patient seen and examined with Dr. Smith    [facility]  0912

## 2022-06-06 NOTE — ED ADULT TRIAGE NOTE - NSWEIGHTCALCTOOLDRUG_GEN_A_CORE
I would recommend that he stopped the prednisone after his dose today. He should be using his sliding scale 3 times a day. That will keep his sugars from going up too high. I would not make any adjustments with his Lantus any further but stay on his usual dose.  It be much more helpful to control high sugars with his short acting insulin and he should be using that with his sliding scale 3 times a day  used

## 2022-06-06 NOTE — CONSULT NOTE ADULT - SUBJECTIVE AND OBJECTIVE BOX
Interventional Radiology    Evaluate for Procedure: Abdominal abscess drainage    HPI: 66 y/o F with PMH hypothyroidism presenting to the ED with abdominal pain. Patient is post-op appendectomy on 6/3. Endorses abdominal pain and distention since being discharged. Also endorses nauseas and states she has not been able to tolerate PO. No fever or chills. No urinary complaints. No CP, cough, or SOB. IR consulted for abdominal drainage.    Allergies: latex (Short breath)    Medications (Abx/Cardiac/Anticoagulation/Blood Products)  piperacillin/tazobactam IVPB.: 200 mL/Hr IV Intermittent (06-06 @ 09:45)    Data:  157.5  65.8  T(C): 36.8  HR: 118  BP: 126/79  RR: 26  SpO2: 96%    -WBC 4.81 / HgB 15.3 / Hct 47.7 / Plt 266  -Na 134 / Cl 97 / BUN 17 / Glucose 124  -K 4.5 / CO2 21 / Cr 0.71  -ALT 18 / Alk Phos 56 / T.Bili 0.8  -INR 1.30 / PTT 27.0    Radiology: reviewed    Assessment/Plan: 66 y/o F with PMH hypothyroidism presenting to the ED with abdominal pain. Patient is post-op appendectomy on 6/3. Endorses abdominal pain and distention since being discharged. Also endorses nauseas and states she has not been able to tolerate PO. No fever or chills. No urinary complaints. No CP, cough, or SOB. IR consulted for abdominal drainage.    - case reviewed and approved for today  - please place IR procedure order under Dr. Gamez  - [facility] [place] [facility]   - maintain NPO   - COVID PCR results within 5 days of planned procedure  - d/w primary team    LORY Canela-BC  Available on Teams

## 2022-06-06 NOTE — CONSULT NOTE ADULT - SUBJECTIVE AND OBJECTIVE BOX
HISTORY OF PRESENT ILLNESS:  66yo F with Hx hypothyroidism and breast CA (s/p R breast lumpectomy 8/2021, XRT) and recent appendectomy 6/3 who presents with severe abdominal pain, found to have pneumoperitoneum and loculated fluid collection c/f perforation. Patient was taken emergently to OR for exlap ___, left in discontinuity with Abthera. SICU consulted for vent management and open abdomen.     --------------------------------------------------------------------------------------  PAST MEDICAL HISTORY:   Breast cancer  Hypothyroidism  Obesity  Vitamin D deficiency  H/O osteoporosis  Other appendicitis  GERD (gastroesophageal reflux disease)  Moderate mitral regurgitation    PAST SURGICAL HISTORY:   S/P bilateral breast reduction  H/O: hysterectomy  S/P lumpectomy, right breast  H/O elbow surgery  S/P laparoscopic appendectomy    FAMILY HISTORY:     HOME MEDICATIONS:    ALLERGIES:   latex (Short breath)  No Known Drug Allergies    --------------------------------------------------------------------------------------  PHYSICAL EXAM:    VITAL SIGNS:  ICU Vital Signs Last 24 Hrs  T(C): 36.9 (06 Jun 2022 13:46), Max: 36.9 (06 Jun 2022 13:08)  T(F): 98.4 (06 Jun 2022 13:08), Max: 98.4 (06 Jun 2022 13:08)  HR: 117 (06 Jun 2022 13:46) (117 - 140)  BP: 94/71 (06 Jun 2022 13:46) (89/70 - 143/90)  BP(mean): 88 (06 Jun 2022 13:46) (88 - 88)  RR: 26 (06 Jun 2022 13:46) (22 - 30)  SpO2: 97% (06 Jun 2022 13:46) (94% - 97%)    I/Os:  I&O's Summary    06 Jun 2022 07:01  -  06 Jun 2022 17:04  --------------------------------------------------------  IN: 2050 mL / OUT: 250 mL / NET: 1800 mL    NEURO:   Exam: sedated to RASS -2    RESPIRATORY  Exam: synchronous with vent  Mechanical Ventilation:       CARDIOVASCULAR  Exam: warm, well-perfused  Cardiac Rhythm:    GI/NUTRITION  Exam: Abthera vac in place, functioning well with good seal  Diet: NPO    GENITOURINARY/RENAL  Exam: Diaz catheter in place, draining clear yellow urine   [x] Diaz catheter, indication: urine output monitoring in critically ill patient  Weight (kg): 65.8 (06-06 @ 13:46)    HEMATOLOGIC  [ ] VTE Prophylaxis:  Transfusion: [ ] PRBC	[ ] Platelets	[ ] FFP	[ ] Cryoprecipitate    INFECTIOUS DISEASES:  Recent Cultures: none    ENDOCRINE  Glucose:  CAPILLARY BLOOD GLUCOSE    PATIENT CARE ACCESS DEVICES:  [2] Peripheral IV  [ ] Central Venous Line	[ ] R	[ ] L	[ ] IJ	[ ] Fem	[ ] SC	Placed:   [ ] Arterial Line		[ ] R	[ ] L	[ ] Fem	[ ] Rad	[ ] Ax	Placed:   [ ] PICC:					[ ] Mediport  [x] Urinary Catheter, Date Placed: 6/6  [x] Necessity of urinary, arterial, and venous catheters discussed    --------------------------------------------------------------------------------------  MEDICATIONS:   Neurologic Medications    Respiratory Medications    Cardiovascular Medications    Gastrointestinal Medications    Genitourinary Medications    Hematologic/Oncologic Medications    Antimicrobial/Immunologic Medications    Endocrine/Metabolic Medications    Topical/Other Medications      --------------------------------------------------------------------------------------  LABS:              15.3   4.81  )-----------( 266      ( 06 Jun 2022 10:09 )             47.7     134<L>  |  97  |  17  ----------------------------<  124<H>  4.5   |  21<L>  |  0.71    Ca    10.5      06 Jun 2022 10:09    TPro  7.6  /  Alb  3.4  /  TBili  0.8  /  DBili  x   /  AST  24  /  ALT  18  /  AlkPhos  56  06-06  PT/INR - ( 06 Jun 2022 10:09 )   PT: 15.0 sec;   INR: 1.30 ratio    PTT - ( 06 Jun 2022 10:09 )  PTT:27.0 sec  --------------------------------------------------------------------------------------    IMAGING :  < from: CT Abdomen and Pelvis w/ IV Cont (06.06.22 @ 10:42) >  FINDINGS:  LOWER CHEST: Small right pleural effusion with associated compressive   atelectasis. Linear atelectasis at the left lung base.    LIVER: Within normal limits.  BILE DUCTS: Normal caliber.  GALLBLADDER: Within normal limits.  SPLEEN: Within normal limits.  PANCREAS: Within normal limits.  ADRENALS: Within normal limits.  KIDNEYS/URETERS: Small right renal cyst.    BLADDER: Intraluminal air consistent with recent instrumentation.  REPRODUCTIVE ORGANS: Hysterectomy.    BOWEL: Small hiatal hernia. Status post appendectomy. No bowel   obstruction.  PERITONEUM: Large amount of free air greater than expected for   postoperative day 3. Loculated fluid in the right hemiabdomen containing   droplets of air as well as additional loculated fluid in the pelvis   without discrete well-formed collection. Enhancement of the peritoneal   lining in the pelvis consistent with peritonitis.  VESSELS: Atherosclerotic changes.  RETROPERITONEUM/LYMPH NODES: No lymphadenopathy.  ABDOMINAL WALL: Within normal limits.  BONES: Degenerative changes. Mild compression deformity of T12 and L1.    IMPRESSION:  Large amount of free air greater than expected for postoperative day 3.   Loculated fluid in the right hemiabdomen and pelvis with enhancement of   the peritoneal lining concerning for peritonitis.    < end of copied text >   HISTORY OF PRESENT ILLNESS:  66yo F with Hx hypothyroidism and breast CA (s/p R breast lumpectomy 8/2021, XRT) and recent appendectomy 6/3 who presents with severe abdominal pain, found to have pneumoperitoneum and loculated fluid collection c/f perforation. Patient was taken emergently to OR for exlap , found to have TI perforation with gross spillage of intestinal contents. Patient underwent washout, ileocolic resection with primary anastomosis, left open with Abthera. SICU consulted for vent management and open abdomen.     --------------------------------------------------------------------------------------  PAST MEDICAL HISTORY:   Breast cancer  Hypothyroidism  Obesity  Vitamin D deficiency  H/O osteoporosis  Other appendicitis  GERD (gastroesophageal reflux disease)  Moderate mitral regurgitation    PAST SURGICAL HISTORY:   S/P bilateral breast reduction  H/O: hysterectomy  S/P lumpectomy, right breast  H/O elbow surgery  S/P laparoscopic appendectomy    FAMILY HISTORY:     HOME MEDICATIONS:    ALLERGIES:   latex (Short breath)  No Known Drug Allergies    --------------------------------------------------------------------------------------  PHYSICAL EXAM:    VITAL SIGNS:  ICU Vital Signs Last 24 Hrs  T(C): 36.9 (06 Jun 2022 13:46), Max: 36.9 (06 Jun 2022 13:08)  T(F): 98.4 (06 Jun 2022 13:08), Max: 98.4 (06 Jun 2022 13:08)  HR: 117 (06 Jun 2022 13:46) (117 - 140)  BP: 94/71 (06 Jun 2022 13:46) (89/70 - 143/90)  BP(mean): 88 (06 Jun 2022 13:46) (88 - 88)  RR: 26 (06 Jun 2022 13:46) (22 - 30)  SpO2: 97% (06 Jun 2022 13:46) (94% - 97%)    I/Os:  I&O's Summary    06 Jun 2022 07:01  -  06 Jun 2022 17:04  --------------------------------------------------------  IN: 2050 mL / OUT: 250 mL / NET: 1800 mL    NEURO:   Exam: sedated to RASS -2    RESPIRATORY  Exam: synchronous with vent  Mechanical Ventilation:       CARDIOVASCULAR  Exam: warm, well-perfused  Cardiac Rhythm:    GI/NUTRITION  Exam: Abthera vac in place, functioning well with good seal  Diet: NPO    GENITOURINARY/RENAL  Exam: Diaz catheter in place, draining clear yellow urine   [x] Diaz catheter, indication: urine output monitoring in critically ill patient  Weight (kg): 65.8 (06-06 @ 13:46)    HEMATOLOGIC  [ ] VTE Prophylaxis:  Transfusion: [ ] PRBC	[ ] Platelets	[ ] FFP	[ ] Cryoprecipitate    INFECTIOUS DISEASES:  Recent Cultures: none    ENDOCRINE  Glucose:  CAPILLARY BLOOD GLUCOSE    PATIENT CARE ACCESS DEVICES:  [2] Peripheral IV  [ ] Central Venous Line	[ ] R	[ ] L	[ ] IJ	[ ] Fem	[ ] SC	Placed:   [ ] Arterial Line		[ ] R	[ ] L	[ ] Fem	[ ] Rad	[ ] Ax	Placed:   [ ] PICC:					[ ] Mediport  [x] Urinary Catheter, Date Placed: 6/6  [x] Necessity of urinary, arterial, and venous catheters discussed    --------------------------------------------------------------------------------------  MEDICATIONS:   Neurologic Medications    Respiratory Medications    Cardiovascular Medications    Gastrointestinal Medications    Genitourinary Medications    Hematologic/Oncologic Medications    Antimicrobial/Immunologic Medications    Endocrine/Metabolic Medications    Topical/Other Medications      --------------------------------------------------------------------------------------  LABS:              15.3   4.81  )-----------( 266      ( 06 Jun 2022 10:09 )             47.7     134<L>  |  97  |  17  ----------------------------<  124<H>  4.5   |  21<L>  |  0.71    Ca    10.5      06 Jun 2022 10:09    TPro  7.6  /  Alb  3.4  /  TBili  0.8  /  DBili  x   /  AST  24  /  ALT  18  /  AlkPhos  56  06-06  PT/INR - ( 06 Jun 2022 10:09 )   PT: 15.0 sec;   INR: 1.30 ratio    PTT - ( 06 Jun 2022 10:09 )  PTT:27.0 sec  --------------------------------------------------------------------------------------    IMAGING :  < from: CT Abdomen and Pelvis w/ IV Cont (06.06.22 @ 10:42) >  FINDINGS:  LOWER CHEST: Small right pleural effusion with associated compressive   atelectasis. Linear atelectasis at the left lung base.    LIVER: Within normal limits.  BILE DUCTS: Normal caliber.  GALLBLADDER: Within normal limits.  SPLEEN: Within normal limits.  PANCREAS: Within normal limits.  ADRENALS: Within normal limits.  KIDNEYS/URETERS: Small right renal cyst.    BLADDER: Intraluminal air consistent with recent instrumentation.  REPRODUCTIVE ORGANS: Hysterectomy.    BOWEL: Small hiatal hernia. Status post appendectomy. No bowel   obstruction.  PERITONEUM: Large amount of free air greater than expected for   postoperative day 3. Loculated fluid in the right hemiabdomen containing   droplets of air as well as additional loculated fluid in the pelvis   without discrete well-formed collection. Enhancement of the peritoneal   lining in the pelvis consistent with peritonitis.  VESSELS: Atherosclerotic changes.  RETROPERITONEUM/LYMPH NODES: No lymphadenopathy.  ABDOMINAL WALL: Within normal limits.  BONES: Degenerative changes. Mild compression deformity of T12 and L1.    IMPRESSION:  Large amount of free air greater than expected for postoperative day 3.   Loculated fluid in the right hemiabdomen and pelvis with enhancement of   the peritoneal lining concerning for peritonitis.    < end of copied text >   HISTORY OF PRESENT ILLNESS:  66yo F with Hx hypothyroidism and breast CA (s/p R breast lumpectomy 8/2021, XRT) and recent appendectomy 6/3 who presents with severe abdominal pain, found to have pneumoperitoneum and loculated fluid collection c/f perforation. Patient was taken emergently to OR for exlap, found to have TI perforation with gross spillage of intestinal contents. Patient underwent washout, ileocolic resection with primary anastomosis, left open with Abthera. SICU consulted for vent management and open abdomen.     --------------------------------------------------------------------------------------  PAST MEDICAL HISTORY:   Breast cancer  Hypothyroidism  Obesity  Vitamin D deficiency  H/O osteoporosis  Other appendicitis  GERD (gastroesophageal reflux disease)  Moderate mitral regurgitation    PAST SURGICAL HISTORY:   S/P bilateral breast reduction  H/O: hysterectomy  S/P lumpectomy, right breast  H/O elbow surgery  S/P laparoscopic appendectomy    FAMILY HISTORY:     HOME MEDICATIONS:    ALLERGIES:   latex (Short breath)  No Known Drug Allergies    --------------------------------------------------------------------------------------  PHYSICAL EXAM:    VITAL SIGNS:  ICU Vital Signs Last 24 Hrs  T(C): 36.9 (06 Jun 2022 13:46), Max: 36.9 (06 Jun 2022 13:08)  T(F): 98.4 (06 Jun 2022 13:08), Max: 98.4 (06 Jun 2022 13:08)  HR: 117 (06 Jun 2022 13:46) (117 - 140)  BP: 94/71 (06 Jun 2022 13:46) (89/70 - 143/90)  BP(mean): 88 (06 Jun 2022 13:46) (88 - 88)  RR: 26 (06 Jun 2022 13:46) (22 - 30)  SpO2: 97% (06 Jun 2022 13:46) (94% - 97%)    I/Os:  I&O's Summary    06 Jun 2022 07:01  -  06 Jun 2022 17:04  --------------------------------------------------------  IN: 2050 mL / OUT: 250 mL / NET: 1800 mL    NEURO:   Exam: sedated to RASS -2    RESPIRATORY  Exam: synchronous with vent  Mechanical Ventilation:       CARDIOVASCULAR  Exam: warm, well-perfused  Cardiac Rhythm:    GI/NUTRITION  Exam: Abthera vac in place, functioning well with good seal  Diet: NPO    GENITOURINARY/RENAL  Exam: Diaz catheter in place, draining clear yellow urine   [x] Diaz catheter, indication: urine output monitoring in critically ill patient  Weight (kg): 65.8 (06-06 @ 13:46)    HEMATOLOGIC  [ ] VTE Prophylaxis:  Transfusion: [ ] PRBC	[ ] Platelets	[ ] FFP	[ ] Cryoprecipitate    INFECTIOUS DISEASES:  Recent Cultures: none    ENDOCRINE  Glucose:  CAPILLARY BLOOD GLUCOSE    PATIENT CARE ACCESS DEVICES:  [2] Peripheral IV  [ ] Central Venous Line	[ ] R	[ ] L	[ ] IJ	[ ] Fem	[ ] SC	Placed:   [ ] Arterial Line		[ ] R	[ ] L	[ ] Fem	[ ] Rad	[ ] Ax	Placed:   [ ] PICC:					[ ] Mediport  [x] Urinary Catheter, Date Placed: 6/6  [x] Necessity of urinary, arterial, and venous catheters discussed    --------------------------------------------------------------------------------------  MEDICATIONS:   Neurologic Medications    Respiratory Medications    Cardiovascular Medications    Gastrointestinal Medications    Genitourinary Medications    Hematologic/Oncologic Medications    Antimicrobial/Immunologic Medications    Endocrine/Metabolic Medications    Topical/Other Medications      --------------------------------------------------------------------------------------  LABS:              15.3   4.81  )-----------( 266      ( 06 Jun 2022 10:09 )             47.7     134<L>  |  97  |  17  ----------------------------<  124<H>  4.5   |  21<L>  |  0.71    Ca    10.5      06 Jun 2022 10:09    TPro  7.6  /  Alb  3.4  /  TBili  0.8  /  DBili  x   /  AST  24  /  ALT  18  /  AlkPhos  56  06-06  PT/INR - ( 06 Jun 2022 10:09 )   PT: 15.0 sec;   INR: 1.30 ratio    PTT - ( 06 Jun 2022 10:09 )  PTT:27.0 sec  --------------------------------------------------------------------------------------    IMAGING :  < from: CT Abdomen and Pelvis w/ IV Cont (06.06.22 @ 10:42) >  FINDINGS:  LOWER CHEST: Small right pleural effusion with associated compressive   atelectasis. Linear atelectasis at the left lung base.    LIVER: Within normal limits.  BILE DUCTS: Normal caliber.  GALLBLADDER: Within normal limits.  SPLEEN: Within normal limits.  PANCREAS: Within normal limits.  ADRENALS: Within normal limits.  KIDNEYS/URETERS: Small right renal cyst.    BLADDER: Intraluminal air consistent with recent instrumentation.  REPRODUCTIVE ORGANS: Hysterectomy.    BOWEL: Small hiatal hernia. Status post appendectomy. No bowel   obstruction.  PERITONEUM: Large amount of free air greater than expected for   postoperative day 3. Loculated fluid in the right hemiabdomen containing   droplets of air as well as additional loculated fluid in the pelvis   without discrete well-formed collection. Enhancement of the peritoneal   lining in the pelvis consistent with peritonitis.  VESSELS: Atherosclerotic changes.  RETROPERITONEUM/LYMPH NODES: No lymphadenopathy.  ABDOMINAL WALL: Within normal limits.  BONES: Degenerative changes. Mild compression deformity of T12 and L1.    IMPRESSION:  Large amount of free air greater than expected for postoperative day 3.   Loculated fluid in the right hemiabdomen and pelvis with enhancement of   the peritoneal lining concerning for peritonitis.    < end of copied text >   HISTORY OF PRESENT ILLNESS:  68yo F with Hx hypothyroidism and breast CA (s/p R breast lumpectomy 8/2021, XRT) and recent appendectomy 6/3 who presents with severe abdominal pain, found to have pneumoperitoneum and loculated fluid collection c/f perforation. Patient was taken emergently to OR for exlap, found to have TI perforation with gross spillage of intestinal contents. Patient underwent washout, ileocolic resection with primary anastomosis, left open with Abthera. EBL 50, patient received 3L crystalloid intraop. SICU consulted for vent management and open abdomen.     --------------------------------------------------------------------------------------  PAST MEDICAL HISTORY:   Breast cancer  Hypothyroidism  Obesity  Vitamin D deficiency  H/O osteoporosis  Other appendicitis  GERD (gastroesophageal reflux disease)  Moderate mitral regurgitation    PAST SURGICAL HISTORY:   S/P bilateral breast reduction  H/O: hysterectomy  S/P lumpectomy, right breast  H/O elbow surgery  S/P laparoscopic appendectomy    FAMILY HISTORY:     HOME MEDICATIONS:    ALLERGIES:   latex (Short breath)  No Known Drug Allergies    --------------------------------------------------------------------------------------  PHYSICAL EXAM:    VITAL SIGNS:  ICU Vital Signs Last 24 Hrs  T(C): 36.9 (06 Jun 2022 13:46), Max: 36.9 (06 Jun 2022 13:08)  T(F): 98.4 (06 Jun 2022 13:08), Max: 98.4 (06 Jun 2022 13:08)  HR: 117 (06 Jun 2022 13:46) (117 - 140)  BP: 94/71 (06 Jun 2022 13:46) (89/70 - 143/90)  BP(mean): 88 (06 Jun 2022 13:46) (88 - 88)  RR: 26 (06 Jun 2022 13:46) (22 - 30)  SpO2: 97% (06 Jun 2022 13:46) (94% - 97%)    I/Os:  I&O's Summary    06 Jun 2022 07:01  -  06 Jun 2022 17:04  --------------------------------------------------------  IN: 2050 mL / OUT: 250 mL / NET: 1800 mL    NEURO:   Exam: sedated to RASS -2    RESPIRATORY  Exam: synchronous with vent  Mechanical Ventilation:       CARDIOVASCULAR  Exam: warm, well-perfused  Cardiac Rhythm:    GI/NUTRITION  Exam: Abthera vac in place, functioning well with good seal  Diet: NPO    GENITOURINARY/RENAL  Exam: Diaz catheter in place, draining clear yellow urine   [x] Diaz catheter, indication: urine output monitoring in critically ill patient  Weight (kg): 65.8 (06-06 @ 13:46)    HEMATOLOGIC  [ ] VTE Prophylaxis:  Transfusion: [ ] PRBC	[ ] Platelets	[ ] FFP	[ ] Cryoprecipitate    INFECTIOUS DISEASES:  Recent Cultures: none    ENDOCRINE  Glucose:  CAPILLARY BLOOD GLUCOSE    PATIENT CARE ACCESS DEVICES:  [2] Peripheral IV  [ ] Central Venous Line	[ ] R	[ ] L	[ ] IJ	[ ] Fem	[ ] SC	Placed:   [ ] Arterial Line		[ ] R	[ ] L	[ ] Fem	[ ] Rad	[ ] Ax	Placed:   [ ] PICC:					[ ] Mediport  [x] Urinary Catheter, Date Placed: 6/6  [x] Necessity of urinary, arterial, and venous catheters discussed    --------------------------------------------------------------------------------------  MEDICATIONS:   Neurologic Medications    Respiratory Medications    Cardiovascular Medications    Gastrointestinal Medications    Genitourinary Medications    Hematologic/Oncologic Medications    Antimicrobial/Immunologic Medications    Endocrine/Metabolic Medications    Topical/Other Medications      --------------------------------------------------------------------------------------  LABS:              15.3   4.81  )-----------( 266      ( 06 Jun 2022 10:09 )             47.7     134<L>  |  97  |  17  ----------------------------<  124<H>  4.5   |  21<L>  |  0.71    Ca    10.5      06 Jun 2022 10:09    TPro  7.6  /  Alb  3.4  /  TBili  0.8  /  DBili  x   /  AST  24  /  ALT  18  /  AlkPhos  56  06-06  PT/INR - ( 06 Jun 2022 10:09 )   PT: 15.0 sec;   INR: 1.30 ratio    PTT - ( 06 Jun 2022 10:09 )  PTT:27.0 sec  --------------------------------------------------------------------------------------    IMAGING :  < from: CT Abdomen and Pelvis w/ IV Cont (06.06.22 @ 10:42) >  FINDINGS:  LOWER CHEST: Small right pleural effusion with associated compressive   atelectasis. Linear atelectasis at the left lung base.    LIVER: Within normal limits.  BILE DUCTS: Normal caliber.  GALLBLADDER: Within normal limits.  SPLEEN: Within normal limits.  PANCREAS: Within normal limits.  ADRENALS: Within normal limits.  KIDNEYS/URETERS: Small right renal cyst.    BLADDER: Intraluminal air consistent with recent instrumentation.  REPRODUCTIVE ORGANS: Hysterectomy.    BOWEL: Small hiatal hernia. Status post appendectomy. No bowel   obstruction.  PERITONEUM: Large amount of free air greater than expected for   postoperative day 3. Loculated fluid in the right hemiabdomen containing   droplets of air as well as additional loculated fluid in the pelvis   without discrete well-formed collection. Enhancement of the peritoneal   lining in the pelvis consistent with peritonitis.  VESSELS: Atherosclerotic changes.  RETROPERITONEUM/LYMPH NODES: No lymphadenopathy.  ABDOMINAL WALL: Within normal limits.  BONES: Degenerative changes. Mild compression deformity of T12 and L1.    IMPRESSION:  Large amount of free air greater than expected for postoperative day 3.   Loculated fluid in the right hemiabdomen and pelvis with enhancement of   the peritoneal lining concerning for peritonitis.    < end of copied text >

## 2022-06-06 NOTE — ED ADULT NURSE NOTE - NSICDXPASTMEDICALHX_GEN_ALL_CORE_FT
PAST MEDICAL HISTORY:  Breast cancer right breast CA s/p right breast lumpectomy (8/2021) and chemo/radiation treatment    GERD (gastroesophageal reflux disease) Nexium PRN    H/O osteoporosis on Repatha 2x per month    Hypothyroidism     Moderate mitral regurgitation Mild-To-Moderate - On echo report from 8/2020    Obesity     Other appendicitis Pending Laparoscopic Appendectomy with Dr. Verde on 6/3/22    Vitamin D deficiency      Yes

## 2022-06-06 NOTE — H&P ADULT - HISTORY OF PRESENT ILLNESS
67F presenting to the ED with 3 days of severe abdominal pain and obstipation after having a laparoscopic appendectomy on 6/3/22.  She states that the pain never went away after surgery and has been worsening.  This morning she tried taking oxycodone but this did not help so she called Dr. Verde's office who referred her to ED.

## 2022-06-06 NOTE — ED ADULT NURSE NOTE - OBJECTIVE STATEMENT
66 y/o F PMH HLD, hypothyroid PSH appendectomy on Friday presenting to ED for abd pain, nausea and constipation. Had appendectomy friday and has been having increasing abd pain and distention. No fevers. Abd distended, diffusely tender, tachycardic, hypotensive and tachypneic upon arrival. Last drink small sip of water with am with 5mg oxy for pain and last ate yesterday afternoon. Denies CP, SOB, v/d, fevers, chills, urinary symptoms, numbness, tingling in upper and lower extremities, HA, blurry vision. Updated on plan of care.

## 2022-06-06 NOTE — H&P ADULT - NSHPPOAPULMEMBOLUS_GEN_A_CORE
Daily Note     Today's date: 2019  Patient name: Rita Puentes  : 1969  MRN: [id number]  Referring provider: Daisy Garcia MD  Dx:   Encounter Diagnosis     ICD-10-CM    1  Bilateral primary osteoarthritis of knee M17 0                   Subjective: The patient reports feeling 4/10 pain at her right anterior knee  She feels this may be due to the knee extension board stretching  Objective: See treatment diary below      Assessment: We held the knee extension board today as the patient's right knee was more sore today  The patient tolerated all activities well without pain increase  The patient will be re-assessed next session for possible discharge      Plan: Continue per plan of care  Progress note during next visit  Potential discharge next visit       Re-Evaluation: 19  PRECAUTIONS: DM/ ADHESIVE ALLERGY  Knee Specialty Daily Treatment Diary     Manual  19   PROM flex/ext Right knee inv foot w/flexion  B/L 3x:15  Right knee inv foot w/ flexion  B/L flex/ext  3x:15 ea Right knee inv foot w/ flexion  B/L flex/ext  3x:15ea Right knee inv foot w/ flexion  B/L flex/ext  3x:15ea   Hamstring stretch B/L 3x:30  :30x3 B/L B/L :30x3 B/L :30x3           R fibular head A/P mob        Patellar mobs   Performed L LE and instructed again reviewed reviewed   Knee stretch on edge of mat Performed 1 min ea flex/ext  EXT/FLEX 1 min ea B/L ( total 4 mins) EXT/FLEX x 1 min ea B/L EXT/FLEX x 1 min ea B/L       Exercise Diary  19   Nu Step S=10 TOWEL ROLL @ Back L3 x10 mins roll @ back  L3 5 mins x 2 L3 x5 mins x 2 L3 x5 min x2   Biodex Bike        EXT board HOLD  2 mins 3 mins 4 mins   PRONE PRESS UP x10  x10 x10 x10   Heel slides flex/abd x20 heelslide/qs combo B/L  X15 combo heelslides  Flex/qs/abd x10 x10   Bridges 3x10  x25 x25 3x10   TKE        Clam shells 3x10   3x10 3x10   QS roll @ knee        SLR all planes x10       SAQ Prone 2x10 Prone QS  2x10 NV Prone QS  2x10   LAQ        Total Gym Squats        Calf stretch        Standing hamstring curls        COMBO hip EXT and Abd        Total gym squats (deep)                Step up        SLB        Mini Squat        Hurdles Amb 25ft x 2       sidestepping        Heel-toe amb        Back ext        Mirror Amb with SPC   Walking without AD in gym 100ft x 2 In gym 100ft x 1 In gym 100ft x 1   Up/Down Steps with SOS            Modalities  12/23/19 12/24/19 12/27/19 12/30/19   CP B/L KNEE  x7 mins declined declined no

## 2022-06-06 NOTE — ED PROVIDER NOTE - CLINICAL SUMMARY MEDICAL DECISION MAKING FREE TEXT BOX
66 y/o F with PMH hypothyroidism, recent appendectomy presents to the ED with abdominal pain. Patient tachycardic and hypotensive upon arrival. Ill appearing. Abdomen with +peritoneal signs. Concern for perforated viscous. Will obtain labs, CXR, CT abdomen. Surgical consult. Fluids and abx ordered per sepsis criteria. TBA. Phill Scruggs, DO PGY3

## 2022-06-07 ENCOUNTER — APPOINTMENT (OUTPATIENT)
Dept: CARDIOLOGY | Facility: CLINIC | Age: 67
End: 2022-06-07

## 2022-06-07 ENCOUNTER — TRANSCRIPTION ENCOUNTER (OUTPATIENT)
Age: 67
End: 2022-06-07

## 2022-06-07 DIAGNOSIS — K66.8 OTHER SPECIFIED DISORDERS OF PERITONEUM: ICD-10-CM

## 2022-06-07 LAB
ANION GAP SERPL CALC-SCNC: 13 MMOL/L — SIGNIFICANT CHANGE UP (ref 5–17)
BUN SERPL-MCNC: 12 MG/DL — SIGNIFICANT CHANGE UP (ref 7–23)
CALCIUM SERPL-MCNC: 8.1 MG/DL — LOW (ref 8.4–10.5)
CHLORIDE SERPL-SCNC: 105 MMOL/L — SIGNIFICANT CHANGE UP (ref 96–108)
CO2 SERPL-SCNC: 19 MMOL/L — LOW (ref 22–31)
CREAT SERPL-MCNC: 0.54 MG/DL — SIGNIFICANT CHANGE UP (ref 0.5–1.3)
CULTURE RESULTS: SIGNIFICANT CHANGE UP
EGFR: 100 ML/MIN/1.73M2 — SIGNIFICANT CHANGE UP
GLUCOSE SERPL-MCNC: 121 MG/DL — HIGH (ref 70–99)
GRAM STN FLD: SIGNIFICANT CHANGE UP
HCT VFR BLD CALC: 36.9 % — SIGNIFICANT CHANGE UP (ref 34.5–45)
HGB BLD-MCNC: 11.5 G/DL — SIGNIFICANT CHANGE UP (ref 11.5–15.5)
MAGNESIUM SERPL-MCNC: 2.5 MG/DL — SIGNIFICANT CHANGE UP (ref 1.6–2.6)
MCHC RBC-ENTMCNC: 28.7 PG — SIGNIFICANT CHANGE UP (ref 27–34)
MCHC RBC-ENTMCNC: 31.2 GM/DL — LOW (ref 32–36)
MCV RBC AUTO: 92 FL — SIGNIFICANT CHANGE UP (ref 80–100)
NRBC # BLD: 0 /100 WBCS — SIGNIFICANT CHANGE UP (ref 0–0)
PHOSPHATE SERPL-MCNC: 3.3 MG/DL — SIGNIFICANT CHANGE UP (ref 2.5–4.5)
PLATELET # BLD AUTO: 261 K/UL — SIGNIFICANT CHANGE UP (ref 150–400)
POTASSIUM SERPL-MCNC: 4 MMOL/L — SIGNIFICANT CHANGE UP (ref 3.5–5.3)
POTASSIUM SERPL-SCNC: 4 MMOL/L — SIGNIFICANT CHANGE UP (ref 3.5–5.3)
RBC # BLD: 4.01 M/UL — SIGNIFICANT CHANGE UP (ref 3.8–5.2)
RBC # FLD: 13.6 % — SIGNIFICANT CHANGE UP (ref 10.3–14.5)
SODIUM SERPL-SCNC: 137 MMOL/L — SIGNIFICANT CHANGE UP (ref 135–145)
SPECIMEN SOURCE: SIGNIFICANT CHANGE UP
WBC # BLD: 7.17 K/UL — SIGNIFICANT CHANGE UP (ref 3.8–10.5)
WBC # FLD AUTO: 7.17 K/UL — SIGNIFICANT CHANGE UP (ref 3.8–10.5)

## 2022-06-07 PROCEDURE — 71045 X-RAY EXAM CHEST 1 VIEW: CPT | Mod: 26

## 2022-06-07 RX ORDER — SODIUM CHLORIDE 9 MG/ML
500 INJECTION, SOLUTION INTRAVENOUS ONCE
Refills: 0 | Status: COMPLETED | OUTPATIENT
Start: 2022-06-07 | End: 2022-06-07

## 2022-06-07 RX ORDER — FLUCONAZOLE 150 MG/1
800 TABLET ORAL ONCE
Refills: 0 | Status: COMPLETED | OUTPATIENT
Start: 2022-06-07 | End: 2022-06-07

## 2022-06-07 RX ORDER — BENZOCAINE 10 %
1 GEL (GRAM) MUCOUS MEMBRANE THREE TIMES A DAY
Refills: 0 | Status: DISCONTINUED | OUTPATIENT
Start: 2022-06-07 | End: 2022-06-08

## 2022-06-07 RX ORDER — FLUCONAZOLE 150 MG/1
400 TABLET ORAL EVERY 24 HOURS
Refills: 0 | Status: DISCONTINUED | OUTPATIENT
Start: 2022-06-08 | End: 2022-06-08

## 2022-06-07 RX ADMIN — SODIUM CHLORIDE 1000 MILLILITER(S): 9 INJECTION, SOLUTION INTRAVENOUS at 02:04

## 2022-06-07 RX ADMIN — SODIUM CHLORIDE 500 MILLILITER(S): 9 INJECTION, SOLUTION INTRAVENOUS at 03:50

## 2022-06-07 RX ADMIN — HYDROMORPHONE HYDROCHLORIDE 0.5 MILLIGRAM(S): 2 INJECTION INTRAMUSCULAR; INTRAVENOUS; SUBCUTANEOUS at 22:11

## 2022-06-07 RX ADMIN — Medication 400 MILLIGRAM(S): at 12:05

## 2022-06-07 RX ADMIN — Medication 400 MILLIGRAM(S): at 18:00

## 2022-06-07 RX ADMIN — HYDROMORPHONE HYDROCHLORIDE 0.5 MILLIGRAM(S): 2 INJECTION INTRAMUSCULAR; INTRAVENOUS; SUBCUTANEOUS at 16:20

## 2022-06-07 RX ADMIN — Medication 1 SPRAY(S): at 20:58

## 2022-06-07 RX ADMIN — Medication 400 MILLIGRAM(S): at 06:26

## 2022-06-07 RX ADMIN — HYDROMORPHONE HYDROCHLORIDE 0.5 MILLIGRAM(S): 2 INJECTION INTRAMUSCULAR; INTRAVENOUS; SUBCUTANEOUS at 10:26

## 2022-06-07 RX ADMIN — PIPERACILLIN AND TAZOBACTAM 25 GRAM(S): 4; .5 INJECTION, POWDER, LYOPHILIZED, FOR SOLUTION INTRAVENOUS at 20:58

## 2022-06-07 RX ADMIN — HYDROMORPHONE HYDROCHLORIDE 0.5 MILLIGRAM(S): 2 INJECTION INTRAMUSCULAR; INTRAVENOUS; SUBCUTANEOUS at 06:00

## 2022-06-07 RX ADMIN — HYDROMORPHONE HYDROCHLORIDE 0.5 MILLIGRAM(S): 2 INJECTION INTRAMUSCULAR; INTRAVENOUS; SUBCUTANEOUS at 10:06

## 2022-06-07 RX ADMIN — ENOXAPARIN SODIUM 40 MILLIGRAM(S): 100 INJECTION SUBCUTANEOUS at 20:57

## 2022-06-07 RX ADMIN — Medication 38 MICROGRAM(S): at 20:58

## 2022-06-07 RX ADMIN — HYDROMORPHONE HYDROCHLORIDE 0.5 MILLIGRAM(S): 2 INJECTION INTRAMUSCULAR; INTRAVENOUS; SUBCUTANEOUS at 16:00

## 2022-06-07 RX ADMIN — PIPERACILLIN AND TAZOBACTAM 25 GRAM(S): 4; .5 INJECTION, POWDER, LYOPHILIZED, FOR SOLUTION INTRAVENOUS at 13:24

## 2022-06-07 RX ADMIN — Medication 1000 MILLIGRAM(S): at 18:30

## 2022-06-07 RX ADMIN — FLUCONAZOLE 100 MILLIGRAM(S): 150 TABLET ORAL at 08:54

## 2022-06-07 RX ADMIN — SODIUM CHLORIDE 100 MILLILITER(S): 9 INJECTION, SOLUTION INTRAVENOUS at 13:25

## 2022-06-07 RX ADMIN — Medication 1000 MILLIGRAM(S): at 12:25

## 2022-06-07 RX ADMIN — HYDROMORPHONE HYDROCHLORIDE 0.5 MILLIGRAM(S): 2 INJECTION INTRAMUSCULAR; INTRAVENOUS; SUBCUTANEOUS at 21:41

## 2022-06-07 RX ADMIN — HYDROMORPHONE HYDROCHLORIDE 0.5 MILLIGRAM(S): 2 INJECTION INTRAMUSCULAR; INTRAVENOUS; SUBCUTANEOUS at 02:30

## 2022-06-07 RX ADMIN — Medication 1 SPRAY(S): at 13:59

## 2022-06-07 RX ADMIN — PIPERACILLIN AND TAZOBACTAM 25 GRAM(S): 4; .5 INJECTION, POWDER, LYOPHILIZED, FOR SOLUTION INTRAVENOUS at 06:26

## 2022-06-07 RX ADMIN — Medication 1000 MILLIGRAM(S): at 06:30

## 2022-06-07 RX ADMIN — PANTOPRAZOLE SODIUM 40 MILLIGRAM(S): 20 TABLET, DELAYED RELEASE ORAL at 12:05

## 2022-06-07 RX ADMIN — HYDROMORPHONE HYDROCHLORIDE 0.5 MILLIGRAM(S): 2 INJECTION INTRAMUSCULAR; INTRAVENOUS; SUBCUTANEOUS at 02:01

## 2022-06-07 RX ADMIN — HYDROMORPHONE HYDROCHLORIDE 0.5 MILLIGRAM(S): 2 INJECTION INTRAMUSCULAR; INTRAVENOUS; SUBCUTANEOUS at 06:30

## 2022-06-07 NOTE — PROGRESS NOTE ADULT - SUBJECTIVE AND OBJECTIVE BOX
Surgery Progress Note     Subjective/24hour Events: Patient seen and examined at the bedside this morning. No acute events overnight. Pain controlled.     Vital Signs:  Vital Signs Last 24 Hrs  T(C): 36.6 (06 Jun 2022 23:00), Max: 36.9 (06 Jun 2022 13:08)  T(F): 97.9 (06 Jun 2022 23:00), Max: 98.4 (06 Jun 2022 13:08)  HR: 62 (07 Jun 2022 00:45) (62 - 140)  BP: 90/55 (07 Jun 2022 00:45) (78/50 - 143/90)  BP(mean): 69 (07 Jun 2022 00:45) (56 - 100)  RR: 22 (07 Jun 2022 00:45) (12 - 30)  SpO2: 97% (07 Jun 2022 00:45) (94% - 100%)        I&O's Detail    06 Jun 2022 07:01  -  07 Jun 2022 01:13  --------------------------------------------------------  IN:    Dexmedetomidine: 52.8 mL    Enteral Tube Flush: 50 mL    IV PiggyBack: 200 mL    IV PiggyBack: 100 mL    Lactated Ringers: 100 mL    Lactated Ringers: 700 mL    Lactated Ringers Bolus: 1000 mL    Phenylephrine: 76.4 mL    Sodium Chloride 0.9% Bolus: 2000 mL  Total IN: 4279.2 mL    OUT:    Indwelling Catheter - Urethral (mL): 265 mL    Nasogastric/Oral tube (mL): 50 mL    VAC (Vacuum Assisted Closure) System (mL): 0 mL    Voided (mL): 250 mL  Total OUT: 565 mL    Total NET: 3714.2 mL        Physical Exam:  General: NAD, resting comfortably in bed  Pulmonary: Nonlabored breathing, no respiratory distress  Cardiovascular: NSR  Abdominal: soft, NT/ND, abthera in place over midline, NGT in place to LCWS  [place] [facility]    Labs:    06-06    136  |  106  |  13  ----------------------------<  135<H>  4.1   |  20<L>  |  0.53    Ca    7.7<L>      06 Jun 2022 21:55  Phos  4.0     06-06  Mg     1.9     06-06    TPro  7.6  /  Alb  3.4  /  TBili  0.8  /  DBili  x   /  AST  24  /  ALT  18  /  AlkPhos  56  06-06    CAPILLARY BLOOD GLUCOSE        LIVER FUNCTIONS - ( 06 Jun 2022 10:09 )  Alb: 3.4 g/dL / Pro: 7.6 g/dL / ALK PHOS: 56 U/L / ALT: 18 U/L / AST: 24 U/L / GGT: x                                 12.1   5.90  )-----------( 262      ( 06 Jun 2022 21:55 )             37.9     PT/INR - ( 06 Jun 2022 18:14 )   PT: 15.4 sec;   INR: 1.34 ratio         PTT - ( 06 Jun 2022 18:14 )  PTT:29.0 sec

## 2022-06-07 NOTE — PROGRESS NOTE ADULT - ATTENDING COMMENTS
I disused the finds at surgery and the procedure that was done.  She knows we are planning to return her to the OR tomorrow for removal of Abthera and if abdomen is clean will close the abdomen I disused the finds at surgery and the procedure that was done.  She knows we are planning to return her to the OR tomorrow for removal of Abthera and if abdomen is clean will close the abdomen    SICU Attending Note    Patient seen and examined and agree with above.   67 year old female after a laparoscopy appendectomy 6/3 complicated by enterotomy and now s/p ex lap, ileocectomy.     Current management includes:  Neuro- pain currently controlled with current regimen. Tylenol and dilaudid PRN.   CV- Hemodynamically stable   Pulm - acute respiratory insufficiency - on 2 liters nasal cannula   -goal SpO2 96%  GI- NPO, no flatus  -abthera in place   ID- fluconazole and zosyn; patient has yeast like cells in culture   Endocrine - hyperglycemia. Will monitor at this time.    - making adequate urine output; will keep meyers today to monitor strict I and Os and will keep LR @ 100 cc/hour.     Patient to remain in sicu for hemodynamic monitoring.

## 2022-06-07 NOTE — PROGRESS NOTE ADULT - ASSESSMENT
Assessment:  The patient is a 67y Female who is now s/p exploratory laparotomy with ileocecal resection and abdominal washout on 6/6    Plan:  - NPO/NGT/IVF  - Pain control as needed  - DVT ppx  - OOB and ambulating as tolerated  - F/u AM labs  - Appreciate excellent sicu care     [place] [facility]   [id number]. Assessment:  The patient is a 67y Female who is now s/p exploratory laparotomy with ileocecal resection and abdominal washout on 6/6    Plan:  - NPO/NGT/IVF  - Pain control as needed  - DVT ppx  - OOB and ambulating as tolerated  - F/u AM labs  - Plan to RTOR Tomorrow for Abdominal washout closure of abdominal wall  - Appreciate excellent sicu care     [facility]   [id number].

## 2022-06-07 NOTE — PROGRESS NOTE ADULT - SUBJECTIVE AND OBJECTIVE BOX
HISTORY    24 HOUR EVENTS:  - extubated  - 500cc LR x2 for hypotension overnight    SUBJECTIVE/ROS:  A ten-point review of systems was otherwise negative except as noted.    NEURO  RASS: 0    GCS: 15    Exam: awake, alert, oriented x4, no acute distress, no acute focal deficits, lethargy, follows complex commands, follows simple commands, moving all four extremities, does not follow commands  Meds: acetaminophen   IVPB .. 1000 milliGRAM(s) IV Intermittent every 6 hours  HYDROmorphone  Injectable 0.5 milliGRAM(s) IV Push every 4 hours PRN Moderate Pain (4 - 6)      RESPIRATORY  RR: 19 (06-07-22 @ 03:30) (12 - 30)  SpO2: 96% (06-07-22 @ 03:30) (94% - 100%)  Wt(kg): --  Exam: clear to auscultation bilaterally coarse rhonchi in all lung fields  Mechanical Ventilation: Mode: CPAP with PS, RR (patient): 24, FiO2: 50, PEEP: 5, PS: 5, MAP: 7, PIP: 11  ABG - ( 06 Jun 2022 21:45 )  pH: 7.40  /  pCO2: 33    /  pO2: 107   / HCO3: 20    / Base Excess: -3.6  /  SaO2: 99.5    Lactate: x                Meds:     CARDIOVASCULAR  HR: 67 (06-07-22 @ 03:30) (62 - 140)  BP: 99/52 (06-07-22 @ 03:30) (78/50 - 143/90)  BP(mean): 74 (06-07-22 @ 03:30) (56 - 100)  ABP: --  ABP(mean): --  Wt(kg): --  CVP(cm H2O): --  VBG - ( 06 Jun 2022 09:43 )  pH: 7.38  /  pCO2: 41    /  pO2: 27    / HCO3: 24    / Base Excess: -0.9  /  SaO2: 36.0   Lactate: 3.1                Exam: regular rate  Perfusion     [x]Adequate   [ ]Inadequate  Mentation   [x]Normal       [ ]Reduced  Extremities  [x]Warm         [ ]Cool  Volume Status [ ]Hypervolemic [x]Euvolemic [ ]Hypovolemic  Meds: phenylephrine    Infusion 0.5 MICROgram(s)/kG/Min IV Continuous <Continuous>      GI/NUTRITION  Exam: abthera, soft, appropriately tender  Diet: NPO  Meds: pantoprazole  Injectable 40 milliGRAM(s) IV Push daily      GENITOURINARY  I&O's Detail    06-06 @ 07:01  -  06-07 @ 03:35  --------------------------------------------------------  IN:    Dexmedetomidine: 52.8 mL    Enteral Tube Flush: 50 mL    IV PiggyBack: 200 mL    IV PiggyBack: 100 mL    Lactated Ringers: 100 mL    Lactated Ringers: 900 mL    Lactated Ringers Bolus: 1500 mL    Phenylephrine: 103.5 mL    Sodium Chloride 0.9% Bolus: 2000 mL  Total IN: 5006.3 mL    OUT:    Indwelling Catheter - Urethral (mL): 330 mL    Nasogastric/Oral tube (mL): 50 mL    VAC (Vacuum Assisted Closure) System (mL): 0 mL    Voided (mL): 250 mL  Total OUT: 630 mL    Total NET: 4376.3 mL        Weight (kg): 65.8 (06-06 @ 13:46)  06-06    136  |  106  |  13  ----------------------------<  135<H>  4.1   |  20<L>  |  0.53    Ca    7.7<L>      06 Jun 2022 21:55  Phos  4.0     06-06  Mg     1.9     06-06    TPro  7.6  /  Alb  3.4  /  TBili  0.8  /  DBili  x   /  AST  24  /  ALT  18  /  AlkPhos  56  06-06    [x] Diaz catheter, indication: 6/6/2022  Meds: lactated ringers. 1000 milliLiter(s) IV Continuous <Continuous>      HEMATOLOGIC  Meds: enoxaparin Injectable 40 milliGRAM(s) SubCutaneous every 24 hours    [x] VTE Prophylaxis                        12.1   5.90  )-----------( 262      ( 06 Jun 2022 21:55 )             37.9     PT/INR - ( 06 Jun 2022 18:14 )   PT: 15.4 sec;   INR: 1.34 ratio         PTT - ( 06 Jun 2022 18:14 )  PTT:29.0 sec    INFECTIOUS DISEASES  T(C): 36.9 (06-07-22 @ 03:00), Max: 36.9 (06-06-22 @ 13:08)  Wt(kg): --  WBC Count: 5.90 K/uL (06-06 @ 21:55)  WBC Count: 5.49 K/uL (06-06 @ 18:14)  WBC Count: 4.81 K/uL (06-06 @ 10:09)    Recent Cultures:    Meds: piperacillin/tazobactam IVPB.. 3.375 Gram(s) IV Intermittent every 8 hours      ENDOCRINE  Capillary Blood Glucose    Meds: levothyroxine Injectable 38 MICROGram(s) IV Push at bedtime      ACCESS DEVICES:  [x] Peripheral IV  [ ] Central Venous Line	[ ] R	[ ] L	[ ] IJ	[ ] Fem	[ ] SC	Placed:   [ ] Arterial Line		[ ] R	[ ] L	[ ] Fem	[ ] Rad	[ ] Ax	Placed:   [ ] PICC:					[ ] Mediport  [x] Urinary Catheter, Date Placed: 6/6/2022  [x] Necessity of urinary, arterial, and venous catheters discussed    OTHER MEDICATIONS:      IMAGING:

## 2022-06-07 NOTE — CHART NOTE - NSCHARTNOTEFT_GEN_A_CORE
POST-OPERATIVE NOTE    Subjective:  Patient is s/p exploratory laparotomy with ileocecal resection and abdominal washout. Recovering appropriately.     Vital Signs Last 24 Hrs  T(C): 36.6 (06 Jun 2022 23:00), Max: 36.9 (06 Jun 2022 13:08)  T(F): 97.9 (06 Jun 2022 23:00), Max: 98.4 (06 Jun 2022 13:08)  HR: 62 (07 Jun 2022 00:45) (62 - 140)  BP: 90/55 (07 Jun 2022 00:45) (78/50 - 143/90)  BP(mean): 69 (07 Jun 2022 00:45) (56 - 100)  RR: 22 (07 Jun 2022 00:45) (12 - 30)  SpO2: 97% (07 Jun 2022 00:45) (94% - 100%)  I&O's Detail    06 Jun 2022 07:01  -  07 Jun 2022 01:10  --------------------------------------------------------  IN:    Dexmedetomidine: 52.8 mL    Enteral Tube Flush: 50 mL    IV PiggyBack: 200 mL    IV PiggyBack: 100 mL    Lactated Ringers: 100 mL    Lactated Ringers: 700 mL    Lactated Ringers Bolus: 1000 mL    Phenylephrine: 76.4 mL    Sodium Chloride 0.9% Bolus: 2000 mL  Total IN: 4279.2 mL    OUT:    Indwelling Catheter - Urethral (mL): 265 mL    Nasogastric/Oral tube (mL): 50 mL    VAC (Vacuum Assisted Closure) System (mL): 0 mL    Voided (mL): 250 mL  Total OUT: 565 mL    Total NET: 3714.2 mL        piperacillin/tazobactam IVPB.. 3.375  enoxaparin Injectable 40  phenylephrine    Infusion 0.5  piperacillin/tazobactam IVPB.. 3.375    PAST MEDICAL & SURGICAL HISTORY:  Breast cancer  right breast CA s/p right breast lumpectomy (8/2021) and chemo/radiation treatment      Hypothyroidism      Obesity      Vitamin D deficiency      H/O osteoporosis  on Repatha 2x per month      Other appendicitis  Pending Laparoscopic Appendectomy with Dr. Verde on 6/3/22      GERD (gastroesophageal reflux disease)  Nexium PRN      Moderate mitral regurgitation  Mild-To-Moderate - On echo report from 8/2020      S/P bilateral breast reduction  2013      H/O: hysterectomy  2005 2/2 myoma      S/P lumpectomy, right breast  8/2021 2/2 right breast CA      H/O elbow surgery  Right side - 50+ years ago      S/P laparoscopic appendectomy  6/3/2022            Physical Exam:  General: NAD, resting comfortably in bed  Pulmonary: Nonlabored breathing, no respiratory distress  Cardiovascular: NSR  Abdominal: soft, NT/ND, abthera in place over midline, NGT in place to LCWS  Extremities: WWP      LABS:                        12.1   5.90  )-----------( 262      ( 06 Jun 2022 21:55 )             37.9     06-06    136  |  106  |  13  ----------------------------<  135<H>  4.1   |  20<L>  |  0.53    Ca    7.7<L>      06 Jun 2022 21:55  Phos  4.0     06-06  Mg     1.9     06-06    TPro  7.6  /  Alb  3.4  /  TBili  0.8  /  DBili  x   /  AST  24  /  ALT  18  /  AlkPhos  56  06-06    PT/INR - ( 06 Jun 2022 18:14 )   PT: 15.4 sec;   INR: 1.34 ratio         PTT - ( 06 Jun 2022 18:14 )  PTT:29.0 sec  CAPILLARY BLOOD GLUCOSE          Radiology and Additional Studies:    Assessment:  The patient is a 67y Female who is now several hours post-op from an exploratory laparotomy with ileocecal resection and abdominal washout    Plan:  - Pain control as needed  - DVT ppx  - OOB and ambulating as tolerated  - F/u AM labs  - Appreciate excellent sicu care     [facility] Surgery   [id number]

## 2022-06-07 NOTE — CONSULT NOTE ADULT - SUBJECTIVE AND OBJECTIVE BOX
DATE OF SERVICE: 06-07-22      CHIEF COMPLAINT:Patient is a 67y old  Female who presents with a chief complaint of Abdominal Sepsis (07 Jun 2022 03:35)      HISTORY OF PRESENT ILLNESS:HPI:  67F presenting to the ED with 3 days of severe abdominal pain and obstipation after having a laparoscopic appendectomy on 6/3/22.  She states that the pain never went away after surgery and has been worsening.  This morning she tried taking oxycodone but this did not help so she called Dr. Verde's office who referred her to ED.   (06 Jun 2022 11:27)      PAST MEDICAL & SURGICAL HISTORY:  Breast cancer  right breast CA s/p right breast lumpectomy (8/2021) and chemo/radiation treatment      Hypothyroidism      Obesity      Vitamin D deficiency      H/O osteoporosis  on Repatha 2x per month      Other appendicitis  Pending Laparoscopic Appendectomy with Dr. Verde on 6/3/22      GERD (gastroesophageal reflux disease)  Nexium PRN      Moderate mitral regurgitation  Mild-To-Moderate - On echo report from 8/2020      S/P bilateral breast reduction  2013      H/O: hysterectomy  2005 2/2 myoma      S/P lumpectomy, right breast  8/2021 2/2 right breast CA      H/O elbow surgery  Right side - 50+ years ago      S/P laparoscopic appendectomy  6/3/2022              MEDICATIONS:  enoxaparin Injectable 40 milliGRAM(s) SubCutaneous every 24 hours    piperacillin/tazobactam IVPB.. 3.375 Gram(s) IV Intermittent every 8 hours      HYDROmorphone  Injectable 0.5 milliGRAM(s) IV Push every 4 hours PRN    pantoprazole  Injectable 40 milliGRAM(s) IV Push daily    levothyroxine Injectable 38 MICROGram(s) IV Push at bedtime    benzocaine 20% Spray 1 Spray(s) Topical three times a day  lactated ringers. 1000 milliLiter(s) IV Continuous <Continuous>      FAMILY HISTORY:      Non-contributory    SOCIAL HISTORY:    [ ] not a smoker    Allergies    latex (Short breath)  No Known Drug Allergies    Intolerances    	    REVIEW OF SYSTEMS:  CONSTITUTIONAL: No fever  EYES: No eye pain, visual disturbances, or discharge  ENMT:  No difficulty hearing, tinnitus  NECK: No pain or stiffness  RESPIRATORY: No cough, wheezing,  CARDIOVASCULAR: No chest pain, palpitations, passing out, dizziness, or leg swelling  GASTROINTESTINAL:  see HPI  GENITOURINARY: No dysuria, hematuria  NEUROLOGICAL: No stroke like symptoms  SKIN: No burning or lesions   ENDOCRINE: No heat or cold intolerance  MUSCULOSKELETAL: No joint pain or swelling  PSYCHIATRIC: No  anxiety, mood swings  HEME/LYMPH: No bleeding gums  ALLERGY AND IMMUNOLOGIC: No hives or eczema	    All other ROS negative    PHYSICAL EXAM:  T(C): 37.2 (06-07-22 @ 19:00), Max: 37.2 (06-07-22 @ 07:00)  HR: 82 (06-07-22 @ 21:00) (62 - 91)  BP: 120/57 (06-07-22 @ 21:00) (83/47 - 130/61)  RR: 28 (06-07-22 @ 21:00) (15 - 30)  SpO2: 96% (06-07-22 @ 21:00) (92% - 99%)  Wt(kg): --  I&O's Summary    06 Jun 2022 07:01  -  07 Jun 2022 07:00  --------------------------------------------------------  IN: 6061.2 mL / OUT: 1155 mL / NET: 4906.2 mL    07 Jun 2022 07:01  -  07 Jun 2022 22:23  --------------------------------------------------------  IN: 2175 mL / OUT: 749 mL / NET: 1426 mL        Appearance: Normal	  HEENT:   Normal oral mucosa, EOMI	  Cardiovascular:  S1 S2, No JVD,    Respiratory: Lungs clear to auscultation	  Psychiatry: Alert  Gastrointestinal:  Soft, Non-tender, + BS	  Skin: No rashes   Neurologic: Non-focal  Extremities:  No edema  Vascular: Peripheral pulses palpable    	    	  	  CARDIAC MARKERS:  Labs personally reviewed by me                                  11.5   7.17  )-----------( 261      ( 07 Jun 2022 07:10 )             36.9     06-07    137  |  105  |  12  ----------------------------<  121<H>  4.0   |  19<L>  |  0.54    Ca    8.1<L>      07 Jun 2022 07:11  Phos  3.3     06-07  Mg     2.5     06-07    TPro  7.6  /  Alb  3.4  /  TBili  0.8  /  DBili  x   /  AST  24  /  ALT  18  /  AlkPhos  56  06-06             Radiology: Personally reviewed by me -   < from: Xray Chest 1 View- PORTABLE-Routine (Xray Chest 1 View- PORTABLE-Routine in AM.) (06.07.22 @ 08:20) >  IMPRESSION:  Enteric tube tip and side-port within the stomach.  Trace left pleural effusion. Bibasilar atelectasis. Linear atelectasis in   the left lower lobe.  Previously noted free air under the diaphragm no longer seen.           Assessment and Plan:   · Assessment	  Assessment:  The patient is a 67y Female who is now s/p exploratory laparotomy with ileocecal resection and abdominal washout on 6/6    1. [place]anza op risk stratification - tolerated surgery well, planned for closing of abd 6/8  - HD stable     2. HLD - on Repatha, resume as OP    3. Mod MR - euvolemic   - OP follow up                 Randy Santamaria DO [facility]  Cardiovascular Medicine  [address] [place] [address] Dr, [address]  Office [phone]  Cell [phone]

## 2022-06-07 NOTE — PROGRESS NOTE ADULT - ASSESSMENT
68yo F with Hx hypothyroidism and breast CA (s/p R breast lumpectomy 8/2021, XRT) and recent appendectomy 6/3 who presents with severe abdominal pain, found to have pneumoperitoneum and loculated fluid collection c/f perforation. Patient was taken emergently to OR for exlap, found to have TI perforation with gross spillage of intestinal contents. Patient underwent washout, ileocolic resection with primary anastomosis, left open with Abthera. SICU consulted for vent management and open abdomen.     PLAN:  Neuro:   - Pain control: IV Tylenol, PRN dilaudid    Respiratory:  - extubated  - AM CXR    Cardiovascular:  pressor requirements intraop  - given 1.5L bolus LR postop  - lactate cleared  - Monitor vitals signs      Gastrointestinal: perforated viscus  - Diet: NPO  - NGT to LCWS  - C/w Abthera vac  - plan to RTOR Wednesday 6/8    Genitourinary/Renal:  - IVF: LR @100  - maintain Diaz catheter  - Monitor UOP  - Trend electrolytes on BMP, replete PRN    Heme:  - Trend H/H on CBC  - VTE ppx: LVX    ID:   - Abx: Zosyn  - Trend WBC on CBC  - Monitor fever curve    Endocrine:  - continue Synthroid IV 38ug  - FS q6 while NPO    Lines:   - PIV x 2    Labs:   - CBC, BMP, PT/PTT/INR qD    Code Status: Full Code  Dispo: SICU -> RTOR 6/8    Michel Ortiz  PGY-2  SICU  Spectra [id number]

## 2022-06-08 ENCOUNTER — APPOINTMENT (OUTPATIENT)
Dept: SURGERY | Facility: HOSPITAL | Age: 67
End: 2022-06-08
Payer: COMMERCIAL

## 2022-06-08 ENCOUNTER — APPOINTMENT (OUTPATIENT)
Dept: DERMATOLOGY | Facility: CLINIC | Age: 67
End: 2022-06-08

## 2022-06-08 ENCOUNTER — TRANSCRIPTION ENCOUNTER (OUTPATIENT)
Age: 67
End: 2022-06-08

## 2022-06-08 LAB
-  AMIKACIN: SIGNIFICANT CHANGE UP
-  AMOXICILLIN/CLAVULANIC ACID: SIGNIFICANT CHANGE UP
-  AMPICILLIN/SULBACTAM: SIGNIFICANT CHANGE UP
-  AMPICILLIN: SIGNIFICANT CHANGE UP
-  AZTREONAM: SIGNIFICANT CHANGE UP
-  BLOOD PCR PANEL: SIGNIFICANT CHANGE UP
-  CEFAZOLIN: SIGNIFICANT CHANGE UP
-  CEFEPIME: SIGNIFICANT CHANGE UP
-  CEFOXITIN: SIGNIFICANT CHANGE UP
-  CEFTRIAXONE: SIGNIFICANT CHANGE UP
-  CIPROFLOXACIN: SIGNIFICANT CHANGE UP
-  ERTAPENEM: SIGNIFICANT CHANGE UP
-  GENTAMICIN: SIGNIFICANT CHANGE UP
-  IMIPENEM: SIGNIFICANT CHANGE UP
-  LEVOFLOXACIN: SIGNIFICANT CHANGE UP
-  MEROPENEM: SIGNIFICANT CHANGE UP
-  PIPERACILLIN/TAZOBACTAM: SIGNIFICANT CHANGE UP
-  TOBRAMYCIN: SIGNIFICANT CHANGE UP
-  TRIMETHOPRIM/SULFAMETHOXAZOLE: SIGNIFICANT CHANGE UP
ANION GAP SERPL CALC-SCNC: 13 MMOL/L — SIGNIFICANT CHANGE UP (ref 5–17)
ANION GAP SERPL CALC-SCNC: 15 MMOL/L — SIGNIFICANT CHANGE UP (ref 5–17)
APTT BLD: 28.8 SEC — SIGNIFICANT CHANGE UP (ref 27.5–35.5)
BUN SERPL-MCNC: 12 MG/DL — SIGNIFICANT CHANGE UP (ref 7–23)
BUN SERPL-MCNC: 12 MG/DL — SIGNIFICANT CHANGE UP (ref 7–23)
CALCIUM SERPL-MCNC: 7.6 MG/DL — LOW (ref 8.4–10.5)
CALCIUM SERPL-MCNC: 7.9 MG/DL — LOW (ref 8.4–10.5)
CHLORIDE SERPL-SCNC: 104 MMOL/L — SIGNIFICANT CHANGE UP (ref 96–108)
CHLORIDE SERPL-SCNC: 99 MMOL/L — SIGNIFICANT CHANGE UP (ref 96–108)
CO2 SERPL-SCNC: 19 MMOL/L — LOW (ref 22–31)
CO2 SERPL-SCNC: 23 MMOL/L — SIGNIFICANT CHANGE UP (ref 22–31)
CREAT SERPL-MCNC: 0.46 MG/DL — LOW (ref 0.5–1.3)
CREAT SERPL-MCNC: 0.51 MG/DL — SIGNIFICANT CHANGE UP (ref 0.5–1.3)
CULTURE RESULTS: SIGNIFICANT CHANGE UP
EGFR: 101 ML/MIN/1.73M2 — SIGNIFICANT CHANGE UP
EGFR: 104 ML/MIN/1.73M2 — SIGNIFICANT CHANGE UP
GLUCOSE BLDC GLUCOMTR-MCNC: 86 MG/DL — SIGNIFICANT CHANGE UP (ref 70–99)
GLUCOSE SERPL-MCNC: 103 MG/DL — HIGH (ref 70–99)
GLUCOSE SERPL-MCNC: 245 MG/DL — HIGH (ref 70–99)
HCT VFR BLD CALC: 30.5 % — LOW (ref 34.5–45)
HCT VFR BLD CALC: 34 % — LOW (ref 34.5–45)
HGB BLD-MCNC: 10 G/DL — LOW (ref 11.5–15.5)
HGB BLD-MCNC: 10.8 G/DL — LOW (ref 11.5–15.5)
INR BLD: 1.23 RATIO — HIGH (ref 0.88–1.16)
MAGNESIUM SERPL-MCNC: 2.3 MG/DL — SIGNIFICANT CHANGE UP (ref 1.6–2.6)
MAGNESIUM SERPL-MCNC: 2.4 MG/DL — SIGNIFICANT CHANGE UP (ref 1.6–2.6)
MCHC RBC-ENTMCNC: 29.1 PG — SIGNIFICANT CHANGE UP (ref 27–34)
MCHC RBC-ENTMCNC: 29.6 PG — SIGNIFICANT CHANGE UP (ref 27–34)
MCHC RBC-ENTMCNC: 31.8 GM/DL — LOW (ref 32–36)
MCHC RBC-ENTMCNC: 32.8 GM/DL — SIGNIFICANT CHANGE UP (ref 32–36)
MCV RBC AUTO: 90.2 FL — SIGNIFICANT CHANGE UP (ref 80–100)
MCV RBC AUTO: 91.6 FL — SIGNIFICANT CHANGE UP (ref 80–100)
METHOD TYPE: SIGNIFICANT CHANGE UP
METHOD TYPE: SIGNIFICANT CHANGE UP
NRBC # BLD: 0 /100 WBCS — SIGNIFICANT CHANGE UP (ref 0–0)
NRBC # BLD: 0 /100 WBCS — SIGNIFICANT CHANGE UP (ref 0–0)
ORGANISM # SPEC MICROSCOPIC CNT: SIGNIFICANT CHANGE UP
PHOSPHATE SERPL-MCNC: 2.7 MG/DL — SIGNIFICANT CHANGE UP (ref 2.5–4.5)
PHOSPHATE SERPL-MCNC: 3.4 MG/DL — SIGNIFICANT CHANGE UP (ref 2.5–4.5)
PLATELET # BLD AUTO: 258 K/UL — SIGNIFICANT CHANGE UP (ref 150–400)
PLATELET # BLD AUTO: 282 K/UL — SIGNIFICANT CHANGE UP (ref 150–400)
POTASSIUM SERPL-MCNC: 3.7 MMOL/L — SIGNIFICANT CHANGE UP (ref 3.5–5.3)
POTASSIUM SERPL-MCNC: 3.8 MMOL/L — SIGNIFICANT CHANGE UP (ref 3.5–5.3)
POTASSIUM SERPL-SCNC: 3.7 MMOL/L — SIGNIFICANT CHANGE UP (ref 3.5–5.3)
POTASSIUM SERPL-SCNC: 3.8 MMOL/L — SIGNIFICANT CHANGE UP (ref 3.5–5.3)
PROTHROM AB SERPL-ACNC: 14.3 SEC — HIGH (ref 10.5–13.4)
RBC # BLD: 3.38 M/UL — LOW (ref 3.8–5.2)
RBC # BLD: 3.71 M/UL — LOW (ref 3.8–5.2)
RBC # FLD: 14 % — SIGNIFICANT CHANGE UP (ref 10.3–14.5)
RBC # FLD: 14.2 % — SIGNIFICANT CHANGE UP (ref 10.3–14.5)
SODIUM SERPL-SCNC: 135 MMOL/L — SIGNIFICANT CHANGE UP (ref 135–145)
SODIUM SERPL-SCNC: 138 MMOL/L — SIGNIFICANT CHANGE UP (ref 135–145)
SPECIMEN SOURCE: SIGNIFICANT CHANGE UP
WBC # BLD: 12.38 K/UL — HIGH (ref 3.8–10.5)
WBC # BLD: 9.51 K/UL — SIGNIFICANT CHANGE UP (ref 3.8–10.5)
WBC # FLD AUTO: 12.38 K/UL — HIGH (ref 3.8–10.5)
WBC # FLD AUTO: 9.51 K/UL — SIGNIFICANT CHANGE UP (ref 3.8–10.5)

## 2022-06-08 PROCEDURE — 71045 X-RAY EXAM CHEST 1 VIEW: CPT | Mod: 26

## 2022-06-08 PROCEDURE — 15734 MUSCLE-SKIN GRAFT TRUNK: CPT | Mod: 58

## 2022-06-08 PROCEDURE — 49084 PERITONEAL LAVAGE: CPT | Mod: 58

## 2022-06-08 PROCEDURE — 49002 REOPENING OF ABDOMEN: CPT | Mod: 58

## 2022-06-08 PROCEDURE — 15777 ACELLULAR DERM MATRIX IMPLT: CPT | Mod: 58

## 2022-06-08 DEVICE — IMPLANTABLE DEVICE: Type: IMPLANTABLE DEVICE | Status: FUNCTIONAL

## 2022-06-08 RX ORDER — HYDROMORPHONE HYDROCHLORIDE 2 MG/ML
0.5 INJECTION INTRAMUSCULAR; INTRAVENOUS; SUBCUTANEOUS
Refills: 0 | Status: DISCONTINUED | OUTPATIENT
Start: 2022-06-08 | End: 2022-06-12

## 2022-06-08 RX ORDER — SODIUM CHLORIDE 9 MG/ML
1000 INJECTION, SOLUTION INTRAVENOUS
Refills: 0 | Status: DISCONTINUED | OUTPATIENT
Start: 2022-06-08 | End: 2022-06-10

## 2022-06-08 RX ORDER — ACETAMINOPHEN 500 MG
1000 TABLET ORAL EVERY 6 HOURS
Refills: 0 | Status: DISCONTINUED | OUTPATIENT
Start: 2022-06-08 | End: 2022-06-10

## 2022-06-08 RX ORDER — PANTOPRAZOLE SODIUM 20 MG/1
40 TABLET, DELAYED RELEASE ORAL DAILY
Refills: 0 | Status: DISCONTINUED | OUTPATIENT
Start: 2022-06-08 | End: 2022-06-08

## 2022-06-08 RX ORDER — TETRACAINE/BENZOCAINE/BUTAMBEN 2%-14%-2%
1 OINTMENT (GRAM) TOPICAL
Refills: 0 | Status: DISCONTINUED | OUTPATIENT
Start: 2022-06-08 | End: 2022-06-08

## 2022-06-08 RX ORDER — ENOXAPARIN SODIUM 100 MG/ML
40 INJECTION SUBCUTANEOUS EVERY 24 HOURS
Refills: 0 | Status: DISCONTINUED | OUTPATIENT
Start: 2022-06-08 | End: 2022-06-20

## 2022-06-08 RX ORDER — HYDROMORPHONE HYDROCHLORIDE 2 MG/ML
0.5 INJECTION INTRAMUSCULAR; INTRAVENOUS; SUBCUTANEOUS
Refills: 0 | Status: DISCONTINUED | OUTPATIENT
Start: 2022-06-08 | End: 2022-06-08

## 2022-06-08 RX ORDER — CHLORHEXIDINE GLUCONATE 213 G/1000ML
1 SOLUTION TOPICAL
Refills: 0 | Status: DISCONTINUED | OUTPATIENT
Start: 2022-06-08 | End: 2022-06-18

## 2022-06-08 RX ORDER — NYSTATIN 500MM UNIT
500000 POWDER (EA) MISCELLANEOUS EVERY 6 HOURS
Refills: 0 | Status: COMPLETED | OUTPATIENT
Start: 2022-06-08 | End: 2022-06-15

## 2022-06-08 RX ORDER — ACETAMINOPHEN 500 MG
1000 TABLET ORAL EVERY 6 HOURS
Refills: 0 | Status: DISCONTINUED | OUTPATIENT
Start: 2022-06-08 | End: 2022-06-08

## 2022-06-08 RX ORDER — FLUCONAZOLE 150 MG/1
400 TABLET ORAL EVERY 24 HOURS
Refills: 0 | Status: DISCONTINUED | OUTPATIENT
Start: 2022-06-08 | End: 2022-06-18

## 2022-06-08 RX ORDER — CHLORHEXIDINE GLUCONATE 213 G/1000ML
1 SOLUTION TOPICAL
Refills: 0 | Status: DISCONTINUED | OUTPATIENT
Start: 2022-06-08 | End: 2022-06-08

## 2022-06-08 RX ORDER — LEVOTHYROXINE SODIUM 125 MCG
38 TABLET ORAL AT BEDTIME
Refills: 0 | Status: DISCONTINUED | OUTPATIENT
Start: 2022-06-08 | End: 2022-06-16

## 2022-06-08 RX ORDER — POTASSIUM PHOSPHATE, MONOBASIC POTASSIUM PHOSPHATE, DIBASIC 236; 224 MG/ML; MG/ML
15 INJECTION, SOLUTION INTRAVENOUS ONCE
Refills: 0 | Status: COMPLETED | OUTPATIENT
Start: 2022-06-08 | End: 2022-06-08

## 2022-06-08 RX ORDER — PIPERACILLIN AND TAZOBACTAM 4; .5 G/20ML; G/20ML
3.38 INJECTION, POWDER, LYOPHILIZED, FOR SOLUTION INTRAVENOUS EVERY 8 HOURS
Refills: 0 | Status: DISCONTINUED | OUTPATIENT
Start: 2022-06-08 | End: 2022-06-10

## 2022-06-08 RX ORDER — POTASSIUM CHLORIDE 20 MEQ
10 PACKET (EA) ORAL
Refills: 0 | Status: COMPLETED | OUTPATIENT
Start: 2022-06-08 | End: 2022-06-08

## 2022-06-08 RX ADMIN — SODIUM CHLORIDE 100 MILLILITER(S): 9 INJECTION, SOLUTION INTRAVENOUS at 07:31

## 2022-06-08 RX ADMIN — PANTOPRAZOLE SODIUM 40 MILLIGRAM(S): 20 TABLET, DELAYED RELEASE ORAL at 14:57

## 2022-06-08 RX ADMIN — HYDROMORPHONE HYDROCHLORIDE 0.5 MILLIGRAM(S): 2 INJECTION INTRAMUSCULAR; INTRAVENOUS; SUBCUTANEOUS at 09:01

## 2022-06-08 RX ADMIN — ENOXAPARIN SODIUM 40 MILLIGRAM(S): 100 INJECTION SUBCUTANEOUS at 21:25

## 2022-06-08 RX ADMIN — HYDROMORPHONE HYDROCHLORIDE 0.5 MILLIGRAM(S): 2 INJECTION INTRAMUSCULAR; INTRAVENOUS; SUBCUTANEOUS at 01:30

## 2022-06-08 RX ADMIN — PIPERACILLIN AND TAZOBACTAM 25 GRAM(S): 4; .5 INJECTION, POWDER, LYOPHILIZED, FOR SOLUTION INTRAVENOUS at 05:04

## 2022-06-08 RX ADMIN — HYDROMORPHONE HYDROCHLORIDE 0.5 MILLIGRAM(S): 2 INJECTION INTRAMUSCULAR; INTRAVENOUS; SUBCUTANEOUS at 09:16

## 2022-06-08 RX ADMIN — HYDROMORPHONE HYDROCHLORIDE 0.5 MILLIGRAM(S): 2 INJECTION INTRAMUSCULAR; INTRAVENOUS; SUBCUTANEOUS at 02:00

## 2022-06-08 RX ADMIN — PIPERACILLIN AND TAZOBACTAM 25 GRAM(S): 4; .5 INJECTION, POWDER, LYOPHILIZED, FOR SOLUTION INTRAVENOUS at 21:25

## 2022-06-08 RX ADMIN — HYDROMORPHONE HYDROCHLORIDE 0.5 MILLIGRAM(S): 2 INJECTION INTRAMUSCULAR; INTRAVENOUS; SUBCUTANEOUS at 21:03

## 2022-06-08 RX ADMIN — Medication 38 MICROGRAM(S): at 21:25

## 2022-06-08 RX ADMIN — Medication 100 MILLIEQUIVALENT(S): at 16:51

## 2022-06-08 RX ADMIN — HYDROMORPHONE HYDROCHLORIDE 0.5 MILLIGRAM(S): 2 INJECTION INTRAMUSCULAR; INTRAVENOUS; SUBCUTANEOUS at 05:30

## 2022-06-08 RX ADMIN — Medication 100 MILLIEQUIVALENT(S): at 17:55

## 2022-06-08 RX ADMIN — POTASSIUM PHOSPHATE, MONOBASIC POTASSIUM PHOSPHATE, DIBASIC 62.5 MILLIMOLE(S): 236; 224 INJECTION, SOLUTION INTRAVENOUS at 05:03

## 2022-06-08 RX ADMIN — HYDROMORPHONE HYDROCHLORIDE 0.5 MILLIGRAM(S): 2 INJECTION INTRAMUSCULAR; INTRAVENOUS; SUBCUTANEOUS at 05:00

## 2022-06-08 RX ADMIN — FLUCONAZOLE 100 MILLIGRAM(S): 150 TABLET ORAL at 09:31

## 2022-06-08 RX ADMIN — HYDROMORPHONE HYDROCHLORIDE 0.5 MILLIGRAM(S): 2 INJECTION INTRAMUSCULAR; INTRAVENOUS; SUBCUTANEOUS at 21:34

## 2022-06-08 NOTE — PROGRESS NOTE ADULT - ASSESSMENT
68 y/o female w/ a PMHx of moderate mitral regurgitation, hypothyroidism, GERD, osteoporosis, vitamin D deficiency, breast CA s/p right lumpectomy w/ chemo & radiation, bilateral breast reduction (2013), s/p hysterectomy (2005), s/p right elbow surgery (1970s), & s/p laparoscopic appendectomy (6/3/2022) who presented on 6/6 w/ pneumoperitoneum & a loculated fluid collection in the RLQ s/p exploratory laparotomy, washout, ileocolic anastomosis w/ primary anastomosis for two perforations in the terminal ileum, & temporary abdominal closure w/ Abthera VAC    PLAN:    Neuro: acute post-op pain  - Multimodal pain control w/ IV acetaminophen and Dilaudid IVP PRN    Resp: no acute issues  - Out of bed to chair, ambulate as tolerated, and incentive spirometry to prevent atelectasis    CV: no acute issues  - Monitor vital signs    GI: s/p exploratory laparotomy, washout, ileocolic anastomosis w/ primary anastomosis for two perforations in the terminal ileum, & temporary abdominal closure w/ Abthera VAC  - NPO w/ NGT to LCWS  - RTOR today  - Protonix for stress ulcer prophylaxis    Renal: no acute issues  - LR at 75 mL/hr while NPO    Heme: no acute issues  - Lovenox for VTE prophylaxis    ID: perforated viscus w/ yeast & Enterobacter cloacae in OR cultures  - Monitor WBC, temperature, and procalcitonin  - OR cultures from 6/6 w/ yeast & Enterobacter cloacae  - Empiric coverage w/ Zosyn & fluconazole    Endo: no acute issues  - Levothyroxine for hypothyroidism    Code Status: Full code    Disposition: Will remain in SICU    Leana Cote PA-C     [id number]

## 2022-06-08 NOTE — PROGRESS NOTE ADULT - ATTENDING COMMENTS
SICU Attending Note    Patient seen and examined and agree with above.   67 year old female after a laparoscopy appendectomy 6/3 complicated by enterotomy and now s/p ex lap, ileocectomy. Today the patient went to the OR and is s/p ex lap, drainage and abdominal wall reconstruction with mesh.     Current management includes:  Neuro- pain currently controlled with current regimen. Tylenol and dilaudid PRN.   CV- Hemodynamically stable   Pulm - acute respiratory insufficiency - on 2 liters nasal cannula   -goal SpO2 96%  GI- NPO, no flatus  ID- fluconazole and zosyn; patient has yeast like cells in culture   Endocrine - hyperglycemia. Will monitor at this time.    - will monitor urine output closely.     Patient to remain in sicu for hemodynamic monitoring.

## 2022-06-08 NOTE — PRE-ANESTHESIA EVALUATION ADULT - NSDENTALSD_ENT_ALL_CORE
appears normal and intact
appears normal and intact
alert/awake/oriented to person, place, time/situation

## 2022-06-08 NOTE — PROGRESS NOTE ADULT - SUBJECTIVE AND OBJECTIVE BOX
Date of Service   06-08-22 @ 16:35    Patient is a 67y old  Female who presents with a chief complaint of Abdominal Sepsis (08 Jun 2022 04:55)      INTERVAL HISTORY: pt feels ok     TELEMETRY Personally reviewed: SR 70-80's     REVIEW OF SYSTEMS:   CONSTITUTIONAL: No weakness  EYES/ENT: No visual changes; No throat pain  Neck: No pain or stiffness  Respiratory: No cough, wheezing, No shortness of breath  CARDIOVASCULAR: no chest pain or palpitations  GASTROINTESTINAL: No abdominal pain, no nausea, vomiting or hematemesis  GENITOURINARY: No dysuria, frequency or hematuria  NEUROLOGICAL: No stroke like symptoms  SKIN: No rashes    	  MEDICATIONS:        PHYSICAL EXAM:  T(C): 36.8 (06-08-22 @ 15:00), Max: 37.4 (06-08-22 @ 13:22)  HR: 77 (06-08-22 @ 16:00) (74 - 93)  BP: 122/62 (06-08-22 @ 16:00) (96/54 - 129/76)  RR: 16 (06-08-22 @ 16:00) (14 - 28)  SpO2: 96% (06-08-22 @ 16:00) (94% - 97%)  Wt(kg): --  I&O's Summary    07 Jun 2022 07:01  -  08 Jun 2022 07:00  --------------------------------------------------------  IN: 3512.5 mL / OUT: 1309 mL / NET: 2203.5 mL    08 Jun 2022 07:01  -  08 Jun 2022 16:35  --------------------------------------------------------  IN: 787.5 mL / OUT: 210 mL / NET: 577.5 mL      Height (cm): 157.5 (06-08 @ 10:17)  Weight (kg): 65.8 (06-08 @ 10:17)  BMI (kg/m2): 26.5 (06-08 @ 10:17)  BSA (m2): 1.67 (06-08 @ 10:17)    Appearance: In no distress	  HEENT:    PERRL, EOMI	  Cardiovascular:  S1 S2, No JVD  Respiratory: Lungs clear to auscultation	  Gastrointestinal:  Soft, Non-tender, + BS	  Vasculature:  No edema of LE  Psychiatric: Appropriate affect   Neuro: no acute focal deficits                               10.8   12.38 )-----------( 282      ( 08 Jun 2022 15:44 )             34.0     06-08    138  |  104  |  12  ----------------------------<  103<H>  3.8   |  19<L>  |  0.46<L>    Ca    7.6<L>      08 Jun 2022 15:44  Phos  3.4     06-08  Mg     2.3     06-08          Labs personally reviewed      ASSESSMENT/PLAN: 	  The patient is a 67y Female who is now s/p exploratory laparotomy with ileocecal resection and abdominal washout on 6/6    1. Esperanza op risk stratification - tolerated surgery well, planned for closing of abd and washout  6/8  - HD stable   - SR on the monitor   - Pain management     2. HLD   - on Repatha, resume as OP    3. Mod MR - euvolemic   - OP follow up      Elsa Li [facility]   Randy Santamaria DO [facility]  Cardiovascular Medicine  [address], [address]  Office: [phone]

## 2022-06-08 NOTE — BRIEF OPERATIVE NOTE - NSICDXBRIEFPOSTOP_GEN_ALL_CORE_FT
POST-OP DIAGNOSIS:  Perforated viscus 06-Jun-2022 19:50:08  Radha Black  
POST-OP DIAGNOSIS:  S/P exploratory laparotomy 08-Jun-2022 13:31:21  Daniel Weber

## 2022-06-08 NOTE — PROGRESS NOTE ADULT - SUBJECTIVE AND OBJECTIVE BOX
HISTORY:  68 y/o female w/ a PMHx of moderate mitral regurgitation, hypothyroidism, GERD, osteoporosis, vitamin D deficiency, breast CA s/p right lumpectomy w/ chemo & radiation, bilateral breast reduction (2013), s/p hysterectomy (2005), s/p right elbow surgery (1970s), and s/p laparoscopic appendectomy (6/3/2022) who presented on 6/6 w/ severe abdominal pain and obstipation. Imaging revealed pneumoperitoneum w/ a loculated fluid collection in the RLQ so she was taken to the OR emergently for an exploratory laparotomy, washout, ileocolic anastomosis w/ primary anastomosis for two perforations in the terminal ileum, and temporary abdominal closure w/ Abthera VAC. Patient was also hypotensive requiring vasopressor support during the case so she was left intubated and transferred to SICU for further management. Patient was extubated later that day. Unable     24 HOUR EVENTS:      NEURO  Exam:   Meds: acetaminophen   IVPB .. 1000 milliGRAM(s) IV Intermittent every 6 hours PRN Mild Pain (1 - 3)  acetaminophen   IVPB .. 1000 milliGRAM(s) IV Intermittent every 6 hours PRN Mild Pain (1 - 3)  HYDROmorphone  Injectable 0.5 milliGRAM(s) IV Push every 3 hours PRN Severe Pain (7 - 10)      RESPIRATORY  RR: 16 (06-08-22 @ 04:00) (15 - 28)  SpO2: 95% (06-08-22 @ 04:00) (92% - 97%)  Wt(kg): --  Exam:  Mechanical Ventilation:   ABG - ( 06 Jun 2022 21:45 )  pH: 7.40  /  pCO2: 33    /  pO2: 107   / HCO3: 20    / Base Excess: -3.6  /  SaO2: 99.5    Lactate: x                Meds:     CARDIOVASCULAR  HR: 78 (06-08-22 @ 04:00) (69 - 91)  BP: 101/62 (06-08-22 @ 04:00) (96/54 - 130/61)  BP(mean): 74 (06-08-22 @ 04:00) (69 - 87)  ABP: --  ABP(mean): --  Wt(kg): --  CVP(cm H2O): --  VBG - ( 06 Jun 2022 09:43 )  pH: 7.38  /  pCO2: 41    /  pO2: 27    / HCO3: 24    / Base Excess: -0.9  /  SaO2: 36.0   Lactate: 3.1                Exam:   Cardiac Rhythm:   Perfusion     [ ]Adequate   [ ]Inadequate  Mentation   [ ]Normal       [ ]Reduced  Extremities  [ ]Warm         [ ]Cool  Volume Status [ ]Hypervolemic [ ]Euvolemic [ ]Hypovolemic  Meds:     GI/NUTRITION  Exam:   Diet:   Meds: pantoprazole  Injectable 40 milliGRAM(s) IV Push daily      GENITOURINARY  I&O's Detail    06-06 @ 07:01  -  06-07 @ 07:00  --------------------------------------------------------  IN:    Dexmedetomidine: 52.8 mL    Enteral Tube Flush: 50 mL    IV PiggyBack: 150 mL    IV PiggyBack: 300 mL    Lactated Ringers: 1300 mL    Lactated Ringers: 100 mL    Lactated Ringers Bolus: 2000 mL    Phenylephrine: 108.4 mL    Sodium Chloride 0.9% Bolus: 2000 mL  Total IN: 6061.2 mL    OUT:    Indwelling Catheter - Urethral (mL): 455 mL    Nasogastric/Oral tube (mL): 50 mL    VAC (Vacuum Assisted Closure) System (mL): 400 mL    Voided (mL): 250 mL  Total OUT: 1155 mL    Total NET: 4906.2 mL      06-07 @ 07:01  -  06-08 @ 04:55  --------------------------------------------------------  IN:    IV PiggyBack: 150 mL    IV PiggyBack: 700 mL    Lactated Ringers: 2100 mL  Total IN: 2950 mL    OUT:    Indwelling Catheter - Urethral (mL): 629 mL    Nasogastric/Oral tube (mL): 100 mL    VAC (Vacuum Assisted Closure) System (mL): 250 mL  Total OUT: 979 mL    Total NET: 1971 mL          06-08    135  |  99  |  12  ----------------------------<  245<H>  3.7   |  23  |  0.51    Ca    7.9<L>      08 Jun 2022 01:19  Phos  2.7     06-08  Mg     2.4     06-08    TPro  7.6  /  Alb  3.4  /  TBili  0.8  /  DBili  x   /  AST  24  /  ALT  18  /  AlkPhos  56  06-06    Meds: lactated ringers. 1000 milliLiter(s) IV Continuous <Continuous>  potassium phosphate IVPB 15 milliMole(s) IV Intermittent once      HEMATOLOGIC  Meds: enoxaparin Injectable 40 milliGRAM(s) SubCutaneous every 24 hours                          10.0   9.51  )-----------( 258      ( 08 Jun 2022 01:19 )             30.5     PT/INR - ( 06 Jun 2022 18:14 )   PT: 15.4 sec;   INR: 1.34 ratio         PTT - ( 06 Jun 2022 18:14 )  PTT:29.0 sec    INFECTIOUS DISEASES  T(C): 37.2 (06-08-22 @ 00:00), Max: 37.2 (06-07-22 @ 07:00)  Wt(kg): --  WBC Count: 9.51 K/uL (06-08 @ 01:19)  WBC Count: 7.17 K/uL (06-07 @ 07:10)    Recent Cultures:  Specimen Source: .Body Fluid Peritoneal Fluid, 06-06 @ 21:43; Results   Rare Yeast; Gram Stain:   Moderate polymorphonuclear leukocytes seen per low power field  Rare Gram Positive Rods seen per oil power field  Rare Yeast like cells seen per oil power field; Organism: --  Specimen Source: .Blood Blood-Peripheral, 06-06 @ 12:40; Results   No growth to date.; Gram Stain: --; Organism: --  Specimen Source: Clean Catch Clean Catch (Midstream), 06-06 @ 12:27; Results   <10,000 CFU/mL Normal Urogenital Odalis; Gram Stain: --; Organism: --  Specimen Source: .Blood Blood-Peripheral, 06-06 @ 12:20; Results   No growth to date.; Gram Stain: --; Organism: --  Specimen Source: .Blood Blood-Peripheral, 06-06 @ 09:41; Results   Growth in anaerobic bottle: Gram Negative Rods; Gram Stain:   Growth in anaerobic bottle: Gram Negative Rods; Organism: --  Specimen Source: .Blood Blood-Peripheral, 06-06 @ 09:40; Results   Growth in anaerobic bottle: Gram Negative Rods  ***Blood Panel PCR results on this specimen are available  approximately 3 hours after the Gram stain result.***  Gram stain, PCR, and/or culture results may not always  correspond due to difference in methodologies.  ************************************************************  This PCR assay was performed by multiplex PCR. This  Assay tests for 66 bacterial and resistance gene targets.  Please refer to the [facility] test directory  at [email or web address] for details.; Gram Stain:   Growth in anaerobic bottle: Gram Negative Rods; Organism: Blood Culture PCR    Meds: fluconAZOLE IVPB 400 milliGRAM(s) IV Intermittent every 24 hours  piperacillin/tazobactam IVPB.. 3.375 Gram(s) IV Intermittent every 8 hours      ENDOCRINE  Capillary Blood Glucose    Meds: levothyroxine Injectable 38 MICROGram(s) IV Push at bedtime      ACCESS DEVICES:  [ ] Peripheral IV  [ ] Central Venous Line		[ ] R	[ ] L	[ ] IJ	[ ] Fem	[ ] SC	Placed:   [ ] Arterial Line			[ ] R	[ ] L	[ ] Fem	[ ] Rad	[ ] Ax	Placed:   [ ] PICC:					[ ] Mediport  [ ] Urinary Catheter, Date Placed:   [ ] Necessity of urinary, arterial, and venous catheters discussed    OTHER MEDICATIONS:  chlorhexidine 2% Cloths 1 Application(s) Topical <User Schedule>  tetracaine/benzocaine/butamben Spray 1 Spray(s) Topical four times a day PRN      IMAGING: HISTORY:  66 y/o female w/ a PMHx of moderate mitral regurgitation, hypothyroidism, GERD, osteoporosis, vitamin D deficiency, breast CA s/p right lumpectomy w/ chemo & radiation, bilateral breast reduction (2013), s/p hysterectomy (2005), s/p right elbow surgery (1970s), and s/p laparoscopic appendectomy (6/3/2022) who presented on 6/6 w/ severe abdominal pain and obstipation. Imaging revealed pneumoperitoneum w/ a loculated fluid collection in the RLQ so she was taken to the OR emergently for an exploratory laparotomy, washout, ileocolic anastomosis w/ primary anastomosis for two perforations in the terminal ileum, and temporary abdominal closure w/ Abthera VAC. Patient was also hypotensive requiring vasopressor support during the case so she was left intubated and transferred to SICU for further management. Patient was extubated later that day. Patient reports NGT discomfort and incisional abdominal pain but otherwise denies headache, fevers, chills, dizziness, weakness, shortness of breath, chest pain, or nausea/vomiting.    24 HOUR EVENTS:  - Fluconazole started yeast in OR cultures from 6/6  - Cardiology consulted & following    NEURO  Exam: awake, alert, oriented x4, no acute distress, no acute focal deficits  Meds:  - acetaminophen   IVPB .. 1000 milliGRAM(s) IV Intermittent every 6 hours PRN Mild Pain (1 - 3)  - HYDROmorphone  Injectable 0.5 milliGRAM(s) IV Push every 3 hours PRN Severe Pain (7 - 10)    RESPIRATORY  RR: 16 (06-08-22 @ 04:00) (15 - 28)  SpO2: 95% (06-08-22 @ 04:00) (92% - 97%)  Exam: clear to auscultation bilaterally    CARDIOVASCULAR  HR: 78 (06-08-22 @ 04:00) (69 - 91)  BP: 101/62 (06-08-22 @ 04:00) (96/54 - 130/61)  BP(mean): 74 (06-08-22 @ 04:00) (69 - 87)  Exam: regular rate and rhythm, S1S2  Cardiac Rhythm: sinus  Perfusion    [x]Adequate    [ ]Inadequate  Mentation   [x]Normal       [ ]Reduced  Extremities  [x]Warm         [ ]Cool  Volume Status [ ]Hypervolemic [x]Euvolemic [ ]Hypovolemic    GI/NUTRITION  Exam: soft, nondistended, tavares-incisional tenderness, Abthera VAC to suction w/ serosanguineous output, NGT w/ bilious output  Diet: NPO w/ NGT to LCWS  Meds: pantoprazole  Injectable 40 milliGRAM(s) IV Push daily    GENITOURINARY  I&O's Detail  06-07 @ 07:01  -  06-08 @ 04:55  --------------------------------------------------------  IN:    IV PiggyBack: 150 mL    IV PiggyBack: 700 mL    Lactated Ringers: 2100 mL  Total IN: 2950 mL    OUT:    Indwelling Catheter - Urethral (mL): 629 mL    Nasogastric/Oral tube (mL): 100 mL    VAC (Vacuum Assisted Closure) System (mL): 250 mL  Total OUT: 979 mL    Total NET: 1971 mL    135  |  99  |  12  ----------------------------<  245<H>  3.7   |  23  |  0.51    Ca    7.9<L>      08 Jun 2022 01:19  Phos  2.7  Mg     2.4  TPro  7.6  /  Alb  3.4  /  TBili  0.8  /  DBili  x   /  AST  24  /  ALT  18  /  AlkPhos  56    Meds: lactated ringers infuse at 75 mL/hr    HEMATOLOGIC  Meds: enoxaparin Injectable 40 milliGRAM(s) SubCutaneous every 24 hours                        10.0   9.51  )-----------( 258      ( 08 Jun 2022 01:19 )             30.5     PT/INR - ( 06 Jun 2022 18:14 )   PT: 15.4 sec;   INR: 1.34 ratio    PTT - ( 06 Jun 2022 18:14 )  PTT:29.0 sec    INFECTIOUS DISEASES  T(C): 37.2 (06-08-22 @ 00:00), Max: 37.2 (06-07-22 @ 07:00)  WBC Count:  - 9.51 K/uL (06-08 @ 01:19)  - 7.17 K/uL (06-07 @ 07:10)    Recent Cultures:  - Specimen Source: .Body Fluid Peritoneal Fluid, 06-06 @ 21:43  Results: Rare Yeast  Gram Stain: Moderate polymorphonuclear leukocytes seen per low power field. Rare Gram Positive Rods seen per oil power field. Rare Yeast like cells seen per oil power field.  Organism: --  - Specimen Source: .Blood Blood-Peripheral, 06-06 @ 12:40  Results: No growth to date.  Gram Stain: --  Organism: --  - Specimen Source: Clean Catch Clean Catch (Midstream), 06-06 @ 12:27  Results: <10,000 CFU/mL Normal Urogenital Odalis  Gram Stain: --  Organism: --  - Specimen Source: .Blood Blood-Peripheral, 06-06 @ 12:20  Results: No growth to date.  Gram Stain: --  Organism: --  - Specimen Source: .Blood Blood-Peripheral, 06-06 @ 09:41  Results: Growth in anaerobic bottle: Gram Negative Rods  Gram Stain: Growth in anaerobic bottle: Gram Negative Rods  Organism: --  - Specimen Source: .Blood Blood-Peripheral, 06-06 @ 09:40  Results: Growth in anaerobic bottle: Gram Negative Rods  Gram Stain: Growth in anaerobic bottle: Gram Negative Rods  Organism: --    Meds:  - fluconAZOLE IVPB 400 milliGRAM(s) IV Intermittent every 24 hours  - piperacillin/tazobactam IVPB.. 3.375 Gram(s) IV Intermittent every 8 hours    ENDOCRINE  Capillary Blood Glucose: 245 mg/dL  Meds: levothyroxine Injectable 38 MICROGram(s) IV Push at bedtime    ACCESS DEVICES:  [x] Peripheral IV  [ ] Central Venous Line		[ ] R	[ ] L	[ ] IJ	[ ] Fem	[ ] SC	Placed:   [ ] Arterial Line			[ ] R	[ ] L	[ ] Fem	[ ] Rad	[ ] Ax	Placed:   [ ] PICC:					[ ] Mediport  [x] Urinary Catheter, Date Placed: 6/6  [x] Necessity of urinary, arterial, and venous catheters discussed    OTHER MEDICATIONS:  chlorhexidine 2% Cloths 1 Application(s) Topical <User Schedule>  tetracaine/benzocaine/butamben Spray 1 Spray(s) Topical four times a day PRN    IMAGING:

## 2022-06-08 NOTE — PRE-ANESTHESIA EVALUATION ADULT - LAST STRESS TEST
Possibly 2 years ago - Routinely with Cardiologist - As per patient, results were normal
Possibly 2 years ago - Routinely with Cardiologist - As per patient, results were normal

## 2022-06-08 NOTE — PROGRESS NOTE ADULT - SUBJECTIVE AND OBJECTIVE BOX
Surgery Progress Note     Subjective/24hour Events: Patient seen and examined at the bedside this morning. No acute events overnight. Pain controlled.     Vital Signs:  Vital Signs Last 24 Hrs  T(C): 37.2 (08 Jun 2022 00:00), Max: 37.2 (07 Jun 2022 07:00)  T(F): 99 (08 Jun 2022 00:00), Max: 99 (07 Jun 2022 07:00)  HR: 79 (08 Jun 2022 01:00) (66 - 91)  BP: 115/56 (08 Jun 2022 01:00) (94/56 - 130/61)  BP(mean): 78 (08 Jun 2022 01:00) (69 - 87)  RR: 23 (08 Jun 2022 01:00) (15 - 28)  SpO2: 95% (08 Jun 2022 01:00) (92% - 97%)        I&O's Detail    06 Jun 2022 07:01  -  07 Jun 2022 07:00  --------------------------------------------------------  IN:    Dexmedetomidine: 52.8 mL    Enteral Tube Flush: 50 mL    IV PiggyBack: 150 mL    IV PiggyBack: 300 mL    Lactated Ringers: 1300 mL    Lactated Ringers: 100 mL    Lactated Ringers Bolus: 2000 mL    Phenylephrine: 108.4 mL    Sodium Chloride 0.9% Bolus: 2000 mL  Total IN: 6061.2 mL    OUT:    Indwelling Catheter - Urethral (mL): 455 mL    Nasogastric/Oral tube (mL): 50 mL    VAC (Vacuum Assisted Closure) System (mL): 400 mL    Voided (mL): 250 mL  Total OUT: 1155 mL    Total NET: 4906.2 mL      07 Jun 2022 07:01  -  08 Jun 2022 01:32  --------------------------------------------------------  IN:    IV PiggyBack: 150 mL    IV PiggyBack: 700 mL    Lactated Ringers: 1800 mL  Total IN: 2650 mL    OUT:    Indwelling Catheter - Urethral (mL): 549 mL    Nasogastric/Oral tube (mL): 100 mL    VAC (Vacuum Assisted Closure) System (mL): 250 mL  Total OUT: 899 mL    Total NET: 1751 mL        Physical Exam:  General: NAD, resting comfortably in bed  Pulmonary: Nonlabored breathing, no respiratory distress  Cardiovascular: NSR  Abdominal: soft, NT/ND, abthera in place over midline, NGT in place to LCWS  Extremities: WWP    Labs:    06-07    137  |  105  |  12  ----------------------------<  121<H>  4.0   |  19<L>  |  0.54    Ca    8.1<L>      07 Jun 2022 07:11  Phos  3.3     06-07  Mg     2.5     06-07    TPro  7.6  /  Alb  3.4  /  TBili  0.8  /  DBili  x   /  AST  24  /  ALT  18  /  AlkPhos  56  06-06    CAPILLARY BLOOD GLUCOSE        LIVER FUNCTIONS - ( 06 Jun 2022 10:09 )  Alb: 3.4 g/dL / Pro: 7.6 g/dL / ALK PHOS: 56 U/L / ALT: 18 U/L / AST: 24 U/L / GGT: x                                 11.5   7.17  )-----------( 261      ( 07 Jun 2022 07:10 )             36.9     PT/INR - ( 06 Jun 2022 18:14 )   PT: 15.4 sec;   INR: 1.34 ratio         PTT - ( 06 Jun 2022 18:14 )  PTT:29.0 sec

## 2022-06-08 NOTE — CHART NOTE - NSCHARTNOTEFT_GEN_A_CORE
Post Operative Note  Patient: GRETEL MARTIN 67y (1955) Female   MRN: [id number]  Location: [place] [address]  Visit: 06-06-22 Inpatient  Date: 06-08-22 @ 17:34    Procedure: S/P removal of abthera vac, abd washout, abd wall reconstruction with myocutaneous flaps and bridging with ovitex mesh    Subjective:   Seen and examined at bedside 4 hrs postop. No acute events in the immediate postop period. Denies chest pain, SOB, nausea or vomiting. Vitals stable, UOP adequate via Diaz. family at bedside, questions answered.     Objective:  Vitals: T(F): 98.2 (06-08-22 @ 15:00), Max: 99.3 (06-08-22 @ 13:22)  HR: 80 (06-08-22 @ 17:00)  BP: 128/71 (06-08-22 @ 17:00) (96/54 - 129/76)  RR: 22 (06-08-22 @ 17:00)  SpO2: 96% (06-08-22 @ 17:00)  Vent Settings:     In:   06-07-22 @ 07:01  -  06-08-22 @ 07:00  --------------------------------------------------------  IN: 3512.5 mL    06-08-22 @ 07:01  -  06-08-22 @ 17:34  --------------------------------------------------------  IN: 987.5 mL      IV Fluids: lactated ringers. 1000 milliLiter(s) (100 mL/Hr) IV Continuous <Continuous>  potassium chloride  10 mEq/100 mL IVPB 10 milliEquivalent(s) IV Intermittent every 1 hour      Out:   06-07-22 @ 07:01  -  06-08-22 @ 07:00  --------------------------------------------------------  OUT: 1309 mL    06-08-22 @ 07:01  -  06-08-22 @ 17:34  --------------------------------------------------------  OUT: 305 mL      EBL: 25cc    Voided Urine:   06-07-22 @ 07:01  -  06-08-22 @ 07:00  --------------------------------------------------------  OUT: 1309 mL    06-08-22 @ 07:01  -  06-08-22 @ 17:34  --------------------------------------------------------  OUT: 305 mL      Diaz Catheter: yes   Drains:   LILLIAM:   06-07-22 @ 07:01  -  06-08-22 @ 07:00  --------------------------------------------------------  OUT: 400 mL    06-08-22 @ 07:01  -  06-08-22 @ 17:34  --------------------------------------------------------  OUT: 15 mL       NG Tube:   06-07-22 @ 07:01  -  06-08-22 @ 07:00  --------------------------------------------------------  OUT: 130 mL    06-08-22 @ 07:01  -  06-08-22 @ 17:34  --------------------------------------------------------  OUT: 20 mL          Physical Examination:  General: NAD, resting comfortably in bed  HEENT: Normocephalic atraumatic  Respiratory: Nonlabored respirations, normal CW expansion.  Cardio: regular rate and rhythm.  Abdomen: softly distended, appropriately tender, prevena plus vac intact. NGT intact. LILLIAM drain x1 ss  Vascular: extremities are warm and well perfused.     Imaging:  No post-op imaging studies    Assessment:  67yFemale patient S/P removal of abthera vac, abd washout, abd wall reconstruction with myocutaneous flaps and bridging with ovitex mesh.      Plan:  - IV Abx: continue Zosyn x4 days   - Pain control PRN  - Diet: NPO/NGT   - IVF  - Diaz  - Activity: ambulate as tolerated   - DVT ppx: LVX   - appreciate excellent SICU care    [facility]  [id number]

## 2022-06-08 NOTE — ED PROVIDER NOTE - HIV OFFER
Opt out Dapsone Pregnancy And Lactation Text: This medication is Pregnancy Category C and is not considered safe during pregnancy or breast feeding.

## 2022-06-08 NOTE — BRIEF OPERATIVE NOTE - NSICDXBRIEFPREOP_GEN_ALL_CORE_FT
PRE-OP DIAGNOSIS:  S/P exploratory laparotomy 08-Jun-2022 13:31:07  Daniel Weber  
PRE-OP DIAGNOSIS:  Peritonitis 06-Jun-2022 19:49:55  Radha Black

## 2022-06-08 NOTE — BRIEF OPERATIVE NOTE - NSICDXBRIEFPROCEDURE_GEN_ALL_CORE_FT
PROCEDURES:  Exploratory laparotomy 06-Jun-2022 19:48:34  Radha Black  Ileocecal resection 06-Jun-2022 19:48:48  Radha Black  Abdominal washout 06-Jun-2022 19:48:58  Radha Black  
PROCEDURES:  Abdominal washout 06-Jun-2022 19:48:58  Radha Black  Reconstruction, abdominal wall, using mesh 08-Jun-2022 13:29:46  Daniel Weber  Reconstruction of abdominal wall using flap 08-Jun-2022 13:30:50  Daniel Weber

## 2022-06-08 NOTE — PRE-ANESTHESIA EVALUATION ADULT - LAST ECHOCARDIOGRAM
8/2020 (Results in chart) - Routinely with Cardiologist
8/2020 (Results in chart) - Routinely with Cardiologist

## 2022-06-08 NOTE — PROGRESS NOTE ADULT - ASSESSMENT
The patient is a 67y Female who is now s/p exploratory laparotomy with ileocecal resection and abdominal washout on 6/6    Plan:  - NPO/NGT/IVF  - Pain control as needed  - DVT ppx  - OOB and ambulating as tolerated  - F/u AM labs  - Plan to RTOR today for Abdominal washout closure of abdominal wall  - Appreciate excellent sicu care     [facility]   [id number].

## 2022-06-08 NOTE — BRIEF OPERATIVE NOTE - OPERATION/FINDINGS
Succus upon entry to abdomen  2 perforations in terminal ileum  Ileocecal resection with primary stapled anastomosis  Abdominal washout, Abthera vac closure
Exploratory laparotomy, removal of abthera vac, abdominal washout and thoroughly irrigated with warm saline. Abdominal wall reconstruction with myofascial flap advancement, bridge repair with ovitex mesh. mesh secured with loop PDS. skin closed with staples, wet telfa placed in between staples, and Prevena plus placed.

## 2022-06-08 NOTE — PRE-ANESTHESIA EVALUATION ADULT - NSANTHOSAYNRD_GEN_A_CORE
No. VIET screening performed.  STOP BANG Legend: 0-2 = LOW Risk; 3-4 = INTERMEDIATE Risk; 5-8 = HIGH Risk
No. VIET screening performed.  STOP BANG Legend: 0-2 = LOW Risk; 3-4 = INTERMEDIATE Risk; 5-8 = HIGH Risk
I have reviewed and confirmed nurses' notes for patient's medications, allergies, medical history, and surgical history.

## 2022-06-08 NOTE — PRE-ANESTHESIA EVALUATION ADULT - NSANTHPMHFT_GEN_ALL_CORE
67F hx Breast cancers s/p CT/RT, R-Lumpectomy, Hypothyroidism and mild-mod MR. s/p appendectomy c/b terminal ileum perforation s/p ex-lap and small bowel resection now for washout and closure.

## 2022-06-09 LAB
-  AMIKACIN: SIGNIFICANT CHANGE UP
-  AMOXICILLIN/CLAVULANIC ACID: SIGNIFICANT CHANGE UP
-  AMPICILLIN/SULBACTAM: SIGNIFICANT CHANGE UP
-  AMPICILLIN: SIGNIFICANT CHANGE UP
-  AZTREONAM: SIGNIFICANT CHANGE UP
-  CEFAZOLIN: SIGNIFICANT CHANGE UP
-  CEFEPIME: SIGNIFICANT CHANGE UP
-  CEFOXITIN: SIGNIFICANT CHANGE UP
-  CEFTRIAXONE: SIGNIFICANT CHANGE UP
-  CIPROFLOXACIN: SIGNIFICANT CHANGE UP
-  ERTAPENEM: SIGNIFICANT CHANGE UP
-  GENTAMICIN: SIGNIFICANT CHANGE UP
-  IMIPENEM: SIGNIFICANT CHANGE UP
-  LEVOFLOXACIN: SIGNIFICANT CHANGE UP
-  MEROPENEM: SIGNIFICANT CHANGE UP
-  PIPERACILLIN/TAZOBACTAM: SIGNIFICANT CHANGE UP
-  TOBRAMYCIN: SIGNIFICANT CHANGE UP
-  TRIMETHOPRIM/SULFAMETHOXAZOLE: SIGNIFICANT CHANGE UP
ANION GAP SERPL CALC-SCNC: 14 MMOL/L — SIGNIFICANT CHANGE UP (ref 5–17)
BUN SERPL-MCNC: 14 MG/DL — SIGNIFICANT CHANGE UP (ref 7–23)
CALCIUM SERPL-MCNC: 8 MG/DL — LOW (ref 8.4–10.5)
CHLORIDE SERPL-SCNC: 104 MMOL/L — SIGNIFICANT CHANGE UP (ref 96–108)
CO2 SERPL-SCNC: 21 MMOL/L — LOW (ref 22–31)
CREAT SERPL-MCNC: 0.47 MG/DL — LOW (ref 0.5–1.3)
CULTURE RESULTS: SIGNIFICANT CHANGE UP
EGFR: 103 ML/MIN/1.73M2 — SIGNIFICANT CHANGE UP
GLUCOSE SERPL-MCNC: 109 MG/DL — HIGH (ref 70–99)
HCT VFR BLD CALC: 30.6 % — LOW (ref 34.5–45)
HCV AB S/CO SERPL IA: 0.12 S/CO — SIGNIFICANT CHANGE UP (ref 0–0.99)
HCV AB SERPL-IMP: SIGNIFICANT CHANGE UP
HGB BLD-MCNC: 9.9 G/DL — LOW (ref 11.5–15.5)
MAGNESIUM SERPL-MCNC: 2.3 MG/DL — SIGNIFICANT CHANGE UP (ref 1.6–2.6)
MCHC RBC-ENTMCNC: 29.3 PG — SIGNIFICANT CHANGE UP (ref 27–34)
MCHC RBC-ENTMCNC: 32.4 GM/DL — SIGNIFICANT CHANGE UP (ref 32–36)
MCV RBC AUTO: 90.5 FL — SIGNIFICANT CHANGE UP (ref 80–100)
METHOD TYPE: SIGNIFICANT CHANGE UP
NRBC # BLD: 0 /100 WBCS — SIGNIFICANT CHANGE UP (ref 0–0)
ORGANISM # SPEC MICROSCOPIC CNT: SIGNIFICANT CHANGE UP
ORGANISM # SPEC MICROSCOPIC CNT: SIGNIFICANT CHANGE UP
PHOSPHATE SERPL-MCNC: 3 MG/DL — SIGNIFICANT CHANGE UP (ref 2.5–4.5)
PLATELET # BLD AUTO: 294 K/UL — SIGNIFICANT CHANGE UP (ref 150–400)
POTASSIUM SERPL-MCNC: 4 MMOL/L — SIGNIFICANT CHANGE UP (ref 3.5–5.3)
POTASSIUM SERPL-SCNC: 4 MMOL/L — SIGNIFICANT CHANGE UP (ref 3.5–5.3)
RBC # BLD: 3.38 M/UL — LOW (ref 3.8–5.2)
RBC # FLD: 14.2 % — SIGNIFICANT CHANGE UP (ref 10.3–14.5)
SODIUM SERPL-SCNC: 139 MMOL/L — SIGNIFICANT CHANGE UP (ref 135–145)
SPECIMEN SOURCE: SIGNIFICANT CHANGE UP
WBC # BLD: 13.24 K/UL — HIGH (ref 3.8–10.5)
WBC # FLD AUTO: 13.24 K/UL — HIGH (ref 3.8–10.5)

## 2022-06-09 RX ORDER — ACETAMINOPHEN 500 MG
1000 TABLET ORAL EVERY 6 HOURS
Refills: 0 | Status: COMPLETED | OUTPATIENT
Start: 2022-06-09 | End: 2022-06-11

## 2022-06-09 RX ORDER — FUROSEMIDE 40 MG
10 TABLET ORAL ONCE
Refills: 0 | Status: COMPLETED | OUTPATIENT
Start: 2022-06-09 | End: 2022-06-09

## 2022-06-09 RX ADMIN — Medication 400 MILLIGRAM(S): at 08:56

## 2022-06-09 RX ADMIN — HYDROMORPHONE HYDROCHLORIDE 0.5 MILLIGRAM(S): 2 INJECTION INTRAMUSCULAR; INTRAVENOUS; SUBCUTANEOUS at 19:43

## 2022-06-09 RX ADMIN — Medication 500000 UNIT(S): at 05:24

## 2022-06-09 RX ADMIN — HYDROMORPHONE HYDROCHLORIDE 0.5 MILLIGRAM(S): 2 INJECTION INTRAMUSCULAR; INTRAVENOUS; SUBCUTANEOUS at 19:10

## 2022-06-09 RX ADMIN — PIPERACILLIN AND TAZOBACTAM 25 GRAM(S): 4; .5 INJECTION, POWDER, LYOPHILIZED, FOR SOLUTION INTRAVENOUS at 22:01

## 2022-06-09 RX ADMIN — PIPERACILLIN AND TAZOBACTAM 25 GRAM(S): 4; .5 INJECTION, POWDER, LYOPHILIZED, FOR SOLUTION INTRAVENOUS at 13:17

## 2022-06-09 RX ADMIN — HYDROMORPHONE HYDROCHLORIDE 0.5 MILLIGRAM(S): 2 INJECTION INTRAMUSCULAR; INTRAVENOUS; SUBCUTANEOUS at 13:10

## 2022-06-09 RX ADMIN — Medication 500000 UNIT(S): at 11:17

## 2022-06-09 RX ADMIN — ENOXAPARIN SODIUM 40 MILLIGRAM(S): 100 INJECTION SUBCUTANEOUS at 22:01

## 2022-06-09 RX ADMIN — HYDROMORPHONE HYDROCHLORIDE 0.5 MILLIGRAM(S): 2 INJECTION INTRAMUSCULAR; INTRAVENOUS; SUBCUTANEOUS at 12:55

## 2022-06-09 RX ADMIN — Medication 38 MICROGRAM(S): at 22:01

## 2022-06-09 RX ADMIN — Medication 500000 UNIT(S): at 23:59

## 2022-06-09 RX ADMIN — Medication 10 MILLIGRAM(S): at 11:17

## 2022-06-09 RX ADMIN — HYDROMORPHONE HYDROCHLORIDE 0.5 MILLIGRAM(S): 2 INJECTION INTRAMUSCULAR; INTRAVENOUS; SUBCUTANEOUS at 05:24

## 2022-06-09 RX ADMIN — HYDROMORPHONE HYDROCHLORIDE 0.5 MILLIGRAM(S): 2 INJECTION INTRAMUSCULAR; INTRAVENOUS; SUBCUTANEOUS at 01:00

## 2022-06-09 RX ADMIN — Medication 500000 UNIT(S): at 17:25

## 2022-06-09 RX ADMIN — HYDROMORPHONE HYDROCHLORIDE 0.5 MILLIGRAM(S): 2 INJECTION INTRAMUSCULAR; INTRAVENOUS; SUBCUTANEOUS at 00:14

## 2022-06-09 RX ADMIN — PIPERACILLIN AND TAZOBACTAM 25 GRAM(S): 4; .5 INJECTION, POWDER, LYOPHILIZED, FOR SOLUTION INTRAVENOUS at 05:24

## 2022-06-09 RX ADMIN — Medication 1000 MILLIGRAM(S): at 09:18

## 2022-06-09 RX ADMIN — HYDROMORPHONE HYDROCHLORIDE 0.5 MILLIGRAM(S): 2 INJECTION INTRAMUSCULAR; INTRAVENOUS; SUBCUTANEOUS at 06:00

## 2022-06-09 RX ADMIN — FLUCONAZOLE 100 MILLIGRAM(S): 150 TABLET ORAL at 08:02

## 2022-06-09 NOTE — DIETITIAN INITIAL EVALUATION ADULT - NS FNS WEIGHT CHANGE REASON
No weight history available in EMR. No weight history available in EMR.  Pt endorses baseline stable wt of 142 pounds.

## 2022-06-09 NOTE — DIETITIAN INITIAL EVALUATION ADULT - PERTINENT MEDS FT
MEDICATIONS  (STANDING):  chlorhexidine 2% Cloths 1 Application(s) Topical <User Schedule>  enoxaparin Injectable 40 milliGRAM(s) SubCutaneous every 24 hours  fluconAZOLE IVPB 400 milliGRAM(s) IV Intermittent every 24 hours  lactated ringers. 1000 milliLiter(s) (30 mL/Hr) IV Continuous <Continuous>  levothyroxine Injectable 38 MICROGram(s) IV Push at bedtime  nystatin    Suspension 301621 Unit(s) Oral every 6 hours  piperacillin/tazobactam IVPB.. 3.375 Gram(s) IV Intermittent every 8 hours    MEDICATIONS  (PRN):  acetaminophen   IVPB .. 1000 milliGRAM(s) IV Intermittent every 6 hours PRN Mild Pain (1 - 3)  acetaminophen   IVPB .. 1000 milliGRAM(s) IV Intermittent every 6 hours PRN Mild Pain (1 - 3)  HYDROmorphone  Injectable 0.5 milliGRAM(s) IV Push every 3 hours PRN Severe Pain (7 - 10)

## 2022-06-09 NOTE — DIETITIAN INITIAL EVALUATION ADULT - ORAL INTAKE PTA/DIET HISTORY
Pt reported 3 days of abdominal pain PTA (s/p lap appendectomy (6/3/2022).  Allergies: NKFA Pt reported 3 days of abdominal pain PTA (s/p lap appendectomy (6/3/2022), with minimal po intake.  Pt reports intolerance to fried, spicy, and sweet foods.  Allergies: NKFA

## 2022-06-09 NOTE — DIETITIAN INITIAL EVALUATION ADULT - ENERGY INTAKE
NPO x 4 days; NGT to LCS; s/p OR today for abdominal washout, abd wall closure w/mesh & flap, prevena vac in place;

## 2022-06-09 NOTE — DIETITIAN INITIAL EVALUATION ADULT - REASON FOR ADMISSION
Peritonitis    Per chart: "68 y/o female w/ a PMHx of moderate mitral regurgitation, hypothyroidism, GERD, osteoporosis, vitamin D deficiency, breast CA s/p right lumpectomy w/ chemo & radiation, bilateral breast reduction (2013), s/p hysterectomy (2005), s/p right elbow surgery (1970s), & s/p laparoscopic appendectomy (6/3/2022) who presented on 6/6 w/ pneumoperitoneum & a loculated fluid collection in the RLQ s/p exploratory laparotomy, washout, ileocolic anastomosis w/ primary anastomosis for two perforations in the terminal ileum, & temporary abdominal closure w/ Abthera VAC."

## 2022-06-09 NOTE — PROGRESS NOTE ADULT - ATTENDING COMMENTS
I discussed the surgical findings with the patient.  I discussed with her the abdominal closure with her She knows that a biologiac mesh was used to hep close the abdomen due to the rigidity of the abd wall secondary to her abdominoplasty I discussed the surgical findings with the patient.  I discussed with her the abdominal closure with her She knows that a biologic mesh was used to hep close the abdomen due to the rigidity of the abd wall secondary to her abdominoplasty

## 2022-06-09 NOTE — PROGRESS NOTE ADULT - ASSESSMENT
68 y/o female w/ a PMHx of moderate mitral regurgitation, hypothyroidism, GERD, osteoporosis, vitamin D deficiency, breast CA s/p right lumpectomy w/ chemo & radiation, bilateral breast reduction (2013), s/p hysterectomy (2005), s/p right elbow surgery (1970s), & s/p laparoscopic appendectomy (6/3/2022) who presented on 6/6 w/ pneumoperitoneum & a loculated fluid collection in the RLQ s/p exploratory laparotomy, washout, ileocolic anastomosis w/ primary anastomosis for two perforations in the terminal ileum, & temporary abdominal closure w/ Abthera VAC.    PLAN:    Neuro: acute post-op pain  - Multimodal pain control w/ IV acetaminophen and Dilaudid IVP PRN    Resp: no acute issues, on 4L NC  - Out of bed to chair, ambulate as tolerated, and incentive spirometry to prevent atelectasis    CV: no acute issues  - Monitor vital signs  - cardiology following, recs appreciated    GI: s/p exploratory laparotomy, washout, ileocolic anastomosis w/ primary anastomosis for two perforations in the terminal ileum, & temporary abdominal closure w/ Abthera VAC  - NPO w/ NGT to LCWS  - RTOR today  - Protonix d/c'ed     Renal: no acute issues  - LR increased to 100ml/hr    Heme: no acute issues  - Lovenox for VTE prophylaxis    ID: perforated viscus w/ yeast & Enterobacter cloacae in OR cultures  - Monitor WBC, temperature, and procalcitonin  - OR cultures from 6/6 w/ yeast & Enterobacter cloacae  - Empiric coverage w/ Zosyn & fluconazole  - nystatin swish & swallow added for oral thrush    Endo: no acute issues  - Levothyroxine for hypothyroidism    Code Status: Full code    Disposition: Will remain in SICU 68 y/o female w/ a PMHx of moderate mitral regurgitation, hypothyroidism, GERD, osteoporosis, vitamin D deficiency, breast CA s/p right lumpectomy w/ chemo & radiation, bilateral breast reduction (2013), s/p hysterectomy (2005), s/p right elbow surgery (1970s), & s/p laparoscopic appendectomy (6/3/2022) who presented on 6/6 w/ pneumoperitoneum & a loculated fluid collection in the RLQ s/p exploratory laparotomy, washout, ileocolic anastomosis w/ primary anastomosis for two perforations in the terminal ileum, & temporary abdominal closure w/ Abthera VAC.    PLAN:    Neuro: acute post-op pain  - Multimodal pain control w/ IV acetaminophen and Dilaudid IVP PRN    Resp: no acute issues  - Out of bed to chair, ambulate as tolerated, and incentive spirometry to prevent atelectasis    CV: no acute issues  - Monitor vital signs  - cardiology following, recs appreciated    GI: s/p exploratory laparotomy, washout, ileocolic anastomosis w/ primary anastomosis for two perforations in the terminal ileum, & temporary abdominal closure w/ Abthera VAC  - NPO w/ NGT to LCWS  - RTOR today  - Protonix d/c'ed     Renal: no acute issues  - LR increased to 100ml/hr    Heme: no acute issues  - Lovenox for VTE prophylaxis    ID: perforated viscus w/ yeast & Enterobacter cloacae in OR cultures  - Monitor WBC, temperature, and procalcitonin  - OR cultures from 6/6 w/ yeast & Enterobacter cloacae  - Empiric coverage w/ Zosyn & fluconazole; c/w zosyn for 4d course  - nystatin swish & swallow added for oral thrush    Endo: no acute issues  - Levothyroxine for hypothyroidism    Code Status: Full code    Disposition: SICU

## 2022-06-09 NOTE — PROGRESS NOTE ADULT - ASSESSMENT
67yFemale patient S/P removal of abthera vac, abd washout, abd wall reconstruction with myocutaneous flaps and bridging with ovitex mesh.      Plan:  - IV Abx: continue Zosyn x4 days   - Pain control PRN  - Diet: NPO/NGT   - IVF  - Diaz  - Activity: ambulate as tolerated   - DVT ppx: LVX   - appreciate excellent SICU care    [facility]  [id number].

## 2022-06-09 NOTE — PROGRESS NOTE ADULT - SUBJECTIVE AND OBJECTIVE BOX
Surgery Progress Note     Subjective/24hour Events: Patient seen and examined at the bedside this morning. No acute events overnight. Pain controlled.     Vital Signs:  Vital Signs Last 24 Hrs  T(C): 37.1 (08 Jun 2022 23:00), Max: 37.4 (08 Jun 2022 13:22)  T(F): 98.8 (08 Jun 2022 23:00), Max: 99.3 (08 Jun 2022 13:22)  HR: 81 (09 Jun 2022 00:00) (71 - 93)  BP: 133/62 (09 Jun 2022 00:00) (99/56 - 137/73)  BP(mean): 89 (09 Jun 2022 00:00) (72 - 97)  RR: 25 (09 Jun 2022 00:00) (14 - 28)  SpO2: 97% (09 Jun 2022 00:00) (94% - 98%)        I&O's Detail    07 Jun 2022 07:01  -  08 Jun 2022 07:00  --------------------------------------------------------  IN:    IV PiggyBack: 337.5 mL    IV PiggyBack: 775 mL    Lactated Ringers: 2400 mL  Total IN: 3512.5 mL    OUT:    Indwelling Catheter - Urethral (mL): 779 mL    Nasogastric/Oral tube (mL): 130 mL    VAC (Vacuum Assisted Closure) System (mL): 400 mL  Total OUT: 1309 mL    Total NET: 2203.5 mL      08 Jun 2022 07:01  -  09 Jun 2022 01:01  --------------------------------------------------------  IN:    IV PiggyBack: 62.5 mL    IV PiggyBack: 400 mL    Lactated Ringers: 300 mL    Lactated Ringers: 1100 mL  Total IN: 1862.5 mL    OUT:    Bulb (mL): 15 mL    Indwelling Catheter - Urethral (mL): 565 mL    Nasogastric/Oral tube (mL): 20 mL  Total OUT: 600 mL    Total NET: 1262.5 mL      Physical Examination:  General: NAD, resting comfortably in bed  HEENT: Normocephalic atraumatic  Respiratory: Nonlabored respirations, normal CW expansion.  Cardio: regular rate and rhythm.  Abdomen: softly distended, appropriately tender, prevena plus vac intact. NGT intact. LILLIAM drain x1 ss  Vascular: extremities are warm and well perfused.     Labs:    06-09    139  |  104  |  14  ----------------------------<  109<H>  4.0   |  21<L>  |  0.47<L>    Ca    8.0<L>      09 Jun 2022 00:25  Phos  3.0     06-09  Mg     2.3     06-09      CAPILLARY BLOOD GLUCOSE      POCT Blood Glucose.: 86 mg/dL (08 Jun 2022 05:26)                            9.9    13.24 )-----------( 294      ( 09 Jun 2022 00:25 )             30.6     PT/INR - ( 08 Jun 2022 05:56 )   PT: 14.3 sec;   INR: 1.23 ratio         PTT - ( 08 Jun 2022 05:56 )  PTT:28.8 sec

## 2022-06-09 NOTE — DIETITIAN INITIAL EVALUATION ADULT - NSFNSGIIOFT_GEN_A_CORE
06-08-22 @ 07:01  -  06-09-22 @ 07:00  --------------------------------------------------------  OUT:    Nasogastric/Oral tube (mL): 100 mL  Total OUT: 100 mL    Total NET: -100 mL    Last BM: none noted

## 2022-06-09 NOTE — PROGRESS NOTE ADULT - ATTENDING COMMENTS
SICU Attending Note    Patient seen and examined and agree with above.   67 year old female after a laparoscopy appendectomy 6/3 complicated by enterotomy and now s/p ex lap, ileocectomy. Yesterday the patient went to the OR and is s/p ex lap, drainage and abdominal wall reconstruction with mesh.     Current management includes:  Neuro- pain currently controlled with current regimen. Tylenol and dilaudid PRN.   CV- Hemodynamically stable   Pulm - room air   -goal SpO2 92%  GI- NPO, no flatus;  cc/24 hours   -preveena vac   ID- fluconazole and zosyn; patient has yeast like cells in culture   -Bacteroides bacteremia- will continue zosyn until cultures speciate. Repeat blood cultures are negative.   -enterobacter in the peritoneal fluid    - discontinue meyers   PT to see patient.

## 2022-06-09 NOTE — PROGRESS NOTE ADULT - SUBJECTIVE AND OBJECTIVE BOX
HISTORY:  66 y/o female w/ a PMHx of moderate mitral regurgitation, hypothyroidism, GERD, osteoporosis, vitamin D deficiency, breast CA s/p right lumpectomy w/ chemo & radiation, bilateral breast reduction (2013), s/p hysterectomy (2005), s/p right elbow surgery (1970s), and s/p laparoscopic appendectomy (6/3/2022) who presented on 6/6 w/ severe abdominal pain and obstipation. Imaging revealed pneumoperitoneum w/ a loculated fluid collection in the RLQ so she was taken to the OR emergently for an exploratory laparotomy, washout, ileocolic anastomosis w/ primary anastomosis for two perforations in the terminal ileum, and temporary abdominal closure w/ Abthera VAC. Patient was also hypotensive requiring vasopressor support during the case so she was left intubated and transferred to SICU for further management. Patient was extubated later that day. Patient reports NGT discomfort and incisional abdominal pain but otherwise denies headache, fevers, chills, dizziness, weakness, shortness of breath, chest pain, or nausea/vomiting.    24 HOUR EVENTS:  - s/p OR today for abdominal washout, abd wall closure w/mesh & flap, prevena vac in place; given 1.5L crystalloid in OR  - NPO w/NGT to suction  - nystatin swish & swallow ordered for oral thrush    NEURO  Exam: awake, alert, oriented x4, no acute distress, no acute focal deficits  Meds:   -acetaminophen   IVPB .. 1000 milliGRAM(s) IV Intermittent every 6 hours PRN Mild Pain (1 - 3)  -HYDROmorphone  Injectable 0.5 milliGRAM(s) IV Push every 3 hours PRN Severe Pain (7 - 10)      RESPIRATORY  RR: 25 (06-09-22 @ 00:00) (14 - 28)  SpO2: 97% (06-09-22 @ 00:00) (94% - 98%)  Exam: clear to auscultation bilaterally, on 4L NC    Meds:     CARDIOVASCULAR  HR: 81 (06-09-22 @ 00:00) (71 - 93)  BP: 133/62 (06-09-22 @ 00:00) (99/56 - 137/73)  BP(mean): 89 (06-09-22 @ 00:00) (72 - 97)  Exam: regular rate and rhythm, S1S2  Cardiac Rhythm: sinus  Perfusion    [x]Adequate    [ ]Inadequate  Mentation   [x]Normal       [ ]Reduced  Extremities  [x]Warm         [ ]Cool  Volume Status [ ]Hypervolemic [x]Euvolemic [ ]Hypovolemic      GI/NUTRITION  Exam: soft, nondistended, tavares-incisional tenderness, Abthera VAC to suction w/ serosanguineous output, NGT w/ bilious output  Diet: NPO w/ NGT to LCWS    GENITOURINARY  I&O's Detail    06-07 @ 07:01  -  06-08 @ 07:00  --------------------------------------------------------  IN:    IV PiggyBack: 337.5 mL    IV PiggyBack: 775 mL    Lactated Ringers: 2400 mL  Total IN: 3512.5 mL    OUT:    Indwelling Catheter - Urethral (mL): 779 mL    Nasogastric/Oral tube (mL): 130 mL    VAC (Vacuum Assisted Closure) System (mL): 400 mL  Total OUT: 1309 mL    Total NET: 2203.5 mL      06-08 @ 07:01  -  06-09 @ 00:49  --------------------------------------------------------  IN:    IV PiggyBack: 62.5 mL    IV PiggyBack: 400 mL    Lactated Ringers: 300 mL    Lactated Ringers: 1100 mL  Total IN: 1862.5 mL    OUT:    Bulb (mL): 15 mL    Indwelling Catheter - Urethral (mL): 565 mL    Nasogastric/Oral tube (mL): 20 mL  Total OUT: 600 mL    Total NET: 1262.5 mL    Weight (kg): 65.8 (06-08 @ 10:17)  06-08    138  |  104  |  12  ----------------------------<  103<H>  3.8   |  19<L>  |  0.46<L>    Ca    7.6<L>      08 Jun 2022 15:44  Phos  3.4     06-08  Mg     2.3     06-08      Meds: lactated ringers. 1000 milliLiter(s) IV Continuous <Continuous>      HEMATOLOGIC  Meds: enoxaparin Injectable 40 milliGRAM(s) SubCutaneous every 24 hours                          9.9    13.24 )-----------( 294      ( 09 Jun 2022 00:25 )             30.6     PT/INR - ( 08 Jun 2022 05:56 )   PT: 14.3 sec;   INR: 1.23 ratio    PTT - ( 08 Jun 2022 05:56 )  PTT:28.8 sec    INFECTIOUS DISEASES  T(C): 37.1 (06-08-22 @ 23:00), Max: 37.4 (06-08-22 @ 13:22)    WBC Count: 13.24 K/uL (06-09 @ 00:25)  WBC Count: 12.38 K/uL (06-08 @ 15:44)  WBC Count: 9.51 K/uL (06-08 @ 01:19)    Recent Cultures:  Specimen Source: .Body Fluid Peritoneal Fluid, 06-06 @ 21:43; Results   Rare Yeast Identification to follow.  Growth in fluid media only Enterobacter cloacae complex Susceptibility to  follow.  Few Bacteroides vulgatus group "Susceptibilities not performed"; Gram Stain:   Moderate polymorphonuclear leukocytes seen per low power field  Rare Gram Positive Rods seen per oil power field  Rare Yeast like cells seen per oil power field; Organism: Enterobacter cloacae complex  Specimen Source: .Blood Blood-Peripheral, 06-06 @ 12:40; Results   No growth to date.; Gram Stain: --; Organism: --  Specimen Source: Clean Catch Clean Catch (Midstream), 06-06 @ 12:27; Results   <10,000 CFU/mL Normal Urogenital Odalis; Gram Stain: --; Organism: --  Specimen Source: .Blood Blood-Peripheral, 06-06 @ 12:20; Results   No growth to date.; Gram Stain: --; Organism: --  Specimen Source: .Blood Blood-Peripheral, 06-06 @ 09:41; Results   Growth in anaerobic bottle: Bacteroides ovatus group  "Susceptibilities not performed"; Gram Stain:   Growth in anaerobic bottle: Gram Negative Rods; Organism: --  Specimen Source: .Blood Blood-Peripheral, 06-06 @ 09:40; Results   Growth in anaerobic bottle: Bacteroides ovatus group "Susceptibilities  not performed"  ***Blood Panel PCR results on this specimen are available  approximately 3 hours after the Gram stain result.***  Gram stain, PCR, and/or culture results may not always  correspond due to difference in methodologies.  ************************************************************  This PCR assay was performed by multiplex PCR. This  Assay tests for 66 bacterial and resistance gene targets.  Please refer to [facility] test directory  at [email or web address] for details.; Gram Stain:   Growth in anaerobic bottle: Gram Negative Rods; Organism: Blood Culture PCR    Meds: fluconAZOLE IVPB 400 milliGRAM(s) IV Intermittent every 24 hours  nystatin    Suspension 832290 Unit(s) Oral every 6 hours  piperacillin/tazobactam IVPB.. 3.375 Gram(s) IV Intermittent every 8 hours      ENDOCRINE  Capillary Blood Glucose    Meds: levothyroxine Injectable 38 MICROGram(s) IV Push at bedtime    ACCESS DEVICES:  [x] Peripheral IV  [ ] Central Venous Line		[ ] R	[ ] L	[ ] IJ	[ ] Fem	[ ] SC	Placed:   [ ] Arterial Line			[ ] R	[ ] L	[ ] Fem	[ ] Rad	[ ] Ax	Placed:   [ ] PICC:					[ ] Mediport  [x] Urinary Catheter, Date Placed: 6/6  [x] Necessity of urinary, arterial, and venous catheters discussed    OTHER MEDICATIONS:  chlorhexidine 2% Cloths 1 Application(s) Topical <User Schedule>      IMAGING:

## 2022-06-09 NOTE — DIETITIAN INITIAL EVALUATION ADULT - REASON INDICATOR FOR ASSESSMENT
Nutrition Assessment warranted for length of stay on SICU  Information obtained from: medical record, communication with team.  Nutrition Assessment warranted for length of stay on SICU  Information obtained from: patient, daughter at bedside, medical record, communication with team.

## 2022-06-09 NOTE — PROGRESS NOTE ADULT - SUBJECTIVE AND OBJECTIVE BOX
DATE OF SERVICE: 06-09-22 @ 17:09    Patient is a 67y old  Female who presents with a chief complaint of Peritonitis    Per chart: "68 y/o female w/ a PMHx of moderate mitral regurgitation, hypothyroidism, GERD, osteoporosis, vitamin D deficiency, breast CA s/p right lumpectomy w/ chemo & radiation, bilateral breast reduction (2013), s/p hysterectomy (2005), s/p right elbow surgery (1970s), & s/p laparoscopic appendectomy (6/3/2022) who presented on 6/6 w/ pneumoperitoneum & a loculated fluid collection in the RLQ s/p exploratory laparotomy, washout, ileocolic anastomosis w/ primary anastomosis for two perforations in the terminal ileum, & temporary abdominal closure w/ Abthera VAC."     (09 Jun 2022 13:52)      INTERVAL HISTORY: Feels ok.     REVIEW OF SYSTEMS:  CONSTITUTIONAL: No weakness  EYES/ENT: No visual changes;  No throat pain   NECK: No pain or stiffness  RESPIRATORY: No cough, wheezing; No shortness of breath  CARDIOVASCULAR: No chest pain or palpitations  GASTROINTESTINAL: No abdominal  pain. No nausea, vomiting, or hematemesis  GENITOURINARY: No dysuria, frequency or hematuria  NEUROLOGICAL: No stroke like symptoms  SKIN: No rashes    TELEMETRY Personally reviewed: SR/ST 70-100s  	  MEDICATIONS:        PHYSICAL EXAM:  T(C): 37.1 (06-09-22 @ 11:00), Max: 37.2 (06-09-22 @ 07:00)  HR: 78 (06-09-22 @ 15:00) (67 - 87)  BP: 111/61 (06-09-22 @ 15:00) (103/59 - 137/73)  RR: 20 (06-09-22 @ 15:00) (13 - 25)  SpO2: 96% (06-09-22 @ 15:00) (91% - 98%)  Wt(kg): --  I&O's Summary    08 Jun 2022 07:01  -  09 Jun 2022 07:00  --------------------------------------------------------  IN: 2662.5 mL / OUT: 1050 mL / NET: 1612.5 mL    09 Jun 2022 07:01  -  09 Jun 2022 17:09  --------------------------------------------------------  IN: 905 mL / OUT: 940 mL / NET: -35 mL          Appearance: In no distress	  HEENT:    PERRL, EOMI	  Cardiovascular:  S1 S2, No JVD  Respiratory: Lungs clear to auscultation	  Gastrointestinal:  Soft, Non-tender, + BS	  Vascularature:  No edema of LE  Psychiatric: Appropriate affect   Neuro: no acute focal deficits                               9.9    13.24 )-----------( 294      ( 09 Jun 2022 00:25 )             30.6     06-09    139  |  104  |  14  ----------------------------<  109<H>  4.0   |  21<L>  |  0.47<L>    Ca    8.0<L>      09 Jun 2022 00:25  Phos  3.0     06-09  Mg     2.3     06-09          Labs personally reviewed      ASSESSMENT/PLAN: 	    The patient is a 67y Female who is now s/p exploratory laparotomy with ileocecal resection and abdominal washout on 6/6    1. Esperanza op risk stratification - tolerated surgery well, planned for closing of abd and washout  6/8  - HD stable   - SR on the monitor   - Pain management   - Tolerated surgery well    2. HLD   - on Repatha, resume as OP    3. Mod MR - euvolemic   - OP follow up        NIKKY Carver-LEIGH Santamaria DO [facility]  Cardiovascular Medicine  [address] [place] [address], [address]  Office: [phone]  Cell: [phone]

## 2022-06-09 NOTE — DIETITIAN INITIAL EVALUATION ADULT - PERTINENT LABORATORY DATA
06-09    139  |  104  |  14  ----------------------------<  109<H>  4.0   |  21<L>  |  0.47<L>    Ca    8.0<L>      09 Jun 2022 00:25  Phos  3.0     06-09  Mg     2.3     06-09

## 2022-06-10 LAB
ANION GAP SERPL CALC-SCNC: 12 MMOL/L — SIGNIFICANT CHANGE UP (ref 5–17)
ANION GAP SERPL CALC-SCNC: 13 MMOL/L — SIGNIFICANT CHANGE UP (ref 5–17)
BUN SERPL-MCNC: 15 MG/DL — SIGNIFICANT CHANGE UP (ref 7–23)
BUN SERPL-MCNC: 15 MG/DL — SIGNIFICANT CHANGE UP (ref 7–23)
CALCIUM SERPL-MCNC: 8 MG/DL — LOW (ref 8.4–10.5)
CALCIUM SERPL-MCNC: 8.2 MG/DL — LOW (ref 8.4–10.5)
CHLORIDE SERPL-SCNC: 100 MMOL/L — SIGNIFICANT CHANGE UP (ref 96–108)
CHLORIDE SERPL-SCNC: 102 MMOL/L — SIGNIFICANT CHANGE UP (ref 96–108)
CO2 SERPL-SCNC: 24 MMOL/L — SIGNIFICANT CHANGE UP (ref 22–31)
CO2 SERPL-SCNC: 24 MMOL/L — SIGNIFICANT CHANGE UP (ref 22–31)
CREAT SERPL-MCNC: 0.49 MG/DL — LOW (ref 0.5–1.3)
CREAT SERPL-MCNC: 0.49 MG/DL — LOW (ref 0.5–1.3)
EGFR: 102 ML/MIN/1.73M2 — SIGNIFICANT CHANGE UP
EGFR: 102 ML/MIN/1.73M2 — SIGNIFICANT CHANGE UP
GLUCOSE SERPL-MCNC: 106 MG/DL — HIGH (ref 70–99)
GLUCOSE SERPL-MCNC: 119 MG/DL — HIGH (ref 70–99)
HCT VFR BLD CALC: 28.3 % — LOW (ref 34.5–45)
HGB BLD-MCNC: 9.5 G/DL — LOW (ref 11.5–15.5)
MAGNESIUM SERPL-MCNC: 2 MG/DL — SIGNIFICANT CHANGE UP (ref 1.6–2.6)
MAGNESIUM SERPL-MCNC: 2.1 MG/DL — SIGNIFICANT CHANGE UP (ref 1.6–2.6)
MCHC RBC-ENTMCNC: 30.1 PG — SIGNIFICANT CHANGE UP (ref 27–34)
MCHC RBC-ENTMCNC: 33.6 GM/DL — SIGNIFICANT CHANGE UP (ref 32–36)
MCV RBC AUTO: 89.6 FL — SIGNIFICANT CHANGE UP (ref 80–100)
NRBC # BLD: 0 /100 WBCS — SIGNIFICANT CHANGE UP (ref 0–0)
PHOSPHATE SERPL-MCNC: 1.8 MG/DL — LOW (ref 2.5–4.5)
PHOSPHATE SERPL-MCNC: 3.7 MG/DL — SIGNIFICANT CHANGE UP (ref 2.5–4.5)
PLATELET # BLD AUTO: 304 K/UL — SIGNIFICANT CHANGE UP (ref 150–400)
POTASSIUM SERPL-MCNC: 3.8 MMOL/L — SIGNIFICANT CHANGE UP (ref 3.5–5.3)
POTASSIUM SERPL-MCNC: 3.8 MMOL/L — SIGNIFICANT CHANGE UP (ref 3.5–5.3)
POTASSIUM SERPL-SCNC: 3.8 MMOL/L — SIGNIFICANT CHANGE UP (ref 3.5–5.3)
POTASSIUM SERPL-SCNC: 3.8 MMOL/L — SIGNIFICANT CHANGE UP (ref 3.5–5.3)
PROCALCITONIN SERPL-MCNC: 0.91 NG/ML — HIGH (ref 0.02–0.1)
RBC # BLD: 3.16 M/UL — LOW (ref 3.8–5.2)
RBC # FLD: 14.3 % — SIGNIFICANT CHANGE UP (ref 10.3–14.5)
SODIUM SERPL-SCNC: 137 MMOL/L — SIGNIFICANT CHANGE UP (ref 135–145)
SODIUM SERPL-SCNC: 138 MMOL/L — SIGNIFICANT CHANGE UP (ref 135–145)
WBC # BLD: 16.57 K/UL — HIGH (ref 3.8–10.5)
WBC # FLD AUTO: 16.57 K/UL — HIGH (ref 3.8–10.5)

## 2022-06-10 RX ORDER — ONDANSETRON 8 MG/1
4 TABLET, FILM COATED ORAL ONCE
Refills: 0 | Status: COMPLETED | OUTPATIENT
Start: 2022-06-10 | End: 2022-06-10

## 2022-06-10 RX ORDER — SODIUM CHLORIDE 9 MG/ML
1000 INJECTION, SOLUTION INTRAVENOUS
Refills: 0 | Status: DISCONTINUED | OUTPATIENT
Start: 2022-06-10 | End: 2022-06-12

## 2022-06-10 RX ORDER — PIPERACILLIN AND TAZOBACTAM 4; .5 G/20ML; G/20ML
3.38 INJECTION, POWDER, LYOPHILIZED, FOR SOLUTION INTRAVENOUS EVERY 8 HOURS
Refills: 0 | Status: COMPLETED | OUTPATIENT
Start: 2022-06-10 | End: 2022-06-13

## 2022-06-10 RX ORDER — POTASSIUM PHOSPHATE, MONOBASIC POTASSIUM PHOSPHATE, DIBASIC 236; 224 MG/ML; MG/ML
15 INJECTION, SOLUTION INTRAVENOUS ONCE
Refills: 0 | Status: COMPLETED | OUTPATIENT
Start: 2022-06-10 | End: 2022-06-10

## 2022-06-10 RX ORDER — FUROSEMIDE 40 MG
20 TABLET ORAL ONCE
Refills: 0 | Status: COMPLETED | OUTPATIENT
Start: 2022-06-10 | End: 2022-06-10

## 2022-06-10 RX ADMIN — Medication 400 MILLIGRAM(S): at 21:25

## 2022-06-10 RX ADMIN — Medication 20 MILLIGRAM(S): at 11:42

## 2022-06-10 RX ADMIN — HYDROMORPHONE HYDROCHLORIDE 0.5 MILLIGRAM(S): 2 INJECTION INTRAMUSCULAR; INTRAVENOUS; SUBCUTANEOUS at 12:20

## 2022-06-10 RX ADMIN — Medication 1000 MILLIGRAM(S): at 06:11

## 2022-06-10 RX ADMIN — POTASSIUM PHOSPHATE, MONOBASIC POTASSIUM PHOSPHATE, DIBASIC 62.5 MILLIMOLE(S): 236; 224 INJECTION, SOLUTION INTRAVENOUS at 02:11

## 2022-06-10 RX ADMIN — FLUCONAZOLE 100 MILLIGRAM(S): 150 TABLET ORAL at 11:42

## 2022-06-10 RX ADMIN — ONDANSETRON 4 MILLIGRAM(S): 8 TABLET, FILM COATED ORAL at 00:30

## 2022-06-10 RX ADMIN — Medication 38 MICROGRAM(S): at 21:25

## 2022-06-10 RX ADMIN — Medication 63.75 MILLIMOLE(S): at 05:19

## 2022-06-10 RX ADMIN — HYDROMORPHONE HYDROCHLORIDE 0.5 MILLIGRAM(S): 2 INJECTION INTRAMUSCULAR; INTRAVENOUS; SUBCUTANEOUS at 06:46

## 2022-06-10 RX ADMIN — HYDROMORPHONE HYDROCHLORIDE 0.5 MILLIGRAM(S): 2 INJECTION INTRAMUSCULAR; INTRAVENOUS; SUBCUTANEOUS at 12:35

## 2022-06-10 RX ADMIN — PIPERACILLIN AND TAZOBACTAM 25 GRAM(S): 4; .5 INJECTION, POWDER, LYOPHILIZED, FOR SOLUTION INTRAVENOUS at 05:19

## 2022-06-10 RX ADMIN — HYDROMORPHONE HYDROCHLORIDE 0.5 MILLIGRAM(S): 2 INJECTION INTRAMUSCULAR; INTRAVENOUS; SUBCUTANEOUS at 18:31

## 2022-06-10 RX ADMIN — Medication 1000 MILLIGRAM(S): at 21:55

## 2022-06-10 RX ADMIN — HYDROMORPHONE HYDROCHLORIDE 0.5 MILLIGRAM(S): 2 INJECTION INTRAMUSCULAR; INTRAVENOUS; SUBCUTANEOUS at 01:30

## 2022-06-10 RX ADMIN — Medication 500000 UNIT(S): at 17:38

## 2022-06-10 RX ADMIN — ENOXAPARIN SODIUM 40 MILLIGRAM(S): 100 INJECTION SUBCUTANEOUS at 21:25

## 2022-06-10 RX ADMIN — HYDROMORPHONE HYDROCHLORIDE 0.5 MILLIGRAM(S): 2 INJECTION INTRAMUSCULAR; INTRAVENOUS; SUBCUTANEOUS at 00:54

## 2022-06-10 RX ADMIN — HYDROMORPHONE HYDROCHLORIDE 0.5 MILLIGRAM(S): 2 INJECTION INTRAMUSCULAR; INTRAVENOUS; SUBCUTANEOUS at 18:46

## 2022-06-10 RX ADMIN — Medication 400 MILLIGRAM(S): at 05:19

## 2022-06-10 RX ADMIN — Medication 500000 UNIT(S): at 11:42

## 2022-06-10 RX ADMIN — HYDROMORPHONE HYDROCHLORIDE 0.5 MILLIGRAM(S): 2 INJECTION INTRAMUSCULAR; INTRAVENOUS; SUBCUTANEOUS at 06:11

## 2022-06-10 RX ADMIN — Medication 500000 UNIT(S): at 05:19

## 2022-06-10 RX ADMIN — PIPERACILLIN AND TAZOBACTAM 25 GRAM(S): 4; .5 INJECTION, POWDER, LYOPHILIZED, FOR SOLUTION INTRAVENOUS at 14:27

## 2022-06-10 RX ADMIN — CHLORHEXIDINE GLUCONATE 1 APPLICATION(S): 213 SOLUTION TOPICAL at 06:46

## 2022-06-10 RX ADMIN — PIPERACILLIN AND TAZOBACTAM 25 GRAM(S): 4; .5 INJECTION, POWDER, LYOPHILIZED, FOR SOLUTION INTRAVENOUS at 22:56

## 2022-06-10 NOTE — PROGRESS NOTE ADULT - ASSESSMENT
66 y/o F with Hx hypothyroidism and breast CA (s/p R breast lumpectomy 8/2021, XRT) and recent appendectomy 6/3 who presents with severe abdominal pain, found to have pneumoperitoneum and loculated fluid collection c/f perforation. Patient was taken emergently to OR on 6/8 for exlap, found to have TI perforation with gross spillage of intestinal contents. Patient underwent washout, ileocolic resection with primary anastomosis, left open with Abthera. SICU consulted for vent management and open abdomen. Patient is s/p RTOR 6/9 for abd washout and complex abdominal closure with ovatex mesh.     24 HOUR EVENTS:  -Diaz removed, passed TOV  -sips & chips  -LR decreased to 30 cc/hr; s/p 10 IV lasix for diuresis  68 y/o F with Hx hypothyroidism and breast CA (s/p R breast lumpectomy 8/2021, XRT) and recent appendectomy 6/3 who presents with severe abdominal pain, found to have pneumoperitoneum and loculated fluid collection c/f perforation. Patient was taken emergently to OR on 6/8 for exlap, found to have TI perforation with gross spillage of intestinal contents. Patient underwent washout, ileocolic resection with primary anastomosis, left open with Abthera. SICU consulted for vent management and open abdomen. Patient is s/p RTOR 6/9 for abd washout and complex abdominal closure with ovatex mesh.     24 HOUR EVENTS:  -Diaz removed, passed TOV  -sips & chips  -LR decreased to 30 cc/hr; s/p 10 IV lasix for diuresis       PLAN:    Neuro: acute post-op pain  - Multimodal pain control w/ IV acetaminophen and Dilaudid IVP PRN    Resp: no acute issues  - Out of bed to chair, ambulate as tolerated, and incentive spirometry to prevent atelectasis    CV: no acute issues  - Monitor vital signs  - cardiology following, recs appreciated    GI: s/p exploratory laparotomy, washout, ileocolic anastomosis w/ primary anastomosis for two perforations in the terminal ileum, & temporary abdominal closure w/ Abthera VAC  - NPO w/ NGT to LCWS  - RTOR today  - Protonix d/c'ed     Renal: no acute issues  - LR increased to 100ml/hr    Heme: no acute issues  - Lovenox for VTE prophylaxis    ID: perforated viscus w/ yeast & Enterobacter cloacae in OR cultures  - Monitor WBC, temperature, and procalcitonin  - OR cultures from 6/6 w/ yeast & Enterobacter cloacae  - Empiric coverage w/ Zosyn & fluconazole; c/w zosyn for 4d course  - nystatin swish & swallow added for oral thrush    Endo: no acute issues  - Levothyroxine for hypothyroidism    Code Status: Full code    Disposition: SICU   66 y/o F with Hx hypothyroidism and breast CA (s/p R breast lumpectomy 8/2021, XRT) and recent appendectomy 6/3 who presents with severe abdominal pain, found to have pneumoperitoneum and loculated fluid collection c/f perforation. Patient was taken emergently to OR on 6/8 for exlap, found to have TI perforation with gross spillage of intestinal contents. Patient underwent washout, ileocolic resection with primary anastomosis, left open with Abthera. SICU consulted for vent management and open abdomen. Patient is s/p RTOR 6/9 for abd washout and complex abdominal closure with ovatex mesh.     24 HOUR EVENTS:  -Diaz removed, passed TOV  -sips & chips  -LR decreased to 30 cc/hr; s/p 10 IV lasix for diuresis       PLAN:    Neuro: acute post-op pain  - Multimodal pain control w/ IV acetaminophen and Dilaudid IVP PRN    Resp: no acute issues  - Tolerating room air to 1L NC   - Out of bed to chair, ambulate as tolerated, and incentive spirometry to prevent atelectasis    CV: no acute issues  - Monitor vital signs  - cardiology following, recs appreciated    GI: s/p exploratory laparotomy, washout, ileocolic anastomosis w/ primary anastomosis for two perforations in the terminal ileum, & temporary abdominal closure w/ Abthera VAC  - NPO w/ NGT to LCWS  - Protonix d/c'ed     Renal: no acute issues  - NS@125ml/hr  - urinary catheter dc'd, passed TOV  -s/p Lasix 10 IV     Heme: no acute issues  - Lovenox for VTE prophylaxis    ID: perforated viscus w/ yeast & Enterobacter cloacae in OR cultures  - Monitor WBC, temperature, and procalcitonin  - OR cultures from 6/6 w/ yeast & Enterobacter cloacae  - Empiric coverage w/ Zosyn & fluconazole; c/w zosyn for 4d course  - nystatin swish & swallow added for oral thrush    Endo: no acute issues  - Levothyroxine for hypothyroidism    Code Status: Full code    Disposition: SICU

## 2022-06-10 NOTE — PROGRESS NOTE ADULT - SUBJECTIVE AND OBJECTIVE BOX
Date of Service   06-10-22 @ 12:27    Patient is a 67y old  Female who presents with a chief complaint of Abdominal Sepsis (10 Dominick 2022 01:21)      INTERVAL HISTORY: pt feels ok   TELEMETRY Personally reviewed: SR 70's    REVIEW OF SYSTEMS:   CONSTITUTIONAL: No weakness  EYES/ENT: No visual changes; No throat pain  Neck: No pain or stiffness  Respiratory: No cough, wheezing, No shortness of breath  CARDIOVASCULAR: no chest pain or palpitations  GASTROINTESTINAL: No abdominal pain, no nausea, vomiting or hematemesis  GENITOURINARY: No dysuria, frequency or hematuria  NEUROLOGICAL: No stroke like symptoms  SKIN: No rashes    	  MEDICATIONS:        PHYSICAL EXAM:  T(C): 36.8 (06-10-22 @ 11:00), Max: 37.4 (06-09-22 @ 23:00)  HR: 89 (06-10-22 @ 11:00) (71 - 92)  BP: 129/68 (06-10-22 @ 11:00) (102/55 - 129/68)  RR: 21 (06-10-22 @ 11:00) (13 - 23)  SpO2: 91% (06-10-22 @ 11:00) (89% - 98%)  Wt(kg): --  I&O's Summary    09 Jun 2022 07:01  -  10 Dominick 2022 07:00  --------------------------------------------------------  IN: 1770 mL / OUT: 1490 mL / NET: 280 mL    10 Dominick 2022 07:01  -  10 Dominick 2022 12:27  --------------------------------------------------------  IN: 392.5 mL / OUT: 0 mL / NET: 392.5 mL          Appearance: In no distress	  HEENT:    PERRL, EOMI	  Cardiovascular:  S1 S2, No JVD  Respiratory: Lungs clear to auscultation	  Gastrointestinal:  Soft, Non-tender, + BS,  Vasculature:  No edema of LE  Psychiatric: Appropriate affect   Neuro: no acute focal deficits                               9.5    16.57 )-----------( 304      ( 10 Dominick 2022 01:01 )             28.3     06-10    137  |  100  |  15  ----------------------------<  119<H>  3.8   |  24  |  0.49<L>    Ca    8.2<L>      10 Dominick 2022 09:26  Phos  3.7     06-10  Mg     2.1     06-10          Labs personally reviewed      ASSESSMENT/PLAN: 	  The patient is a 67y Female who is now s/p exploratory laparotomy with ileocecal resection and abdominal washout on 6/6    1. Esperanza op risk stratification - tolerated surgery well, planned for closing of abd and washout  6/8  - HD stable   - SR on the monitor   - Pain management   - Tolerated surgery well  - BP stable     2. HLD   - on Repatha, resume as OP    3. Mod MR - euvolemic   - OP follow up        Elsa Li [facility]   Randy Santamaria DO [facility]  Cardiovascular Medicine  [address] [place] [address], [address]  Office: [phone]

## 2022-06-10 NOTE — PHYSICAL THERAPY INITIAL EVALUATION ADULT - GENERAL OBSERVATIONS, REHAB EVAL
Chart reviewed events to date noted. Blood glucose reviewed. Pt tolerated 60min PT initial evaluation well. Pt rec'd in bed in NAD, +perveena, ICU monitoring, eager for PT.

## 2022-06-10 NOTE — PROGRESS NOTE ADULT - SUBJECTIVE AND OBJECTIVE BOX
The patient is afebrile with normal VS - only issue at present is a rising WBC - she is a setup for an intra-abd abscess - NG tube clamping trial underway - hopefully she will be transferred to [facility]

## 2022-06-10 NOTE — PROGRESS NOTE ADULT - ASSESSMENT
67yFemale patient S/P removal of abthera vac, abd washout, abd wall reconstruction with myocutaneous flaps and bridging with ovitex mesh.      Plan:  - IV Abx: continue Zosyn x4 days   - Pain control PRN  - Diet: NPO/NGT   - IVF  - Diaz  - Activity: ambulate as tolerated   - DVT ppx: LVX   - appreciate excellent SICU care    [facility]  [id number]. 67yFemale patient S/P removal of abthera vac, abd washout, abd wall reconstruction with myocutaneous flaps and bridging with ovitex mesh.      Plan:  - IV Abx: continue Zosyn x4 days   - Pain control PRN  - Diet: sips/NGT   - Activity: ambulate as tolerated   - DVT ppx: LVX   - appreciate excellent SICU care    [facility]  [id number].

## 2022-06-10 NOTE — PHYSICAL THERAPY INITIAL EVALUATION ADULT - ADDITIONAL COMMENTS
Pt lives with  in a private home with 4 steps to enter & 1 flight to bedroom. Pt was independent with all mobility & ADLs prior to admit.

## 2022-06-10 NOTE — PROGRESS NOTE ADULT - SUBJECTIVE AND OBJECTIVE BOX
Surgery Progress Note     Subjective/24hour Events: Patient seen and examined at the bedside this morning. No acute events overnight. Pain controlled.     Vital Signs:  Vital Signs Last 24 Hrs  T(C): 37.4 (09 Jun 2022 23:00), Max: 37.4 (09 Jun 2022 23:00)  T(F): 99.4 (09 Jun 2022 23:00), Max: 99.4 (09 Jun 2022 23:00)  HR: 86 (09 Jun 2022 23:00) (67 - 89)  BP: 122/62 (09 Jun 2022 23:00) (103/59 - 127/62)  BP(mean): 87 (09 Jun 2022 23:00) (72 - 91)  RR: 21 (09 Jun 2022 23:00) (13 - 25)  SpO2: 96% (09 Jun 2022 23:00) (91% - 98%)        I&O's Detail    08 Jun 2022 07:01  -  09 Jun 2022 07:00  --------------------------------------------------------  IN:    IV PiggyBack: 62.5 mL    IV PiggyBack: 500 mL    Lactated Ringers: 1800 mL    Lactated Ringers: 300 mL  Total IN: 2662.5 mL    OUT:    Bulb (mL): 65 mL    Indwelling Catheter - Urethral (mL): 885 mL    Nasogastric/Oral tube (mL): 100 mL  Total OUT: 1050 mL    Total NET: 1612.5 mL      09 Jun 2022 07:01  -  10 Dominick 2022 00:23  --------------------------------------------------------  IN:    IV PiggyBack: 200 mL    IV PiggyBack: 275 mL    Lactated Ringers: 690 mL  Total IN: 1165 mL    OUT:    Bulb (mL): 50 mL    Indwelling Catheter - Urethral (mL): 950 mL    Nasogastric/Oral tube (mL): 50 mL    VAC (Vacuum Assisted Closure) System (mL): 150 mL    Voided (mL): 200 mL  Total OUT: 1400 mL    Total NET: -235 mL        Physical Examination:  General: NAD, resting comfortably in bed  HEENT: Normocephalic atraumatic  Respiratory: Nonlabored respirations, normal CW expansion.  Cardio: regular rate and rhythm.  Abdomen: softly distended, appropriately tender, prevena plus vac intact. NGT intact. LILLIAM drain x1 ss  Vascular: extremities are warm and well perfused.     Labs:    06-09    139  |  104  |  14  ----------------------------<  109<H>  4.0   |  21<L>  |  0.47<L>    Ca    8.0<L>      09 Jun 2022 00:25  Phos  3.0     06-09  Mg     2.3     06-09      CAPILLARY BLOOD GLUCOSE                                9.9    13.24 )-----------( 294      ( 09 Jun 2022 00:25 )             30.6     PT/INR - ( 08 Jun 2022 05:56 )   PT: 14.3 sec;   INR: 1.23 ratio         PTT - ( 08 Jun 2022 05:56 )  PTT:28.8 sec       Surgery Progress Note     24 HOUR EVENTS:  -Diaz removed, passed TOV  -Advanced to Sips & chips  -LR decreased to 30 cc/hr; s/p 10 IV lasix for diuresis     Subjective/24hour Events: Patient seen and examined at the bedside this morning. No acute events overnight. Pain controlled. No gas or BM yet.     Vital Signs:  Vital Signs Last 24 Hrs  T(C): 37.4 (09 Jun 2022 23:00), Max: 37.4 (09 Jun 2022 23:00)  T(F): 99.4 (09 Jun 2022 23:00), Max: 99.4 (09 Jun 2022 23:00)  HR: 86 (09 Jun 2022 23:00) (67 - 89)  BP: 122/62 (09 Jun 2022 23:00) (103/59 - 127/62)  BP(mean): 87 (09 Jun 2022 23:00) (72 - 91)  RR: 21 (09 Jun 2022 23:00) (13 - 25)  SpO2: 96% (09 Jun 2022 23:00) (91% - 98%)        I&O's Detail    08 Jun 2022 07:01  -  09 Jun 2022 07:00  --------------------------------------------------------  IN:    IV PiggyBack: 62.5 mL    IV PiggyBack: 500 mL    Lactated Ringers: 1800 mL    Lactated Ringers: 300 mL  Total IN: 2662.5 mL    OUT:    Bulb (mL): 65 mL    Indwelling Catheter - Urethral (mL): 885 mL    Nasogastric/Oral tube (mL): 100 mL  Total OUT: 1050 mL    Total NET: 1612.5 mL      09 Jun 2022 07:01  -  10 Dominick 2022 00:23  --------------------------------------------------------  IN:    IV PiggyBack: 200 mL    IV PiggyBack: 275 mL    Lactated Ringers: 690 mL  Total IN: 1165 mL    OUT:    Bulb (mL): 50 mL    Indwelling Catheter - Urethral (mL): 950 mL    Nasogastric/Oral tube (mL): 50 mL    VAC (Vacuum Assisted Closure) System (mL): 150 mL    Voided (mL): 200 mL  Total OUT: 1400 mL    Total NET: -235 mL        Physical Examination:  General: NAD, resting comfortably in bed  HEENT: Normocephalic atraumatic  Respiratory: Nonlabored respirations, normal CW expansion.  Cardio: regular rate and rhythm.  Abdomen: softly distended, appropriately tender, prevena plus vac intact. NGT intact. LILLIAM drain x1 ss  Vascular: extremities are warm and well perfused.     Labs:    06-09    139  |  104  |  14  ----------------------------<  109<H>  4.0   |  21<L>  |  0.47<L>    Ca    8.0<L>      09 Jun 2022 00:25  Phos  3.0     06-09  Mg     2.3     06-09      CAPILLARY BLOOD GLUCOSE                                9.9    13.24 )-----------( 294      ( 09 Jun 2022 00:25 )             30.6     PT/INR - ( 08 Jun 2022 05:56 )   PT: 14.3 sec;   INR: 1.23 ratio         PTT - ( 08 Jun 2022 05:56 )  PTT:28.8 sec

## 2022-06-10 NOTE — PROGRESS NOTE ADULT - SUBJECTIVE AND OBJECTIVE BOX
HISTORY  68 y/o F with Hx hypothyroidism and breast CA (s/p R breast lumpectomy 8/2021, XRT) and recent appendectomy 6/3 who presents with severe abdominal pain, found to have pneumoperitoneum and loculated fluid collection c/f perforation. Patient was taken emergently to OR on 6/8 for exlap, found to have TI perforation with gross spillage of intestinal contents. Patient underwent washout, ileocolic resection with primary anastomosis, left open with Abthera. SICU consulted for vent management and open abdomen. Patient is s/p RTOR 6/9 for abd washout and complex abdominal closure with ovatex mesh.     24 HOUR EVENTS:  -Diaz removed, passed TOV  -Advanced to Sips & chips  -LR decreased to 30 cc/hr; s/p 10 IV lasix for diuresis       SUBJECTIVE/ROS:  [ ] A ten-point review of systems was otherwise negative except as noted.  [ ] Due to altered mental status/intubation, subjective information were not able to be obtained from the patient. History was obtained, to the extent possible, from review of the chart and collateral sources of information.      NEURO  RASS:     GCS:     [facility] ICU:  Exam: awake, alert, oriented  Meds: acetaminophen   IVPB .. 1000 milliGRAM(s) IV Intermittent every 6 hours PRN Mild Pain (1 - 3)  acetaminophen   IVPB .. 1000 milliGRAM(s) IV Intermittent every 6 hours PRN Mild Pain (1 - 3)  HYDROmorphone  Injectable 0.5 milliGRAM(s) IV Push every 3 hours PRN Severe Pain (7 - 10)    [x] Adequacy of sedation and pain control has been assessed and adjusted      RESPIRATORY  RR: 21 (06-09-22 @ 23:00) (13 - 25)  SpO2: 96% (06-09-22 @ 23:00) (91% - 98%)  Wt(kg): --  Exam: unlabored, clear to auscultation bilaterally  Mechanical Ventilation:     [N/A] Extubation Readiness Assessed  Meds:       CARDIOVASCULAR  HR: 86 (06-09-22 @ 23:00) (67 - 89)  BP: 122/62 (06-09-22 @ 23:00) (103/59 - 127/62)  BP(mean): 87 (06-09-22 @ 23:00) (72 - 91)  ABP: --  ABP(mean): --  Wt(kg): --  CVP(cm H2O): --      Exam: regular rate and rhythm  Cardiac Rhythm: sinus  Perfusion     [x]Adequate   [ ]Inadequate  Mentation   [x]Normal       [ ]Reduced  Extremities  [x]Warm         [ ]Cool  Volume Status [ ]Hypervolemic [x]Euvolemic [ ]Hypovolemic  Meds:       GI/NUTRITION  Exam: soft, nontender, nondistended, incision C/D/I  Diet:  Meds:     GENITOURINARY  I&O's Detail    06-08 @ 07:01  -  06-09 @ 07:00  --------------------------------------------------------  IN:    IV PiggyBack: 62.5 mL    IV PiggyBack: 500 mL    Lactated Ringers: 1800 mL    Lactated Ringers: 300 mL  Total IN: 2662.5 mL    OUT:    Bulb (mL): 65 mL    Indwelling Catheter - Urethral (mL): 885 mL    Nasogastric/Oral tube (mL): 100 mL  Total OUT: 1050 mL    Total NET: 1612.5 mL      06-09 @ 07:01  -  06-10 @ 01:22  --------------------------------------------------------  IN:    IV PiggyBack: 200 mL    IV PiggyBack: 275 mL    Lactated Ringers: 690 mL  Total IN: 1165 mL    OUT:    Bulb (mL): 50 mL    Indwelling Catheter - Urethral (mL): 950 mL    Nasogastric/Oral tube (mL): 50 mL    VAC (Vacuum Assisted Closure) System (mL): 150 mL    Voided (mL): 200 mL  Total OUT: 1400 mL    Total NET: -235 mL          06-09    139  |  104  |  14  ----------------------------<  109<H>  4.0   |  21<L>  |  0.47<L>    Ca    8.0<L>      09 Jun 2022 00:25  Phos  3.0     06-09  Mg     2.3     06-09      [ ] Diaz catheter, indication: N/A  Meds: lactated ringers. 1000 milliLiter(s) IV Continuous <Continuous>        HEMATOLOGIC  Meds: enoxaparin Injectable 40 milliGRAM(s) SubCutaneous every 24 hours    [x] VTE Prophylaxis                        9.5    16.57 )-----------( 304      ( 10 Dominick 2022 01:01 )             28.3     PT/INR - ( 08 Jun 2022 05:56 )   PT: 14.3 sec;   INR: 1.23 ratio         PTT - ( 08 Jun 2022 05:56 )  PTT:28.8 sec  Transfusion     [ ] PRBC   [ ] Platelets   [ ] FFP   [ ] Cryoprecipitate      INFECTIOUS DISEASES  WBC Count: 16.57 K/uL (06-10 @ 01:01)    RECENT CULTURES:  Specimen Source: .Blood Blood-Peripheral  Date/Time: 06-08 @ 05:56  Culture Results:   No growth to date.  Gram Stain: --  Organism: --  Specimen Source: .Body Fluid Peritoneal Fluid  Date/Time: 06-06 @ 21:43  Culture Results:   Rare Alysa albicans "Susceptibilities not performed"  Growth in fluid media only Enterobacter cloacae complex  Few Bacteroides vulgatus group "Susceptibilities not performed"  Rare Lactobacillus rhamnosus "Susceptibilities not performed"  Gram Stain:   Moderate polymorphonuclear leukocytes seen per low power field  Rare Gram Positive Rods seen per oil power field  Rare Yeast like cells seen per oil power field  Organism: Enterobacter cloacae complex  Specimen Source: .Blood Blood-Peripheral  Date/Time: 06-06 @ 12:40  Culture Results:   No growth to date.  Gram Stain: --  Organism: --  Specimen Source: Clean Catch Clean Catch (Midstream)  Date/Time: 06-06 @ 12:27  Culture Results:   <10,000 CFU/mL Normal Urogenital Odalis  Gram Stain: --  Organism: --  Specimen Source: .Blood Blood-Peripheral  Date/Time: 06-06 @ 12:20  Culture Results:   No growth to date.  Gram Stain: --  Organism: --  Specimen Source: .Blood Blood-Peripheral  Date/Time: 06-06 @ 09:41  Culture Results:   Growth in anaerobic bottle: Bacteroides ovatus group  "Susceptibilities not performed"  Gram Stain:   Growth in anaerobic bottle: Gram Negative Rods  Organism: --  Specimen Source: .Blood Blood-Peripheral  Date/Time: 06-06 @ 09:40  Culture Results:   Growth in anaerobic bottle: Bacteroides ovatus group "Susceptibilities  not performed"  ***Blood Panel PCR results on this specimen are available  approximately 3 hours after the Gram stain result.***  Gram stain, PCR, and/or culture results may not always  correspond due to difference in methodologies.  ************************************************************  This PCR assay was performed by multiplex PCR. This  Assay tests for 66 bacterial and resistance gene targets.  Please refer to [facility] test directory  at [email or web address] for details.  Gram Stain:   Growth in anaerobic bottle: Gram Negative Rods  Organism: Blood Culture PCR    Meds: fluconAZOLE IVPB 400 milliGRAM(s) IV Intermittent every 24 hours  nystatin    Suspension [id number] Unit(s) Oral every 6 hours  piperacillin/tazobactam IVPB.. 3.375 Gram(s) IV Intermittent every 8 hours        ENDOCRINE  CAPILLARY BLOOD GLUCOSE        Meds: levothyroxine Injectable 38 MICROGram(s) IV Push at bedtime        ACCESS DEVICES:  [ ] Peripheral IV  [ ] Central Venous Line	[ ] R	[ ] L	[ ] IJ	[ ] Fem	[ ] SC	Placed:   [ ] Arterial Line		[ ] R	[ ] L	[ ] Fem	[ ] Rad	[ ] Ax	Placed:   [ ] PICC:					[ ] Mediport  [ ] Urinary Catheter, Date Placed:   [x] Necessity of urinary, arterial, and venous catheters discussed    OTHER MEDICATIONS:  chlorhexidine 2% Cloths 1 Application(s) Topical <User Schedule>      CODE STATUS:      IMAGING: HISTORY  66 y/o F with Hx hypothyroidism and breast CA (s/p R breast lumpectomy 8/2021, XRT) and recent appendectomy 6/3 who presents with severe abdominal pain, found to have pneumoperitoneum and loculated fluid collection c/f perforation. Patient was taken emergently to OR on 6/8 for exlap, found to have TI perforation with gross spillage of intestinal contents. Patient underwent washout, ileocolic resection with primary anastomosis, left open with Abthera. SICU consulted for vent management and open abdomen. Patient is s/p RTOR 6/9 for abd washout and complex abdominal closure with ovatex mesh.     24 HOUR EVENTS:  -Diaz removed, passed TOV  -Advanced to Sips & chips  -LR decreased to 30 cc/hr; s/p 10 IV lasix for diuresis       SUBJECTIVE/ROS:  [X] A ten-point review of systems was otherwise negative except as noted.  [ ] Due to altered mental status/intubation, subjective information were not able to be obtained from the patient. History was obtained, to the extent possible, from review of the chart and collateral sources of information.      NEURO  Exam: awake, alert  Meds: acetaminophen   IVPB .. 1000 milliGRAM(s) IV Intermittent every 6 hours PRN Mild Pain (1 - 3)  acetaminophen   IVPB .. 1000 milliGRAM(s) IV Intermittent every 6 hours PRN Mild Pain (1 - 3)  HYDROmorphone  Injectable 0.5 milliGRAM(s) IV Push every 3 hours PRN Severe Pain (7 - 10)        RESPIRATORY  RR: 21 (06-09-22 @ 23:00) (13 - 25)  SpO2: 96% (06-09-22 @ 23:00) (91% - 98%)  Exam: unlabored, clear to auscultation bilaterally  Mechanical Ventilation:          CARDIOVASCULAR  HR: 86 (06-09-22 @ 23:00) (67 - 89)  BP: 122/62 (06-09-22 @ 23:00) (103/59 - 127/62)  BP(mean): 87 (06-09-22 @ 23:00) (72 - 91)    Exam: regular rate and rhythm  Cardiac Rhythm: sinus  Perfusion     [x]Adequate   [ ]Inadequate  Mentation   [x]Normal       [ ]Reduced  Extremities  [x]Warm         [ ]Cool  Volume Status [ ]Hypervolemic [x]Euvolemic [ ]Hypovolemic  Meds:       GI/NUTRITION  Exam: soft, nontender   Diet:  NPO w/ ice chips    GENITOURINARY  I&O's Detail    06-08 @ 07:01  -  06-09 @ 07:00  --------------------------------------------------------  IN:    IV PiggyBack: 62.5 mL    IV PiggyBack: 500 mL    Lactated Ringers: 1800 mL    Lactated Ringers: 300 mL  Total IN: 2662.5 mL    OUT:    Bulb (mL): 65 mL    Indwelling Catheter - Urethral (mL): 885 mL    Nasogastric/Oral tube (mL): 100 mL  Total OUT: 1050 mL    Total NET: 1612.5 mL      06-09 @ 07:01  -  06-10 @ 01:22  --------------------------------------------------------  IN:    IV PiggyBack: 200 mL    IV PiggyBack: 275 mL    Lactated Ringers: 690 mL  Total IN: 1165 mL    OUT:    Bulb (mL): 50 mL    Indwelling Catheter - Urethral (mL): 950 mL    Nasogastric/Oral tube (mL): 50 mL    VAC (Vacuum Assisted Closure) System (mL): 150 mL    Voided (mL): 200 mL  Total OUT: 1400 mL    Total NET: -235 mL          06-09    139  |  104  |  14  ----------------------------<  109<H>  4.0   |  21<L>  |  0.47<L>    Ca    8.0<L>      09 Jun 2022 00:25  Phos  3.0     06-09  Mg     2.3     06-09    Meds: lactated ringers. 1000 milliLiter(s) IV Continuous <Continuous>        HEMATOLOGIC  Meds: enoxaparin Injectable 40 milliGRAM(s) SubCutaneous every 24 hours    [x] VTE Prophylaxis                        9.5    16.57 )-----------( 304      ( 10 Dominick 2022 01:01 )             28.3     PT/INR - ( 08 Jun 2022 05:56 )   PT: 14.3 sec;   INR: 1.23 ratio         PTT - ( 08 Jun 2022 05:56 )  PTT:28.8 sec        INFECTIOUS DISEASES  WBC Count: 16.57 K/uL (06-10 @ 01:01)    RECENT CULTURES:  Specimen Source: .Blood Blood-Peripheral  Date/Time: 06-08 @ 05:56  Culture Results:   No growth to date.  Gram Stain: --  Organism: --  Specimen Source: .Body Fluid Peritoneal Fluid  Date/Time: 06-06 @ 21:43  Culture Results:   Rare Alysa albicans "Susceptibilities not performed"  Growth in fluid media only Enterobacter cloacae complex  Few Bacteroides vulgatus group "Susceptibilities not performed"  Rare Lactobacillus rhamnosus "Susceptibilities not performed"  Gram Stain:   Moderate polymorphonuclear leukocytes seen per low power field  Rare Gram Positive Rods seen per oil power field  Rare Yeast like cells seen per oil power field  Organism: Enterobacter cloacae complex  Specimen Source: .Blood Blood-Peripheral  Date/Time: 06-06 @ 12:40  Culture Results:   No growth to date.  Gram Stain: --  Organism: --  Specimen Source: Clean Catch Clean Catch (Midstream)  Date/Time: 06-06 @ 12:27  Culture Results:   <10,000 CFU/mL Normal Urogenital Odalis  Gram Stain: --  Organism: --  Specimen Source: .Blood Blood-Peripheral  Date/Time: 06-06 @ 12:20  Culture Results:   No growth to date.  Gram Stain: --  Organism: --  Specimen Source: .Blood Blood-Peripheral  Date/Time: 06-06 @ 09:41  Culture Results:   Growth in anaerobic bottle: Bacteroides ovatus group  "Susceptibilities not performed"  Gram Stain:   Growth in anaerobic bottle: Gram Negative Rods  Organism: --  Specimen Source: .Blood Blood-Peripheral  Date/Time: 06-06 @ 09:40  Culture Results:   Growth in anaerobic bottle: Bacteroides ovatus group "Susceptibilities  not performed"  ***Blood Panel PCR results on this specimen are available  approximately 3 hours after the Gram stain result.***  Gram stain, PCR, and/or culture results may not always  correspond due to difference in methodologies.  ************************************************************  This PCR assay was performed by multiplex PCR. This  Assay tests for 66 bacterial and resistance gene targets.  Please refer to [facility] test directory  at [email or web address] for details.  Gram Stain:   Growth in anaerobic bottle: Gram Negative Rods  Organism: Blood Culture PCR    Meds: fluconAZOLE IVPB 400 milliGRAM(s) IV Intermittent every 24 hours  nystatin    Suspension [id number] Unit(s) Oral every 6 hours  piperacillin/tazobactam IVPB.. 3.375 Gram(s) IV Intermittent every 8 hours        Meds: levothyroxine Injectable 38 MICROGram(s) IV Push at bedtime        ACCESS DEVICES:  [X ] Peripheral IV  [ ] Central Venous Line	[ ] R	[ ] L	[ ] IJ	[ ] Fem	[ ] SC	Placed:   [ ] Arterial Line		[ ] R	[ ] L	[ ] Fem	[ ] Rad	[ ] Ax	Placed:   [ ] PICC:					[ ] Mediport  [ ] Urinary Catheter, Date Placed:       OTHER MEDICATIONS:  chlorhexidine 2% Cloths 1 Application(s) Topical <User Schedule>       HISTORY  68 y/o F with Hx hypothyroidism and breast CA (s/p R breast lumpectomy 8/2021, XRT) and recent appendectomy 6/3 who presents with severe abdominal pain, found to have pneumoperitoneum and loculated fluid collection c/f perforation. Patient was taken emergently to OR on 6/8 for exlap, found to have TI perforation with gross spillage of intestinal contents. Patient underwent washout, ileocolic resection with primary anastomosis, left open with Abthera. SICU consulted for vent management and open abdomen. Patient is s/p RTOR 6/9 for abd washout and complex abdominal closure with ovatex mesh.     24 HOUR EVENTS:  -Diaz removed, passed TOV  -Advanced to Sips & chips  -LR decreased to 30 cc/hr; s/p 10 IV lasix for diuresis       SUBJECTIVE/ROS:  [X] A ten-point review of systems was otherwise negative except as noted.  [ ] Due to altered mental status/intubation, subjective information were not able to be obtained from the patient. History was obtained, to the extent possible, from review of the chart and collateral sources of information.      NEURO  Exam: awake, alert  Meds: acetaminophen   IVPB .. 1000 milliGRAM(s) IV Intermittent every 6 hours PRN Mild Pain (1 - 3)  acetaminophen   IVPB .. 1000 milliGRAM(s) IV Intermittent every 6 hours PRN Mild Pain (1 - 3)  HYDROmorphone  Injectable 0.5 milliGRAM(s) IV Push every 3 hours PRN Severe Pain (7 - 10)        RESPIRATORY  RR: 21 (06-09-22 @ 23:00) (13 - 25)  SpO2: 96% (06-09-22 @ 23:00) (91% - 98%)  Exam: unlabored, clear to auscultation bilaterally        CARDIOVASCULAR  HR: 86 (06-09-22 @ 23:00) (67 - 89)  BP: 122/62 (06-09-22 @ 23:00) (103/59 - 127/62)  BP(mean): 87 (06-09-22 @ 23:00) (72 - 91)    Exam: regular rate and rhythm  Cardiac Rhythm: sinus  Perfusion     [x]Adequate   [ ]Inadequate  Mentation   [x]Normal       [ ]Reduced  Extremities  [x]Warm         [ ]Cool  Volume Status [ ]Hypervolemic [x]Euvolemic [ ]Hypovolemic  Meds:       GI/NUTRITION  Exam: soft, nontender   Diet:  NPO w/ ice chips    GENITOURINARY  I&O's Detail    06-08 @ 07:01  -  06-09 @ 07:00  --------------------------------------------------------  IN:    IV PiggyBack: 62.5 mL    IV PiggyBack: 500 mL    Lactated Ringers: 1800 mL    Lactated Ringers: 300 mL  Total IN: 2662.5 mL    OUT:    Bulb (mL): 65 mL    Indwelling Catheter - Urethral (mL): 885 mL    Nasogastric/Oral tube (mL): 100 mL  Total OUT: 1050 mL    Total NET: 1612.5 mL      06-09 @ 07:01  -  06-10 @ 01:22  --------------------------------------------------------  IN:    IV PiggyBack: 200 mL    IV PiggyBack: 275 mL    Lactated Ringers: 690 mL  Total IN: 1165 mL    OUT:    Bulb (mL): 50 mL    Indwelling Catheter - Urethral (mL): 950 mL    Nasogastric/Oral tube (mL): 50 mL    VAC (Vacuum Assisted Closure) System (mL): 150 mL    Voided (mL): 200 mL  Total OUT: 1400 mL    Total NET: -235 mL          06-09    139  |  104  |  14  ----------------------------<  109<H>  4.0   |  21<L>  |  0.47<L>    Ca    8.0<L>      09 Jun 2022 00:25  Phos  3.0     06-09  Mg     2.3     06-09    Meds: lactated ringers. 1000 milliLiter(s) IV Continuous <Continuous>        HEMATOLOGIC  Meds: enoxaparin Injectable 40 milliGRAM(s) SubCutaneous every 24 hours    [x] VTE Prophylaxis                        9.5    16.57 )-----------( 304      ( 10 Dominick 2022 01:01 )             28.3     PT/INR - ( 08 Jun 2022 05:56 )   PT: 14.3 sec;   INR: 1.23 ratio         PTT - ( 08 Jun 2022 05:56 )  PTT:28.8 sec        INFECTIOUS DISEASES  WBC Count: 16.57 K/uL (06-10 @ 01:01)    RECENT CULTURES:  Specimen Source: .Blood Blood-Peripheral  Date/Time: 06-08 @ 05:56  Culture Results:   No growth to date.  Gram Stain: --  Organism: --  Specimen Source: .Body Fluid Peritoneal Fluid  Date/Time: 06-06 @ 21:43  Culture Results:   Rare Alysa albicans "Susceptibilities not performed"  Growth in fluid media only Enterobacter cloacae complex  Few Bacteroides vulgatus group "Susceptibilities not performed"  Rare Lactobacillus rhamnosus "Susceptibilities not performed"  Gram Stain:   Moderate polymorphonuclear leukocytes seen per low power field  Rare Gram Positive Rods seen per oil power field  Rare Yeast like cells seen per oil power field  Organism: Enterobacter cloacae complex  Specimen Source: .Blood Blood-Peripheral  Date/Time: 06-06 @ 12:40  Culture Results:   No growth to date.  Gram Stain: --  Organism: --  Specimen Source: Clean Catch Clean Catch (Midstream)  Date/Time: 06-06 @ 12:27  Culture Results:   <10,000 CFU/mL Normal Urogenital Odalis  Gram Stain: --  Organism: --  Specimen Source: .Blood Blood-Peripheral  Date/Time: 06-06 @ 12:20  Culture Results:   No growth to date.  Gram Stain: --  Organism: --  Specimen Source: .Blood Blood-Peripheral  Date/Time: 06-06 @ 09:41  Culture Results:   Growth in anaerobic bottle: Bacteroides ovatus group  "Susceptibilities not performed"  Gram Stain:   Growth in anaerobic bottle: Gram Negative Rods  Organism: --  Specimen Source: .Blood Blood-Peripheral  Date/Time: 06-06 @ 09:40  Culture Results:   Growth in anaerobic bottle: Bacteroides ovatus group "Susceptibilities  not performed"  ***Blood Panel PCR results on this specimen are available  approximately 3 hours after the Gram stain result.***  Gram stain, PCR, and/or culture results may not always  correspond due to difference in methodologies.  ************************************************************  This PCR assay was performed by multiplex PCR. This  Assay tests for 66 bacterial and resistance gene targets.  Please refer to [facility] test directory  at [email or web address] for details.  Gram Stain:   Growth in anaerobic bottle: Gram Negative Rods  Organism: Blood Culture PCR    Meds: fluconAZOLE IVPB 400 milliGRAM(s) IV Intermittent every 24 hours  nystatin    Suspension [id number] Unit(s) Oral every 6 hours  piperacillin/tazobactam IVPB.. 3.375 Gram(s) IV Intermittent every 8 hours        Meds: levothyroxine Injectable 38 MICROGram(s) IV Push at bedtime        ACCESS DEVICES:  [X ] Peripheral IV  [ ] Central Venous Line	[ ] R	[ ] L	[ ] IJ	[ ] Fem	[ ] SC	Placed:   [ ] Arterial Line		[ ] R	[ ] L	[ ] Fem	[ ] Rad	[ ] Ax	Placed:   [ ] PICC:					[ ] Mediport  [ ] Urinary Catheter, Date Placed:       OTHER MEDICATIONS:  chlorhexidine 2% Cloths 1 Application(s) Topical <User Schedule>

## 2022-06-10 NOTE — PHYSICAL THERAPY INITIAL EVALUATION ADULT - PRECAUTIONS/LIMITATIONS, REHAB EVAL
continued:  Pt is s/p RTOR 6/9  S/P removal of abthera vac, abd washout, abd wall reconstruction with myocutaneous flaps and bridging with ovitex mesh./fall precautions/surgical precautions

## 2022-06-10 NOTE — PROGRESS NOTE ADULT - ATTENDING COMMENTS
SICU Attending Note    Patient seen and examined and agree with above.   67 year old female after a laparoscopy appendectomy7/4 complicated by enterotomy and now s/p ex lap, ileocectomy. Yesterday the patient went to the OR and is s/p ex lap, drainage and abdominal wall reconstruction with mesh.     Current management includes:  Neuro- pain currently controlled with current regimen. Tylenol and dilaudid PRN.   CV- Hemodynamically stable   Pulm - room air   -goal SpO2 92%  GI- NPO, no flatus;  cc/24 hours. NGT on clamp trial.   -preveena vac   ID- fluconazole and zosyn; patient has yeast like cells in culture   -Bacteroides bacteremia- will continue zosyn until cultures speciate. Repeat blood cultures are negative.   -enterobacter in the peritoneal fluid    -lasix 20 mg   PT to continue working with patient.     Stable for transfer to floor.

## 2022-06-10 NOTE — PHYSICAL THERAPY INITIAL EVALUATION ADULT - GAIT DEVIATIONS NOTED, PT EVAL
decreased christiano/increased time in double stance/decreased step length/decreased stride length/decreased swing-to-stance ratio/decreased weight-shifting ability

## 2022-06-10 NOTE — PHYSICAL THERAPY INITIAL EVALUATION ADULT - DID THE PATIENT HAVE SURGERY?
Pt is s/p RTOR 6/9  S/P removal of abthera vac, abd washout, abd wall reconstruction with myocutaneous flaps and bridging with ovitex mesh./yes

## 2022-06-11 LAB
ANION GAP SERPL CALC-SCNC: 14 MMOL/L — SIGNIFICANT CHANGE UP (ref 5–17)
BUN SERPL-MCNC: 8 MG/DL — SIGNIFICANT CHANGE UP (ref 7–23)
CALCIUM SERPL-MCNC: 8.1 MG/DL — LOW (ref 8.4–10.5)
CHLORIDE SERPL-SCNC: 99 MMOL/L — SIGNIFICANT CHANGE UP (ref 96–108)
CO2 SERPL-SCNC: 23 MMOL/L — SIGNIFICANT CHANGE UP (ref 22–31)
CREAT SERPL-MCNC: 0.42 MG/DL — LOW (ref 0.5–1.3)
CULTURE RESULTS: SIGNIFICANT CHANGE UP
CULTURE RESULTS: SIGNIFICANT CHANGE UP
EGFR: 106 ML/MIN/1.73M2 — SIGNIFICANT CHANGE UP
GLUCOSE SERPL-MCNC: 91 MG/DL — SIGNIFICANT CHANGE UP (ref 70–99)
HCT VFR BLD CALC: 29 % — LOW (ref 34.5–45)
HGB BLD-MCNC: 9.3 G/DL — LOW (ref 11.5–15.5)
MAGNESIUM SERPL-MCNC: 1.8 MG/DL — SIGNIFICANT CHANGE UP (ref 1.6–2.6)
MCHC RBC-ENTMCNC: 29.2 PG — SIGNIFICANT CHANGE UP (ref 27–34)
MCHC RBC-ENTMCNC: 32.1 GM/DL — SIGNIFICANT CHANGE UP (ref 32–36)
MCV RBC AUTO: 91.2 FL — SIGNIFICANT CHANGE UP (ref 80–100)
NRBC # BLD: 0 /100 WBCS — SIGNIFICANT CHANGE UP (ref 0–0)
PHOSPHATE SERPL-MCNC: 2.8 MG/DL — SIGNIFICANT CHANGE UP (ref 2.5–4.5)
PLATELET # BLD AUTO: 327 K/UL — SIGNIFICANT CHANGE UP (ref 150–400)
POTASSIUM SERPL-MCNC: 3.4 MMOL/L — LOW (ref 3.5–5.3)
POTASSIUM SERPL-SCNC: 3.4 MMOL/L — LOW (ref 3.5–5.3)
RBC # BLD: 3.18 M/UL — LOW (ref 3.8–5.2)
RBC # FLD: 14.1 % — SIGNIFICANT CHANGE UP (ref 10.3–14.5)
SODIUM SERPL-SCNC: 136 MMOL/L — SIGNIFICANT CHANGE UP (ref 135–145)
SPECIMEN SOURCE: SIGNIFICANT CHANGE UP
SPECIMEN SOURCE: SIGNIFICANT CHANGE UP
WBC # BLD: 23.76 K/UL — HIGH (ref 3.8–10.5)
WBC # FLD AUTO: 23.76 K/UL — HIGH (ref 3.8–10.5)

## 2022-06-11 RX ORDER — KETOROLAC TROMETHAMINE 30 MG/ML
15 SYRINGE (ML) INJECTION ONCE
Refills: 0 | Status: DISCONTINUED | OUTPATIENT
Start: 2022-06-11 | End: 2022-06-11

## 2022-06-11 RX ORDER — LANOLIN ALCOHOL/MO/W.PET/CERES
10 CREAM (GRAM) TOPICAL AT BEDTIME
Refills: 0 | Status: DISCONTINUED | OUTPATIENT
Start: 2022-06-11 | End: 2022-06-20

## 2022-06-11 RX ORDER — POTASSIUM CHLORIDE 20 MEQ
10 PACKET (EA) ORAL
Refills: 0 | Status: COMPLETED | OUTPATIENT
Start: 2022-06-11 | End: 2022-06-11

## 2022-06-11 RX ORDER — POTASSIUM PHOSPHATE, MONOBASIC POTASSIUM PHOSPHATE, DIBASIC 236; 224 MG/ML; MG/ML
15 INJECTION, SOLUTION INTRAVENOUS ONCE
Refills: 0 | Status: DISCONTINUED | OUTPATIENT
Start: 2022-06-11 | End: 2022-06-12

## 2022-06-11 RX ORDER — ACETAMINOPHEN 500 MG
1000 TABLET ORAL EVERY 6 HOURS
Refills: 0 | Status: DISCONTINUED | OUTPATIENT
Start: 2022-06-11 | End: 2022-06-12

## 2022-06-11 RX ORDER — MAGNESIUM SULFATE 500 MG/ML
2 VIAL (ML) INJECTION ONCE
Refills: 0 | Status: COMPLETED | OUTPATIENT
Start: 2022-06-11 | End: 2022-06-11

## 2022-06-11 RX ADMIN — Medication 500000 UNIT(S): at 12:19

## 2022-06-11 RX ADMIN — PIPERACILLIN AND TAZOBACTAM 25 GRAM(S): 4; .5 INJECTION, POWDER, LYOPHILIZED, FOR SOLUTION INTRAVENOUS at 05:10

## 2022-06-11 RX ADMIN — Medication 38 MICROGRAM(S): at 22:12

## 2022-06-11 RX ADMIN — Medication 500000 UNIT(S): at 05:10

## 2022-06-11 RX ADMIN — Medication 400 MILLIGRAM(S): at 20:16

## 2022-06-11 RX ADMIN — Medication 500000 UNIT(S): at 20:16

## 2022-06-11 RX ADMIN — Medication 25 GRAM(S): at 13:14

## 2022-06-11 RX ADMIN — Medication 1000 MILLIGRAM(S): at 20:46

## 2022-06-11 RX ADMIN — PIPERACILLIN AND TAZOBACTAM 25 GRAM(S): 4; .5 INJECTION, POWDER, LYOPHILIZED, FOR SOLUTION INTRAVENOUS at 18:06

## 2022-06-11 RX ADMIN — Medication 500000 UNIT(S): at 00:27

## 2022-06-11 RX ADMIN — Medication 100 MILLIEQUIVALENT(S): at 20:53

## 2022-06-11 RX ADMIN — Medication 400 MILLIGRAM(S): at 12:18

## 2022-06-11 RX ADMIN — HYDROMORPHONE HYDROCHLORIDE 0.5 MILLIGRAM(S): 2 INJECTION INTRAMUSCULAR; INTRAVENOUS; SUBCUTANEOUS at 01:51

## 2022-06-11 RX ADMIN — Medication 100 MILLIEQUIVALENT(S): at 18:19

## 2022-06-11 RX ADMIN — HYDROMORPHONE HYDROCHLORIDE 0.5 MILLIGRAM(S): 2 INJECTION INTRAMUSCULAR; INTRAVENOUS; SUBCUTANEOUS at 01:21

## 2022-06-11 RX ADMIN — FLUCONAZOLE 100 MILLIGRAM(S): 150 TABLET ORAL at 15:41

## 2022-06-11 RX ADMIN — ENOXAPARIN SODIUM 40 MILLIGRAM(S): 100 INJECTION SUBCUTANEOUS at 22:12

## 2022-06-11 RX ADMIN — CHLORHEXIDINE GLUCONATE 1 APPLICATION(S): 213 SOLUTION TOPICAL at 18:18

## 2022-06-11 NOTE — PROGRESS NOTE ADULT - ASSESSMENT
67yFemale patient S/P removal of abthera vac, abd washout, abd wall reconstruction with myocutaneous flaps and bridging with ovitex mesh.      Plan:  - IV Abx: continue Zosyn x4 days   - Pain control PRN  - Diet: sips  - Activity: ambulate as tolerated   - DVT ppx: LVX   - appreciate excellent SICU care    [facility]  [id number].

## 2022-06-11 NOTE — PROGRESS NOTE ADULT - SUBJECTIVE AND OBJECTIVE BOX
Surgery Progress Note     Subjective/24hour Events: Patient seen and examined at the bedside this morning. Transferred from SICU to floor overnight. Pain controlled. Denies passing flatus or having bowel movements.     Vital Signs:  Vital Signs Last 24 Hrs  T(C): 36.6 (11 Jun 2022 01:15), Max: 37.6 (10 Dominick 2022 18:00)  T(F): 97.9 (11 Jun 2022 01:15), Max: 99.7 (10 Dominick 2022 18:00)  HR: 78 (11 Jun 2022 01:15) (75 - 96)  BP: 114/64 (11 Jun 2022 01:15) (102/55 - 132/62)  BP(mean): 83 (10 Dominick 2022 20:00) (70 - 91)  RR: 20 (11 Jun 2022 01:15) (17 - 26)  SpO2: 95% (11 Jun 2022 01:15) (90% - 96%)        I&O's Detail    09 Jun 2022 07:01  -  10 Dominick 2022 07:00  --------------------------------------------------------  IN:    IV PiggyBack: 475 mL    IV PiggyBack: 575 mL    Lactated Ringers: 720 mL  Total IN: 1770 mL    OUT:    Bulb (mL): 90 mL    Indwelling Catheter - Urethral (mL): 950 mL    Nasogastric/Oral tube (mL): 100 mL    VAC (Vacuum Assisted Closure) System (mL): 150 mL    Voided (mL): 200 mL  Total OUT: 1490 mL    Total NET: 280 mL      10 Dominick 2022 07:01  -  11 Jun 2022 01:26  --------------------------------------------------------  IN:    IV PiggyBack: 187.5 mL    IV PiggyBack: 375 mL    IV PiggyBack: 100 mL    IV PiggyBack: 100 mL    Lactated Ringers: 90 mL    Lactated Ringers: 210 mL    Oral Fluid: 480 mL  Total IN: 1542.5 mL    OUT:    Bulb (mL): 30 mL    Nasogastric/Oral tube (mL): 0 mL    VAC (Vacuum Assisted Closure) System (mL): 0 mL    Voided (mL): 1300 mL  Total OUT: 1330 mL    Total NET: 212.5 mL        Physical Examination:  General: NAD, resting comfortably in bed  HEENT: Normocephalic atraumatic  Respiratory: Nonlabored respirations, normal CW expansion.  Cardio: regular rate and rhythm.  Abdomen: softly distended, appropriately tender, prevena plus vac intact. LILLIAM drain x1 ss  Vascular: extremities are warm and well perfused.   Labs:    06-10    137  |  100  |  15  ----------------------------<  119<H>  3.8   |  24  |  0.49<L>    Ca    8.2<L>      10 Dominick 2022 09:26  Phos  3.7     06-10  Mg     2.1     06-10      CAPILLARY BLOOD GLUCOSE                                9.5    16.57 )-----------( 304      ( 10 Dominick 2022 01:01 )             28.3

## 2022-06-12 LAB
C DIFF GDH STL QL: NEGATIVE — SIGNIFICANT CHANGE UP
C DIFF GDH STL QL: SIGNIFICANT CHANGE UP
HCT VFR BLD CALC: 29.6 % — LOW (ref 34.5–45)
HGB BLD-MCNC: 9.2 G/DL — LOW (ref 11.5–15.5)
MCHC RBC-ENTMCNC: 28.8 PG — SIGNIFICANT CHANGE UP (ref 27–34)
MCHC RBC-ENTMCNC: 31.1 GM/DL — LOW (ref 32–36)
MCV RBC AUTO: 92.8 FL — SIGNIFICANT CHANGE UP (ref 80–100)
NRBC # BLD: 0 /100 WBCS — SIGNIFICANT CHANGE UP (ref 0–0)
PLATELET # BLD AUTO: 368 K/UL — SIGNIFICANT CHANGE UP (ref 150–400)
RBC # BLD: 3.19 M/UL — LOW (ref 3.8–5.2)
RBC # FLD: 14.3 % — SIGNIFICANT CHANGE UP (ref 10.3–14.5)
WBC # BLD: 23.41 K/UL — HIGH (ref 3.8–10.5)
WBC # FLD AUTO: 23.41 K/UL — HIGH (ref 3.8–10.5)

## 2022-06-12 PROCEDURE — 74177 CT ABD & PELVIS W/CONTRAST: CPT | Mod: 26

## 2022-06-12 RX ORDER — DEXTROSE MONOHYDRATE, SODIUM CHLORIDE, AND POTASSIUM CHLORIDE 50; .745; 4.5 G/1000ML; G/1000ML; G/1000ML
1000 INJECTION, SOLUTION INTRAVENOUS
Refills: 0 | Status: DISCONTINUED | OUTPATIENT
Start: 2022-06-12 | End: 2022-06-14

## 2022-06-12 RX ORDER — ACETAMINOPHEN 500 MG
1000 TABLET ORAL EVERY 6 HOURS
Refills: 0 | Status: COMPLETED | OUTPATIENT
Start: 2022-06-12 | End: 2022-06-13

## 2022-06-12 RX ORDER — HYDROMORPHONE HYDROCHLORIDE 2 MG/ML
0.5 INJECTION INTRAMUSCULAR; INTRAVENOUS; SUBCUTANEOUS EVERY 4 HOURS
Refills: 0 | Status: DISCONTINUED | OUTPATIENT
Start: 2022-06-12 | End: 2022-06-16

## 2022-06-12 RX ORDER — ONDANSETRON 8 MG/1
4 TABLET, FILM COATED ORAL ONCE
Refills: 0 | Status: COMPLETED | OUTPATIENT
Start: 2022-06-12 | End: 2022-06-18

## 2022-06-12 RX ORDER — HYDROMORPHONE HYDROCHLORIDE 2 MG/ML
0.25 INJECTION INTRAMUSCULAR; INTRAVENOUS; SUBCUTANEOUS EVERY 4 HOURS
Refills: 0 | Status: DISCONTINUED | OUTPATIENT
Start: 2022-06-12 | End: 2022-06-16

## 2022-06-12 RX ORDER — ONDANSETRON 8 MG/1
4 TABLET, FILM COATED ORAL ONCE
Refills: 0 | Status: COMPLETED | OUTPATIENT
Start: 2022-06-12 | End: 2022-06-12

## 2022-06-12 RX ADMIN — Medication 1000 MILLIGRAM(S): at 09:51

## 2022-06-12 RX ADMIN — CHLORHEXIDINE GLUCONATE 1 APPLICATION(S): 213 SOLUTION TOPICAL at 08:51

## 2022-06-12 RX ADMIN — HYDROMORPHONE HYDROCHLORIDE 0.25 MILLIGRAM(S): 2 INJECTION INTRAMUSCULAR; INTRAVENOUS; SUBCUTANEOUS at 22:42

## 2022-06-12 RX ADMIN — Medication 500000 UNIT(S): at 13:18

## 2022-06-12 RX ADMIN — Medication 38 MICROGRAM(S): at 21:18

## 2022-06-12 RX ADMIN — ENOXAPARIN SODIUM 40 MILLIGRAM(S): 100 INJECTION SUBCUTANEOUS at 21:19

## 2022-06-12 RX ADMIN — Medication 400 MILLIGRAM(S): at 01:54

## 2022-06-12 RX ADMIN — ONDANSETRON 4 MILLIGRAM(S): 8 TABLET, FILM COATED ORAL at 01:08

## 2022-06-12 RX ADMIN — Medication 400 MILLIGRAM(S): at 23:08

## 2022-06-12 RX ADMIN — FLUCONAZOLE 100 MILLIGRAM(S): 150 TABLET ORAL at 08:51

## 2022-06-12 RX ADMIN — Medication 1000 MILLIGRAM(S): at 19:32

## 2022-06-12 RX ADMIN — Medication 500000 UNIT(S): at 20:07

## 2022-06-12 RX ADMIN — Medication 500000 UNIT(S): at 03:28

## 2022-06-12 RX ADMIN — Medication 400 MILLIGRAM(S): at 08:51

## 2022-06-12 RX ADMIN — PIPERACILLIN AND TAZOBACTAM 25 GRAM(S): 4; .5 INJECTION, POWDER, LYOPHILIZED, FOR SOLUTION INTRAVENOUS at 01:55

## 2022-06-12 RX ADMIN — Medication 1000 MILLIGRAM(S): at 23:23

## 2022-06-12 RX ADMIN — PIPERACILLIN AND TAZOBACTAM 25 GRAM(S): 4; .5 INJECTION, POWDER, LYOPHILIZED, FOR SOLUTION INTRAVENOUS at 18:32

## 2022-06-12 RX ADMIN — HYDROMORPHONE HYDROCHLORIDE 0.5 MILLIGRAM(S): 2 INJECTION INTRAMUSCULAR; INTRAVENOUS; SUBCUTANEOUS at 16:26

## 2022-06-12 RX ADMIN — Medication 500000 UNIT(S): at 08:51

## 2022-06-12 RX ADMIN — PIPERACILLIN AND TAZOBACTAM 25 GRAM(S): 4; .5 INJECTION, POWDER, LYOPHILIZED, FOR SOLUTION INTRAVENOUS at 11:18

## 2022-06-12 RX ADMIN — DEXTROSE MONOHYDRATE, SODIUM CHLORIDE, AND POTASSIUM CHLORIDE 50 MILLILITER(S): 50; .745; 4.5 INJECTION, SOLUTION INTRAVENOUS at 20:07

## 2022-06-12 RX ADMIN — Medication 1000 MILLIGRAM(S): at 02:24

## 2022-06-12 RX ADMIN — HYDROMORPHONE HYDROCHLORIDE 0.5 MILLIGRAM(S): 2 INJECTION INTRAMUSCULAR; INTRAVENOUS; SUBCUTANEOUS at 17:26

## 2022-06-12 RX ADMIN — Medication 400 MILLIGRAM(S): at 18:32

## 2022-06-12 RX ADMIN — HYDROMORPHONE HYDROCHLORIDE 0.25 MILLIGRAM(S): 2 INJECTION INTRAMUSCULAR; INTRAVENOUS; SUBCUTANEOUS at 22:57

## 2022-06-12 NOTE — PROGRESS NOTE ADULT - SUBJECTIVE AND OBJECTIVE BOX
DATE OF SERVICE: 06-12-22      Patient is a 67y old  Female who presents with a chief complaint of Abdominal Sepsis (12 Jun 2022 00:59)      INTERVAL HISTORY: feels fatigue with abd pain    REVIEW OF SYSTEMS:  CONSTITUTIONAL: No weakness  EYES/ENT: No visual changes;  No throat pain   NECK: No pain or stiffness  RESPIRATORY: No cough, wheezing; No shortness of breath  CARDIOVASCULAR: No chest pain or palpitations  GASTROINTESTINAL: No abdominal  pain. No nausea, vomiting, or hematemesis  GENITOURINARY: No dysuria, frequency or hematuria  NEUROLOGICAL: No stroke like symptoms  SKIN: No rashes      PHYSICAL EXAM:  T(C): 37.7 (06-12-22 @ 17:55), Max: 37.7 (06-12-22 @ 17:55)  HR: 88 (06-12-22 @ 17:55) (78 - 88)  BP: 124/66 (06-12-22 @ 17:55) (121/64 - 135/74)  RR: 18 (06-12-22 @ 17:55) (18 - 18)  SpO2: 94% (06-12-22 @ 17:55) (92% - 99%)  Wt(kg): --  I&O's Summary    11 Jun 2022 07:01  -  12 Jun 2022 07:00  --------------------------------------------------------  IN: 460 mL / OUT: 1880 mL / NET: -1420 mL    12 Jun 2022 07:01  -  12 Jun 2022 22:16  --------------------------------------------------------  IN: 1460 mL / OUT: 910 mL / NET: 550 mL          Appearance: In no distress	  HEENT:    PERRL, EOMI	  Cardiovascular:  S1 S2, No JVD  Respiratory: Lungs clear to auscultation	  Gastrointestinal:  Soft, Non-tender, + BS	  Vascularature:  No edema of LE  Psychiatric: Appropriate affect   Neuro: no acute focal deficits                               9.2    23.41 )-----------( 368      ( 12 Jun 2022 12:10 )             29.6     06-11    136  |  99  |  8   ----------------------------<  91  3.4<L>   |  23  |  0.42<L>    Ca    8.1<L>      11 Jun 2022 11:06  Phos  2.8     06-11  Mg     1.8     06-11          Labs personally reviewed      ASSESSMENT/PLAN: 	  ASSESSMENT/PLAN: 	  The patient is a 67y Female who is now s/p exploratory laparotomy with ileocecal resection and abdominal washout on 6/6    1. Esperanza op risk stratification - tolerated surgery well, planned for closing of abd and washout  6/8  - HD stable   - SR on the monitor   - Pain management   - Tolerated surgery well  - BP stable     2. HLD   - on Repatha, resume as OP    3. Mod MR - euvolemic   - OP follow up    4, Leukocytosis - post op  - surgical care appreciated   - CT abd/pelvis done,  awaiting report        Randy Santamaria DO [facility]  Cardiovascular Medicine  [address] [place] [address], [address]  Office: [phone]  Cell: [phone]

## 2022-06-12 NOTE — PROGRESS NOTE ADULT - ASSESSMENT
67yFemale patient S/P removal of abthera vac, abd washout, abd wall reconstruction with myocutaneous flaps and bridging with ovitex mesh.      Plan:  - IV Abx: continue Zosyn    - Pain control PRN  - Diet: sips  - Activity: ambulate as tolerated   - DVT ppx: LVX   - f/u WBC this AM  -CT abdomen if WBC keeps uptrending      [facility]  [id number]. 66 y/o female w/ a PMHx of moderate mitral regurgitation, hypothyroidism, GERD, osteoporosis, vitamin D deficiency, breast CA s/p right lumpectomy w/ chemo & radiation, bilateral breast reduction (2013), s/p hysterectomy (2005), s/p right elbow surgery (1970s), & s/p laparoscopic appendectomy (6/3/2022) who presented on 6/6 w/ pneumoperitoneum & a loculated fluid collection in the RLQ s/p exploratory laparotomy, washout, ileocolic anastomosis w/ primary anastomosis for two perforations in the terminal ileum, & temporary abdominal closure w/ Abthera VAC 6/6. s/p removal of abthera vac, abd washout, abd wall reconstruction with myocutaneous flaps and bridging with ovitex mesh 6/8    Plan:  - IV Abx: continue Zosyn    - Pain control PRN  - Diet: advance to clears   - Activity: ambulate as tolerated   - DVT ppx: LVX   - f/u WBC this AM, CT A/P with PO/IV contrast if WBC uptrending    [facility]  [id number].

## 2022-06-12 NOTE — PROGRESS NOTE ADULT - ATTENDING COMMENTS
I have seen and examined the patient. I agree with the above surgery resident's note.  abd benign, +BM's  clears  f/u wbc  repeat ct if persistently elevated

## 2022-06-12 NOTE — PROGRESS NOTE ADULT - SUBJECTIVE AND OBJECTIVE BOX
Surgery Progress Note     Subjective/24hour Events: Patient 1 episode of nausea overnight, no emesis; controlled with 1 dose of zofran. Patient seen and examined at the bedside this morning. . Pain controlled.     Vital Signs Last 24 Hrs  T(C): 36.9 (11 Jun 2022 21:57), Max: 37.1 (11 Jun 2022 11:03)  T(F): 98.4 (11 Jun 2022 21:57), Max: 98.8 (11 Jun 2022 11:03)  HR: 82 (11 Jun 2022 21:57) (78 - 87)  BP: 119/73 (11 Jun 2022 21:57) (107/63 - 130/77)  BP(mean): --  RR: 18 (11 Jun 2022 21:57) (18 - 20)  SpO2: 94% (11 Jun 2022 21:57) (94% - 96%)      I&O's Summary    10 Dominick 2022 07:01  -  11 Jun 2022 07:00  --------------------------------------------------------  IN: 2002.5 mL / OUT: 1870 mL / NET: 132.5 mL    11 Jun 2022 07:01  -  12 Jun 2022 01:01  --------------------------------------------------------  IN: 100 mL / OUT: 1090 mL / NET: -990 mL        Physical Examination:  General: NAD, resting comfortably in bed  Respiratory: Nonlabored respirations, normal CW expansion.  Cardio: regular rate and rhythm.  Abdomen: softly distended, appropriately tender, prevena plus vac intact. LILLIAM drain x1 ss  Vascular: extremities are warm and well perfused.     LABS:                        9.3    23.76 )-----------( 327      ( 11 Jun 2022 11:07 )             29.0     06-11    136  |  99  |  8   ----------------------------<  91  3.4<L>   |  23  |  0.42<L>    Ca    8.1<L>      11 Jun 2022 11:06  Phos  2.8     06-11  Mg     1.8     06-11                CAPILLARY BLOOD GLUCOSE                                9.5    16.57 )-----------( 304      ( 10 Dominick 2022 01:01 )             28.3            Surgery Progress Note     Subjective/24hour Events: Patient 1 episode of nausea overnight, no emesis; controlled with 1 dose of zofran. Patient seen and examined at the bedside this morning. . Pain comes and goes. Passing gas and had 4 BM overnight, formed. NO n/v.      Vital Signs Last 24 Hrs  T(C): 36.9 (11 Jun 2022 21:57), Max: 37.1 (11 Jun 2022 11:03)  T(F): 98.4 (11 Jun 2022 21:57), Max: 98.8 (11 Jun 2022 11:03)  HR: 82 (11 Jun 2022 21:57) (78 - 87)  BP: 119/73 (11 Jun 2022 21:57) (107/63 - 130/77)  BP(mean): --  RR: 18 (11 Jun 2022 21:57) (18 - 20)  SpO2: 94% (11 Jun 2022 21:57) (94% - 96%)      I&O's Summary    10 Dominick 2022 07:01  -  11 Jun 2022 07:00  --------------------------------------------------------  IN: 2002.5 mL / OUT: 1870 mL / NET: 132.5 mL    11 Jun 2022 07:01  -  12 Jun 2022 01:01  --------------------------------------------------------  IN: 100 mL / OUT: 1090 mL / NET: -990 mL        Physical Examination:  General: NAD, resting comfortably in bed  Respiratory: Nonlabored respirations, normal CW expansion.  Cardio: regular rate and rhythm.  Abdomen: softly distended, appropriately tender, prevena plus vac intact. LILLIAM drain x1 ss  Vascular: extremities are warm and well perfused.     LABS:                        9.3    23.76 )-----------( 327      ( 11 Jun 2022 11:07 )             29.0     06-11    136  |  99  |  8   ----------------------------<  91  3.4<L>   |  23  |  0.42<L>    Ca    8.1<L>      11 Jun 2022 11:06  Phos  2.8     06-11  Mg     1.8     06-11                CAPILLARY BLOOD GLUCOSE                                9.5    16.57 )-----------( 304      ( 10 Dominick 2022 01:01 )             28.3

## 2022-06-13 DIAGNOSIS — K65.9 PERITONITIS, UNSPECIFIED: ICD-10-CM

## 2022-06-13 DIAGNOSIS — J90 PLEURAL EFFUSION, NOT ELSEWHERE CLASSIFIED: ICD-10-CM

## 2022-06-13 LAB
ANION GAP SERPL CALC-SCNC: 9 MMOL/L — SIGNIFICANT CHANGE UP (ref 5–17)
BUN SERPL-MCNC: 5 MG/DL — LOW (ref 7–23)
CALCIUM SERPL-MCNC: 8 MG/DL — LOW (ref 8.4–10.5)
CHLORIDE SERPL-SCNC: 101 MMOL/L — SIGNIFICANT CHANGE UP (ref 96–108)
CO2 SERPL-SCNC: 26 MMOL/L — SIGNIFICANT CHANGE UP (ref 22–31)
CREAT SERPL-MCNC: 0.43 MG/DL — LOW (ref 0.5–1.3)
CULTURE RESULTS: SIGNIFICANT CHANGE UP
EGFR: 105 ML/MIN/1.73M2 — SIGNIFICANT CHANGE UP
GLUCOSE SERPL-MCNC: 113 MG/DL — HIGH (ref 70–99)
HCT VFR BLD CALC: 25.9 % — LOW (ref 34.5–45)
HGB BLD-MCNC: 8.3 G/DL — LOW (ref 11.5–15.5)
MAGNESIUM SERPL-MCNC: 2 MG/DL — SIGNIFICANT CHANGE UP (ref 1.6–2.6)
MCHC RBC-ENTMCNC: 29.1 PG — SIGNIFICANT CHANGE UP (ref 27–34)
MCHC RBC-ENTMCNC: 32 GM/DL — SIGNIFICANT CHANGE UP (ref 32–36)
MCV RBC AUTO: 90.9 FL — SIGNIFICANT CHANGE UP (ref 80–100)
NRBC # BLD: 0 /100 WBCS — SIGNIFICANT CHANGE UP (ref 0–0)
PHOSPHATE SERPL-MCNC: 2.9 MG/DL — SIGNIFICANT CHANGE UP (ref 2.5–4.5)
PLATELET # BLD AUTO: 307 K/UL — SIGNIFICANT CHANGE UP (ref 150–400)
POTASSIUM SERPL-MCNC: 3.1 MMOL/L — LOW (ref 3.5–5.3)
POTASSIUM SERPL-SCNC: 3.1 MMOL/L — LOW (ref 3.5–5.3)
RBC # BLD: 2.85 M/UL — LOW (ref 3.8–5.2)
RBC # FLD: 14.3 % — SIGNIFICANT CHANGE UP (ref 10.3–14.5)
SARS-COV-2 RNA SPEC QL NAA+PROBE: SIGNIFICANT CHANGE UP
SODIUM SERPL-SCNC: 136 MMOL/L — SIGNIFICANT CHANGE UP (ref 135–145)
SPECIMEN SOURCE: SIGNIFICANT CHANGE UP
WBC # BLD: 19.43 K/UL — HIGH (ref 3.8–10.5)
WBC # FLD AUTO: 19.43 K/UL — HIGH (ref 3.8–10.5)

## 2022-06-13 PROCEDURE — 71045 X-RAY EXAM CHEST 1 VIEW: CPT | Mod: 26

## 2022-06-13 PROCEDURE — 71250 CT THORAX DX C-: CPT | Mod: 26

## 2022-06-13 RX ORDER — POTASSIUM PHOSPHATE, MONOBASIC POTASSIUM PHOSPHATE, DIBASIC 236; 224 MG/ML; MG/ML
15 INJECTION, SOLUTION INTRAVENOUS ONCE
Refills: 0 | Status: COMPLETED | OUTPATIENT
Start: 2022-06-13 | End: 2022-06-13

## 2022-06-13 RX ORDER — ACETAMINOPHEN 500 MG
1000 TABLET ORAL ONCE
Refills: 0 | Status: COMPLETED | OUTPATIENT
Start: 2022-06-13 | End: 2022-06-13

## 2022-06-13 RX ORDER — PIPERACILLIN AND TAZOBACTAM 4; .5 G/20ML; G/20ML
3.38 INJECTION, POWDER, LYOPHILIZED, FOR SOLUTION INTRAVENOUS EVERY 8 HOURS
Refills: 0 | Status: DISCONTINUED | OUTPATIENT
Start: 2022-06-13 | End: 2022-06-13

## 2022-06-13 RX ORDER — PIPERACILLIN AND TAZOBACTAM 4; .5 G/20ML; G/20ML
3.38 INJECTION, POWDER, LYOPHILIZED, FOR SOLUTION INTRAVENOUS EVERY 8 HOURS
Refills: 0 | Status: DISCONTINUED | OUTPATIENT
Start: 2022-06-13 | End: 2022-06-15

## 2022-06-13 RX ORDER — POTASSIUM CHLORIDE 20 MEQ
40 PACKET (EA) ORAL ONCE
Refills: 0 | Status: COMPLETED | OUTPATIENT
Start: 2022-06-13 | End: 2022-06-13

## 2022-06-13 RX ADMIN — PIPERACILLIN AND TAZOBACTAM 25 GRAM(S): 4; .5 INJECTION, POWDER, LYOPHILIZED, FOR SOLUTION INTRAVENOUS at 01:12

## 2022-06-13 RX ADMIN — Medication 400 MILLIGRAM(S): at 12:22

## 2022-06-13 RX ADMIN — HYDROMORPHONE HYDROCHLORIDE 0.5 MILLIGRAM(S): 2 INJECTION INTRAMUSCULAR; INTRAVENOUS; SUBCUTANEOUS at 19:00

## 2022-06-13 RX ADMIN — Medication 400 MILLIGRAM(S): at 22:27

## 2022-06-13 RX ADMIN — Medication 40 MILLIEQUIVALENT(S): at 12:21

## 2022-06-13 RX ADMIN — Medication 1000 MILLIGRAM(S): at 13:22

## 2022-06-13 RX ADMIN — Medication 400 MILLIGRAM(S): at 05:06

## 2022-06-13 RX ADMIN — Medication 38 MICROGRAM(S): at 21:46

## 2022-06-13 RX ADMIN — Medication 1000 MILLIGRAM(S): at 05:21

## 2022-06-13 RX ADMIN — PIPERACILLIN AND TAZOBACTAM 25 GRAM(S): 4; .5 INJECTION, POWDER, LYOPHILIZED, FOR SOLUTION INTRAVENOUS at 10:59

## 2022-06-13 RX ADMIN — POTASSIUM PHOSPHATE, MONOBASIC POTASSIUM PHOSPHATE, DIBASIC 62.5 MILLIMOLE(S): 236; 224 INJECTION, SOLUTION INTRAVENOUS at 10:59

## 2022-06-13 RX ADMIN — ENOXAPARIN SODIUM 40 MILLIGRAM(S): 100 INJECTION SUBCUTANEOUS at 21:47

## 2022-06-13 RX ADMIN — Medication 500000 UNIT(S): at 09:11

## 2022-06-13 RX ADMIN — HYDROMORPHONE HYDROCHLORIDE 0.5 MILLIGRAM(S): 2 INJECTION INTRAMUSCULAR; INTRAVENOUS; SUBCUTANEOUS at 04:46

## 2022-06-13 RX ADMIN — Medication 500000 UNIT(S): at 21:46

## 2022-06-13 RX ADMIN — PIPERACILLIN AND TAZOBACTAM 25 GRAM(S): 4; .5 INJECTION, POWDER, LYOPHILIZED, FOR SOLUTION INTRAVENOUS at 21:46

## 2022-06-13 RX ADMIN — HYDROMORPHONE HYDROCHLORIDE 0.5 MILLIGRAM(S): 2 INJECTION INTRAMUSCULAR; INTRAVENOUS; SUBCUTANEOUS at 18:07

## 2022-06-13 RX ADMIN — HYDROMORPHONE HYDROCHLORIDE 0.5 MILLIGRAM(S): 2 INJECTION INTRAMUSCULAR; INTRAVENOUS; SUBCUTANEOUS at 05:01

## 2022-06-13 RX ADMIN — CHLORHEXIDINE GLUCONATE 1 APPLICATION(S): 213 SOLUTION TOPICAL at 05:06

## 2022-06-13 RX ADMIN — DEXTROSE MONOHYDRATE, SODIUM CHLORIDE, AND POTASSIUM CHLORIDE 50 MILLILITER(S): 50; .745; 4.5 INJECTION, SOLUTION INTRAVENOUS at 21:47

## 2022-06-13 RX ADMIN — FLUCONAZOLE 100 MILLIGRAM(S): 150 TABLET ORAL at 09:11

## 2022-06-13 NOTE — CONSULT NOTE ADULT - PROBLEM SELECTOR RECOMMENDATION 9
CT A/P with R pleural effusion with associated compressive atelectasis  -Normoxic, no complaints of dyspnea but does note R mid back pain   -Check CT chest.

## 2022-06-13 NOTE — PROGRESS NOTE ADULT - ASSESSMENT
66 y/o female w/ a PMHx of moderate mitral regurgitation, hypothyroidism, GERD, osteoporosis, vitamin D deficiency, breast CA s/p right lumpectomy w/ chemo & radiation, bilateral breast reduction (2013), s/p hysterectomy (2005), s/p right elbow surgery (1970s), & s/p laparoscopic appendectomy (6/3/2022) who presented on 6/6 w/ pneumoperitoneum & a loculated fluid collection in the RLQ s/p exploratory laparotomy, washout, ileocolic anastomosis w/ primary anastomosis for two perforations in the terminal ileum, & temporary abdominal closure w/ Abthera VAC 6/6. s/p removal of abthera vac, abd washout, abd wall reconstruction with myocutaneous flaps and bridging with ovitex mesh 6/8    Plan:    - IV Abx: continue Zosyn, Diflucan  - CT scan w/ final read completed  - F/u WBC today  - Cdiff negative  - Pain control PRN  - Diet: advance to clears   - Activity: ambulate as tolerated   - DVT ppx: LVX       [facility]  [id number]. 68 y/o female w/ a PMHx of moderate mitral regurgitation, hypothyroidism, GERD, osteoporosis, vitamin D deficiency, breast CA s/p right lumpectomy w/ chemo & radiation, bilateral breast reduction (2013), s/p hysterectomy (2005), s/p right elbow surgery (1970s), & s/p laparoscopic appendectomy (6/3/2022) who presented on 6/6 w/ pneumoperitoneum & a loculated fluid collection in the RLQ s/p exploratory laparotomy, washout, ileocolic anastomosis w/ primary anastomosis for two perforations in the terminal ileum, & temporary abdominal closure w/ Abthera VAC 6/6. s/p removal of abthera vac, abd washout, abd wall reconstruction with myocutaneous flaps and bridging with ovitex mesh 6/8. CT A/P 6/12 showed 4 cm pelvic collections not amenable to drainage, right pleural effusion with collapsed right lower lobe. C. diff negative 6/12    Plan:  - DC provena  - pulm c/s for right pleural effusion and CT findings   - f/u AM CXR  - IV Abx: continue Zosyn, Diflucan  - CT scan w/ final read completed  - F/u WBC today  - Pain control PRN  - Diet: advance to LRD  - Activity: ambulate as tolerated   - DVT ppx: LVX       [facility]  [id number].

## 2022-06-13 NOTE — PROGRESS NOTE ADULT - ATTENDING COMMENTS
Pulmonary to see for the right pleural effusion - WBC coming down - remains afebrile- having GI function with diarrhea - C-Dif pending

## 2022-06-13 NOTE — CONSULT NOTE ADULT - PROBLEM SELECTOR RECOMMENDATION 2
Presented to the ED with severe abdominal pain x3 days after laparoscopic appendectomy on 6/3/2022  -Found to have pneumoperitoneum & a loculated fluid collection in the RLQ concerning for perforation  -S/p exploratory laparotomy, washout, ileocolic anastomosis for two perforations in the terminal ileum, & temporary abdominal closure w/ wound vac on 6/6  -S/p removal of wound vac, abdominal washout, abdominal wall reconstruction on 6/8  -OR tissue cx with rate enterobacter, few candida albicans, few bacteroides vulgatus group, few bacteroides ovatus group  -ABX per surgery team.

## 2022-06-13 NOTE — PROGRESS NOTE ADULT - SUBJECTIVE AND OBJECTIVE BOX
Surgery Progress Note    Subjective:     Patient seen and examined at bedside. POD 7. VSS, AF. Overnight CT scan w/ IV contrast A/P completed due to rising WBC. Final read completed. Cdiff negative. Patient and family at bedside with questions regarding management. Patient endorses her abdomen feels "tight". She has some mild pain and wonders when she will improve. Endorses multiple episodes of diarrhea.     OBJECTIVE:     T(C): 36.7 (06-13-22 @ 00:57), Max: 37.7 (06-12-22 @ 17:55)  HR: 86 (06-13-22 @ 00:57) (85 - 88)  BP: 117/61 (06-13-22 @ 00:57) (117/61 - 135/74)  RR: 18 (06-13-22 @ 00:57) (18 - 18)  SpO2: 94% (06-13-22 @ 00:57) (92% - 96%)  Wt(kg): --    I&O's Detail    11 Jun 2022 07:01  -  12 Jun 2022 07:00  --------------------------------------------------------  IN:    IV PiggyBack: 100 mL    Lactated Ringers: 360 mL  Total IN: 460 mL    OUT:    Bulb (mL): 80 mL    Oral Fluid: 0 mL    Voided (mL): 1800 mL  Total OUT: 1880 mL    Total NET: -1420 mL      12 Jun 2022 07:01  -  13 Jun 2022 03:47  --------------------------------------------------------  IN:    dextrose 5% + sodium chloride 0.45% w/ Additives: 500 mL    IV PiggyBack: 200 mL    IV PiggyBack: 100 mL    IV PiggyBack: 300 mL    Oral Fluid: 560 mL  Total IN: 1660 mL    OUT:    Bulb (mL): 40 mL    Voided (mL): 880 mL  Total OUT: 920 mL    Total NET: 740 mL          PHYSICAL EXAM:    General: NAD, resting comfortably in bed  Respiratory: Nonlabored respirations, normal CW expansion.  Cardio: regular rate and rhythm.  Abdomen: softly distended, appropriately tender, prevena plus vac intact. LILLIAM drain x1 ss with serous milky fluid  Vascular: extremities are warm and well perfused.     MEDICATIONS  (STANDING):  acetaminophen   IVPB .. 1000 milliGRAM(s) IV Intermittent every 6 hours  chlorhexidine 2% Cloths 1 Application(s) Topical <User Schedule>  dextrose 5% + sodium chloride 0.45% with potassium chloride 20 mEq/L 1000 milliLiter(s) (50 mL/Hr) IV Continuous <Continuous>  enoxaparin Injectable 40 milliGRAM(s) SubCutaneous every 24 hours  fluconAZOLE IVPB 400 milliGRAM(s) IV Intermittent every 24 hours  levothyroxine Injectable 38 MICROGram(s) IV Push at bedtime  melatonin 10 milliGRAM(s) Oral at bedtime  nystatin    Suspension 072900 Unit(s) Oral every 6 hours  ondansetron Injectable 4 milliGRAM(s) IV Push once  piperacillin/tazobactam IVPB.. 3.375 Gram(s) IV Intermittent every 8 hours    MEDICATIONS  (PRN):  HYDROmorphone  Injectable 0.5 milliGRAM(s) IV Push every 4 hours PRN Severe Pain (7 - 10)  HYDROmorphone  Injectable 0.25 milliGRAM(s) IV Push every 4 hours PRN Moderate Pain (4 - 6)      LABS:                          9.2    23.41 )-----------( 368      ( 12 Jun 2022 12:10 )             29.6     06-11    136  |  99  |  8   ----------------------------<  91  3.4<L>   |  23  |  0.42<L>    Ca    8.1<L>      11 Jun 2022 11:06  Phos  2.8     06-11  Mg     1.8     06-11                 Surgery Progress Note    Subjective:     Patient seen and examined at bedside. POD 7. VSS, AF. Overnight CT scan w/ IV contrast A/P completed due to rising WBC. Final read completed. Cdiff negative. Patient and family at bedside with questions regarding management. Patient endorses her abdomen feels "tight". She has some mild pain and wonders when she will improve. Endorses multiple episodes of diarrhea.     Patient reports pain unchanged. Still having multiple BM. Passing gas and having BM.     OBJECTIVE:     T(C): 36.7 (06-13-22 @ 00:57), Max: 37.7 (06-12-22 @ 17:55)  HR: 86 (06-13-22 @ 00:57) (85 - 88)  BP: 117/61 (06-13-22 @ 00:57) (117/61 - 135/74)  RR: 18 (06-13-22 @ 00:57) (18 - 18)  SpO2: 94% (06-13-22 @ 00:57) (92% - 96%)  Wt(kg): --    I&O's Detail    11 Jun 2022 07:01  -  12 Jun 2022 07:00  --------------------------------------------------------  IN:    IV PiggyBack: 100 mL    Lactated Ringers: 360 mL  Total IN: 460 mL    OUT:    Bulb (mL): 80 mL    Oral Fluid: 0 mL    Voided (mL): 1800 mL  Total OUT: 1880 mL    Total NET: -1420 mL      12 Jun 2022 07:01  -  13 Jun 2022 03:47  --------------------------------------------------------  IN:    dextrose 5% + sodium chloride 0.45% w/ Additives: 500 mL    IV PiggyBack: 200 mL    IV PiggyBack: 100 mL    IV PiggyBack: 300 mL    Oral Fluid: 560 mL  Total IN: 1660 mL    OUT:    Bulb (mL): 40 mL    Voided (mL): 880 mL  Total OUT: 920 mL    Total NET: 740 mL          PHYSICAL EXAM:    General: NAD, resting comfortably in bed  Respiratory: Nonlabored respirations, normal CW expansion.  Cardio: regular rate and rhythm.  Abdomen: softly distended, appropriately tender, prevena plus vac intact. LILLIAM drain x1 ss    Vascular: extremities are warm and well perfused.     MEDICATIONS  (STANDING):  acetaminophen   IVPB .. 1000 milliGRAM(s) IV Intermittent every 6 hours  chlorhexidine 2% Cloths 1 Application(s) Topical <User Schedule>  dextrose 5% + sodium chloride 0.45% with potassium chloride 20 mEq/L 1000 milliLiter(s) (50 mL/Hr) IV Continuous <Continuous>  enoxaparin Injectable 40 milliGRAM(s) SubCutaneous every 24 hours  fluconAZOLE IVPB 400 milliGRAM(s) IV Intermittent every 24 hours  levothyroxine Injectable 38 MICROGram(s) IV Push at bedtime  melatonin 10 milliGRAM(s) Oral at bedtime  nystatin    Suspension [id number] Unit(s) Oral every 6 hours  ondansetron Injectable 4 milliGRAM(s) IV Push once  piperacillin/tazobactam IVPB.. 3.375 Gram(s) IV Intermittent every 8 hours    MEDICATIONS  (PRN):  HYDROmorphone  Injectable 0.5 milliGRAM(s) IV Push every 4 hours PRN Severe Pain (7 - 10)  HYDROmorphone  Injectable 0.25 milliGRAM(s) IV Push every 4 hours PRN Moderate Pain (4 - 6)      LABS:                          9.2    23.41 )-----------( 368      ( 12 Jun 2022 12:10 )             29.6     06-11    136  |  99  |  8   ----------------------------<  91  3.4<L>   |  23  |  0.42<L>    Ca    8.1<L>      11 Jun 2022 11:06  Phos  2.8     06-11  Mg     1.8     06-11

## 2022-06-13 NOTE — CONSULT NOTE ADULT - ASSESSMENT
68 y/o with PMH of breast CA (s/p R lumpectomy 8/2021, chemo and radiation), hypothyroid, osteoporosis GERD. Presented to the ED with severe abdominal pain x3 days after laparoscopic appendectomy on 6/3/2022. Found to have pneumoperitoneum & a loculated fluid collection in the RLQ concerning for perforation. S/p exploratory laparotomy, washout, ileocolic anastomosis w/ primary anastomosis for two perforations in the terminal ileum, & temporary abdominal closure w/ wound vac on 6/6. S/p removal of wound vac, abdominal washout, abdominal wall reconstruction on 6/8. Pulmonary called to consult for R pleural effusion on CT A/P. O2 sats 94% on RA.

## 2022-06-13 NOTE — CONSULT NOTE ADULT - NS ATTEND AMEND GEN_ALL_CORE FT
ct chest with effusion/atelectasis on rt, likely sympathetic  pt on rm air wo distress  would observe wo drainage at this point   dw pt and

## 2022-06-13 NOTE — CONSULT NOTE ADULT - SUBJECTIVE AND OBJECTIVE BOX
PULMONARY CONSULT    HPI: 66 y/o with PMH of breast CA (s/p R lumpectomy 8/2021, chemo and radiation), hypothyroid, osteoporosis GERD. Presented to the ED with severe abdominal pain x3 days after laparoscopic appendectomy on 6/3/2022. Found to have pneumoperitoneum & a loculated fluid collection in the RLQ concerning for perforation. S/p exploratory laparotomy, washout, ileocolic anastomosis w/ primary anastomosis for two perforations in the terminal ileum, & temporary abdominal closure w/ wound vac on 6/6. S/p removal of wound vac, abdominal washout, abdominal wall reconstruction on 6/8. Pulmonary called to consult for R pleural effusion on CT A/P. Never smoker, no hx of lung disease. Denies SOB, cough, sputum production but does note R sided back pain. O2 sats 94% on RA.     PAST MEDICAL & SURGICAL HISTORY:  Breast cancer  right breast CA s/p right breast lumpectomy (8/2021) and chemo/radiation treatment  Hypothyroidism  Obesity  Vitamin D deficiency  H/O osteoporosis  Appendicitis  GERD (gastroesophageal reflux disease)  S/P bilateral breast reduction  S/P laparoscopic appendectomy    Allergies  latex (Short breath)  No Known Drug Allergies    FAMILY HISTORY: non contributory     Social history: never smoker     Review of Systems:  CONSTITUTIONAL: No fever, chills, or fatigue  EYES: No eye pain, visual disturbances, or discharge  ENMT:  No difficulty hearing, tinnitus, vertigo; No sinus or throat pain  NECK: No pain or stiffness  RESPIRATORY: Per above  CARDIOVASCULAR: No chest pain, palpitations, dizziness, or leg swelling  GASTROINTESTINAL: No abdominal or epigastric pain. No nausea, vomiting, or hematemesis; No diarrhea or constipation. No melena or hematochezia.  GENITOURINARY: No dysuria, frequency, hematuria, or incontinence  NEUROLOGICAL: No headaches, memory loss, loss of strength, numbness, or tremors  SKIN: No itching, burning, rashes, or lesions   MUSCULOSKELETAL: +back pain  PSYCHIATRIC: No depression, anxiety, mood swings, or difficulty sleeping    Medications:  MEDICATIONS  (STANDING):  chlorhexidine 2% Cloths 1 Application(s) Topical <User Schedule>  dextrose 5% + sodium chloride 0.45% with potassium chloride 20 mEq/L 1000 milliLiter(s) (50 mL/Hr) IV Continuous <Continuous>  enoxaparin Injectable 40 milliGRAM(s) SubCutaneous every 24 hours  fluconAZOLE IVPB 400 milliGRAM(s) IV Intermittent every 24 hours  levothyroxine Injectable 38 MICROGram(s) IV Push at bedtime  melatonin 10 milliGRAM(s) Oral at bedtime  nystatin    Suspension 529332 Unit(s) Oral every 6 hours  ondansetron Injectable 4 milliGRAM(s) IV Push once    MEDICATIONS  (PRN):  HYDROmorphone  Injectable 0.5 milliGRAM(s) IV Push every 4 hours PRN Severe Pain (7 - 10)  HYDROmorphone  Injectable 0.25 milliGRAM(s) IV Push every 4 hours PRN Moderate Pain (4 - 6)    Vital Signs Last 24 Hrs  T(C): 36.6 (13 Jun 2022 08:19), Max: 37.7 (12 Jun 2022 17:55)  T(F): 97.9 (13 Jun 2022 08:19), Max: 99.9 (12 Jun 2022 17:55)  HR: 79 (13 Jun 2022 08:19) (78 - 88)  BP: 114/71 (13 Jun 2022 08:19) (114/62 - 134/73)  BP(mean): --  RR: 18 (13 Jun 2022 08:19) (18 - 18)  SpO2: 93% (13 Jun 2022 08:19) (93% - 96%)    06-12 @ 07:01  -  06-13 @ 07:00  --------------------------------------------------------  IN: 2360 mL / OUT: 1340 mL / NET: 1020 mL    LABS:                        8.3    19.43 )-----------( 307      ( 13 Jun 2022 07:26 )             25.9     06-13    136  |  101  |  5<L>  ----------------------------<  113<H>  3.1<L>   |  26  |  0.43<L>    Ca    8.0<L>      13 Jun 2022 07:20  Phos  2.9     06-13  Mg     2.0     06-13    CULTURES:      (collected 06-08-22 @ 05:56)  Source: .Blood Blood-Peripheral  Final Report (06-13-22 @ 10:00):    No Growth Final     (collected 06-06-22 @ 12:40)  Source: .Blood Blood-Peripheral  Final Report (06-11-22 @ 16:00):    No Growth Final     (collected 06-06-22 @ 12:20)  Source: .Blood Blood-Peripheral  Final Report (06-11-22 @ 16:00):    No Growth Final     (collected 06-06-22 @ 09:41)  Source: .Blood Blood-Peripheral  Gram Stain (06-07-22 @ 23:04):    Growth in anaerobic bottle: Gram Negative Rods  Final Report (06-08-22 @ 20:04):    Growth in anaerobic bottle: Bacteroides ovatus group    "Susceptibilities not performed"     (collected 06-06-22 @ 09:40)  Source: .Blood Blood-Peripheral  Gram Stain (06-07-22 @ 22:55):    Growth in anaerobic bottle: Gram Negative Rods  Final Report (06-08-22 @ 20:03):    Growth in anaerobic bottle: Bacteroides ovatus group "Susceptibilities    not performed"    ***Blood Panel PCR results on this specimen are available    approximately 3 hours after the Gram stain result.***    Gram stain, PCR, and/or culture results may not always    correspond due to difference in methodologies.    ************************************************************    This PCR assay was performed by multiplex PCR. This    Assay tests for 66 bacterial and resistance gene targets.    Please refer to [facility] test directory    at [email or web address] for details.  Organism: Blood Culture PCR (06-08-22 @ 20:03)  Organism: Blood Culture PCR (06-08-22 @ 20:03)      -  Blood PCR Panel: NEG      Method Type: PCR    Culture - Urine (collected 06-06-22 @ 12:27)  Source: Clean Catch Clean Catch (Midstream)  Final Report (06-07-22 @ 12:55):    <10,000 CFU/mL Normal Urogenital Odalis    Culture - Body Fluid with Gram Stain (collected 06-06-22 @ 21:43)  Source: .Body Fluid Peritoneal Fluid  Gram Stain (06-07-22 @ 04:50):    Moderate polymorphonuclear leukocytes seen per low power field    Rare Gram Positive Rods seen per oil power field    Rare Yeast like cells seen per oil power field  Final Report (06-09-22 @ 20:27):    Rare Candida albicans "Susceptibilities not performed"    Growth in fluid media only Enterobacter cloacae complex    Few Bacteroides vulgatus group "Susceptibilities not performed"    Rare Lactobacillus rhamnosus "Susceptibilities not performed"  Organism: Enterobacter cloacae complex (06-09-22 @ 20:27)  Organism: Enterobacter cloacae complex (06-09-22 @ 20:27)      -  Amikacin: S <=16      -  Amoxicillin/Clavulanic Acid: R >16/8      -  Ampicillin: R >16 These ampicillin results predict results for amoxicillin      -  Ampicillin/Sulbactam: R >16/8 Enterobacter, Klebsiella aerogenes, Citrobacter, and Serratia may develop resistance during prolonged therapy (3-4 days)      -  Aztreonam: S <=4      -  Cefazolin: R >16 Enterobacter, Klebsiella aerogenes, Citrobacter, and Serratia may develop resistance during prolonged therapy (3-4 days)      -  Cefepime: S <=2      -  Cefoxitin: R >16      -  Ceftriaxone: S <=1 Enterobacter, Klebsiella aerogenes, Citrobacter, and Serratia may develop resistance during prolonged therapy      -  Ciprofloxacin: S <=0.25      -  Ertapenem: S <=0.5      -  Gentamicin: S <=2      -  Imipenem: S <=1      -  Levofloxacin: S <=0.5      -  Meropenem: S <=1      -  Piperacillin/Tazobactam: S <=8      -  Tobramycin: S <=2      -  Trimethoprim/Sulfamethoxazole: S <=0.5/9.5      Method Type: SPENCER    Physical Examination:    General: No acute distress.      HEENT: Pupils equal, reactive to light.  Symmetric.    PULM: Decreased BS R>L     CVS: RRR    ABD: Soft, nondistended, nontender, normoactive bowel sounds, no masses    EXT: No edema, nontender    SKIN: Warm and well perfused, no rashes noted.    NEURO: Alert, oriented, interactive, nonfocal    RADIOLOGY REVIEWED    CT A/P (lower chest) < from:     CT Abdomen and Pelvis w/ Oral Cont and w/ IV Cont (06.12.22 @ 16:45) >  Sagittal and coronal reformats were performed.    FINDINGS:  LOWER CHEST: There has been interval increase in size of a mild to   moderate layering right pleural effusion with complete compressive   collapse of the right lower lobe. Again is noted a 5mm subpleural nodule   in the right upper lobe anteriorly on image 1. There is a new trace   layering left pleural effusion with moderate left basilar atelectasis.

## 2022-06-13 NOTE — PROGRESS NOTE ADULT - SUBJECTIVE AND OBJECTIVE BOX
Date of Service   06-13-22 @ 12:22    Patient is a 67y old  Female who presents with a chief complaint of Abdominal Sepsis (13 Jun 2022 03:46)      INTERVAL HISTORY: pt feels ok     REVIEW OF SYSTEMS:   CONSTITUTIONAL: No weakness  EYES/ENT: No visual changes; No throat pain  Neck: No pain or stiffness  Respiratory: No cough, wheezing, No shortness of breath  CARDIOVASCULAR: no chest pain or palpitations  GASTROINTESTINAL: No abdominal pain, no nausea, vomiting or hematemesis  GENITOURINARY: No dysuria, frequency or hematuria  NEUROLOGICAL: No stroke like symptoms  SKIN: No rashes    	  MEDICATIONS:        PHYSICAL EXAM:  T(C): 36.6 (06-13-22 @ 08:19), Max: 37.7 (06-12-22 @ 17:55)  HR: 79 (06-13-22 @ 08:19) (78 - 88)  BP: 114/71 (06-13-22 @ 08:19) (114/62 - 134/73)  RR: 18 (06-13-22 @ 08:19) (18 - 18)  SpO2: 93% (06-13-22 @ 08:19) (93% - 96%)  Wt(kg): --  I&O's Summary    12 Jun 2022 07:01  -  13 Jun 2022 07:00  --------------------------------------------------------  IN: 2360 mL / OUT: 1340 mL / NET: 1020 mL          Appearance: In no distress	  HEENT:    PERRL, EOMI	  Cardiovascular:  S1 S2, No JVD  Respiratory: L clear to auscultation, R diminished 	  Gastrointestinal:  Soft, Non-tender, + BS	  Vasculature:  No edema of LE  Psychiatric: Appropriate affect   Neuro: no acute focal deficits                               8.3    19.43 )-----------( 307      ( 13 Jun 2022 07:26 )             25.9     06-13    136  |  101  |  5<L>  ----------------------------<  113<H>  3.1<L>   |  26  |  0.43<L>    Ca    8.0<L>      13 Jun 2022 07:20  Phos  2.9     06-13  Mg     2.0     06-13          Labs personally reviewed  < from: CT Abdomen and Pelvis w/ Oral Cont and w/ IV Cont (06.12.22 @ 16:45) >    IMPRESSION: Status post evacuation of previously identified fluid and   mottled gas in the right hemiabdomen. Residual tracking fluid and   enhancement, as described, without evidence of discrete drainable   collection. Subcutaneous drain without evidence of drainable collection.   Complete right lower lobe atelectatic collapse.    --- End of Report ---    < end of copied text >  < from: Xray Chest 1 View- PORTABLE-Urgent (Xray Chest 1 View- PORTABLE-Urgent .) (06.13.22 @ 06:03) >  Pleural effusions as noted.      < end of copied text >      ASSESSMENT/PLAN: 		  The patient is a 67y Female who is now s/p exploratory laparotomy with ileocecal resection and abdominal washout on 6/6   s/p removal of abthera vac, abd washout, abd wall reconstruction with myocutaneous flaps and bridging with ovitex mesh 6/8.    1. Esperanza op risk stratification - tolerated surgery well, planned for closing of abd and washout  6/8  - HD stable   - SR on the monitor   - Pain management   - Tolerated surgery well  - BP stable, sat well on RA     2. HLD   - on Repatha, resume as OP    3. Mod MR - euvolemic   - OP follow up    4, Leukocytosis - post op  - surgical team following   - CT abd/pelvis done as noted above showing Complete right lower lobe atelectatic collapse.  - CXR show b/l pleural effusion   - Pulmn consult         Elsa FAUSTIN-BC   Randy Santamaria DO [facility]  Cardiovascular Medicine  [address] [place] [address], [address]  Office: [phone]

## 2022-06-14 LAB
ANION GAP SERPL CALC-SCNC: 9 MMOL/L — SIGNIFICANT CHANGE UP (ref 5–17)
BLD GP AB SCN SERPL QL: NEGATIVE — SIGNIFICANT CHANGE UP
BUN SERPL-MCNC: 5 MG/DL — LOW (ref 7–23)
CALCIUM SERPL-MCNC: 7.9 MG/DL — LOW (ref 8.4–10.5)
CHLORIDE SERPL-SCNC: 102 MMOL/L — SIGNIFICANT CHANGE UP (ref 96–108)
CO2 SERPL-SCNC: 24 MMOL/L — SIGNIFICANT CHANGE UP (ref 22–31)
CREAT SERPL-MCNC: 0.41 MG/DL — LOW (ref 0.5–1.3)
EGFR: 106 ML/MIN/1.73M2 — SIGNIFICANT CHANGE UP
GLUCOSE SERPL-MCNC: 131 MG/DL — HIGH (ref 70–99)
HCT VFR BLD CALC: 25.3 % — LOW (ref 34.5–45)
HGB BLD-MCNC: 8.2 G/DL — LOW (ref 11.5–15.5)
MAGNESIUM SERPL-MCNC: 1.9 MG/DL — SIGNIFICANT CHANGE UP (ref 1.6–2.6)
MCHC RBC-ENTMCNC: 29.1 PG — SIGNIFICANT CHANGE UP (ref 27–34)
MCHC RBC-ENTMCNC: 32.4 GM/DL — SIGNIFICANT CHANGE UP (ref 32–36)
MCV RBC AUTO: 89.7 FL — SIGNIFICANT CHANGE UP (ref 80–100)
NRBC # BLD: 0 /100 WBCS — SIGNIFICANT CHANGE UP (ref 0–0)
PHOSPHATE SERPL-MCNC: 2.7 MG/DL — SIGNIFICANT CHANGE UP (ref 2.5–4.5)
PLATELET # BLD AUTO: 520 K/UL — HIGH (ref 150–400)
POTASSIUM SERPL-MCNC: 3.3 MMOL/L — LOW (ref 3.5–5.3)
POTASSIUM SERPL-SCNC: 3.3 MMOL/L — LOW (ref 3.5–5.3)
RBC # BLD: 2.82 M/UL — LOW (ref 3.8–5.2)
RBC # FLD: 14.5 % — SIGNIFICANT CHANGE UP (ref 10.3–14.5)
RH IG SCN BLD-IMP: POSITIVE — SIGNIFICANT CHANGE UP
SODIUM SERPL-SCNC: 135 MMOL/L — SIGNIFICANT CHANGE UP (ref 135–145)
SURGICAL PATHOLOGY STUDY: SIGNIFICANT CHANGE UP
WBC # BLD: 21.94 K/UL — HIGH (ref 3.8–10.5)
WBC # FLD AUTO: 21.94 K/UL — HIGH (ref 3.8–10.5)

## 2022-06-14 RX ORDER — LOPERAMIDE HCL 2 MG
2 TABLET ORAL DAILY
Refills: 0 | Status: DISCONTINUED | OUTPATIENT
Start: 2022-06-14 | End: 2022-06-15

## 2022-06-14 RX ORDER — PANTOPRAZOLE SODIUM 20 MG/1
40 TABLET, DELAYED RELEASE ORAL DAILY
Refills: 0 | Status: DISCONTINUED | OUTPATIENT
Start: 2022-06-14 | End: 2022-06-16

## 2022-06-14 RX ORDER — POTASSIUM CHLORIDE 20 MEQ
40 PACKET (EA) ORAL
Refills: 0 | Status: DISCONTINUED | OUTPATIENT
Start: 2022-06-14 | End: 2022-06-14

## 2022-06-14 RX ORDER — ACETAMINOPHEN 500 MG
975 TABLET ORAL EVERY 6 HOURS
Refills: 0 | Status: DISCONTINUED | OUTPATIENT
Start: 2022-06-14 | End: 2022-06-20

## 2022-06-14 RX ORDER — POTASSIUM CHLORIDE 20 MEQ
10 PACKET (EA) ORAL
Refills: 0 | Status: COMPLETED | OUTPATIENT
Start: 2022-06-14 | End: 2022-06-14

## 2022-06-14 RX ADMIN — Medication 38 MICROGRAM(S): at 21:30

## 2022-06-14 RX ADMIN — ENOXAPARIN SODIUM 40 MILLIGRAM(S): 100 INJECTION SUBCUTANEOUS at 21:30

## 2022-06-14 RX ADMIN — HYDROMORPHONE HYDROCHLORIDE 0.5 MILLIGRAM(S): 2 INJECTION INTRAMUSCULAR; INTRAVENOUS; SUBCUTANEOUS at 18:34

## 2022-06-14 RX ADMIN — HYDROMORPHONE HYDROCHLORIDE 0.5 MILLIGRAM(S): 2 INJECTION INTRAMUSCULAR; INTRAVENOUS; SUBCUTANEOUS at 05:12

## 2022-06-14 RX ADMIN — PIPERACILLIN AND TAZOBACTAM 25 GRAM(S): 4; .5 INJECTION, POWDER, LYOPHILIZED, FOR SOLUTION INTRAVENOUS at 11:10

## 2022-06-14 RX ADMIN — HYDROMORPHONE HYDROCHLORIDE 0.5 MILLIGRAM(S): 2 INJECTION INTRAMUSCULAR; INTRAVENOUS; SUBCUTANEOUS at 19:00

## 2022-06-14 RX ADMIN — HYDROMORPHONE HYDROCHLORIDE 0.5 MILLIGRAM(S): 2 INJECTION INTRAMUSCULAR; INTRAVENOUS; SUBCUTANEOUS at 11:18

## 2022-06-14 RX ADMIN — FLUCONAZOLE 100 MILLIGRAM(S): 150 TABLET ORAL at 08:51

## 2022-06-14 RX ADMIN — Medication 100 MILLIEQUIVALENT(S): at 18:00

## 2022-06-14 RX ADMIN — PIPERACILLIN AND TAZOBACTAM 25 GRAM(S): 4; .5 INJECTION, POWDER, LYOPHILIZED, FOR SOLUTION INTRAVENOUS at 18:34

## 2022-06-14 RX ADMIN — Medication 100 MILLIEQUIVALENT(S): at 16:37

## 2022-06-14 RX ADMIN — Medication 100 MILLIEQUIVALENT(S): at 15:43

## 2022-06-14 RX ADMIN — HYDROMORPHONE HYDROCHLORIDE 0.5 MILLIGRAM(S): 2 INJECTION INTRAMUSCULAR; INTRAVENOUS; SUBCUTANEOUS at 12:18

## 2022-06-14 RX ADMIN — CHLORHEXIDINE GLUCONATE 1 APPLICATION(S): 213 SOLUTION TOPICAL at 09:06

## 2022-06-14 RX ADMIN — PIPERACILLIN AND TAZOBACTAM 25 GRAM(S): 4; .5 INJECTION, POWDER, LYOPHILIZED, FOR SOLUTION INTRAVENOUS at 02:01

## 2022-06-14 RX ADMIN — Medication 2 MILLIGRAM(S): at 11:10

## 2022-06-14 NOTE — PROGRESS NOTE ADULT - SUBJECTIVE AND OBJECTIVE BOX
Follow-up Pulm Progress Note    No new respiratory events overnight.  Denies SOB/CP.   O2 sats 96% on RA    Medications:  MEDICATIONS  (STANDING):  acetaminophen     Tablet .. 975 milliGRAM(s) Oral every 6 hours  chlorhexidine 2% Cloths 1 Application(s) Topical <User Schedule>  enoxaparin Injectable 40 milliGRAM(s) SubCutaneous every 24 hours  fluconAZOLE IVPB 400 milliGRAM(s) IV Intermittent every 24 hours  levothyroxine Injectable 38 MICROGram(s) IV Push at bedtime  loperamide 2 milliGRAM(s) Oral daily  melatonin 10 milliGRAM(s) Oral at bedtime  nystatin    Suspension 008624 Unit(s) Oral every 6 hours  ondansetron Injectable 4 milliGRAM(s) IV Push once  piperacillin/tazobactam IVPB.. 3.375 Gram(s) IV Intermittent every 8 hours    MEDICATIONS  (PRN):  HYDROmorphone  Injectable 0.5 milliGRAM(s) IV Push every 4 hours PRN Severe Pain (7 - 10)  HYDROmorphone  Injectable 0.25 milliGRAM(s) IV Push every 4 hours PRN Moderate Pain (4 - 6)    Vital Signs Last 24 Hrs  T(C): 37.3 (14 Jun 2022 09:06), Max: 37.3 (13 Jun 2022 13:12)  T(F): 99.1 (14 Jun 2022 09:06), Max: 99.2 (13 Jun 2022 13:12)  HR: 80 (14 Jun 2022 09:06) (80 - 95)  BP: 130/80 (14 Jun 2022 09:06) (121/71 - 136/81)  BP(mean): --  RR: 18 (14 Jun 2022 09:06) (18 - 18)  SpO2: 96% (14 Jun 2022 09:06) (94% - 98%)    06-13 @ 07:01  -  06-14 @ 07:00  --------------------------------------------------------  IN: 1690 mL / OUT: 570 mL / NET: 1120 mL      LABS:                        8.3    19.43 )-----------( 307      ( 13 Jun 2022 07:26 )             25.9     06-13    136  |  101  |  5<L>  ----------------------------<  113<H>  3.1<L>   |  26  |  0.43<L>    Ca    8.0<L>      13 Jun 2022 07:20  Phos  2.9     06-13  Mg     2.0     06-13    CULTURES    Culture - Blood (collected 06-08-22 @ 05:56)  Source: .Blood Blood-Peripheral  Final Report (06-13-22 @ 10:00):    No Growth Final    Culture - Blood (collected 06-06-22 @ 12:40)  Source: .Blood Blood-Peripheral  Final Report (06-11-22 @ 16:00):    No Growth Final    Culture - Blood (collected 06-06-22 @ 12:20)  Source: .Blood Blood-Peripheral  Final Report (06-11-22 @ 16:00):    No Growth Final    Culture - Blood (collected 06-06-22 @ 09:41)  Source: .Blood Blood-Peripheral  Gram Stain (06-07-22 @ 23:04):    Growth in anaerobic bottle: Gram Negative Rods  Final Report (06-08-22 @ 20:04):    Growth in anaerobic bottle: Bacteroides ovatus group    "Susceptibilities not performed"    Culture - Blood (collected 06-06-22 @ 09:40)  Source: .Blood Blood-Peripheral  Gram Stain (06-07-22 @ 22:55):    Growth in anaerobic bottle: Gram Negative Rods  Final Report (06-08-22 @ 20:03):    Growth in anaerobic bottle: Bacteroides ovatus group "Susceptibilities    not performed"    ***Blood Panel PCR results on this specimen are available    approximately 3 hours after the Gram stain result.***    Gram stain, PCR, and/or culture results may not always    correspond due to difference in methodologies.    ************************************************************    This PCR assay was performed by multiplex PCR. This    Assay tests for 66 bacterial and resistance gene targets.    Please refer to [facility] test directory    at [email or web address] for details.  Organism: Blood Culture PCR (06-08-22 @ 20:03)  Organism: Blood Culture PCR (06-08-22 @ 20:03)      -  Blood PCR Panel: NEG      Method Type: PCR    Culture - Urine (collected 06-06-22 @ 12:27)  Source: Clean Catch Clean Catch (Midstream)  Final Report (06-07-22 @ 12:55):    <10,000 CFU/mL Normal Urogenital Odalis    Culture - Body Fluid with Gram Stain (collected 06-06-22 @ 21:43)  Source: .Body Fluid Peritoneal Fluid  Gram Stain (06-07-22 @ 04:50):    Moderate polymorphonuclear leukocytes seen per low power field    Rare Gram Positive Rods seen per oil power field    Rare Yeast like cells seen per oil power field  Final Report (06-09-22 @ 20:27):    Rare Candida albicans "Susceptibilities not performed"    Growth in fluid media only Enterobacter cloacae complex    Few Bacteroides vulgatus group "Susceptibilities not performed"    Rare Lactobacillus rhamnosus "Susceptibilities not performed"  Organism: Enterobacter cloacae complex (06-09-22 @ 20:27)  Organism: Enterobacter cloacae complex (06-09-22 @ 20:27)      -  Amikacin: S <=16      -  Amoxicillin/Clavulanic Acid: R >16/8      -  Ampicillin: R >16 These ampicillin results predict results for amoxicillin      -  Ampicillin/Sulbactam: R >16/8 Enterobacter, Klebsiella aerogenes, Citrobacter, and Serratia may develop resistance during prolonged therapy (3-4 days)      -  Aztreonam: S <=4      -  Cefazolin: R >16 Enterobacter, Klebsiella aerogenes, Citrobacter, and Serratia may develop resistance during prolonged therapy (3-4 days)      -  Cefepime: S <=2      -  Cefoxitin: R >16      -  Ceftriaxone: S <=1 Enterobacter, Klebsiella aerogenes, Citrobacter, and Serratia may develop resistance during prolonged therapy      -  Ciprofloxacin: S <=0.25      -  Ertapenem: S <=0.5      -  Gentamicin: S <=2      -  Imipenem: S <=1      -  Levofloxacin: S <=0.5      -  Meropenem: S <=1      -  Piperacillin/Tazobactam: S <=8      -  Tobramycin: S <=2      -  Trimethoprim/Sulfamethoxazole: S <=0.5/9.5      Method Type: SPENCER    Culture - Tissue with Gram Stain (collected 06-06-22 @ 21:43)  Source: .Tissue Other  Gram Stain (06-07-22 @ 04:49):    Few polymorphonuclear leukocytes seen per low power field    Rare Gram Negative Rods seen per oil power field    Rare Yeast like cells seen per oil power field  Final Report (06-08-22 @ 23:59):    Rare Enterobacter cloacae complex    Few Alysa albicans "Susceptibilities not performed"    Few Bacteroides vulgatus group "Susceptibilities not performed"    Few Bacteroides ovatus group "Susceptibilities not performed"  Organism: Enterobacter cloacae complex (06-08-22 @ 23:59)  Organism: Enterobacter cloacae complex (06-08-22 @ 23:59)      -  Amikacin: S <=16      -  Amoxicillin/Clavulanic Acid: R >16/8      -  Ampicillin: R >16 These ampicillin results predict results for amoxicillin      -  Ampicillin/Sulbactam: R >16/8 Enterobacter, Klebsiella aerogenes, Citrobacter, and Serratia may develop resistance during prolonged therapy (3-4 days)      -  Aztreonam: S <=4      -  Cefazolin: R >16 Enterobacter, Klebsiella aerogenes, Citrobacter, and Serratia may develop resistance during prolonged therapy (3-4 days)      -  Cefepime: S <=2      -  Cefoxitin: R >16      -  Ceftriaxone: S <=1 Enterobacter, Klebsiella aerogenes, Citrobacter, and Serratia may develop resistance during prolonged therapy      -  Ciprofloxacin: S <=0.25      -  Ertapenem: S <=0.5      -  Gentamicin: S <=2      -  Imipenem: S <=1      -  Levofloxacin: S <=0.5      -  Meropenem: S <=1      -  Piperacillin/Tazobactam: S <=8      -  Tobramycin: S <=2      -  Trimethoprim/Sulfamethoxazole: S <=0.5/9.5      Method Type: SPENCER    Physical Examination:  PULM: Decreased BS R base  CVS: RRR    RADIOLOGY REVIEWED    CT chest: < from: CT Chest No Cont (06.13.22 @ 14:22) >    FINDINGS:    LUNGS AND AIRWAYS: Passive atelectasis of the right lower lobe and left   basilar atelectasis.    PLEURA: Bilateral pleural effusions, right greater than left.    MEDIASTINUM AND AFUA: No lymphadenopathy.    VESSELS: Within normal limits.    HEART: Heart size is normal. No pericardial effusion.    CHEST WALL AND LOWER NECK: Within normal limits.    VISUALIZED UPPER ABDOMEN: Postsurgical changes of the imaged upper   abdomen, better evaluated on CT abdomen pelvis dated 6/12/2022.    BONES: Degenerative changes. Mild compression of a lumbar vertebral body.    IMPRESSION:    Bilateral pleural effusions, right greater than left. Passive atelectasis   of the right lower lobe and minimal left basilar atelctasis.    --- End of Report ---    < end of copied text >

## 2022-06-14 NOTE — PROGRESS NOTE ADULT - ASSESSMENT
66 y/o female w/ a PMHx of moderate mitral regurgitation, hypothyroidism, GERD, osteoporosis, vitamin D deficiency, breast CA s/p right lumpectomy w/ chemo & radiation, bilateral breast reduction (2013), s/p hysterectomy (2005), s/p right elbow surgery (1970s), & s/p laparoscopic appendectomy (6/3/2022) who presented on 6/6 w/ pneumoperitoneum & a loculated fluid collection in the RLQ s/p exploratory laparotomy, washout, ileocolic anastomosis w/ primary anastomosis for two perforations in the terminal ileum, & temporary abdominal closure w/ Abthera VAC 6/6. s/p removal of abthera vac, abd washout, abd wall reconstruction with myocutaneous flaps and bridging with ovitex mesh 6/8. CT A/P 6/12 showed 4 cm pelvic collections not amenable to drainage, right pleural effusion with collapsed right lower lobe. C. diff negative 6/12    Plan:  - DC prevena  - Appreciate pulm recs  - IV Abx: continue Zosyn, Diflucan  - Cdiff negative  - F/u WBC today  - Pain control PRN  - Diet: advance to LRD  - Activity: ambulate as tolerated   - DVT ppx: LVX

## 2022-06-14 NOTE — PROGRESS NOTE ADULT - SUBJECTIVE AND OBJECTIVE BOX
DATE OF SERVICE: 06-14-22 @ 09:47    Patient is a 67y old  Female who presents with a chief complaint of Abdominal Sepsis (14 Jun 2022 01:52)      INTERVAL HISTORY: Feels ok. C/o frequent diarrhea.     REVIEW OF SYSTEMS:  CONSTITUTIONAL: No weakness  EYES/ENT: No visual changes;  No throat pain   NECK: No pain or stiffness  RESPIRATORY: No cough, wheezing; No shortness of breath  CARDIOVASCULAR: No chest pain or palpitations  GASTROINTESTINAL: + frequent loose stools  GENITOURINARY: No dysuria, frequency or hematuria  NEUROLOGICAL: No stroke like symptoms  SKIN: No rashes    	  MEDICATIONS:        PHYSICAL EXAM:  T(C): 37.3 (06-14-22 @ 09:06), Max: 37.3 (06-13-22 @ 13:12)  HR: 80 (06-14-22 @ 09:06) (80 - 95)  BP: 130/80 (06-14-22 @ 09:06) (121/71 - 136/81)  RR: 18 (06-14-22 @ 09:06) (18 - 18)  SpO2: 96% (06-14-22 @ 09:06) (94% - 98%)  Wt(kg): --  I&O's Summary    13 Jun 2022 07:01  -  14 Jun 2022 07:00  --------------------------------------------------------  IN: 1690 mL / OUT: 570 mL / NET: 1120 mL          Appearance: In no distress	  HEENT:    PERRL, EOMI	  Cardiovascular:  S1 S2, No JVD  Respiratory: Lungs clear to auscultation	  Gastrointestinal:  Soft, Non-tender, + BS	  Vascularature:  No edema of LE  Psychiatric: Appropriate affect   Neuro: no acute focal deficits                               8.3    19.43 )-----------( 307      ( 13 Jun 2022 07:26 )             25.9     06-13    136  |  101  |  5<L>  ----------------------------<  113<H>  3.1<L>   |  26  |  0.43<L>    Ca    8.0<L>      13 Jun 2022 07:20  Phos  2.9     06-13  Mg     2.0     06-13          Labs personally reviewed      ASSESSMENT/PLAN: 	    The patient is a 67y Female who is now s/p exploratory laparotomy with ileocecal resection and abdominal washout on 6/6   s/p removal of abthera vac, abd washout, abd wall reconstruction with myocutaneous flaps and bridging with ovitex mesh 6/8.    1. Esperanza op risk stratification - tolerated surgery well, planned for closing of abd and washout  6/8  - HD stable   - SR on the monitor   - Pain management   - Tolerated surgery well  - BP stable, sat well on RA     2. HLD   - on Repatha, resume as OP    3. Mod MR - euvolemic   - OP follow up    4, Leukocytosis - post op  - surgical team following   - CT abd/pelvis done as noted above showing Complete right lower lobe atelectatic collapse.  - CXR show b/l pleural effusion   - Pulmn consult - recommend Repeat Chest CT which revealed b/l pleural effusions         NIKKY Carver-LEIGH Santamaria DO [facility]  Cardiovascular Medicine  [address] [place] [address], [address]  Office: [phone]  Cell: [phone]

## 2022-06-14 NOTE — PROGRESS NOTE ADULT - SUBJECTIVE AND OBJECTIVE BOX
Surgery Progress Note    Subjective:     Patient seen and examined at bedside. POD 6. VSS, AF. No acute events overnight.     OBJECTIVE:     T(C): 36.7 (06-14-22 @ 01:18), Max: 37.3 (06-13-22 @ 13:12)  HR: 95 (06-14-22 @ 01:18) (78 - 95)  BP: 122/72 (06-14-22 @ 01:18) (114/62 - 132/80)  RR: 18 (06-14-22 @ 01:18) (18 - 18)  SpO2: 98% (06-14-22 @ 01:18) (93% - 98%)  Wt(kg): --    I&O's Detail    12 Jun 2022 07:01  -  13 Jun 2022 07:00  --------------------------------------------------------  IN:    dextrose 5% + sodium chloride 0.45% w/ Additives: 1100 mL    IV PiggyBack: 200 mL    IV PiggyBack: 200 mL    IV PiggyBack: 300 mL    Oral Fluid: 560 mL  Total IN: 2360 mL    OUT:    Bulb (mL): 60 mL    Voided (mL): 1280 mL  Total OUT: 1340 mL    Total NET: 1020 mL      13 Jun 2022 07:01  -  14 Jun 2022 01:53  --------------------------------------------------------  IN:    dextrose 5% + sodium chloride 0.45% w/ Additives: 300 mL    IV PiggyBack: 250 mL    IV PiggyBack: 200 mL    IV PiggyBack: 100 mL    IV PiggyBack: 100 mL    Oral Fluid: 440 mL  Total IN: 1390 mL    OUT:    Bulb (mL): 20 mL  Total OUT: 20 mL    Total NET: 1370 mL          PHYSICAL EXAM:    General: NAD, resting comfortably in bed  Respiratory: Nonlabored respirations, normal CW expansion.  Cardio: regular rate and rhythm.  Abdomen: softly distended, appropriately tender, prevena plus vac intact. LILLIAM drain x1 ss    Vascular: extremities are warm and well perfused.     MEDICATIONS  (STANDING):  chlorhexidine 2% Cloths 1 Application(s) Topical <User Schedule>  dextrose 5% + sodium chloride 0.45% with potassium chloride 20 mEq/L 1000 milliLiter(s) (50 mL/Hr) IV Continuous <Continuous>  enoxaparin Injectable 40 milliGRAM(s) SubCutaneous every 24 hours  fluconAZOLE IVPB 400 milliGRAM(s) IV Intermittent every 24 hours  levothyroxine Injectable 38 MICROGram(s) IV Push at bedtime  melatonin 10 milliGRAM(s) Oral at bedtime  nystatin    Suspension 770370 Unit(s) Oral every 6 hours  ondansetron Injectable 4 milliGRAM(s) IV Push once  piperacillin/tazobactam IVPB.. 3.375 Gram(s) IV Intermittent every 8 hours    MEDICATIONS  (PRN):  HYDROmorphone  Injectable 0.5 milliGRAM(s) IV Push every 4 hours PRN Severe Pain (7 - 10)  HYDROmorphone  Injectable 0.25 milliGRAM(s) IV Push every 4 hours PRN Moderate Pain (4 - 6)      LABS:                          8.3    19.43 )-----------( 307      ( 13 Jun 2022 07:26 )             25.9     06-13    136  |  101  |  5<L>  ----------------------------<  113<H>  3.1<L>   |  26  |  0.43<L>    Ca    8.0<L>      13 Jun 2022 07:20  Phos  2.9     06-13  Mg     2.0     06-13

## 2022-06-15 DIAGNOSIS — D72.829 ELEVATED WHITE BLOOD CELL COUNT, UNSPECIFIED: ICD-10-CM

## 2022-06-15 LAB
ANION GAP SERPL CALC-SCNC: 12 MMOL/L — SIGNIFICANT CHANGE UP (ref 5–17)
BUN SERPL-MCNC: 4 MG/DL — LOW (ref 7–23)
CALCIUM SERPL-MCNC: 8.1 MG/DL — LOW (ref 8.4–10.5)
CHLORIDE SERPL-SCNC: 106 MMOL/L — SIGNIFICANT CHANGE UP (ref 96–108)
CO2 SERPL-SCNC: 18 MMOL/L — LOW (ref 22–31)
CREAT SERPL-MCNC: 0.4 MG/DL — LOW (ref 0.5–1.3)
EGFR: 107 ML/MIN/1.73M2 — SIGNIFICANT CHANGE UP
GLUCOSE SERPL-MCNC: 99 MG/DL — SIGNIFICANT CHANGE UP (ref 70–99)
POTASSIUM SERPL-MCNC: 5.1 MMOL/L — SIGNIFICANT CHANGE UP (ref 3.5–5.3)
POTASSIUM SERPL-SCNC: 5.1 MMOL/L — SIGNIFICANT CHANGE UP (ref 3.5–5.3)
SODIUM SERPL-SCNC: 136 MMOL/L — SIGNIFICANT CHANGE UP (ref 135–145)

## 2022-06-15 PROCEDURE — 99223 1ST HOSP IP/OBS HIGH 75: CPT

## 2022-06-15 RX ORDER — CEFEPIME 1 G/1
1000 INJECTION, POWDER, FOR SOLUTION INTRAMUSCULAR; INTRAVENOUS EVERY 8 HOURS
Refills: 0 | Status: DISCONTINUED | OUTPATIENT
Start: 2022-06-15 | End: 2022-06-18

## 2022-06-15 RX ORDER — METRONIDAZOLE 500 MG
500 TABLET ORAL EVERY 12 HOURS
Refills: 0 | Status: DISCONTINUED | OUTPATIENT
Start: 2022-06-15 | End: 2022-06-18

## 2022-06-15 RX ORDER — ACETAMINOPHEN 500 MG
1000 TABLET ORAL ONCE
Refills: 0 | Status: COMPLETED | OUTPATIENT
Start: 2022-06-15 | End: 2022-06-15

## 2022-06-15 RX ADMIN — Medication 400 MILLIGRAM(S): at 00:10

## 2022-06-15 RX ADMIN — Medication 975 MILLIGRAM(S): at 11:34

## 2022-06-15 RX ADMIN — PIPERACILLIN AND TAZOBACTAM 25 GRAM(S): 4; .5 INJECTION, POWDER, LYOPHILIZED, FOR SOLUTION INTRAVENOUS at 02:19

## 2022-06-15 RX ADMIN — Medication 1000 MILLIGRAM(S): at 00:40

## 2022-06-15 RX ADMIN — HYDROMORPHONE HYDROCHLORIDE 0.5 MILLIGRAM(S): 2 INJECTION INTRAMUSCULAR; INTRAVENOUS; SUBCUTANEOUS at 18:31

## 2022-06-15 RX ADMIN — PANTOPRAZOLE SODIUM 40 MILLIGRAM(S): 20 TABLET, DELAYED RELEASE ORAL at 13:21

## 2022-06-15 RX ADMIN — FLUCONAZOLE 100 MILLIGRAM(S): 150 TABLET ORAL at 13:12

## 2022-06-15 RX ADMIN — HYDROMORPHONE HYDROCHLORIDE 0.5 MILLIGRAM(S): 2 INJECTION INTRAMUSCULAR; INTRAVENOUS; SUBCUTANEOUS at 12:00

## 2022-06-15 RX ADMIN — CHLORHEXIDINE GLUCONATE 1 APPLICATION(S): 213 SOLUTION TOPICAL at 11:11

## 2022-06-15 RX ADMIN — CEFEPIME 100 MILLIGRAM(S): 1 INJECTION, POWDER, FOR SOLUTION INTRAMUSCULAR; INTRAVENOUS at 22:41

## 2022-06-15 RX ADMIN — PIPERACILLIN AND TAZOBACTAM 25 GRAM(S): 4; .5 INJECTION, POWDER, LYOPHILIZED, FOR SOLUTION INTRAVENOUS at 11:08

## 2022-06-15 RX ADMIN — Medication 38 MICROGRAM(S): at 22:41

## 2022-06-15 RX ADMIN — Medication 975 MILLIGRAM(S): at 12:00

## 2022-06-15 RX ADMIN — HYDROMORPHONE HYDROCHLORIDE 0.5 MILLIGRAM(S): 2 INJECTION INTRAMUSCULAR; INTRAVENOUS; SUBCUTANEOUS at 23:48

## 2022-06-15 RX ADMIN — HYDROMORPHONE HYDROCHLORIDE 0.5 MILLIGRAM(S): 2 INJECTION INTRAMUSCULAR; INTRAVENOUS; SUBCUTANEOUS at 19:00

## 2022-06-15 RX ADMIN — HYDROMORPHONE HYDROCHLORIDE 0.5 MILLIGRAM(S): 2 INJECTION INTRAMUSCULAR; INTRAVENOUS; SUBCUTANEOUS at 11:33

## 2022-06-15 RX ADMIN — ENOXAPARIN SODIUM 40 MILLIGRAM(S): 100 INJECTION SUBCUTANEOUS at 22:41

## 2022-06-15 RX ADMIN — Medication 100 MILLIGRAM(S): at 18:31

## 2022-06-15 NOTE — PROGRESS NOTE ADULT - ATTENDING COMMENTS
Patient seen/examined.  Agree w above note and plan and have discussed plan w house staff.  No further diarrhea, c/o incisional pain.  Abdomen soft, Prevena intact.  Diet, ID consult for appropriate abx, OOB    Darío Verde MD

## 2022-06-15 NOTE — PROGRESS NOTE ADULT - ASSESSMENT
68 y/o female w/ a PMHx of moderate mitral regurgitation, hypothyroidism, GERD, osteoporosis, vitamin D deficiency, breast CA s/p right lumpectomy w/ chemo & radiation, bilateral breast reduction (2013), s/p hysterectomy (2005), s/p right elbow surgery (1970s), & s/p laparoscopic appendectomy (6/3/2022) who presented on 6/6 w/ pneumoperitoneum & a loculated fluid collection in the RLQ s/p exploratory laparotomy, washout, ileocolic anastomosis w/ primary anastomosis for two perforations in the terminal ileum, & temporary abdominal closure w/ Abthera VAC 6/6. s/p removal of abthera vac, abd washout, abd wall reconstruction with myocutaneous flaps and bridging with ovitex mesh 6/8. CT A/P 6/12 showed 4 cm pelvic collections not amenable to drainage, right pleural effusion with collapsed right lower lobe. C. diff negative 6/12    Plan:    - DC prevena  - Appreciate pulm recs  - IV Abx: continue Zosyn, Diflucan  - Cdiff negative  - F/u WBC today  - Pain control PRN  - Diet: advance to LRD  - Activity: ambulate as tolerated   - DVT ppx: LVX    68 y/o female w/ a PMHx of moderate mitral regurgitation, hypothyroidism, GERD, osteoporosis, vitamin D deficiency, breast CA s/p right lumpectomy w/ chemo & radiation, bilateral breast reduction (2013), s/p hysterectomy (2005), s/p right elbow surgery (1970s), & s/p laparoscopic appendectomy (6/3/2022) who presented on 6/6 w/ pneumoperitoneum & a loculated fluid collection in the RLQ s/p exploratory laparotomy, washout, ileocolic anastomosis w/ primary anastomosis for two perforations in the terminal ileum, & temporary abdominal closure w/ Abthera VAC 6/6. s/p removal of abthera vac, abd washout, abd wall reconstruction with myocutaneous flaps and bridging with ovitex mesh 6/8. CT A/P 6/12 showed 4 cm pelvic collections not amenable to drainage, right pleural effusion with collapsed right lower lobe. C. diff negative 6/12    Plan:    - DC prevena  - Appreciate pulm recs  - IV Abx: continue Zosyn, Diflucan  - Cdiff negative  - F/u WBC today  - Pain control PRN  - Diet: advance to LRD  - Activity: ambulate as tolerated   - DVT ppx: LVX     [facility] Surgery  #[id number]

## 2022-06-15 NOTE — PROGRESS NOTE ADULT - SUBJECTIVE AND OBJECTIVE BOX
Surgery Progress Note    Subjective:     Patient seen and examined at bedside. Yesterday patient with increased WBC, and diarrhea. Imodium started. Zosyn started. VSS, AF. No acute events overnight.     OBJECTIVE:     T(C): 36.8 (06-15-22 @ 00:56), Max: 37.8 (06-14-22 @ 13:00)  HR: 83 (06-15-22 @ 00:56) (73 - 92)  BP: 120/68 (06-15-22 @ 00:56) (120/68 - 136/81)  RR: 18 (06-15-22 @ 00:56) (18 - 18)  SpO2: 94% (06-15-22 @ 00:56) (93% - 99%)  Wt(kg): --    I&O's Detail    13 Jun 2022 07:01  -  14 Jun 2022 07:00  --------------------------------------------------------  IN:    dextrose 5% + sodium chloride 0.45% w/ Additives: 300 mL    IV PiggyBack: 250 mL    IV PiggyBack: 200 mL    IV PiggyBack: 200 mL    IV PiggyBack: 300 mL    Oral Fluid: 440 mL  Total IN: 1690 mL    OUT:    Bulb (mL): 120 mL    Voided (mL): 450 mL  Total OUT: 570 mL    Total NET: 1120 mL      14 Jun 2022 07:01  -  15 Dominick 2022 02:01  --------------------------------------------------------  IN:    IV PiggyBack: 300 mL    IV PiggyBack: 200 mL    IV PiggyBack: 200 mL    IV PiggyBack: 100 mL    Oral Fluid: 440 mL  Total IN: 1240 mL    OUT:    Bulb (mL): 50 mL    Voided (mL): 310 mL  Total OUT: 360 mL    Total NET: 880 mL          PHYSICAL EXAM:    General: NAD, resting comfortably in bed  Respiratory: Nonlabored respirations, normal CW expansion.  Cardio: regular rate and rhythm.  Abdomen: softly distended, appropriately tender, prevena plus vac intact. LILLIAM drain x1 ss    Vascular: extremities are warm and well perfused.     MEDICATIONS  (STANDING):  acetaminophen     Tablet .. 975 milliGRAM(s) Oral every 6 hours  chlorhexidine 2% Cloths 1 Application(s) Topical <User Schedule>  enoxaparin Injectable 40 milliGRAM(s) SubCutaneous every 24 hours  fluconAZOLE IVPB 400 milliGRAM(s) IV Intermittent every 24 hours  levothyroxine Injectable 38 MICROGram(s) IV Push at bedtime  loperamide 2 milliGRAM(s) Oral daily  melatonin 10 milliGRAM(s) Oral at bedtime  nystatin    Suspension [id number] Unit(s) Oral every 6 hours  ondansetron Injectable 4 milliGRAM(s) IV Push once  pantoprazole  Injectable 40 milliGRAM(s) IV Push daily  piperacillin/tazobactam IVPB.. 3.375 Gram(s) IV Intermittent every 8 hours    MEDICATIONS  (PRN):  HYDROmorphone  Injectable 0.5 milliGRAM(s) IV Push every 4 hours PRN Severe Pain (7 - 10)  HYDROmorphone  Injectable 0.25 milliGRAM(s) IV Push every 4 hours PRN Moderate Pain (4 - 6)      LABS:                          8.2    21.94 )-----------( 520      ( 14 Jun 2022 13:21 )             25.3     06-14    135  |  102  |  5<L>  ----------------------------<  131<H>  3.3<L>   |  24  |  0.41<L>    Ca    7.9<L>      14 Jun 2022 13:21  Phos  2.7     06-14  Mg     1.9     06-14

## 2022-06-15 NOTE — PROVIDER CONTACT NOTE (OTHER) - ASSESSMENT
pt resting in bed. no c/o pain, no s/s of distress. all VSS. Patient and family member at bedside educated on important of blood test to monitor lab values.
A&Ox4, pain management effective
yes/right chest port for chemotherapy

## 2022-06-15 NOTE — PROGRESS NOTE ADULT - SUBJECTIVE AND OBJECTIVE BOX
Follow-up Pulm Progress Note    No new respiratory events overnight.  Denies SOB/CP.   Reporting abdominal incisional pain, no longer with R back pain  O2 sats 96% on RA    Medications:  MEDICATIONS  (STANDING):  acetaminophen     Tablet .. 975 milliGRAM(s) Oral every 6 hours  chlorhexidine 2% Cloths 1 Application(s) Topical <User Schedule>  enoxaparin Injectable 40 milliGRAM(s) SubCutaneous every 24 hours  fluconAZOLE IVPB 400 milliGRAM(s) IV Intermittent every 24 hours  levothyroxine Injectable 38 MICROGram(s) IV Push at bedtime  melatonin 10 milliGRAM(s) Oral at bedtime  nystatin    Suspension [id number] Unit(s) Oral every 6 hours  ondansetron Injectable 4 milliGRAM(s) IV Push once  pantoprazole  Injectable 40 milliGRAM(s) IV Push daily  piperacillin/tazobactam IVPB.. 3.375 Gram(s) IV Intermittent every 8 hours    MEDICATIONS  (PRN):  HYDROmorphone  Injectable 0.5 milliGRAM(s) IV Push every 4 hours PRN Severe Pain (7 - 10)  HYDROmorphone  Injectable 0.25 milliGRAM(s) IV Push every 4 hours PRN Moderate Pain (4 - 6)    Vital Signs Last 24 Hrs  T(C): 37.1 (15 Dominick 2022 09:03), Max: 37.8 (14 Jun 2022 13:00)  T(F): 98.8 (15 Dominick 2022 09:03), Max: 100 (14 Jun 2022 13:00)  HR: 80 (15 Dominick 2022 09:03) (73 - 92)  BP: 143/81 (15 Dominick 2022 09:03) (120/68 - 143/81)  BP(mean): --  RR: 18 (15 Dominick 2022 09:03) (18 - 18)  SpO2: 96% (15 Dominick 2022 09:03) (93% - 99%)    06-14 @ 07:01  -  06-15 @ 07:00  --------------------------------------------------------  IN: 1340 mL / OUT: 360 mL / NET: 980 mL    LABS:                        8.2    21.94 )-----------( 520      ( 14 Jun 2022 13:21 )             25.3     06-15    136  |  106  |  4<L>  ----------------------------<  99  5.1   |  18<L>  |  0.40<L>    Ca    8.1<L>      15 Dominick 2022 09:21  Phos  2.7     06-14  Mg     1.9     06-14    CULTURES    Culture - Blood (collected 06-08-22 @ 05:56)  Source: .Blood Blood-Peripheral  Final Report (06-13-22 @ 10:00):    No Growth Final    Culture - Blood (collected 06-06-22 @ 12:40)  Source: .Blood Blood-Peripheral  Final Report (06-11-22 @ 16:00):    No Growth Final    Culture - Blood (collected 06-06-22 @ 12:20)  Source: .Blood Blood-Peripheral  Final Report (06-11-22 @ 16:00):    No Growth Final    Culture - Blood (collected 06-06-22 @ 09:41)  Source: .Blood Blood-Peripheral  Gram Stain (06-07-22 @ 23:04):    Growth in anaerobic bottle: Gram Negative Rods  Final Report (06-08-22 @ 20:04):    Growth in anaerobic bottle: Bacteroides ovatus group    "Susceptibilities not performed"    Culture - Blood (collected 06-06-22 @ 09:40)  Source: .Blood Blood-Peripheral  Gram Stain (06-07-22 @ 22:55):    Growth in anaerobic bottle: Gram Negative Rods  Final Report (06-08-22 @ 20:03):    Growth in anaerobic bottle: Bacteroides ovatus group "Susceptibilities    not performed"    ***Blood Panel PCR results on this specimen are available    approximately 3 hours after the Gram stain result.***    Gram stain, PCR, and/or culture results may not always    correspond due to difference in methodologies.    ************************************************************    This PCR assay was performed by multiplex PCR. This    Assay tests for 66 bacterial and resistance gene targets.    Please refer to [facility] test directory    at [email or web address] for details.  Organism: Blood Culture PCR (06-08-22 @ 20:03)  Organism: Blood Culture PCR (06-08-22 @ 20:03)      -  Blood PCR Panel: NEG      Method Type: PCR    Culture - Urine (collected 06-06-22 @ 12:27)  Source: Clean Catch Clean Catch (Midstream)  Final Report (06-07-22 @ 12:55):    <10,000 CFU/mL Normal Urogenital Odalis    Culture - Body Fluid with Gram Stain (collected 06-06-22 @ 21:43)  Source: .Body Fluid Peritoneal Fluid  Gram Stain (06-07-22 @ 04:50):    Moderate polymorphonuclear leukocytes seen per low power field    Rare Gram Positive Rods seen per oil power field    Rare Yeast like cells seen per oil power field  Final Report (06-09-22 @ 20:27):    Rare Candida albicans "Susceptibilities not performed"    Growth in fluid media only Enterobacter cloacae complex    Few Bacteroides vulgatus group "Susceptibilities not performed"    Rare Lactobacillus rhamnosus "Susceptibilities not performed"  Organism: Enterobacter cloacae complex (06-09-22 @ 20:27)  Organism: Enterobacter cloacae complex (06-09-22 @ 20:27)      -  Amikacin: S <=16      -  Amoxicillin/Clavulanic Acid: R >16/8      -  Ampicillin: R >16 These ampicillin results predict results for amoxicillin      -  Ampicillin/Sulbactam: R >16/8 Enterobacter, Klebsiella aerogenes, Citrobacter, and Serratia may develop resistance during prolonged therapy (3-4 days)      -  Aztreonam: S <=4      -  Cefazolin: R >16 Enterobacter, Klebsiella aerogenes, Citrobacter, and Serratia may develop resistance during prolonged therapy (3-4 days)      -  Cefepime: S <=2      -  Cefoxitin: R >16      -  Ceftriaxone: S <=1 Enterobacter, Klebsiella aerogenes, Citrobacter, and Serratia may develop resistance during prolonged therapy      -  Ciprofloxacin: S <=0.25      -  Ertapenem: S <=0.5      -  Gentamicin: S <=2      -  Imipenem: S <=1      -  Levofloxacin: S <=0.5      -  Meropenem: S <=1      -  Piperacillin/Tazobactam: S <=8      -  Tobramycin: S <=2      -  Trimethoprim/Sulfamethoxazole: S <=0.5/9.5      Method Type: SPENCER    Culture - Tissue with Gram Stain (collected 06-06-22 @ 21:43)  Source: .Tissue Other  Gram Stain (06-07-22 @ 04:49):    Few polymorphonuclear leukocytes seen per low power field    Rare Gram Negative Rods seen per oil power field    Rare Yeast like cells seen per oil power field  Final Report (06-08-22 @ 23:59):    Rare Enterobacter cloacae complex    Few Alysa albicans "Susceptibilities not performed"    Few Bacteroides vulgatus group "Susceptibilities not performed"    Few Bacteroides ovatus group "Susceptibilities not performed"  Organism: Enterobacter cloacae complex (06-08-22 @ 23:59)  Organism: Enterobacter cloacae complex (06-08-22 @ 23:59)      -  Amikacin: S <=16      -  Amoxicillin/Clavulanic Acid: R >16/8      -  Ampicillin: R >16 These ampicillin results predict results for amoxicillin      -  Ampicillin/Sulbactam: R >16/8 Enterobacter, Klebsiella aerogenes, Citrobacter, and Serratia may develop resistance during prolonged therapy (3-4 days)      -  Aztreonam: S <=4      -  Cefazolin: R >16 Enterobacter, Klebsiella aerogenes, Citrobacter, and Serratia may develop resistance during prolonged therapy (3-4 days)      -  Cefepime: S <=2      -  Cefoxitin: R >16      -  Ceftriaxone: S <=1 Enterobacter, Klebsiella aerogenes, Citrobacter, and Serratia may develop resistance during prolonged therapy      -  Ciprofloxacin: S <=0.25      -  Ertapenem: S <=0.5      -  Gentamicin: S <=2      -  Imipenem: S <=1      -  Levofloxacin: S <=0.5      -  Meropenem: S <=1      -  Piperacillin/Tazobactam: S <=8      -  Tobramycin: S <=2      -  Trimethoprim/Sulfamethoxazole: S <=0.5/9.5      Method Type: SPENCER    Physical Examination:  PULM: Decreased R base  CVS: RRR    RADIOLOGY REVIEWED      CT chest: < from: CT Chest No Cont (06.13.22 @ 14:22) >    FINDINGS:    LUNGS AND AIRWAYS: Passive atelectasis of the right lower lobe and left   basilar atelectasis.    PLEURA: Bilateral pleural effusions, right greater than left.    MEDIASTINUM AND AFUA: No lymphadenopathy.    VESSELS: Within normal limits.    HEART: Heart size is normal. No pericardial effusion.    CHEST WALL AND LOWER NECK: Within normal limits.    VISUALIZED UPPER ABDOMEN: Postsurgical changes of the imaged upper   abdomen, better evaluated on CT abdomen pelvis dated 6/12/2022.    BONES: Degenerative changes. Mild compression of a lumbar vertebral body.    IMPRESSION:    Bilateral pleural effusions, right greater than left. Passive atelectasis   of the right lower lobe and minimal left basilar atelctasis.    < end of copied text >    < from: CT Abdomen and Pelvis w/ Oral Cont and w/ IV Cont (06.12.22 @ 16:45) >  FINDINGS:  LOWER CHEST: There has been interval increase in size of a mild to   moderate layering right pleural effusion with complete compressive   collapse of the right lower lobe. Again is noted a 5mm subpleural nodule   in the right upper lobe anteriorly on image 1. There is a new trace   layering left pleural effusion with moderate left basilar atelectasis.    LIVER: Within normal limits.  BILE DUCTS: Normal caliber.  GALLBLADDER: Within normal limits.  SPLEEN: Within normal limits.  PANCREAS: Within normal limits.  ADRENALS: Within normal limits.  KIDNEYS/URETERS: Again is noted a small right renal cyst. Otherwise,   within normal limits.    BLADDER: Small nondependent gas in the minimally distended bladder.  REPRODUCTIVE ORGANS: The uterus has been removed. The adnexa are   unremarkable by CT criteria.    BOWEL: Status post resection of the terminal ileum and cecum with   ileocolic anastomosis. The colon is collapsed. There is a single bubble   of extraluminal gas adjacent to the mid transverse colon on image 64 and   a single bubble on image 72. There is mild tracking fluid and enhancement   throughout the mesenteric leaves and peritoneal reflections, extending to   a 4.5 cm collection in the pelvis. There is no evidence of bowel   obstruction. There is mild concentric wall thickening of the mid and   distal small bowel, likely reactive. The mesenteric vessels opacify   unremarkably.  PERITONEUM: No ascites.  VESSELS: Within normal limits.  RETROPERITONEUM/LYMPH NODES: No lymphadenopathy.  ABDOMINAL WALL: New ventral abdominal and pelvic incision with skin   staples and a subcutaneous drain. Mild fluid infiltration of the   subcutaneous fat of the abdomen and pelvis, suggestive of third spacing.  BONES: Chronic anterior wedging deformity of L1. Bilateral L5   spondylolysis with moderate anterolisthesis on S1.    IMPRESSION: Status post evacuation of previously identified fluid and   mottled gas in the right hemiabdomen. Residual tracking fluid and   enhancement, as described, without evidence of discrete drainable   collection. Subcutaneous drain without evidence of drainable collection.   Complete right lower lobe atelectatic collapse.    --- End of Report ---          < end of copied text >

## 2022-06-15 NOTE — CONSULT NOTE ADULT - SUBJECTIVE AND OBJECTIVE BOX
Patient is a 67y old  Female who presents with a chief complaint of Abdominal Sepsis (15 Dominick 2022 11:02)    HPI:  67F presenting to the ED with 3 days of severe abdominal pain and obstipation after having a laparoscopic appendectomy on 6/3/22.  She states that the pain never went away after surgery and has been worsening. She tried taking oxycodone but this did not help so she called Dr. Verde's office who referred her to ED.        REVIEW OF SYSTEMS  [  ] ROS unobtainable because:    [ X ] All other systems negative except as noted below    Constitutional:  [ ] fever [ ] chills  [ ] weight loss  [ ]night sweat  [ ]poor appetite/PO intake [ ]fatigue   Skin:  [ ] rash [ ] phlebitis	  Eyes: [ ] icterus [ ] pain  [ ] discharge	  ENMT: [ ] sore throat  [ ] thrush [ ] ulcers [ ] exudates [ ]anosmia  Respiratory: [ ] dyspnea [ ] hemoptysis [ ] cough [ ] sputum	  Cardiovascular:  [ ] chest pain [ ] palpitations [ ] edema	  Gastrointestinal:  [ ] nausea [ ] vomiting [ ] diarrhea [ ] constipation [ ] pain	  Genitourinary:  [ ] dysuria [ ] frequency [ ] hematuria [ ] discharge [ ] flank pain  [ ] incontinence  Musculoskeletal:  [ ] myalgias [ ] arthralgias [ ] arthritis  [ ] back pain  Neurological:  [ ] headache [ ] weakness [ ] seizures  [ ] confusion/altered mental status    prior hospital charts reviewed [V]  primary team notes reviewed [V]  other consultant notes reviewed [V]    PAST MEDICAL & SURGICAL HISTORY:  right breast CA s/p right breast lumpectomy (8/2021) and chemo/radiation treatment  Hypothyroidism  Obesity  Vitamin D deficiency  H/O osteoporosis on Repatha 2x per month  GERD (gastroesophageal reflux disease)  Moderate mitral regurgitation, Mild-To-Moderate - On echo report from 8/2020  S/P bilateral breast reduction 2013  H/O: hysterectomy 2005 2/2 myoma  S/P lumpectomy, right breast  8/2021 2/2 right breast CA  H/O elbow surgery, Right side - 50+ years ago  S/P laparoscopic appendectomy 6/3/2022      FAMILY HISTORY:  No pertinent family history in first degree relatives    SOCIAL HISTORY:  Denied smoking/vaping/alcohol/recreational drug use    Allergies  latex (Short breath)  No Known Drug Allergies    ANTIMICROBIALS:  fluconAZOLE IVPB 400 every 24 hours  nystatin    Suspension 180185 every 6 hours  piperacillin/tazobactam IVPB.. 3.375 every 8 hours      ANTIMICROBIALS (past 90 days):   MEDICATIONS  (STANDING):  fluconAZOLE IVPB   100 mL/Hr IV Intermittent (06-07-22 @ 08:54)    fluconAZOLE IVPB   100 mL/Hr IV Intermittent (06-08-22 @ 09:31)    fluconAZOLE IVPB   100 mL/Hr IV Intermittent (06-14-22 @ 08:51)   100 mL/Hr IV Intermittent (06-13-22 @ 09:11)   100 mL/Hr IV Intermittent (06-12-22 @ 08:51)   100 mL/Hr IV Intermittent (06-11-22 @ 15:41)   100 mL/Hr IV Intermittent (06-10-22 @ 11:42)   100 mL/Hr IV Intermittent (06-09-22 @ 08:02)    nystatin    Suspension   [id number] Unit(s) Oral (06-13-22 @ 21:46)   [id number] Unit(s) Oral (06-13-22 @ 09:11)   [id number] Unit(s) Oral (06-12-22 @ 20:07)   [id number] Unit(s) Oral (06-12-22 @ 13:18)   [id number] Unit(s) Oral (06-12-22 @ 08:51)   [id number] Unit(s) Oral (06-12-22 @ 03:28)   [id number] Unit(s) Oral (06-11-22 @ 20:16)   [id number] Unit(s) Oral (06-11-22 @ 12:19)   [id number] Unit(s) Oral (06-11-22 @ 05:10)   [id number] Unit(s) Oral (06-11-22 @ 00:27)   [id number] Unit(s) Oral (06-10-22 @ 17:38)   [id number] Unit(s) Oral (06-10-22 @ 11:42)   [id number] Unit(s) Oral (06-10-22 @ 05:19)   [id number] Unit(s) Oral (06-09-22 @ 23:59)   [id number] Unit(s) Oral (06-09-22 @ 17:25)   [id number] Unit(s) Oral (06-09-22 @ 11:17)   [id number] Unit(s) Oral (06-09-22 @ 05:24)    piperacillin/tazobactam IVPB.   200 mL/Hr IV Intermittent (06-06-22 @ 09:45)    piperacillin/tazobactam IVPB..   25 mL/Hr IV Intermittent (06-06-22 @ 13:30)    piperacillin/tazobactam IVPB..   25 mL/Hr IV Intermittent (06-08-22 @ 05:04)   25 mL/Hr IV Intermittent (06-07-22 @ 20:58)   25 mL/Hr IV Intermittent (06-07-22 @ 13:24)   25 mL/Hr IV Intermittent (06-07-22 @ 06:26)   25 mL/Hr IV Intermittent (06-06-22 @ 21:59)    piperacillin/tazobactam IVPB..   25 mL/Hr IV Intermittent (06-15-22 @ 11:08)   25 mL/Hr IV Intermittent (06-15-22 @ 02:19)   25 mL/Hr IV Intermittent (06-14-22 @ 18:34)   25 mL/Hr IV Intermittent (06-14-22 @ 11:10)   25 mL/Hr IV Intermittent (06-14-22 @ 02:01)   25 mL/Hr IV Intermittent (06-13-22 @ 21:46)    piperacillin/tazobactam IVPB..   25 mL/Hr IV Intermittent (06-10-22 @ 05:19)   25 mL/Hr IV Intermittent (06-09-22 @ 22:01)   25 mL/Hr IV Intermittent (06-09-22 @ 13:17)   25 mL/Hr IV Intermittent (06-09-22 @ 05:24)   25 mL/Hr IV Intermittent (06-08-22 @ 21:25)    piperacillin/tazobactam IVPB..   25 mL/Hr IV Intermittent (06-13-22 @ 10:59)   25 mL/Hr IV Intermittent (06-13-22 @ 01:12)   25 mL/Hr IV Intermittent (06-12-22 @ 18:32)   25 mL/Hr IV Intermittent (06-12-22 @ 11:18)   25 mL/Hr IV Intermittent (06-12-22 @ 01:55)   25 mL/Hr IV Intermittent (06-11-22 @ 18:06)   25 mL/Hr IV Intermittent (06-11-22 @ 05:10)   25 mL/Hr IV Intermittent (06-10-22 @ 22:56)   25 mL/Hr IV Intermittent (06-10-22 @ 14:27)    MEDICATIONS  (STANDING):  acetaminophen     Tablet .. 975 every 6 hours  enoxaparin Injectable 40 every 24 hours  HYDROmorphone  Injectable 0.5 every 4 hours PRN  HYDROmorphone  Injectable 0.25 every 4 hours PRN  levothyroxine Injectable 38 at bedtime  melatonin 10 at bedtime  ondansetron Injectable 4 once  pantoprazole  Injectable 40 daily      VITALS:  Vital Signs Last 24 Hrs  T(F): 98.8 (06-15-22 @ 09:03), Max: 100 (06-14-22 @ 13:00)  Vital Signs Last 24 Hrs  HR: 80 (06-15-22 @ 09:03) (73 - 92)  BP: 143/81 (06-15-22 @ 09:03) (120/68 - 143/81)  RR: 18 (06-15-22 @ 09:03)  SpO2: 96% (06-15-22 @ 09:03) (93% - 99%)  Wt(kg): --    PHYSICAL EXAM:        Labs:                        8.2    21.94 )-----------( 520      ( 14 Jun 2022 13:21 )             25.3     06-15    136  |  106  |  4<L>  ----------------------------<  99  5.1   |  18<L>  |  0.40<L>    Ca    8.1<L>      15 Dominick 2022 09:21  Phos  2.7     06-14  Mg     1.9     06-14    WBC Trend:  WBC Count: 21.94 (06-14-22 @ 13:21)  WBC Count: 19.43 (06-13-22 @ 07:26)  WBC Count: 23.41 (06-12-22 @ 12:10)  WBC Count: 23.76 (06-11-22 @ 11:07)    Auto Neutrophil #: 3.33 K/uL (06-06-22 @ 10:09)  Band Neutrophils %: 7.9 % (06-06-22 @ 10:09)    MICROBIOLOGY:  Culture - Blood (collected 08 Jun 2022 05:56)  Source: .Blood Blood-Peripheral  Final Report:    No Growth Final    Culture - Tissue with Gram Stain (collected 06 Jun 2022 21:43)  Source: .Tissue Other  Final Report:    Rare Enterobacter cloacae complex    Few Alysa albicans "Susceptibilities not performed"    Few Bacteroides vulgatus group "Susceptibilities not performed"    Few Bacteroides ovatus group "Susceptibilities not performed"  Organism: Enterobacter cloacae complex  Organism: Enterobacter cloacae complex    Sensitivities:      -  Amikacin: S <=16      -  Amoxicillin/Clavulanic Acid: R >16/8      -  Ampicillin: R >16 These ampicillin results predict results for amoxicillin      -  Ampicillin/Sulbactam: R >16/8 Enterobacter, Klebsiella aerogenes, Citrobacter, and Serratia may develop resistance during prolonged therapy (3-4 days)      -  Aztreonam: S <=4      -  Cefazolin: R >16 Enterobacter, Klebsiella aerogenes, Citrobacter, and Serratia may develop resistance during prolonged therapy (3-4 days)      -  Cefepime: S <=2      -  Cefoxitin: R >16      -  Ceftriaxone: S <=1 Enterobacter, Klebsiella aerogenes, Citrobacter, and Serratia may develop resistance during prolonged therapy      -  Ciprofloxacin: S <=0.25      -  Ertapenem: S <=0.5      -  Gentamicin: S <=2      -  Imipenem: S <=1      -  Levofloxacin: S <=0.5      -  Meropenem: S <=1      -  Piperacillin/Tazobactam: S <=8      -  Tobramycin: S <=2      -  Trimethoprim/Sulfamethoxazole: S <=0.5/9.5      Method Type: SPENCER    Culture - Body Fluid with Gram Stain (collected 06 Jun 2022 21:43)  Source: .Body Fluid Peritoneal Fluid  Final Report:    Rare Candida albicans "Susceptibilities not performed"    Growth in fluid media only Enterobacter cloacae complex    Few Bacteroides vulgatus group "Susceptibilities not performed"    Rare Lactobacillus rhamnosus "Susceptibilities not performed"  Organism: Enterobacter cloacae complex  Organism: Enterobacter cloacae complex    Sensitivities:      -  Amikacin: S <=16      -  Amoxicillin/Clavulanic Acid: R >16/8      -  Ampicillin: R >16 These ampicillin results predict results for amoxicillin      -  Ampicillin/Sulbactam: R >16/8 Enterobacter, Klebsiella aerogenes, Citrobacter, and Serratia may develop resistance during prolonged therapy (3-4 days)      -  Aztreonam: S <=4      -  Cefazolin: R >16 Enterobacter, Klebsiella aerogenes, Citrobacter, and Serratia may develop resistance during prolonged therapy (3-4 days)      -  Cefepime: S <=2      -  Cefoxitin: R >16      -  Ceftriaxone: S <=1 Enterobacter, Klebsiella aerogenes, Citrobacter, and Serratia may develop resistance during prolonged therapy      -  Ciprofloxacin: S <=0.25      -  Ertapenem: S <=0.5      -  Gentamicin: S <=2      -  Imipenem: S <=1      -  Levofloxacin: S <=0.5      -  Meropenem: S <=1      -  Piperacillin/Tazobactam: S <=8      -  Tobramycin: S <=2      -  Trimethoprim/Sulfamethoxazole: S <=0.5/9.5      Method Type: SPENCER    Culture - Blood (collected 06 Jun 2022 12:40)  Source: .Blood Blood-Peripheral  Final Report:    No Growth Final    Culture - Urine (collected 06 Jun 2022 12:27)  Source: Clean Catch Clean Catch (Midstream)  Final Report:    <10,000 CFU/mL Normal Urogenital Odalis    Culture - Blood (collected 06 Jun 2022 12:20)  Source: .Blood Blood-Peripheral  Final Report:    No Growth Final    Culture - Blood (collected 06 Jun 2022 09:41)  Source: .Blood Blood-Peripheral  Final Report:    Growth in anaerobic bottle: Bacteroides ovatus group    "Susceptibilities not performed"    Culture - Blood (collected 06 Jun 2022 09:40)  Source: .Blood Blood-Peripheral  Final Report:    Growth in anaerobic bottle: Bacteroides ovatus group "Susceptibilities    not performed"    ***Blood Panel PCR results on this specimen are available    approximately 3 hours after the Gram stain result.***    Gram stain, PCR, and/or culture results may not always    correspond due to difference in methodologies.    ************************************************************    This PCR assay was performed by multiplex PCR. This    Assay tests for 66 bacterial and resistance gene targets.    Please refer to [facility] test directory    at [email or web address] for details.  Organism: Blood Culture PCR  Organism: Blood Culture PCR    Sensitivities:      -  Blood PCR Panel: NEG      Method Type: PCR    COVID-19 PCR: NotDetec (06-13-22 @ 02:30)  COVID-19 PCR: NotDetec (06-06-22 @ 09:48)  COVID-19 PCR: NotDetec (06-01-22 @ 12:51)    RADIOLOGY:  imaging below personally reviewed    < from: CT Chest No Cont (06.13.22 @ 14:22) >  IMPRESSION:  Bilateral pleural effusions, right greater than left. Passive atelectasis   of the right lower lobe and minimal left basilar atelctasis.  < end of copied text >    < from: CT Abdomen and Pelvis w/ Oral Cont and w/ IV Cont (06.12.22 @ 16:45) >  IMPRESSION: Status post evacuation of previously identified fluid and   mottled gas in the right hemiabdomen. Residual tracking fluid and   enhancement, as described, without evidence of discrete drainable   collection. Subcutaneous drain without evidence of drainable collection.   Complete right lower lobe atelectatic collapse.  < end of copied text >       Patient is a 67y old  Female who presents with a chief complaint of Abdominal Sepsis (15 Dominick 2022 11:02)    HPI:  67F presenting to the ED with 3 days of severe abdominal pain and obstipation after having a laparoscopic appendectomy on 6/3/22.  She said she was diagnosed with a "stomach virus" and was given a CT, with the appendicitis as an incidental finding. She states that the pain never went away after surgery and has been worsening. She tried taking oxycodone but this did not help so she called Dr. Verde's office who referred her to ED. The ED diagnosed her with a perforation and she was taken to surgery 6/6 for an ileocolic resection that was closed 6/8. She began having elevated WBCs starting 6/8. She has been taking Zosyn since 6/6. She began having diarrhea on 6/10 multiple times a day and watery with burning on defecation. She reported no fevers, chills, nausea, or vomiting, and diffuse pain in her abdomen worse around her surgical scar. CT on 6/12, pt refused oral contrast due to inability to tolerate it. She denies having a fever at any point during her hospitalization.        REVIEW OF SYSTEMS  [  ] ROS unobtainable because:    [ X ] All other systems negative except as noted below    Constitutional:  [ ] fever [ ] chills  [ ] weight loss  [ ]night sweat  [ ]poor appetite/PO intake [ ]fatigue   Skin:  [ ] rash [ ] phlebitis	  Eyes: [ ] icterus [ ] pain  [ ] discharge	  ENMT: [ ] sore throat  [ ] thrush [ ] ulcers [ ] exudates [ ]anosmia  Respiratory: [ ] dyspnea [ ] hemoptysis [ ] cough [ ] sputum	  Cardiovascular:  [ ] chest pain [ ] palpitations [ ] edema	  Gastrointestinal:  [ ] nausea [ ] vomiting [ ] diarrhea [ ] constipation [ ] pain	  Genitourinary:  [ ] dysuria [ ] frequency [ ] hematuria [ ] discharge [ ] flank pain  [ ] incontinence  Musculoskeletal:  [ ] myalgias [ ] arthralgias [ ] arthritis  [ ] back pain  Neurological:  [ ] headache [ ] weakness [ ] seizures  [ ] confusion/altered mental status    prior hospital charts reviewed [V]  primary team notes reviewed [V]  other consultant notes reviewed [V]    PAST MEDICAL & SURGICAL HISTORY:  right breast CA s/p right breast lumpectomy (8/2021) and chemo/radiation treatment  Hypothyroidism  Obesity  Vitamin D deficiency  H/O osteoporosis on Repatha 2x per month  GERD (gastroesophageal reflux disease)  Moderate mitral regurgitation, Mild-To-Moderate - On echo report from 8/2020  S/P bilateral breast reduction 2013  H/O: hysterectomy 2005 2/2 myoma  S/P lumpectomy, right breast  8/2021 2/2 right breast CA  H/O elbow surgery, Right side - 50+ years ago  S/P laparoscopic appendectomy 6/3/2022      FAMILY HISTORY:  No pertinent family history in first degree relatives    SOCIAL HISTORY:  Denied smoking/vaping/alcohol/recreational drug use    Allergies  latex (Short breath)  No Known Drug Allergies    ANTIMICROBIALS:  fluconAZOLE IVPB 400 every 24 hours  nystatin    Suspension [id number] every 6 hours  piperacillin/tazobactam IVPB.. 3.375 every 8 hours      ANTIMICROBIALS (past 90 days):   MEDICATIONS  (STANDING):  fluconAZOLE IVPB   100 mL/Hr IV Intermittent (06-07-22 @ 08:54)    fluconAZOLE IVPB   100 mL/Hr IV Intermittent (06-08-22 @ 09:31)    fluconAZOLE IVPB   100 mL/Hr IV Intermittent (06-14-22 @ 08:51)   100 mL/Hr IV Intermittent (06-13-22 @ 09:11)   100 mL/Hr IV Intermittent (06-12-22 @ 08:51)   100 mL/Hr IV Intermittent (06-11-22 @ 15:41)   100 mL/Hr IV Intermittent (06-10-22 @ 11:42)   100 mL/Hr IV Intermittent (06-09-22 @ 08:02)    nystatin    Suspension   [id number] Unit(s) Oral (06-13-22 @ 21:46)   [id number] Unit(s) Oral (06-13-22 @ 09:11)   [id number] Unit(s) Oral (06-12-22 @ 20:07)   [id number] Unit(s) Oral (06-12-22 @ 13:18)   [id number] Unit(s) Oral (06-12-22 @ 08:51)   [id number] Unit(s) Oral (06-12-22 @ 03:28)   [id number] Unit(s) Oral (06-11-22 @ 20:16)   [id number] Unit(s) Oral (06-11-22 @ 12:19)   [id number] Unit(s) Oral (06-11-22 @ 05:10)   [id number] Unit(s) Oral (06-11-22 @ 00:27)   [id number] Unit(s) Oral (06-10-22 @ 17:38)   [id number] Unit(s) Oral (06-10-22 @ 11:42)   [id number] Unit(s) Oral (06-10-22 @ 05:19)   [id number] Unit(s) Oral (06-09-22 @ 23:59)   [id number] Unit(s) Oral (06-09-22 @ 17:25)   [id number] Unit(s) Oral (06-09-22 @ 11:17)   [id number] Unit(s) Oral (06-09-22 @ 05:24)    piperacillin/tazobactam IVPB.   200 mL/Hr IV Intermittent (06-06-22 @ 09:45)    piperacillin/tazobactam IVPB..   25 mL/Hr IV Intermittent (06-06-22 @ 13:30)    piperacillin/tazobactam IVPB..   25 mL/Hr IV Intermittent (06-08-22 @ 05:04)   25 mL/Hr IV Intermittent (06-07-22 @ 20:58)   25 mL/Hr IV Intermittent (06-07-22 @ 13:24)   25 mL/Hr IV Intermittent (06-07-22 @ 06:26)   25 mL/Hr IV Intermittent (06-06-22 @ 21:59)    piperacillin/tazobactam IVPB..   25 mL/Hr IV Intermittent (06-15-22 @ 11:08)   25 mL/Hr IV Intermittent (06-15-22 @ 02:19)   25 mL/Hr IV Intermittent (06-14-22 @ 18:34)   25 mL/Hr IV Intermittent (06-14-22 @ 11:10)   25 mL/Hr IV Intermittent (06-14-22 @ 02:01)   25 mL/Hr IV Intermittent (06-13-22 @ 21:46)    piperacillin/tazobactam IVPB..   25 mL/Hr IV Intermittent (06-10-22 @ 05:19)   25 mL/Hr IV Intermittent (06-09-22 @ 22:01)   25 mL/Hr IV Intermittent (06-09-22 @ 13:17)   25 mL/Hr IV Intermittent (06-09-22 @ 05:24)   25 mL/Hr IV Intermittent (06-08-22 @ 21:25)    piperacillin/tazobactam IVPB..   25 mL/Hr IV Intermittent (06-13-22 @ 10:59)   25 mL/Hr IV Intermittent (06-13-22 @ 01:12)   25 mL/Hr IV Intermittent (06-12-22 @ 18:32)   25 mL/Hr IV Intermittent (06-12-22 @ 11:18)   25 mL/Hr IV Intermittent (06-12-22 @ 01:55)   25 mL/Hr IV Intermittent (06-11-22 @ 18:06)   25 mL/Hr IV Intermittent (06-11-22 @ 05:10)   25 mL/Hr IV Intermittent (06-10-22 @ 22:56)   25 mL/Hr IV Intermittent (06-10-22 @ 14:27)    MEDICATIONS  (STANDING):  acetaminophen     Tablet .. 975 every 6 hours  enoxaparin Injectable 40 every 24 hours  HYDROmorphone  Injectable 0.5 every 4 hours PRN  HYDROmorphone  Injectable 0.25 every 4 hours PRN  levothyroxine Injectable 38 at bedtime  melatonin 10 at bedtime  ondansetron Injectable 4 once  pantoprazole  Injectable 40 daily      VITALS:  Vital Signs Last 24 Hrs  T(F): 98.8 (06-15-22 @ 09:03), Max: 100 (06-14-22 @ 13:00)  Vital Signs Last 24 Hrs  HR: 80 (06-15-22 @ 09:03) (73 - 92)  BP: 143/81 (06-15-22 @ 09:03) (120/68 - 143/81)  RR: 18 (06-15-22 @ 09:03)  SpO2: 96% (06-15-22 @ 09:03) (93% - 99%)  Wt(kg): --    PHYSICAL EXAM:    Gen: tired from painkillers, resting with some discomofrt  HEENT: atraumatic, normocephalic, pupils equally round and reactive to light, extraocular muscles intact, no conjunctival injection  CV: regular rate and rhythym, normal S1/S2, no murmurs, rubs, or gallops  Resp: lungs clear to auscultation bilaterally, no rales, rhonchi, or wheezes  GI: vertical surgical scar mid abdomen, tenderness and pain in all our quadrants, worse in RLQ with guarding. Normal bowel sounds.   MSK: extremities atraumatic, no cyanosis or clubbing  Skin: warm, dry, no rashes or lesions  Neuro: no focal deficits, sensation grossly intact  Psych: alert and oriented x3, appropriate mood and affect          Labs:                        8.2    21.94 )-----------( 520      ( 14 Jun 2022 13:21 )             25.3     06-15    136  |  106  |  4<L>  ----------------------------<  99  5.1   |  18<L>  |  0.40<L>    Ca    8.1<L>      15 Dominick 2022 09:21  Phos  2.7     06-14  Mg     1.9     06-14    WBC Trend:  WBC Count: 21.94 (06-14-22 @ 13:21)  WBC Count: 19.43 (06-13-22 @ 07:26)  WBC Count: 23.41 (06-12-22 @ 12:10)  WBC Count: 23.76 (06-11-22 @ 11:07)    Auto Neutrophil #: 3.33 K/uL (06-06-22 @ 10:09)  Band Neutrophils %: 7.9 % (06-06-22 @ 10:09)    MICROBIOLOGY:  Culture - Blood (collected 08 Jun 2022 05:56)  Source: .Blood Blood-Peripheral  Final Report:    No Growth Final    Culture - Tissue with Gram Stain (collected 06 Jun 2022 21:43)  Source: .Tissue Other  Final Report:    Rare Enterobacter cloacae complex    Few Alysa albicans "Susceptibilities not performed"    Few Bacteroides vulgatus group "Susceptibilities not performed"    Few Bacteroides ovatus group "Susceptibilities not performed"  Organism: Enterobacter cloacae complex  Organism: Enterobacter cloacae complex    Sensitivities:      -  Amikacin: S <=16      -  Amoxicillin/Clavulanic Acid: R >16/8      -  Ampicillin: R >16 These ampicillin results predict results for amoxicillin      -  Ampicillin/Sulbactam: R >16/8 Enterobacter, Klebsiella aerogenes, Citrobacter, and Serratia may develop resistance during prolonged therapy (3-4 days)      -  Aztreonam: S <=4      -  Cefazolin: R >16 Enterobacter, Klebsiella aerogenes, Citrobacter, and Serratia may develop resistance during prolonged therapy (3-4 days)      -  Cefepime: S <=2      -  Cefoxitin: R >16      -  Ceftriaxone: S <=1 Enterobacter, Klebsiella aerogenes, Citrobacter, and Serratia may develop resistance during prolonged therapy      -  Ciprofloxacin: S <=0.25      -  Ertapenem: S <=0.5      -  Gentamicin: S <=2      -  Imipenem: S <=1      -  Levofloxacin: S <=0.5      -  Meropenem: S <=1      -  Piperacillin/Tazobactam: S <=8      -  Tobramycin: S <=2      -  Trimethoprim/Sulfamethoxazole: S <=0.5/9.5      Method Type: SPENCER    Culture - Body Fluid with Gram Stain (collected 06 Jun 2022 21:43)  Source: .Body Fluid Peritoneal Fluid  Final Report:    Rare Candida albicans "Susceptibilities not performed"    Growth in fluid media only Enterobacter cloacae complex    Few Bacteroides vulgatus group "Susceptibilities not performed"    Rare Lactobacillus rhamnosus "Susceptibilities not performed"  Organism: Enterobacter cloacae complex  Organism: Enterobacter cloacae complex    Sensitivities:      -  Amikacin: S <=16      -  Amoxicillin/Clavulanic Acid: R >16/8      -  Ampicillin: R >16 These ampicillin results predict results for amoxicillin      -  Ampicillin/Sulbactam: R >16/8 Enterobacter, Klebsiella aerogenes, Citrobacter, and Serratia may develop resistance during prolonged therapy (3-4 days)      -  Aztreonam: S <=4      -  Cefazolin: R >16 Enterobacter, Klebsiella aerogenes, Citrobacter, and Serratia may develop resistance during prolonged therapy (3-4 days)      -  Cefepime: S <=2      -  Cefoxitin: R >16      -  Ceftriaxone: S <=1 Enterobacter, Klebsiella aerogenes, Citrobacter, and Serratia may develop resistance during prolonged therapy      -  Ciprofloxacin: S <=0.25      -  Ertapenem: S <=0.5      -  Gentamicin: S <=2      -  Imipenem: S <=1      -  Levofloxacin: S <=0.5      -  Meropenem: S <=1      -  Piperacillin/Tazobactam: S <=8      -  Tobramycin: S <=2      -  Trimethoprim/Sulfamethoxazole: S <=0.5/9.5      Method Type: SPENCER    Culture - Blood (collected 06 Jun 2022 12:40)  Source: .Blood Blood-Peripheral  Final Report:    No Growth Final    Culture - Urine (collected 06 Jun 2022 12:27)  Source: Clean Catch Clean Catch (Midstream)  Final Report:    <10,000 CFU/mL Normal Urogenital Odalis    Culture - Blood (collected 06 Jun 2022 12:20)  Source: .Blood Blood-Peripheral  Final Report:    No Growth Final    Culture - Blood (collected 06 Jun 2022 09:41)  Source: .Blood Blood-Peripheral  Final Report:    Growth in anaerobic bottle: Bacteroides ovatus group    "Susceptibilities not performed"    Culture - Blood (collected 06 Jun 2022 09:40)  Source: .Blood Blood-Peripheral  Final Report:    Growth in anaerobic bottle: Bacteroides ovatus group "Susceptibilities    not performed"    ***Blood Panel PCR results on this specimen are available    approximately 3 hours after the Gram stain result.***    Gram stain, PCR, and/or culture results may not always    correspond due to difference in methodologies.    ************************************************************    This PCR assay was performed by multiplex PCR. This    Assay tests for 66 bacterial and resistance gene targets.    Please refer to [facility] test directory    at [email or web address] for details.  Organism: Blood Culture PCR  Organism: Blood Culture PCR    Sensitivities:      -  Blood PCR Panel: NEG      Method Type: PCR    COVID-19 PCR: NotDetec (06-13-22 @ 02:30)  COVID-19 PCR: NotDetec (06-06-22 @ 09:48)  COVID-19 PCR: NotDetec (06-01-22 @ 12:51)    RADIOLOGY:  imaging below personally reviewed    < from: CT Chest No Cont (06.13.22 @ 14:22) >  IMPRESSION:  Bilateral pleural effusions, right greater than left. Passive atelectasis   of the right lower lobe and minimal left basilar atelctasis.  < end of copied text >    < from: CT Abdomen and Pelvis w/ Oral Cont and w/ IV Cont (06.12.22 @ 16:45) >  IMPRESSION: Status post evacuation of previously identified fluid and   mottled gas in the right hemiabdomen. Residual tracking fluid and   enhancement, as described, without evidence of discrete drainable   collection. Subcutaneous drain without evidence of drainable collection.   Complete right lower lobe atelectatic collapse.  < end of copied text >       Patient is a 67y old  Female who presents with a chief complaint of Abdominal Sepsis (15 Dominick 2022 11:02)    HPI:  67F presenting to the ED with 3 days of severe abdominal pain and obstipation after having a laparoscopic appendectomy on 6/3/22.  She said she was diagnosed with a "stomach virus" and was given a CT, with the appendicitis as an incidental finding. She states that the pain never went away after surgery and has been worsening. She tried taking oxycodone but this did not help so she called Dr. Verde's office who referred her to ED. The ED diagnosed her with a perforation and she was taken to surgery 6/6 for an ileocolic resection that was closed 6/8. She began having elevated WBCs starting 6/8. She has been taking Zosyn since 6/6. She began having diarrhea on 6/10 multiple times a day and watery with burning on defecation. She reported no fevers, chills, nausea, or vomiting, and diffuse pain in her abdomen worse around her surgical scar. CT on 6/12, pt refused oral contrast due to inability to tolerate it. She denies having a fever at any point during her hospitalization.        REVIEW OF SYSTEMS  [  ] ROS unobtainable because:    [ X ] All other systems negative except as noted below    Constitutional:  [ ] fever [ ] chills  [ ] weight loss  [ ]night sweat  [ ]poor appetite/PO intake [ ]fatigue   Skin:  [ ] rash [ ] phlebitis	  Eyes: [ ] icterus [ ] pain  [ ] discharge	  ENMT: [ ] sore throat  [ ] thrush [ ] ulcers [ ] exudates [ ]anosmia  Respiratory: [ ] dyspnea [ ] hemoptysis [ ] cough [ ] sputum	  Cardiovascular:  [ ] chest pain [ ] palpitations [ ] edema	  Gastrointestinal:  [ ] nausea [ ] vomiting [ ] diarrhea [ ] constipation [ ] pain	  Genitourinary:  [ ] dysuria [ ] frequency [ ] hematuria [ ] discharge [ ] flank pain  [ ] incontinence  Musculoskeletal:  [ ] myalgias [ ] arthralgias [ ] arthritis  [ ] back pain  Neurological:  [ ] headache [ ] weakness [ ] seizures  [ ] confusion/altered mental status    prior hospital charts reviewed [V]  primary team notes reviewed [V]  other consultant notes reviewed [V]    PAST MEDICAL & SURGICAL HISTORY:  right breast CA s/p right breast lumpectomy (8/2021) and chemo/radiation treatment  Hypothyroidism  Obesity  Vitamin D deficiency  H/O osteoporosis on Repatha 2x per month  GERD (gastroesophageal reflux disease)  Moderate mitral regurgitation, Mild-To-Moderate - On echo report from 8/2020  S/P bilateral breast reduction 2013  H/O: hysterectomy 2005 2/2 myoma  S/P lumpectomy, right breast  8/2021 2/2 right breast CA  H/O elbow surgery, Right side - 50+ years ago  S/P laparoscopic appendectomy 6/3/2022      FAMILY HISTORY:  No pertinent family history in first degree relatives    SOCIAL HISTORY:  Denied smoking/vaping/alcohol/recreational drug use    Allergies  latex (Short breath)  No Known Drug Allergies    ANTIMICROBIALS:  fluconAZOLE IVPB 400 every 24 hours  nystatin    Suspension 237715 every 6 hours  piperacillin/tazobactam IVPB.. 3.375 every 8 hours      ANTIMICROBIALS (past 90 days):   MEDICATIONS  (STANDING):  fluconAZOLE IVPB   100 mL/Hr IV Intermittent (06-07-22 @ 08:54)    fluconAZOLE IVPB   100 mL/Hr IV Intermittent (06-08-22 @ 09:31)    fluconAZOLE IVPB   100 mL/Hr IV Intermittent (06-14-22 @ 08:51)   100 mL/Hr IV Intermittent (06-13-22 @ 09:11)   100 mL/Hr IV Intermittent (06-12-22 @ 08:51)   100 mL/Hr IV Intermittent (06-11-22 @ 15:41)   100 mL/Hr IV Intermittent (06-10-22 @ 11:42)   100 mL/Hr IV Intermittent (06-09-22 @ 08:02)    nystatin    Suspension   [id number] Unit(s) Oral (06-13-22 @ 21:46)   [id number] Unit(s) Oral (06-13-22 @ 09:11)   [id number] Unit(s) Oral (06-12-22 @ 20:07)   [id number] Unit(s) Oral (06-12-22 @ 13:18)   [id number] Unit(s) Oral (06-12-22 @ 08:51)   [id number] Unit(s) Oral (06-12-22 @ 03:28)   [id number] Unit(s) Oral (06-11-22 @ 20:16)   [id number] Unit(s) Oral (06-11-22 @ 12:19)   [id number] Unit(s) Oral (06-11-22 @ 05:10)   [id number] Unit(s) Oral (06-11-22 @ 00:27)   [id number] Unit(s) Oral (06-10-22 @ 17:38)   [id number] Unit(s) Oral (06-10-22 @ 11:42)   [id number] Unit(s) Oral (06-10-22 @ 05:19)   [id number] Unit(s) Oral (06-09-22 @ 23:59)   [id number] Unit(s) Oral (06-09-22 @ 17:25)   [id number] Unit(s) Oral (06-09-22 @ 11:17)   [id number] Unit(s) Oral (06-09-22 @ 05:24)    piperacillin/tazobactam IVPB.   200 mL/Hr IV Intermittent (06-06-22 @ 09:45)    piperacillin/tazobactam IVPB..   25 mL/Hr IV Intermittent (06-06-22 @ 13:30)    piperacillin/tazobactam IVPB..   25 mL/Hr IV Intermittent (06-08-22 @ 05:04)   25 mL/Hr IV Intermittent (06-07-22 @ 20:58)   25 mL/Hr IV Intermittent (06-07-22 @ 13:24)   25 mL/Hr IV Intermittent (06-07-22 @ 06:26)   25 mL/Hr IV Intermittent (06-06-22 @ 21:59)    piperacillin/tazobactam IVPB..   25 mL/Hr IV Intermittent (06-15-22 @ 11:08)   25 mL/Hr IV Intermittent (06-15-22 @ 02:19)   25 mL/Hr IV Intermittent (06-14-22 @ 18:34)   25 mL/Hr IV Intermittent (06-14-22 @ 11:10)   25 mL/Hr IV Intermittent (06-14-22 @ 02:01)   25 mL/Hr IV Intermittent (06-13-22 @ 21:46)    piperacillin/tazobactam IVPB..   25 mL/Hr IV Intermittent (06-10-22 @ 05:19)   25 mL/Hr IV Intermittent (06-09-22 @ 22:01)   25 mL/Hr IV Intermittent (06-09-22 @ 13:17)   25 mL/Hr IV Intermittent (06-09-22 @ 05:24)   25 mL/Hr IV Intermittent (06-08-22 @ 21:25)    piperacillin/tazobactam IVPB..   25 mL/Hr IV Intermittent (06-13-22 @ 10:59)   25 mL/Hr IV Intermittent (06-13-22 @ 01:12)   25 mL/Hr IV Intermittent (06-12-22 @ 18:32)   25 mL/Hr IV Intermittent (06-12-22 @ 11:18)   25 mL/Hr IV Intermittent (06-12-22 @ 01:55)   25 mL/Hr IV Intermittent (06-11-22 @ 18:06)   25 mL/Hr IV Intermittent (06-11-22 @ 05:10)   25 mL/Hr IV Intermittent (06-10-22 @ 22:56)   25 mL/Hr IV Intermittent (06-10-22 @ 14:27)    MEDICATIONS  (STANDING):  acetaminophen     Tablet .. 975 every 6 hours  enoxaparin Injectable 40 every 24 hours  HYDROmorphone  Injectable 0.5 every 4 hours PRN  HYDROmorphone  Injectable 0.25 every 4 hours PRN  levothyroxine Injectable 38 at bedtime  melatonin 10 at bedtime  ondansetron Injectable 4 once  pantoprazole  Injectable 40 daily      VITALS:  Vital Signs Last 24 Hrs  T(F): 98.8 (06-15-22 @ 09:03), Max: 100 (06-14-22 @ 13:00)  Vital Signs Last 24 Hrs  HR: 80 (06-15-22 @ 09:03) (73 - 92)  BP: 143/81 (06-15-22 @ 09:03) (120/68 - 143/81)  RR: 18 (06-15-22 @ 09:03)  SpO2: 96% (06-15-22 @ 09:03) (93% - 99%)  Wt(kg): --    PHYSICAL EXAM:  Gen: some discomfort  HEENT: atraumatic, normocephalic, pupils equally round and reactive to light, extraocular muscles intact, no conjunctival injection  CV: regular rate and rhythym, normal S1/S2, no murmurs, rubs, or gallops  Resp: lungs clear to auscultation bilaterally, no rales, rhonchi, or wheezes  GI: vertical surgical scar mid abdomen, tenderness and pain in all four quadrants, worse in RLQ with guarding. Normal bowel sounds.   MSK: extremities atraumatic, no cyanosis or clubbing  Skin: warm, dry, no rashes or lesions  Neuro: no focal deficits, sensation grossly intact  Psych: alert and oriented x3, appropriate mood and affect          Labs:                        8.2    21.94 )-----------( 520      ( 14 Jun 2022 13:21 )             25.3     06-15    136  |  106  |  4<L>  ----------------------------<  99  5.1   |  18<L>  |  0.40<L>    Ca    8.1<L>      15 Dominick 2022 09:21  Phos  2.7     06-14  Mg     1.9     06-14    WBC Trend:  WBC Count: 21.94 (06-14-22 @ 13:21)  WBC Count: 19.43 (06-13-22 @ 07:26)  WBC Count: 23.41 (06-12-22 @ 12:10)  WBC Count: 23.76 (06-11-22 @ 11:07)    Auto Neutrophil #: 3.33 K/uL (06-06-22 @ 10:09)  Band Neutrophils %: 7.9 % (06-06-22 @ 10:09)    MICROBIOLOGY:  Culture - Blood (collected 08 Jun 2022 05:56)  Source: .Blood Blood-Peripheral  Final Report:    No Growth Final    Culture - Tissue with Gram Stain (collected 06 Jun 2022 21:43)  Source: .Tissue Other  Final Report:    Rare Enterobacter cloacae complex    Few Alysa albicans "Susceptibilities not performed"    Few Bacteroides vulgatus group "Susceptibilities not performed"    Few Bacteroides ovatus group "Susceptibilities not performed"  Organism: Enterobacter cloacae complex  Organism: Enterobacter cloacae complex    Sensitivities:      -  Amikacin: S <=16      -  Amoxicillin/Clavulanic Acid: R >16/8      -  Ampicillin: R >16 These ampicillin results predict results for amoxicillin      -  Ampicillin/Sulbactam: R >16/8 Enterobacter, Klebsiella aerogenes, Citrobacter, and Serratia may develop resistance during prolonged therapy (3-4 days)      -  Aztreonam: S <=4      -  Cefazolin: R >16 Enterobacter, Klebsiella aerogenes, Citrobacter, and Serratia may develop resistance during prolonged therapy (3-4 days)      -  Cefepime: S <=2      -  Cefoxitin: R >16      -  Ceftriaxone: S <=1 Enterobacter, Klebsiella aerogenes, Citrobacter, and Serratia may develop resistance during prolonged therapy      -  Ciprofloxacin: S <=0.25      -  Ertapenem: S <=0.5      -  Gentamicin: S <=2      -  Imipenem: S <=1      -  Levofloxacin: S <=0.5      -  Meropenem: S <=1      -  Piperacillin/Tazobactam: S <=8      -  Tobramycin: S <=2      -  Trimethoprim/Sulfamethoxazole: S <=0.5/9.5      Method Type: SPENCER    Culture - Body Fluid with Gram Stain (collected 06 Jun 2022 21:43)  Source: .Body Fluid Peritoneal Fluid  Final Report:    Rare Candida albicans "Susceptibilities not performed"    Growth in fluid media only Enterobacter cloacae complex    Few Bacteroides vulgatus group "Susceptibilities not performed"    Rare Lactobacillus rhamnosus "Susceptibilities not performed"  Organism: Enterobacter cloacae complex  Organism: Enterobacter cloacae complex    Sensitivities:      -  Amikacin: S <=16      -  Amoxicillin/Clavulanic Acid: R >16/8      -  Ampicillin: R >16 These ampicillin results predict results for amoxicillin      -  Ampicillin/Sulbactam: R >16/8 Enterobacter, Klebsiella aerogenes, Citrobacter, and Serratia may develop resistance during prolonged therapy (3-4 days)      -  Aztreonam: S <=4      -  Cefazolin: R >16 Enterobacter, Klebsiella aerogenes, Citrobacter, and Serratia may develop resistance during prolonged therapy (3-4 days)      -  Cefepime: S <=2      -  Cefoxitin: R >16      -  Ceftriaxone: S <=1 Enterobacter, Klebsiella aerogenes, Citrobacter, and Serratia may develop resistance during prolonged therapy      -  Ciprofloxacin: S <=0.25      -  Ertapenem: S <=0.5      -  Gentamicin: S <=2      -  Imipenem: S <=1      -  Levofloxacin: S <=0.5      -  Meropenem: S <=1      -  Piperacillin/Tazobactam: S <=8      -  Tobramycin: S <=2      -  Trimethoprim/Sulfamethoxazole: S <=0.5/9.5      Method Type: SPENCER    Culture - Blood (collected 06 Jun 2022 12:40)  Source: .Blood Blood-Peripheral  Final Report:    No Growth Final    Culture - Urine (collected 06 Jun 2022 12:27)  Source: Clean Catch Clean Catch (Midstream)  Final Report:    <10,000 CFU/mL Normal Urogenital Odalis    Culture - Blood (collected 06 Jun 2022 12:20)  Source: .Blood Blood-Peripheral  Final Report:    No Growth Final    Culture - Blood (collected 06 Jun 2022 09:41)  Source: .Blood Blood-Peripheral  Final Report:    Growth in anaerobic bottle: Bacteroides ovatus group    "Susceptibilities not performed"    Culture - Blood (collected 06 Jun 2022 09:40)  Source: .Blood Blood-Peripheral  Final Report:    Growth in anaerobic bottle: Bacteroides ovatus group "Susceptibilities    not performed"    ***Blood Panel PCR results on this specimen are available    approximately 3 hours after the Gram stain result.***    Gram stain, PCR, and/or culture results may not always    correspond due to difference in methodologies.    ************************************************************    This PCR assay was performed by multiplex PCR. This    Assay tests for 66 bacterial and resistance gene targets.    Please refer to [facility] test directory    at [email or web address] for details.  Organism: Blood Culture PCR  Organism: Blood Culture PCR    Sensitivities:      -  Blood PCR Panel: NEG      Method Type: PCR    COVID-19 PCR: NotDetec (06-13-22 @ 02:30)  COVID-19 PCR: NotDetec (06-06-22 @ 09:48)  COVID-19 PCR: NotDetec (06-01-22 @ 12:51)    RADIOLOGY:  imaging below personally reviewed    < from: CT Chest No Cont (06.13.22 @ 14:22) >  IMPRESSION:  Bilateral pleural effusions, right greater than left. Passive atelectasis   of the right lower lobe and minimal left basilar atelctasis.  < end of copied text >    < from: CT Abdomen and Pelvis w/ Oral Cont and w/ IV Cont (06.12.22 @ 16:45) >  IMPRESSION: Status post evacuation of previously identified fluid and   mottled gas in the right hemiabdomen. Residual tracking fluid and   enhancement, as described, without evidence of discrete drainable   collection. Subcutaneous drain without evidence of drainable collection.   Complete right lower lobe atelectatic collapse.  < end of copied text >       Patient is a 67y old  Female who presents with a chief complaint of Abdominal Sepsis (15 Dominick 2022 11:02)    HPI:  67F presenting to the ED with 3 days of severe abdominal pain and obstipation after having a laparoscopic appendectomy on 6/3/22.  She said she was diagnosed with a "stomach virus" and was given a CT, with the appendicitis as an incidental finding. She states that the pain never went away after surgery and has been worsening. She tried taking oxycodone but this did not help so she called Dr. Verde's office who referred her to ED. The ED diagnosed her with a perforation and she was taken to surgery 6/6 for an ileocolic resection that was closed 6/8. She began having elevated WBCs starting 6/8. She has been taking Zosyn since 6/6. She began having diarrhea on 6/10 multiple times a day and watery with burning on defecation. She reported no fevers, chills, nausea, or vomiting, and diffuse pain in her abdomen worse around her surgical scar. CT on 6/12 with some fluid but no drainable collections, pt refused oral contrast due to inability to tolerate it. She denies having a fever at any point during her hospitalization.        REVIEW OF SYSTEMS  [  ] ROS unobtainable because:    [ X ] All other systems negative except as noted below    Constitutional:  [ ] fever [ ] chills  [ ] weight loss  [ ]night sweat  [ ]poor appetite/PO intake [ ]fatigue   Skin:  [ ] rash [ ] phlebitis	  Eyes: [ ] icterus [ ] pain  [ ] discharge	  ENMT: [ ] sore throat  [ ] thrush [ ] ulcers [ ] exudates [ ]anosmia  Respiratory: [ ] dyspnea [ ] hemoptysis [ ] cough [ ] sputum	  Cardiovascular:  [ ] chest pain [ ] palpitations [ ] edema	  Gastrointestinal:  [ ] nausea [ ] vomiting [ ] diarrhea [ ] constipation [ ] pain	  Genitourinary:  [ ] dysuria [ ] frequency [ ] hematuria [ ] discharge [ ] flank pain  [ ] incontinence  Musculoskeletal:  [ ] myalgias [ ] arthralgias [ ] arthritis  [ ] back pain  Neurological:  [ ] headache [ ] weakness [ ] seizures  [ ] confusion/altered mental status    prior hospital charts reviewed [V]  primary team notes reviewed [V]  other consultant notes reviewed [V]    PAST MEDICAL & SURGICAL HISTORY:  right breast CA s/p right breast lumpectomy (8/2021) and chemo/radiation treatment  Hypothyroidism  Obesity  Vitamin D deficiency  H/O osteoporosis on Repatha 2x per month  GERD (gastroesophageal reflux disease)  Moderate mitral regurgitation, Mild-To-Moderate - On echo report from 8/2020  S/P bilateral breast reduction 2013  H/O: hysterectomy 2005 2/2 myoma  S/P lumpectomy, right breast  8/2021 2/2 right breast CA  H/O elbow surgery, Right side - 50+ years ago  S/P laparoscopic appendectomy 6/3/2022      FAMILY HISTORY:  No pertinent family history in first degree relatives    SOCIAL HISTORY:  Denied smoking/vaping/alcohol/recreational drug use    Allergies  latex (Short breath)  No Known Drug Allergies    ANTIMICROBIALS:  fluconAZOLE IVPB 400 every 24 hours  nystatin    Suspension [id number] every 6 hours  piperacillin/tazobactam IVPB.. 3.375 every 8 hours      ANTIMICROBIALS (past 90 days):   MEDICATIONS  (STANDING):  fluconAZOLE IVPB   100 mL/Hr IV Intermittent (06-07-22 @ 08:54)    fluconAZOLE IVPB   100 mL/Hr IV Intermittent (06-08-22 @ 09:31)    fluconAZOLE IVPB   100 mL/Hr IV Intermittent (06-14-22 @ 08:51)   100 mL/Hr IV Intermittent (06-13-22 @ 09:11)   100 mL/Hr IV Intermittent (06-12-22 @ 08:51)   100 mL/Hr IV Intermittent (06-11-22 @ 15:41)   100 mL/Hr IV Intermittent (06-10-22 @ 11:42)   100 mL/Hr IV Intermittent (06-09-22 @ 08:02)    nystatin    Suspension   [id number] Unit(s) Oral (06-13-22 @ 21:46)   [id number] Unit(s) Oral (06-13-22 @ 09:11)   [id number] Unit(s) Oral (06-12-22 @ 20:07)   [id number] Unit(s) Oral (06-12-22 @ 13:18)   [id number] Unit(s) Oral (06-12-22 @ 08:51)   [id number] Unit(s) Oral (06-12-22 @ 03:28)   [id number] Unit(s) Oral (06-11-22 @ 20:16)   [id number] Unit(s) Oral (06-11-22 @ 12:19)   [id number] Unit(s) Oral (06-11-22 @ 05:10)   [id number] Unit(s) Oral (06-11-22 @ 00:27)   [id number] Unit(s) Oral (06-10-22 @ 17:38)   [id number] Unit(s) Oral (06-10-22 @ 11:42)   [id number] Unit(s) Oral (06-10-22 @ 05:19)   [id number] Unit(s) Oral (06-09-22 @ 23:59)   [id number] Unit(s) Oral (06-09-22 @ 17:25)   [id number] Unit(s) Oral (06-09-22 @ 11:17)   [id number] Unit(s) Oral (06-09-22 @ 05:24)    piperacillin/tazobactam IVPB.   200 mL/Hr IV Intermittent (06-06-22 @ 09:45)    piperacillin/tazobactam IVPB..   25 mL/Hr IV Intermittent (06-06-22 @ 13:30)    piperacillin/tazobactam IVPB..   25 mL/Hr IV Intermittent (06-08-22 @ 05:04)   25 mL/Hr IV Intermittent (06-07-22 @ 20:58)   25 mL/Hr IV Intermittent (06-07-22 @ 13:24)   25 mL/Hr IV Intermittent (06-07-22 @ 06:26)   25 mL/Hr IV Intermittent (06-06-22 @ 21:59)    piperacillin/tazobactam IVPB..   25 mL/Hr IV Intermittent (06-15-22 @ 11:08)   25 mL/Hr IV Intermittent (06-15-22 @ 02:19)   25 mL/Hr IV Intermittent (06-14-22 @ 18:34)   25 mL/Hr IV Intermittent (06-14-22 @ 11:10)   25 mL/Hr IV Intermittent (06-14-22 @ 02:01)   25 mL/Hr IV Intermittent (06-13-22 @ 21:46)    piperacillin/tazobactam IVPB..   25 mL/Hr IV Intermittent (06-10-22 @ 05:19)   25 mL/Hr IV Intermittent (06-09-22 @ 22:01)   25 mL/Hr IV Intermittent (06-09-22 @ 13:17)   25 mL/Hr IV Intermittent (06-09-22 @ 05:24)   25 mL/Hr IV Intermittent (06-08-22 @ 21:25)    piperacillin/tazobactam IVPB..   25 mL/Hr IV Intermittent (06-13-22 @ 10:59)   25 mL/Hr IV Intermittent (06-13-22 @ 01:12)   25 mL/Hr IV Intermittent (06-12-22 @ 18:32)   25 mL/Hr IV Intermittent (06-12-22 @ 11:18)   25 mL/Hr IV Intermittent (06-12-22 @ 01:55)   25 mL/Hr IV Intermittent (06-11-22 @ 18:06)   25 mL/Hr IV Intermittent (06-11-22 @ 05:10)   25 mL/Hr IV Intermittent (06-10-22 @ 22:56)   25 mL/Hr IV Intermittent (06-10-22 @ 14:27)    MEDICATIONS  (STANDING):  acetaminophen     Tablet .. 975 every 6 hours  enoxaparin Injectable 40 every 24 hours  HYDROmorphone  Injectable 0.5 every 4 hours PRN  HYDROmorphone  Injectable 0.25 every 4 hours PRN  levothyroxine Injectable 38 at bedtime  melatonin 10 at bedtime  ondansetron Injectable 4 once  pantoprazole  Injectable 40 daily      VITALS:  Vital Signs Last 24 Hrs  T(F): 98.8 (06-15-22 @ 09:03), Max: 100 (06-14-22 @ 13:00)  Vital Signs Last 24 Hrs  HR: 80 (06-15-22 @ 09:03) (73 - 92)  BP: 143/81 (06-15-22 @ 09:03) (120/68 - 143/81)  RR: 18 (06-15-22 @ 09:03)  SpO2: 96% (06-15-22 @ 09:03) (93% - 99%)  Wt(kg): --    PHYSICAL EXAM:  Gen: some discomfort  HEENT: atraumatic, normocephalic, pupils equally round and reactive to light, extraocular muscles intact, no conjunctival injection  CV: regular rate and rhythym, normal S1/S2, no murmurs, rubs, or gallops  Resp: lungs clear to auscultation bilaterally, no rales, rhonchi, or wheezes  GI: tense, distended, vertical surgical scar mid abdomen, +LILLIAM drain x 1 w/ serosanguinous output, tenderness in all four quadrants, Normal bowel sounds.   MSK: extremities atraumatic, no cyanosis or clubbing  Skin: warm, dry, no rashes or lesions  Neuro: no focal deficits, sensation grossly intact  Psych: alert and oriented x3, appropriate mood and affect      Labs:                        8.2    21.94 )-----------( 520      ( 14 Jun 2022 13:21 )             25.3     06-15    136  |  106  |  4<L>  ----------------------------<  99  5.1   |  18<L>  |  0.40<L>    Ca    8.1<L>      15 Dominick 2022 09:21  Phos  2.7     06-14  Mg     1.9     06-14    WBC Trend:  WBC Count: 21.94 (06-14-22 @ 13:21)  WBC Count: 19.43 (06-13-22 @ 07:26)  WBC Count: 23.41 (06-12-22 @ 12:10)  WBC Count: 23.76 (06-11-22 @ 11:07)    Auto Neutrophil #: 3.33 K/uL (06-06-22 @ 10:09)  Band Neutrophils %: 7.9 % (06-06-22 @ 10:09)    MICROBIOLOGY:  Culture - Blood (collected 08 Jun 2022 05:56)  Source: .Blood Blood-Peripheral  Final Report:    No Growth Final    Culture - Tissue with Gram Stain (collected 06 Jun 2022 21:43)  Source: .Tissue Other  Final Report:    Rare Enterobacter cloacae complex    Few Alysa albicans "Susceptibilities not performed"    Few Bacteroides vulgatus group "Susceptibilities not performed"    Few Bacteroides ovatus group "Susceptibilities not performed"  Organism: Enterobacter cloacae complex  Organism: Enterobacter cloacae complex    Sensitivities:      -  Amikacin: S <=16      -  Amoxicillin/Clavulanic Acid: R >16/8      -  Ampicillin: R >16 These ampicillin results predict results for amoxicillin      -  Ampicillin/Sulbactam: R >16/8 Enterobacter, Klebsiella aerogenes, Citrobacter, and Serratia may develop resistance during prolonged therapy (3-4 days)      -  Aztreonam: S <=4      -  Cefazolin: R >16 Enterobacter, Klebsiella aerogenes, Citrobacter, and Serratia may develop resistance during prolonged therapy (3-4 days)      -  Cefepime: S <=2      -  Cefoxitin: R >16      -  Ceftriaxone: S <=1 Enterobacter, Klebsiella aerogenes, Citrobacter, and Serratia may develop resistance during prolonged therapy      -  Ciprofloxacin: S <=0.25      -  Ertapenem: S <=0.5      -  Gentamicin: S <=2      -  Imipenem: S <=1      -  Levofloxacin: S <=0.5      -  Meropenem: S <=1      -  Piperacillin/Tazobactam: S <=8      -  Tobramycin: S <=2      -  Trimethoprim/Sulfamethoxazole: S <=0.5/9.5      Method Type: SPENCER    Culture - Body Fluid with Gram Stain (collected 06 Jun 2022 21:43)  Source: .Body Fluid Peritoneal Fluid  Final Report:    Rare Candida albicans "Susceptibilities not performed"    Growth in fluid media only Enterobacter cloacae complex    Few Bacteroides vulgatus group "Susceptibilities not performed"    Rare Lactobacillus rhamnosus "Susceptibilities not performed"  Organism: Enterobacter cloacae complex  Organism: Enterobacter cloacae complex    Sensitivities:      -  Amikacin: S <=16      -  Amoxicillin/Clavulanic Acid: R >16/8      -  Ampicillin: R >16 These ampicillin results predict results for amoxicillin      -  Ampicillin/Sulbactam: R >16/8 Enterobacter, Klebsiella aerogenes, Citrobacter, and Serratia may develop resistance during prolonged therapy (3-4 days)      -  Aztreonam: S <=4      -  Cefazolin: R >16 Enterobacter, Klebsiella aerogenes, Citrobacter, and Serratia may develop resistance during prolonged therapy (3-4 days)      -  Cefepime: S <=2      -  Cefoxitin: R >16      -  Ceftriaxone: S <=1 Enterobacter, Klebsiella aerogenes, Citrobacter, and Serratia may develop resistance during prolonged therapy      -  Ciprofloxacin: S <=0.25      -  Ertapenem: S <=0.5      -  Gentamicin: S <=2      -  Imipenem: S <=1      -  Levofloxacin: S <=0.5      -  Meropenem: S <=1      -  Piperacillin/Tazobactam: S <=8      -  Tobramycin: S <=2      -  Trimethoprim/Sulfamethoxazole: S <=0.5/9.5      Method Type: SPENCER    Culture - Blood (collected 06 Jun 2022 12:40)  Source: .Blood Blood-Peripheral  Final Report:    No Growth Final    Culture - Urine (collected 06 Jun 2022 12:27)  Source: Clean Catch Clean Catch (Midstream)  Final Report:    <10,000 CFU/mL Normal Urogenital Odalis    Culture - Blood (collected 06 Jun 2022 12:20)  Source: .Blood Blood-Peripheral  Final Report:    No Growth Final    Culture - Blood (collected 06 Jun 2022 09:41)  Source: .Blood Blood-Peripheral  Final Report:    Growth in anaerobic bottle: Bacteroides ovatus group    "Susceptibilities not performed"    Culture - Blood (collected 06 Jun 2022 09:40)  Source: .Blood Blood-Peripheral  Final Report:    Growth in anaerobic bottle: Bacteroides ovatus group "Susceptibilities    not performed"    ***Blood Panel PCR results on this specimen are available    approximately 3 hours after the Gram stain result.***    Gram stain, PCR, and/or culture results may not always    correspond due to difference in methodologies.    ************************************************************    This PCR assay was performed by multiplex PCR. This    Assay tests for 66 bacterial and resistance gene targets.    Please refer to [facility] Labs test directory    at [email or web address] for details.  Organism: Blood Culture PCR  Organism: Blood Culture PCR    Sensitivities:      -  Blood PCR Panel: NEG      Method Type: PCR    COVID-19 PCR: NotDetec (06-13-22 @ 02:30)  COVID-19 PCR: NotDetec (06-06-22 @ 09:48)  COVID-19 PCR: NotDetec (06-01-22 @ 12:51)    RADIOLOGY:  imaging below personally reviewed    < from: CT Chest No Cont (06.13.22 @ 14:22) >  IMPRESSION:  Bilateral pleural effusions, right greater than left. Passive atelectasis   of the right lower lobe and minimal left basilar atelctasis.  < end of copied text >    < from: CT Abdomen and Pelvis w/ Oral Cont and w/ IV Cont (06.12.22 @ 16:45) >  IMPRESSION: Status post evacuation of previously identified fluid and   mottled gas in the right hemiabdomen. Residual tracking fluid and   enhancement, as described, without evidence of discrete drainable   collection. Subcutaneous drain without evidence of drainable collection.   Complete right lower lobe atelectatic collapse.  < end of copied text >

## 2022-06-15 NOTE — CONSULT NOTE ADULT - ASSESSMENT
68 y/o F PMHx R breast CA (s/p lumpectomy and chemo/radiation last received 12/2021), hypothyroidism, and s/p recent elective laparoscopic appendectomy on 6/3, presented back to the ED with abdominal pain on 6/6. Found to have feculent peritonitis, taken to OR on 6/6 for ex-lap, found to have 2 perforations in terminal ileum s/p ileocecal resection and washout. S/p RTOR on 6/8 for repeat washout and closure. Blood cultures grew bacteroides.  68 y/o F PMHx R breast CA (s/p lumpectomy and chemo/radiation last received 12/2021), hypothyroidism, and s/p recent elective laparoscopic appendectomy on 6/3, presented back to the ED with abdominal pain on 6/6. Found to have feculent peritonitis, taken to OR on 6/6 for ex-lap, found to have 2 perforations in terminal ileum s/p ileocecal resection and washout. S/p RTOR on 6/8 for repeat washout and closure. Initial blood cultures grew bacteroides. OR cultures growing enterobacter cloacae, candida albicans, lactobacillus.     Blood Cultures (6/6):  bacteroides     Antibiotics/Antifungals:  Zosyn: 6/6 ->  Fluconazole 6/8 ->     66 y/o F PMHx R breast CA (s/p lumpectomy and chemo/radiation last received 12/2021), hypothyroidism, and s/p recent elective laparoscopic appendectomy on 6/3, presented back to the ED with abdominal pain on 6/6. Found to have feculent peritonitis, taken to OR on 6/6 for ex-lap, found to have 2 perforations in terminal ileum s/p ileocecal resection with primary anastomosis and washout. S/p RTOR on 6/8 for repeat washout and abdomen closure. Initial blood cultures grew bacteroides. OR cultures growing enterobacter cloacae, candida albicans, lactobacillus. Repeat CT scan with no drainable collection. Still with worsening leukocytosis, poor PO intake, and distended abdomen. ID now consulted for antibiotic management.    Blood Cultures (6/6):  bacteroides ovatus in both anaerobic bottles  Blood Cultures (6/6): no growth   OR Cultures (6/6):  enterobacter cloacae, candida albicans, bacteroides spp, lactobacillus    CT Abd/Pelvis (6/12): residual tracking fluid without evidence of discrete drainable collection    Antibiotics/Antifungals:  Zosyn: 6/6 -> 6/15  Fluconazole 6/8 ->      Impression:  #Peritonitis - polymicrobial infection including enterobacter, anaerobes, and candida albicans. Will target all organisms on culture given tenuous clinical status and persistent leukocytosis.   #Leukocytosis - due to abdominal source. Interval CT post-washout with some residual fluid, will need to monitor for worsening    Recs:  - d/c Zosyn  - start cefepime 1G IV q8H to better target enterobacter cloacae   - start Flagyl 500mg IV q12H for anaerobic coverage  - c/w fluconazole 400mg q24H  - monitor WBCs  - monitor abdominal exam  - if no improvement in leukocytosis in next 2-3 days, would repeat CT A/P    Plan discussed with primary team    Jeff Mejia MD  Infectious Disease Fellow  Available on [employer] Teams  Before 9AM or after 5PM: [phone]

## 2022-06-15 NOTE — PROGRESS NOTE ADULT - SUBJECTIVE AND OBJECTIVE BOX
DATE OF SERVICE: 06-15-22 @ 20:33    Patient is a 67y old  Female who presents with a chief complaint of Abdominal Sepsis (15 Dominick 2022 12:28)      INTERVAL HISTORY: feels weak, still with abdominal discomfort    REVIEW OF SYSTEMS:  CONSTITUTIONAL: No weakness  EYES/ENT: No visual changes;  No throat pain   NECK: No pain or stiffness  RESPIRATORY: No cough, wheezing; No shortness of breath  CARDIOVASCULAR: No chest pain or palpitations  GASTROINTESTINAL: + abdominal  pain. No nausea, vomiting, or hematemesis  GENITOURINARY: No dysuria, frequency or hematuria  NEUROLOGICAL: No stroke like symptoms  SKIN: No rashes          PHYSICAL EXAM:  T(C): 37.6 (06-15-22 @ 16:44), Max: 37.6 (06-14-22 @ 20:38)  HR: 87 (06-15-22 @ 16:44) (80 - 92)  BP: 136/75 (06-15-22 @ 16:44) (120/68 - 143/81)  RR: 18 (06-15-22 @ 16:44) (18 - 18)  SpO2: 97% (06-15-22 @ 16:44) (94% - 97%)  Wt(kg): --  I&O's Summary    14 Jun 2022 07:01  -  15 Dominick 2022 07:00  --------------------------------------------------------  IN: 1340 mL / OUT: 360 mL / NET: 980 mL    15 Dominick 2022 07:01  -  15 Dominick 2022 20:33  --------------------------------------------------------  IN: 250 mL / OUT: 30 mL / NET: 220 mL          Appearance: In no distress	  HEENT:    PERRL, EOMI	  Cardiovascular:  S1 S2, No JVD  Respiratory: Lungs clear to auscultation	  Gastrointestinal:  Soft, Non-tender, + BS	  Vascularature:  No edema of LE  Psychiatric: Appropriate affect   Neuro: no acute focal deficits                               8.2    21.94 )-----------( 520      ( 14 Jun 2022 13:21 )             25.3     06-15    136  |  106  |  4<L>  ----------------------------<  99  5.1   |  18<L>  |  0.40<L>    Ca    8.1<L>      15 Dominick 2022 09:21  Phos  2.7     06-14  Mg     1.9     06-14          Labs personally reviewed      ASSESSMENT/PLAN: 	    The patient is a 67y Female who is now s/p exploratory laparotomy with ileocecal resection and abdominal washout on 6/6   s/p removal of abthera vac, abd washout, abd wall reconstruction with myocutaneous flaps and bridging with ovitex mesh 6/8.    1. Esperanza op risk stratification - tolerated surgery well, planned for closing of abd and washout  6/8  - HD stable   - SR on the monitor   - Pain management   - Tolerated surgery well  - BP stable, sat well on RA     2. HLD   - on Repatha, resume as OP    3. Mod MR - euvolemic   - OP follow up    4, Leukocytosis - post op  - surgical team following   - CT abd/pelvis done as noted above showing Complete right lower lobe atelectatic collapse.  - CXR show b/l pleural effusion   - Pulmn consult - recommend Repeat Chest CT which revealed b/l pleural effusions   - ID consult and recs appreciated           Randy Santamaria DO [facility]  Cardiovascular Medicine  [address] [place] [address], [address]  Office: [phone]  Cell: [phone]

## 2022-06-15 NOTE — PROVIDER CONTACT NOTE (OTHER) - RECOMMENDATIONS
Will attempt to replace
MD aware. MD to bedside to help educate patient and family on importance of blood draw
SSKI Counseling:  I discussed with the patient the risks of SSKI including but not limited to thyroid abnormalities, metallic taste, GI upset, fever, headache, acne, arthralgias, paraesthesias, lymphadenopathy, easy bleeding, arrhythmias, and allergic reaction.

## 2022-06-16 LAB
ANION GAP SERPL CALC-SCNC: 10 MMOL/L — SIGNIFICANT CHANGE UP (ref 5–17)
BUN SERPL-MCNC: 4 MG/DL — LOW (ref 7–23)
CALCIUM SERPL-MCNC: 8.5 MG/DL — SIGNIFICANT CHANGE UP (ref 8.4–10.5)
CHLORIDE SERPL-SCNC: 102 MMOL/L — SIGNIFICANT CHANGE UP (ref 96–108)
CO2 SERPL-SCNC: 23 MMOL/L — SIGNIFICANT CHANGE UP (ref 22–31)
CREAT SERPL-MCNC: 0.41 MG/DL — LOW (ref 0.5–1.3)
EGFR: 106 ML/MIN/1.73M2 — SIGNIFICANT CHANGE UP
GLUCOSE SERPL-MCNC: 109 MG/DL — HIGH (ref 70–99)
HCT VFR BLD CALC: 27.8 % — LOW (ref 34.5–45)
HGB BLD-MCNC: 9 G/DL — LOW (ref 11.5–15.5)
MAGNESIUM SERPL-MCNC: 2 MG/DL — SIGNIFICANT CHANGE UP (ref 1.6–2.6)
MCHC RBC-ENTMCNC: 29.1 PG — SIGNIFICANT CHANGE UP (ref 27–34)
MCHC RBC-ENTMCNC: 32.4 GM/DL — SIGNIFICANT CHANGE UP (ref 32–36)
MCV RBC AUTO: 90 FL — SIGNIFICANT CHANGE UP (ref 80–100)
NRBC # BLD: 0 /100 WBCS — SIGNIFICANT CHANGE UP (ref 0–0)
PHOSPHATE SERPL-MCNC: 2.8 MG/DL — SIGNIFICANT CHANGE UP (ref 2.5–4.5)
PLATELET # BLD AUTO: 802 K/UL — HIGH (ref 150–400)
POTASSIUM SERPL-MCNC: 3.3 MMOL/L — LOW (ref 3.5–5.3)
POTASSIUM SERPL-SCNC: 3.3 MMOL/L — LOW (ref 3.5–5.3)
RBC # BLD: 3.09 M/UL — LOW (ref 3.8–5.2)
RBC # FLD: 14.6 % — HIGH (ref 10.3–14.5)
SODIUM SERPL-SCNC: 135 MMOL/L — SIGNIFICANT CHANGE UP (ref 135–145)
SURGICAL PATHOLOGY STUDY: SIGNIFICANT CHANGE UP
WBC # BLD: 17.61 K/UL — HIGH (ref 3.8–10.5)
WBC # FLD AUTO: 17.61 K/UL — HIGH (ref 3.8–10.5)

## 2022-06-16 PROCEDURE — 99232 SBSQ HOSP IP/OBS MODERATE 35: CPT

## 2022-06-16 RX ORDER — POTASSIUM CHLORIDE 20 MEQ
20 PACKET (EA) ORAL
Refills: 0 | Status: DISCONTINUED | OUTPATIENT
Start: 2022-06-16 | End: 2022-06-16

## 2022-06-16 RX ORDER — HYDROMORPHONE HYDROCHLORIDE 2 MG/ML
0.5 INJECTION INTRAMUSCULAR; INTRAVENOUS; SUBCUTANEOUS EVERY 4 HOURS
Refills: 0 | Status: DISCONTINUED | OUTPATIENT
Start: 2022-06-16 | End: 2022-06-20

## 2022-06-16 RX ORDER — LEVOTHYROXINE SODIUM 125 MCG
50 TABLET ORAL DAILY
Refills: 0 | Status: DISCONTINUED | OUTPATIENT
Start: 2022-06-16 | End: 2022-06-20

## 2022-06-16 RX ORDER — PANTOPRAZOLE SODIUM 20 MG/1
40 TABLET, DELAYED RELEASE ORAL DAILY
Refills: 0 | Status: DISCONTINUED | OUTPATIENT
Start: 2022-06-16 | End: 2022-06-20

## 2022-06-16 RX ORDER — POTASSIUM CHLORIDE 20 MEQ
10 PACKET (EA) ORAL
Refills: 0 | Status: COMPLETED | OUTPATIENT
Start: 2022-06-16 | End: 2022-06-16

## 2022-06-16 RX ORDER — OXYCODONE HYDROCHLORIDE 5 MG/1
5 TABLET ORAL EVERY 4 HOURS
Refills: 0 | Status: DISCONTINUED | OUTPATIENT
Start: 2022-06-16 | End: 2022-06-20

## 2022-06-16 RX ORDER — OXYCODONE HYDROCHLORIDE 5 MG/1
10 TABLET ORAL EVERY 4 HOURS
Refills: 0 | Status: DISCONTINUED | OUTPATIENT
Start: 2022-06-16 | End: 2022-06-20

## 2022-06-16 RX ORDER — SODIUM,POTASSIUM PHOSPHATES 278-250MG
1 POWDER IN PACKET (EA) ORAL ONCE
Refills: 0 | Status: COMPLETED | OUTPATIENT
Start: 2022-06-16 | End: 2022-06-16

## 2022-06-16 RX ADMIN — Medication 975 MILLIGRAM(S): at 23:43

## 2022-06-16 RX ADMIN — Medication 100 MILLIEQUIVALENT(S): at 17:48

## 2022-06-16 RX ADMIN — ENOXAPARIN SODIUM 40 MILLIGRAM(S): 100 INJECTION SUBCUTANEOUS at 21:12

## 2022-06-16 RX ADMIN — CEFEPIME 100 MILLIGRAM(S): 1 INJECTION, POWDER, FOR SOLUTION INTRAMUSCULAR; INTRAVENOUS at 05:52

## 2022-06-16 RX ADMIN — Medication 1 PACKET(S): at 18:00

## 2022-06-16 RX ADMIN — OXYCODONE HYDROCHLORIDE 10 MILLIGRAM(S): 5 TABLET ORAL at 22:42

## 2022-06-16 RX ADMIN — Medication 100 MILLIGRAM(S): at 17:48

## 2022-06-16 RX ADMIN — Medication 100 MILLIGRAM(S): at 06:01

## 2022-06-16 RX ADMIN — OXYCODONE HYDROCHLORIDE 10 MILLIGRAM(S): 5 TABLET ORAL at 22:12

## 2022-06-16 RX ADMIN — Medication 100 MILLIEQUIVALENT(S): at 14:57

## 2022-06-16 RX ADMIN — CEFEPIME 100 MILLIGRAM(S): 1 INJECTION, POWDER, FOR SOLUTION INTRAMUSCULAR; INTRAVENOUS at 21:13

## 2022-06-16 RX ADMIN — CEFEPIME 100 MILLIGRAM(S): 1 INJECTION, POWDER, FOR SOLUTION INTRAMUSCULAR; INTRAVENOUS at 14:56

## 2022-06-16 RX ADMIN — HYDROMORPHONE HYDROCHLORIDE 0.5 MILLIGRAM(S): 2 INJECTION INTRAMUSCULAR; INTRAVENOUS; SUBCUTANEOUS at 00:18

## 2022-06-16 RX ADMIN — CHLORHEXIDINE GLUCONATE 1 APPLICATION(S): 213 SOLUTION TOPICAL at 10:48

## 2022-06-16 RX ADMIN — Medication 100 MILLIEQUIVALENT(S): at 13:20

## 2022-06-16 RX ADMIN — FLUCONAZOLE 100 MILLIGRAM(S): 150 TABLET ORAL at 10:47

## 2022-06-16 RX ADMIN — OXYCODONE HYDROCHLORIDE 10 MILLIGRAM(S): 5 TABLET ORAL at 11:00

## 2022-06-16 RX ADMIN — OXYCODONE HYDROCHLORIDE 10 MILLIGRAM(S): 5 TABLET ORAL at 10:47

## 2022-06-16 RX ADMIN — PANTOPRAZOLE SODIUM 40 MILLIGRAM(S): 20 TABLET, DELAYED RELEASE ORAL at 13:22

## 2022-06-16 NOTE — PROGRESS NOTE ADULT - ASSESSMENT
66 y/o female w/ a PMHx of moderate mitral regurgitation, hypothyroidism, GERD, osteoporosis, vitamin D deficiency, breast CA s/p right lumpectomy w/ chemo & radiation, bilateral breast reduction (2013), s/p hysterectomy (2005), s/p right elbow surgery (1970s), & s/p laparoscopic appendectomy (6/3/2022) who presented on 6/6 w/ pneumoperitoneum & a loculated fluid collection in the RLQ s/p exploratory laparotomy, washout, ileocolic anastomosis w/ primary anastomosis for two perforations in the terminal ileum, & temporary abdominal closure w/ Abthera VAC 6/6. s/p removal of abthera vac, abd washout, abd wall reconstruction with myocutaneous flaps and bridging with ovitex mesh 6/8. CT A/P 6/12 showed 4 cm pelvic collections not amenable to drainage, right pleural effusion with collapsed right lower lobe. C. diff negative 6/12    Plan:    - DC prevena  - Appreciate pulm recs  - IV Abx: continue Zosyn, Diflucan  - Cdiff negative  - F/u WBC today  - Pain control PRN  - Diet: advance to LRD  - Activity: ambulate as tolerated   - DVT ppx: LVX     Green Team Surgery  #[id number]   66 y/o female w/ a PMHx of moderate mitral regurgitation, hypothyroidism, GERD, osteoporosis, vitamin D deficiency, breast CA s/p right lumpectomy w/ chemo & radiation, bilateral breast reduction (2013), s/p hysterectomy (2005), s/p right elbow surgery (1970s), & s/p laparoscopic appendectomy (6/3/2022) who presented on 6/6 w/ pneumoperitoneum & a loculated fluid collection in the RLQ s/p exploratory laparotomy, washout, ileocolic anastomosis w/ primary anastomosis for two perforations in the terminal ileum, & temporary abdominal closure w/ Abthera VAC 6/6. s/p removal of abthera vac, abd washout, abd wall reconstruction with myocutaneous flaps and bridging with ovitex mesh 6/8. CT A/P 6/12 showed 4 cm pelvic collections not amenable to drainage, right pleural effusion with collapsed right lower lobe. C. diff negative 6/12    Plan:  - appreciate ID recs  - Appreciate pulm recs  - IV Abx: continue cefepime, flagyl, diflucan   - F/u WBC today  - Pain control PRN  - Diet: LRD   - Activity: ambulate as tolerated   - DVT ppx: LVX     Green Team Surgery  #[id number]

## 2022-06-16 NOTE — PROGRESS NOTE ADULT - SUBJECTIVE AND OBJECTIVE BOX
Date of Service   06-16-22 @ 13:27    Patient is a 67y old  Female who presents with a chief complaint of Abdominal Sepsis (16 Jun 2022 13:04)      INTERVAL HISTORY: pt feels ok, sister at bedside     REVIEW OF SYSTEMS:   CONSTITUTIONAL: No weakness  EYES/ENT: No visual changes; No throat pain  Neck: No pain or stiffness  Respiratory: No cough, wheezing, No shortness of breath  CARDIOVASCULAR: no chest pain or palpitations  GASTROINTESTINAL: No abdominal pain, no nausea, vomiting or hematemesis  GENITOURINARY: No dysuria, frequency or hematuria  NEUROLOGICAL: No stroke like symptoms  SKIN: No rashes    	  MEDICATIONS:        PHYSICAL EXAM:  T(C): 36.7 (06-16-22 @ 13:11), Max: 37.7 (06-16-22 @ 09:45)  HR: 80 (06-16-22 @ 13:11) (78 - 92)  BP: 150/72 (06-16-22 @ 13:11) (132/78 - 155/77)  RR: 18 (06-16-22 @ 13:11) (18 - 18)  SpO2: 95% (06-16-22 @ 13:11) (95% - 98%)  Wt(kg): --  I&O's Summary    15 Dominick 2022 07:01  -  16 Jun 2022 07:00  --------------------------------------------------------  IN: 450 mL / OUT: 110 mL / NET: 340 mL    16 Jun 2022 07:01  -  16 Jun 2022 13:27  --------------------------------------------------------  IN: 350 mL / OUT: 15 mL / NET: 335 mL          Appearance: In no distress	  HEENT:    PERRL, EOMI	  Cardiovascular:  S1 S2, No JVD  Respiratory: Lungs clear to auscultation	  Gastrointestinal:  Soft, Non-tender, + BS	  Vasculature:  trace edema of B/L  LE  Psychiatric: Appropriate affect   Neuro: no acute focal deficits                               9.0    17.61 )-----------( 802      ( 16 Jun 2022 10:14 )             27.8     06-16    135  |  102  |  4<L>  ----------------------------<  109<H>  3.3<L>   |  23  |  0.41<L>    Ca    8.5      16 Jun 2022 10:14  Phos  2.8     06-16  Mg     2.0     06-16          Labs personally reviewed      ASSESSMENT/PLAN: 	  The patient is a 67y Female who is now s/p exploratory laparotomy with ileocecal resection and abdominal washout on 6/6   s/p removal of abthera vac, abd washout, abd wall reconstruction with myocutaneous flaps and bridging with ovitex mesh 6/8.    1. Esperanza op risk stratification - tolerated surgery well, s/p closing of abd and washout  6/8  - HD stable   - SR on the monitor   - Pain management   - Tolerated surgery well  - BP stable, sat well on RA     2. HLD   - on Repatha, resume as OP    3. Mod MR - euvolemic   - OP follow up    4, Leukocytosis - post op  - surgical team following   - CT abd/pelvis done as noted above showing Complete right lower lobe atelectatic collapse.  - CXR show b/l pleural effusion   - Pulmn consult - recommend Repeat Chest CT which revealed b/l pleural effusions   - ID consult and recs appreciated   - WBC downtrending     5. Hypokalemia   - monitor lytes and replete PRN         Elsa Li [facility]   Randy Santamaria DO [facility]  Cardiovascular Medicine  [address] [place] [address], [address]  Office: [phone]

## 2022-06-16 NOTE — PROGRESS NOTE ADULT - ASSESSMENT
68 y/o F PMHx R breast CA (s/p lumpectomy and chemo/radiation last received 12/2021), hypothyroidism, and s/p recent elective laparoscopic appendectomy on 6/3, presented back to the ED with abdominal pain on 6/6. Found to have feculent peritonitis, taken to OR on 6/6 for ex-lap, found to have 2 perforations in terminal ileum s/p ileocecal resection with primary anastomosis and washout. S/p RTOR on 6/8 for repeat washout and abdomen closure. Initial blood cultures grew bacteroides. OR cultures growing enterobacter cloacae, candida albicans, lactobacillus. Repeat CT scan with no drainable collection. Still with worsening leukocytosis, poor PO intake, and distended abdomen. ID now consulted for antibiotic management.    Blood Cultures (6/6):  bacteroides ovatus in both anaerobic bottles  Blood Cultures (6/6): no growth   OR Cultures (6/6):  enterobacter cloacae, candida albicans, bacteroides spp, lactobacillus    CT Abd/Pelvis (6/12): residual tracking fluid without evidence of discrete drainable collection    Antibiotics/Antifungals:  Zosyn: 6/6 -> 6/15  Fluconazole 6/8 ->      Impression:  #Peritonitis - polymicrobial infection including enterobacter, anaerobes, and candida albicans. Will target all organisms on culture given tenuous clinical status and persistent leukocytosis.   #Leukocytosis - due to abdominal source. Interval CT post-washout with some residual fluid, will need to monitor for worsening        cefepime 1G IV q8H to better target enterobacter cloacae day 2   Flagyl 500mg IV q12H for anaerobic coverage day 2  - c/w fluconazole 400mg q24H   wbc down somewhat and exam stable  eating without pain  to continue to follow closely

## 2022-06-16 NOTE — PROGRESS NOTE ADULT - ASSESSMENT
66 y/o with PMH of breast CA (s/p R lumpectomy 8/2021, chemo and radiation), hypothyroid, osteoporosis GERD. Presented to the ED with severe abdominal pain x3 days after laparoscopic appendectomy on 6/3/2022. Found to have pneumoperitoneum & a loculated fluid collection in the RLQ concerning for perforation. S/p exploratory laparotomy, washout, ileocolic anastomosis w/ primary anastomosis for two perforations in the terminal ileum, & temporary abdominal closure w/ wound vac on 6/6. S/p removal of wound vac, abdominal washout, abdominal wall reconstruction on 6/8. Pulmonary called to consult for R pleural effusion on CT A/P. O2 sats 94% on RA.

## 2022-06-16 NOTE — PROGRESS NOTE ADULT - SUBJECTIVE AND OBJECTIVE BOX
Surgery Progress Note    Subjective:     Patient seen and examined at bedside. Patient without complaints this AM. Denies N/V/F/C.     OBJECTIVE:     T(C): 37.3 (06-16-22 @ 01:04), Max: 37.6 (06-15-22 @ 16:44)  HR: 78 (06-16-22 @ 01:04) (78 - 87)  BP: 132/78 (06-16-22 @ 01:04) (132/74 - 143/81)  RR: 18 (06-16-22 @ 01:04) (18 - 18)  SpO2: 95% (06-16-22 @ 01:04) (95% - 98%)  Wt(kg): --    I&O's Detail    14 Jun 2022 07:01  -  15 Dominick 2022 07:00  --------------------------------------------------------  IN:    IV PiggyBack: 300 mL    IV PiggyBack: 200 mL    IV PiggyBack: 100 mL    IV PiggyBack: 300 mL    Oral Fluid: 440 mL  Total IN: 1340 mL    OUT:    Bulb (mL): 50 mL    Voided (mL): 310 mL  Total OUT: 360 mL    Total NET: 980 mL      15 Dominick 2022 07:01  -  16 Jun 2022 02:17  --------------------------------------------------------  IN:    Oral Fluid: 250 mL  Total IN: 250 mL    OUT:    Bulb (mL): 50 mL  Total OUT: 50 mL    Total NET: 200 mL          PHYSICAL EXAM:    GENERAL: NAD, lying in bed comfortably  HEAD:  Atraumatic, Normocephalic  ENT: Moist mucous membranes  CHEST/LUNG: Unlabored respirations  ABDOMEN: Soft, Nontender, Nondistended.   NERVOUS SYSTEM:  Alert & Oriented X3, speech clear.   SKIN: No rashes or lesions    MEDICATIONS  (STANDING):  acetaminophen     Tablet .. 975 milliGRAM(s) Oral every 6 hours  cefepime   IVPB 1000 milliGRAM(s) IV Intermittent every 8 hours  chlorhexidine 2% Cloths 1 Application(s) Topical <User Schedule>  enoxaparin Injectable 40 milliGRAM(s) SubCutaneous every 24 hours  fluconAZOLE IVPB 400 milliGRAM(s) IV Intermittent every 24 hours  levothyroxine Injectable 38 MICROGram(s) IV Push at bedtime  melatonin 10 milliGRAM(s) Oral at bedtime  metroNIDAZOLE  IVPB 500 milliGRAM(s) IV Intermittent every 12 hours  ondansetron Injectable 4 milliGRAM(s) IV Push once  pantoprazole  Injectable 40 milliGRAM(s) IV Push daily    MEDICATIONS  (PRN):  HYDROmorphone  Injectable 0.5 milliGRAM(s) IV Push every 4 hours PRN Severe Pain (7 - 10)  HYDROmorphone  Injectable 0.25 milliGRAM(s) IV Push every 4 hours PRN Moderate Pain (4 - 6)      LABS:                          8.2    21.94 )-----------( 520      ( 14 Jun 2022 13:21 )             25.3     06-15    136  |  106  |  4<L>  ----------------------------<  99  5.1   |  18<L>  |  0.40<L>    Ca    8.1<L>      15 Dominick 2022 09:21  Phos  2.7     06-14  Mg     1.9     06-14                 Surgery Progress Note    Subjective:     Patient seen and examined at bedside. POD 8. VSS, AF.     OBJECTIVE:     T(C): 37.3 (06-16-22 @ 01:04), Max: 37.6 (06-15-22 @ 16:44)  HR: 78 (06-16-22 @ 01:04) (78 - 87)  BP: 132/78 (06-16-22 @ 01:04) (132/74 - 143/81)  RR: 18 (06-16-22 @ 01:04) (18 - 18)  SpO2: 95% (06-16-22 @ 01:04) (95% - 98%)  Wt(kg): --    I&O's Detail    14 Jun 2022 07:01  -  15 Dominick 2022 07:00  --------------------------------------------------------  IN:    IV PiggyBack: 300 mL    IV PiggyBack: 200 mL    IV PiggyBack: 100 mL    IV PiggyBack: 300 mL    Oral Fluid: 440 mL  Total IN: 1340 mL    OUT:    Bulb (mL): 50 mL    Voided (mL): 310 mL  Total OUT: 360 mL    Total NET: 980 mL      15 Dominick 2022 07:01  -  16 Jun 2022 02:17  --------------------------------------------------------  IN:    Oral Fluid: 250 mL  Total IN: 250 mL    OUT:    Bulb (mL): 50 mL  Total OUT: 50 mL    Total NET: 200 mL          PHYSICAL EXAM:    General: NAD, resting comfortably in bed  Respiratory: Nonlabored respirations, normal CW expansion.  Cardio: regular rate and rhythm.  Abdomen: softly distended, appropriately tender, prevena plus vac intact. LILLIAM drain x1 ss    Vascular: extremities are warm and well perfused.     MEDICATIONS  (STANDING):  acetaminophen     Tablet .. 975 milliGRAM(s) Oral every 6 hours  cefepime   IVPB 1000 milliGRAM(s) IV Intermittent every 8 hours  chlorhexidine 2% Cloths 1 Application(s) Topical <User Schedule>  enoxaparin Injectable 40 milliGRAM(s) SubCutaneous every 24 hours  fluconAZOLE IVPB 400 milliGRAM(s) IV Intermittent every 24 hours  levothyroxine Injectable 38 MICROGram(s) IV Push at bedtime  melatonin 10 milliGRAM(s) Oral at bedtime  metroNIDAZOLE  IVPB 500 milliGRAM(s) IV Intermittent every 12 hours  ondansetron Injectable 4 milliGRAM(s) IV Push once  pantoprazole  Injectable 40 milliGRAM(s) IV Push daily    MEDICATIONS  (PRN):  HYDROmorphone  Injectable 0.5 milliGRAM(s) IV Push every 4 hours PRN Severe Pain (7 - 10)  HYDROmorphone  Injectable 0.25 milliGRAM(s) IV Push every 4 hours PRN Moderate Pain (4 - 6)      LABS:                          8.2    21.94 )-----------( 520      ( 14 Jun 2022 13:21 )             25.3     06-15    136  |  106  |  4<L>  ----------------------------<  99  5.1   |  18<L>  |  0.40<L>    Ca    8.1<L>      15 Dominick 2022 09:21  Phos  2.7     06-14  Mg     1.9     06-14                 Surgery Progress Note    Subjective:     Patient seen and examined at bedside. POD 8. VSS, AF.     OBJECTIVE:     T(C): 37.3 (06-16-22 @ 01:04), Max: 37.6 (06-15-22 @ 16:44)  HR: 78 (06-16-22 @ 01:04) (78 - 87)  BP: 132/78 (06-16-22 @ 01:04) (132/74 - 143/81)  RR: 18 (06-16-22 @ 01:04) (18 - 18)  SpO2: 95% (06-16-22 @ 01:04) (95% - 98%)  Wt(kg): --    I&O's Detail    14 Jun 2022 07:01  -  15 Dominick 2022 07:00  --------------------------------------------------------  IN:    IV PiggyBack: 300 mL    IV PiggyBack: 200 mL    IV PiggyBack: 100 mL    IV PiggyBack: 300 mL    Oral Fluid: 440 mL  Total IN: 1340 mL    OUT:    Bulb (mL): 50 mL    Voided (mL): 310 mL  Total OUT: 360 mL    Total NET: 980 mL      15 Domniick 2022 07:01  -  16 Jun 2022 02:17  --------------------------------------------------------  IN:    Oral Fluid: 250 mL  Total IN: 250 mL    OUT:    Bulb (mL): 50 mL  Total OUT: 50 mL    Total NET: 200 mL          PHYSICAL EXAM:    General: NAD, resting comfortably in bed  Respiratory: Nonlabored respirations, normal CW expansion.  Cardio: regular rate and rhythm.  Abdomen: softly distended, appropriately tender, incision c/d/i. LILLIAM drain x1 ss    Vascular: extremities are warm and well perfused.     MEDICATIONS  (STANDING):  acetaminophen     Tablet .. 975 milliGRAM(s) Oral every 6 hours  cefepime   IVPB 1000 milliGRAM(s) IV Intermittent every 8 hours  chlorhexidine 2% Cloths 1 Application(s) Topical <User Schedule>  enoxaparin Injectable 40 milliGRAM(s) SubCutaneous every 24 hours  fluconAZOLE IVPB 400 milliGRAM(s) IV Intermittent every 24 hours  levothyroxine Injectable 38 MICROGram(s) IV Push at bedtime  melatonin 10 milliGRAM(s) Oral at bedtime  metroNIDAZOLE  IVPB 500 milliGRAM(s) IV Intermittent every 12 hours  ondansetron Injectable 4 milliGRAM(s) IV Push once  pantoprazole  Injectable 40 milliGRAM(s) IV Push daily    MEDICATIONS  (PRN):  HYDROmorphone  Injectable 0.5 milliGRAM(s) IV Push every 4 hours PRN Severe Pain (7 - 10)  HYDROmorphone  Injectable 0.25 milliGRAM(s) IV Push every 4 hours PRN Moderate Pain (4 - 6)      LABS:                          8.2    21.94 )-----------( 520      ( 14 Jun 2022 13:21 )             25.3     06-15    136  |  106  |  4<L>  ----------------------------<  99  5.1   |  18<L>  |  0.40<L>    Ca    8.1<L>      15 Dominick 2022 09:21  Phos  2.7     06-14  Mg     1.9     06-14

## 2022-06-16 NOTE — PROGRESS NOTE ADULT - ATTENDING COMMENTS
Patient seen/examined.  Agree w above note and plan and have discussed plan w house staff.  More comfortable than yesterday, occasional breakthrough pain.  Afebrile.  Chest clear.  Abdomen soft, wound clean.  Appreciate ID note, abx changed.  Diet, ambulate.  Await WBC and if remains elevated, CT abdomen and chest.  Added small dose dilaudid prn breakthrough pain    Darío Verde MD

## 2022-06-16 NOTE — PROGRESS NOTE ADULT - SUBJECTIVE AND OBJECTIVE BOX
infectious diseases progress note:    Patient is a 67y old  Female who presents with a chief complaint of Abdominal Sepsis (16 Jun 2022 13:27)        Peritonitis          ROS:  CONSTITUTIONAL:  Negative fever or chills, feels well, good appetite  EYES:  Negative  blurry vision or double vision  CARDIOVASCULAR:  Negative for chest pain or palpitations  RESPIRATORY:  Negative for cough, wheezing, or SOB   GASTROINTESTINAL:  Negative for nausea, vomiting, diarrhea, constipation, or abdominal pain  GENITOURINARY:  Negative frequency, urgency or dysuria  NEUROLOGIC:  No headache, confusion, dizziness, lightheadedness    Allergies    latex (Short breath)  No Known Drug Allergies    Intolerances        ANTIBIOTICS/RELEVANT:  antimicrobials  cefepime   IVPB 1000 milliGRAM(s) IV Intermittent every 8 hours  fluconAZOLE IVPB 400 milliGRAM(s) IV Intermittent every 24 hours  metroNIDAZOLE  IVPB 500 milliGRAM(s) IV Intermittent every 12 hours    immunologic:    OTHER:  acetaminophen     Tablet .. 975 milliGRAM(s) Oral every 6 hours  chlorhexidine 2% Cloths 1 Application(s) Topical <User Schedule>  enoxaparin Injectable 40 milliGRAM(s) SubCutaneous every 24 hours  HYDROmorphone  Injectable 0.5 milliGRAM(s) IV Push every 4 hours PRN  levothyroxine 50 MICROGram(s) Oral daily  melatonin 10 milliGRAM(s) Oral at bedtime  ondansetron Injectable 4 milliGRAM(s) IV Push once  oxyCODONE    IR 5 milliGRAM(s) Oral every 4 hours PRN  oxyCODONE    IR 10 milliGRAM(s) Oral every 4 hours PRN  pantoprazole    Tablet 40 milliGRAM(s) Oral daily  potassium chloride  10 mEq/100 mL IVPB 10 milliEquivalent(s) IV Intermittent every 1 hour  potassium phosphate / sodium phosphate Powder (PHOS-NaK) 1 Packet(s) Oral once      Objective:  Vital Signs Last 24 Hrs  T(C): 36.7 (16 Jun 2022 13:11), Max: 37.7 (16 Jun 2022 09:45)  T(F): 98.1 (16 Jun 2022 13:11), Max: 99.8 (16 Jun 2022 09:45)  HR: 80 (16 Jun 2022 13:11) (78 - 92)  BP: 150/72 (16 Jun 2022 13:11) (132/78 - 155/77)  BP(mean): --  RR: 18 (16 Jun 2022 13:11) (18 - 18)     Eyes:ROLAND, EOMI  Ear/Nose/Throat: no oral lesion, no sinus tenderness on percussion	  Neck:no JVD, no lymphadenopathy, supple  Respiratory: CTA araceli  Cardiovascular: S1S2 RRR, no murmurs  Gastrointestinal:soft, (+) BS, no HSM  Extremities:no e/e/c        LABS:                        9.0    17.61 )-----------( 802      ( 16 Jun 2022 10:14 )             27.8     06-16    135  |  102  |  4<L>  ----------------------------<  109<H>  3.3<L>   |  23  |  0.41<L>    Ca    8.5      16 Jun 2022 10:14  Phos  2.8     06-16  Mg     2.0     06-16              MICROBIOLOGY:    RECENT CULTURES:        RESPIRATORY CULTURES:      Clostridium difficile GDH Toxins A&amp;B, EIA:   Negative (06-12 @ 16:13)          RADIOLOGY & ADDITIONAL STUDIES:        Pager [phone]  After 5 pm/weekends or if no response :[phone]

## 2022-06-16 NOTE — PROGRESS NOTE ADULT - SUBJECTIVE AND OBJECTIVE BOX
Follow-up Pulm Progress Note    No new respiratory events overnight.  Denies SOB/CP.   Sats 96% RA    Medications:  MEDICATIONS  (STANDING):  acetaminophen     Tablet .. 975 milliGRAM(s) Oral every 6 hours  cefepime   IVPB 1000 milliGRAM(s) IV Intermittent every 8 hours  chlorhexidine 2% Cloths 1 Application(s) Topical <User Schedule>  enoxaparin Injectable 40 milliGRAM(s) SubCutaneous every 24 hours  fluconAZOLE IVPB 400 milliGRAM(s) IV Intermittent every 24 hours  levothyroxine 50 MICROGram(s) Oral daily  melatonin 10 milliGRAM(s) Oral at bedtime  metroNIDAZOLE  IVPB 500 milliGRAM(s) IV Intermittent every 12 hours  ondansetron Injectable 4 milliGRAM(s) IV Push once  pantoprazole    Tablet 40 milliGRAM(s) Oral daily  potassium chloride  10 mEq/100 mL IVPB 10 milliEquivalent(s) IV Intermittent every 1 hour  potassium phosphate / sodium phosphate Powder (PHOS-NaK) 1 Packet(s) Oral once    MEDICATIONS  (PRN):  HYDROmorphone  Injectable 0.5 milliGRAM(s) IV Push every 4 hours PRN breakthrough pain  oxyCODONE    IR 5 milliGRAM(s) Oral every 4 hours PRN Moderate Pain (4 - 6)  oxyCODONE    IR 10 milliGRAM(s) Oral every 4 hours PRN Severe Pain (7 - 10)          Vital Signs Last 24 Hrs  T(C): 37.7 (16 Jun 2022 09:45), Max: 37.7 (16 Jun 2022 09:45)  T(F): 99.8 (16 Jun 2022 09:45), Max: 99.8 (16 Jun 2022 09:45)  HR: 92 (16 Jun 2022 09:45) (78 - 92)  BP: 155/77 (16 Jun 2022 09:45) (132/78 - 155/77)  BP(mean): --  RR: 18 (16 Jun 2022 09:45) (18 - 18)  SpO2: 95% (16 Jun 2022 09:45) (95% - 98%)          06-15 @ 07:01  -  06-16 @ 07:00  --------------------------------------------------------  IN: 450 mL / OUT: 110 mL / NET: 340 mL          LABS:                        9.0    17.61 )-----------( 802      ( 16 Jun 2022 10:14 )             27.8     06-16    135  |  102  |  4<L>  ----------------------------<  109<H>  3.3<L>   |  23  |  0.41<L>    Ca    8.5      16 Jun 2022 10:14  Phos  2.8     06-16  Mg     2.0     06-16                CULTURES:     Culture - Blood (collected 06-08-22 @ 05:56)  Source: .Blood Blood-Peripheral  Final Report (06-13-22 @ 10:00):    No Growth Final                Culture - Body Fluid with Gram Stain (collected 06-06-22 @ 21:43)  Source: .Body Fluid Peritoneal Fluid  Gram Stain (06-07-22 @ 04:50):    Moderate polymorphonuclear leukocytes seen per low power field    Rare Gram Positive Rods seen per oil power field    Rare Yeast like cells seen per oil power field  Final Report (06-09-22 @ 20:27):    Rare Candida albicans "Susceptibilities not performed"    Growth in fluid media only Enterobacter cloacae complex    Few Bacteroides vulgatus group "Susceptibilities not performed"    Rare Lactobacillus rhamnosus "Susceptibilities not performed"  Organism: Enterobacter cloacae complex (06-09-22 @ 20:27)  Organism: Enterobacter cloacae complex (06-09-22 @ 20:27)      -  Amikacin: S <=16      -  Amoxicillin/Clavulanic Acid: R >16/8      -  Ampicillin: R >16 These ampicillin results predict results for amoxicillin      -  Ampicillin/Sulbactam: R >16/8 Enterobacter, Klebsiella aerogenes, Citrobacter, and Serratia may develop resistance during prolonged therapy (3-4 days)      -  Aztreonam: S <=4      -  Cefazolin: R >16 Enterobacter, Klebsiella aerogenes, Citrobacter, and Serratia may develop resistance during prolonged therapy (3-4 days)      -  Cefepime: S <=2      -  Cefoxitin: R >16      -  Ceftriaxone: S <=1 Enterobacter, Klebsiella aerogenes, Citrobacter, and Serratia may develop resistance during prolonged therapy      -  Ciprofloxacin: S <=0.25      -  Ertapenem: S <=0.5      -  Gentamicin: S <=2      -  Imipenem: S <=1      -  Levofloxacin: S <=0.5      -  Meropenem: S <=1      -  Piperacillin/Tazobactam: S <=8      -  Tobramycin: S <=2      -  Trimethoprim/Sulfamethoxazole: S <=0.5/9.5      Method Type: SPENCER                    Culture - Tissue with Gram Stain (collected 06-06-22 @ 21:43)  Source: .Tissue Other  Gram Stain (06-07-22 @ 04:49):    Few polymorphonuclear leukocytes seen per low power field    Rare Gram Negative Rods seen per oil power field    Rare Yeast like cells seen per oil power field  Final Report (06-08-22 @ 23:59):    Rare Enterobacter cloacae complex    Few Alysa albicans "Susceptibilities not performed"    Few Bacteroides vulgatus group "Susceptibilities not performed"    Few Bacteroides ovatus group "Susceptibilities not performed"  Organism: Enterobacter cloacae complex (06-08-22 @ 23:59)  Organism: Enterobacter cloacae complex (06-08-22 @ 23:59)      -  Amikacin: S <=16      -  Amoxicillin/Clavulanic Acid: R >16/8      -  Ampicillin: R >16 These ampicillin results predict results for amoxicillin      -  Ampicillin/Sulbactam: R >16/8 Enterobacter, Klebsiella aerogenes, Citrobacter, and Serratia may develop resistance during prolonged therapy (3-4 days)      -  Aztreonam: S <=4      -  Cefazolin: R >16 Enterobacter, Klebsiella aerogenes, Citrobacter, and Serratia may develop resistance during prolonged therapy (3-4 days)      -  Cefepime: S <=2      -  Cefoxitin: R >16      -  Ceftriaxone: S <=1 Enterobacter, Klebsiella aerogenes, Citrobacter, and Serratia may develop resistance during prolonged therapy      -  Ciprofloxacin: S <=0.25      -  Ertapenem: S <=0.5      -  Gentamicin: S <=2      -  Imipenem: S <=1      -  Levofloxacin: S <=0.5      -  Meropenem: S <=1      -  Piperacillin/Tazobactam: S <=8      -  Tobramycin: S <=2      -  Trimethoprim/Sulfamethoxazole: S <=0.5/9.5      Method Type: SPENCER        Physical Examination:  PULM: Decreased R base  CVS: RRR    RADIOLOGY REVIEWED      CT chest: < from: CT Chest No Cont (06.13.22 @ 14:22) >    FINDINGS:    LUNGS AND AIRWAYS: Passive atelectasis of the right lower lobe and left   basilar atelectasis.    PLEURA: Bilateral pleural effusions, right greater than left.    MEDIASTINUM AND AFUA: No lymphadenopathy.    VESSELS: Within normal limits.    HEART: Heart size is normal. No pericardial effusion.    CHEST WALL AND LOWER NECK: Within normal limits.    VISUALIZED UPPER ABDOMEN: Postsurgical changes of the imaged upper   abdomen, better evaluated on CT abdomen pelvis dated 6/12/2022.    BONES: Degenerative changes. Mild compression of a lumbar vertebral body.    IMPRESSION:    Bilateral pleural effusions, right greater than left. Passive atelectasis   of the right lower lobe and minimal left basilar atelctasis.    < end of copied text >    < from: CT Abdomen and Pelvis w/ Oral Cont and w/ IV Cont (06.12.22 @ 16:45) >  FINDINGS:  LOWER CHEST: There has been interval increase in size of a mild to   moderate layering right pleural effusion with complete compressive   collapse of the right lower lobe. Again is noted a 5mm subpleural nodule   in the right upper lobe anteriorly on image 1. There is a new trace   layering left pleural effusion with moderate left basilar atelectasis.    LIVER: Within normal limits.  BILE DUCTS: Normal caliber.  GALLBLADDER: Within normal limits.  SPLEEN: Within normal limits.  PANCREAS: Within normal limits.  ADRENALS: Within normal limits.  KIDNEYS/URETERS: Again is noted a small right renal cyst. Otherwise,   within normal limits.    BLADDER: Small nondependent gas in the minimally distended bladder.  REPRODUCTIVE ORGANS: The uterus has been removed. The adnexa are   unremarkable by CT criteria.    BOWEL: Status post resection of the terminal ileum and cecum with   ileocolic anastomosis. The colon is collapsed. There is a single bubble   of extraluminal gas adjacent to the mid transverse colon on image 64 and   a single bubble on image 72. There is mild tracking fluid and enhancement   throughout the mesenteric leaves and peritoneal reflections, extending to   a 4.5 cm collection in the pelvis. There is no evidence of bowel   obstruction. There is mild concentric wall thickening of the mid and   distal small bowel, likely reactive. The mesenteric vessels opacify   unremarkably.  PERITONEUM: No ascites.  VESSELS: Within normal limits.  RETROPERITONEUM/LYMPH NODES: No lymphadenopathy.  ABDOMINAL WALL: New ventral abdominal and pelvic incision with skin   staples and a subcutaneous drain. Mild fluid infiltration of the   subcutaneous fat of the abdomen and pelvis, suggestive of third spacing.  BONES: Chronic anterior wedging deformity of L1. Bilateral L5   spondylolysis with moderate anterolisthesis on S1.    IMPRESSION: Status post evacuation of previously identified fluid and   mottled gas in the right hemiabdomen. Residual tracking fluid and   enhancement, as described, without evidence of discrete drainable   collection. Subcutaneous drain without evidence of drainable collection.   Complete right lower lobe atelectatic collapse.    --- End of Report ---          < end of copied text >

## 2022-06-17 LAB
ANION GAP SERPL CALC-SCNC: 11 MMOL/L — SIGNIFICANT CHANGE UP (ref 5–17)
BUN SERPL-MCNC: 6 MG/DL — LOW (ref 7–23)
CALCIUM SERPL-MCNC: 8.2 MG/DL — LOW (ref 8.4–10.5)
CHLORIDE SERPL-SCNC: 101 MMOL/L — SIGNIFICANT CHANGE UP (ref 96–108)
CO2 SERPL-SCNC: 19 MMOL/L — LOW (ref 22–31)
CREAT SERPL-MCNC: 0.39 MG/DL — LOW (ref 0.5–1.3)
EGFR: 108 ML/MIN/1.73M2 — SIGNIFICANT CHANGE UP
GLUCOSE SERPL-MCNC: 103 MG/DL — HIGH (ref 70–99)
HCT VFR BLD CALC: 26.9 % — LOW (ref 34.5–45)
HGB BLD-MCNC: 8.5 G/DL — LOW (ref 11.5–15.5)
MAGNESIUM SERPL-MCNC: 2 MG/DL — SIGNIFICANT CHANGE UP (ref 1.6–2.6)
MCHC RBC-ENTMCNC: 28.7 PG — SIGNIFICANT CHANGE UP (ref 27–34)
MCHC RBC-ENTMCNC: 31.6 GM/DL — LOW (ref 32–36)
MCV RBC AUTO: 90.9 FL — SIGNIFICANT CHANGE UP (ref 80–100)
NRBC # BLD: 0 /100 WBCS — SIGNIFICANT CHANGE UP (ref 0–0)
PHOSPHATE SERPL-MCNC: 3.2 MG/DL — SIGNIFICANT CHANGE UP (ref 2.5–4.5)
PLATELET # BLD AUTO: 767 K/UL — HIGH (ref 150–400)
POTASSIUM SERPL-MCNC: 3.7 MMOL/L — SIGNIFICANT CHANGE UP (ref 3.5–5.3)
POTASSIUM SERPL-SCNC: 3.7 MMOL/L — SIGNIFICANT CHANGE UP (ref 3.5–5.3)
RBC # BLD: 2.96 M/UL — LOW (ref 3.8–5.2)
RBC # FLD: 14.6 % — HIGH (ref 10.3–14.5)
SODIUM SERPL-SCNC: 131 MMOL/L — LOW (ref 135–145)
WBC # BLD: 12.27 K/UL — HIGH (ref 3.8–10.5)
WBC # FLD AUTO: 12.27 K/UL — HIGH (ref 3.8–10.5)

## 2022-06-17 PROCEDURE — 99232 SBSQ HOSP IP/OBS MODERATE 35: CPT

## 2022-06-17 RX ORDER — PSYLLIUM SEED (WITH DEXTROSE)
1 POWDER (GRAM) ORAL ONCE
Refills: 0 | Status: DISCONTINUED | OUTPATIENT
Start: 2022-06-17 | End: 2022-06-20

## 2022-06-17 RX ORDER — POTASSIUM CHLORIDE 20 MEQ
20 PACKET (EA) ORAL
Refills: 0 | Status: COMPLETED | OUTPATIENT
Start: 2022-06-17 | End: 2022-06-17

## 2022-06-17 RX ADMIN — Medication 975 MILLIGRAM(S): at 05:55

## 2022-06-17 RX ADMIN — Medication 975 MILLIGRAM(S): at 13:15

## 2022-06-17 RX ADMIN — CHLORHEXIDINE GLUCONATE 1 APPLICATION(S): 213 SOLUTION TOPICAL at 12:45

## 2022-06-17 RX ADMIN — CEFEPIME 100 MILLIGRAM(S): 1 INJECTION, POWDER, FOR SOLUTION INTRAMUSCULAR; INTRAVENOUS at 15:22

## 2022-06-17 RX ADMIN — HYDROMORPHONE HYDROCHLORIDE 0.5 MILLIGRAM(S): 2 INJECTION INTRAMUSCULAR; INTRAVENOUS; SUBCUTANEOUS at 21:43

## 2022-06-17 RX ADMIN — PANTOPRAZOLE SODIUM 40 MILLIGRAM(S): 20 TABLET, DELAYED RELEASE ORAL at 12:44

## 2022-06-17 RX ADMIN — CEFEPIME 100 MILLIGRAM(S): 1 INJECTION, POWDER, FOR SOLUTION INTRAMUSCULAR; INTRAVENOUS at 05:18

## 2022-06-17 RX ADMIN — Medication 50 MICROGRAM(S): at 05:25

## 2022-06-17 RX ADMIN — Medication 975 MILLIGRAM(S): at 12:45

## 2022-06-17 RX ADMIN — CEFEPIME 100 MILLIGRAM(S): 1 INJECTION, POWDER, FOR SOLUTION INTRAMUSCULAR; INTRAVENOUS at 21:13

## 2022-06-17 RX ADMIN — ENOXAPARIN SODIUM 40 MILLIGRAM(S): 100 INJECTION SUBCUTANEOUS at 21:13

## 2022-06-17 RX ADMIN — Medication 975 MILLIGRAM(S): at 00:13

## 2022-06-17 RX ADMIN — Medication 20 MILLIEQUIVALENT(S): at 12:44

## 2022-06-17 RX ADMIN — HYDROMORPHONE HYDROCHLORIDE 0.5 MILLIGRAM(S): 2 INJECTION INTRAMUSCULAR; INTRAVENOUS; SUBCUTANEOUS at 21:13

## 2022-06-17 RX ADMIN — Medication 100 MILLIGRAM(S): at 17:29

## 2022-06-17 RX ADMIN — Medication 975 MILLIGRAM(S): at 05:25

## 2022-06-17 RX ADMIN — Medication 100 MILLIGRAM(S): at 05:18

## 2022-06-17 RX ADMIN — FLUCONAZOLE 100 MILLIGRAM(S): 150 TABLET ORAL at 08:28

## 2022-06-17 NOTE — PROGRESS NOTE ADULT - SUBJECTIVE AND OBJECTIVE BOX
Follow-up Pulm Progress Note    No new respiratory events overnight.  Denies SOB/CP.   O2 sats 98% on RA    Medications:  MEDICATIONS  (STANDING):  acetaminophen     Tablet .. 975 milliGRAM(s) Oral every 6 hours  cefepime   IVPB 1000 milliGRAM(s) IV Intermittent every 8 hours  chlorhexidine 2% Cloths 1 Application(s) Topical <User Schedule>  enoxaparin Injectable 40 milliGRAM(s) SubCutaneous every 24 hours  fluconAZOLE IVPB 400 milliGRAM(s) IV Intermittent every 24 hours  levothyroxine 50 MICROGram(s) Oral daily  melatonin 10 milliGRAM(s) Oral at bedtime  metroNIDAZOLE  IVPB 500 milliGRAM(s) IV Intermittent every 12 hours  ondansetron Injectable 4 milliGRAM(s) IV Push once  pantoprazole    Tablet 40 milliGRAM(s) Oral daily  potassium chloride    Tablet ER 20 milliEquivalent(s) Oral every 2 hours    MEDICATIONS  (PRN):  HYDROmorphone  Injectable 0.5 milliGRAM(s) IV Push every 4 hours PRN breakthrough pain  oxyCODONE    IR 5 milliGRAM(s) Oral every 4 hours PRN Moderate Pain (4 - 6)  oxyCODONE    IR 10 milliGRAM(s) Oral every 4 hours PRN Severe Pain (7 - 10)    Vital Signs Last 24 Hrs  T(C): 36.7 (17 Jun 2022 10:35), Max: 37.6 (16 Jun 2022 20:32)  T(F): 98.1 (17 Jun 2022 10:35), Max: 99.6 (16 Jun 2022 20:32)  HR: 77 (17 Jun 2022 10:35) (74 - 91)  BP: 121/73 (17 Jun 2022 10:35) (114/73 - 150/72)  BP(mean): --  RR: 18 (17 Jun 2022 10:35) (18 - 18)  SpO2: 99% (17 Jun 2022 10:35) (95% - 99%)      06-16 @ 07:01  -  06-17 @ 07:00  --------------------------------------------------------  IN: 550 mL / OUT: 50 mL / NET: 500 mL      LABS:                        8.5    12.27 )-----------( 767      ( 17 Jun 2022 11:23 )             26.9     06-17    131<L>  |  101  |  6<L>  ----------------------------<  103<H>  3.7   |  19<L>  |  0.39<L>    Ca    8.2<L>      17 Jun 2022 08:44  Phos  3.2     06-17  Mg     2.0     06-17    CULTURES:    Culture - Blood (collected 06-08-22 @ 05:56)  Source: .Blood Blood-Peripheral  Final Report (06-13-22 @ 10:00):    No Growth Final    Physical Examination:  PULM: Decreased BS R  CVS: RRR    RADIOLOGY REVIEWED    CT chest: < from: CT Chest No Cont (06.13.22 @ 14:22) >    FINDINGS:    LUNGS AND AIRWAYS: Passive atelectasis of the right lower lobe and left   basilar atelectasis.    PLEURA: Bilateral pleural effusions, right greater than left.    MEDIASTINUM AND AFUA: No lymphadenopathy.    VESSELS: Within normal limits.    HEART: Heart size is normal. No pericardial effusion.    CHEST WALL AND LOWER NECK: Within normal limits.    VISUALIZED UPPER ABDOMEN: Postsurgical changes of the imaged upper   abdomen, better evaluated on CT abdomen pelvis dated 6/12/2022.    BONES: Degenerative changes. Mild compression of a lumbar vertebral body.    IMPRESSION:    Bilateral pleural effusions, right greater than left. Passive atelectasis   of the right lower lobe and minimal left basilar atelctasis.        --- End of Report ---      < end of copied text >

## 2022-06-17 NOTE — PROGRESS NOTE ADULT - SUBJECTIVE AND OBJECTIVE BOX
DATE OF SERVICE: 06-17-22 @ 17:24    Patient is a 67y old  Female who presents with a chief complaint of Abdominal Sepsis (17 Jun 2022 12:26)      INTERVAL HISTORY: Feels ok.     REVIEW OF SYSTEMS:  CONSTITUTIONAL: No weakness  EYES/ENT: No visual changes;  No throat pain   NECK: No pain or stiffness  RESPIRATORY: No cough, wheezing; No shortness of breath  CARDIOVASCULAR: No chest pain or palpitations  GASTROINTESTINAL: No abdominal  pain. No nausea, vomiting, or hematemesis  GENITOURINARY: No dysuria, frequency or hematuria  NEUROLOGICAL: No stroke like symptoms  SKIN: No rashes    	  MEDICATIONS:        PHYSICAL EXAM:  T(C): 37.1 (06-17-22 @ 16:43), Max: 37.6 (06-16-22 @ 20:32)  HR: 87 (06-17-22 @ 16:43) (74 - 91)  BP: 129/67 (06-17-22 @ 16:43) (114/73 - 142/80)  RR: 18 (06-17-22 @ 16:43) (18 - 18)  SpO2: 96% (06-17-22 @ 16:43) (96% - 99%)  Wt(kg): --  I&O's Summary    16 Jun 2022 07:01  -  17 Jun 2022 07:00  --------------------------------------------------------  IN: 550 mL / OUT: 50 mL / NET: 500 mL    17 Jun 2022 07:01  -  17 Jun 2022 17:24  --------------------------------------------------------  IN: 575 mL / OUT: 50 mL / NET: 525 mL          Appearance: In no distress	  HEENT:    PERRL, EOMI	  Cardiovascular:  S1 S2, No JVD  Respiratory: Lungs clear to auscultation	  Gastrointestinal:  Soft, Non-tender, + BS	  Vascularature:  No edema of LE  Psychiatric: Appropriate affect   Neuro: no acute focal deficits                               8.5    12.27 )-----------( 767      ( 17 Jun 2022 11:23 )             26.9     06-17    131<L>  |  101  |  6<L>  ----------------------------<  103<H>  3.7   |  19<L>  |  0.39<L>    Ca    8.2<L>      17 Jun 2022 08:44  Phos  3.2     06-17  Mg     2.0     06-17          Labs personally reviewed      ASSESSMENT/PLAN: 	    The patient is a 67y Female who is now s/p exploratory laparotomy with ileocecal resection and abdominal washout on 6/6   s/p removal of abthera vac, abd washout, abd wall reconstruction with myocutaneous flaps and bridging with ovitex mesh 6/8.    1. Esperanza op risk stratification - tolerated surgery well, s/p closing of abd and washout  6/8  - HD stable   - SR on the monitor   - Pain management   - Tolerated surgery well  - BP stable, sat well on RA     2. HLD   - on Repatha, resume as OP    3. Mod MR - euvolemic   - OP follow up    4, Leukocytosis - post op  - surgical team following   - CT abd/pelvis done as noted above showing Complete right lower lobe atelectatic collapse.  - CXR show b/l pleural effusion   - Pulmn consult - recommend Repeat Chest CT which revealed b/l pleural effusions   - ID consult and recs appreciated   - WBC downtrending     5. Hypokalemia   - monitor lytes and replete PRN         Viki Mar, NIKKY-NP   Randy Santamaria DO [facility]  Cardiovascular Medicine  [address] [place] [address], [address]  Office: [phone]  Cell: [phone]

## 2022-06-17 NOTE — PROGRESS NOTE ADULT - NS ATTEND OPT1 GEN_ALL_CORE
I attest my time as attending is greater than 50% of the total combined time spent on qualifying patient care activities by the PA/NP and attending.
I independently performed the documented:

## 2022-06-17 NOTE — PROGRESS NOTE ADULT - SUBJECTIVE AND OBJECTIVE BOX
infectious diseases progress note:    Patient is a 67y old  Female who presents with a chief complaint of Abdominal Sepsis (17 Jun 2022 01:02)        Peritonitis          ROS:  CONSTITUTIONAL:  Negative fever or chills, feels well, good appetite  EYES:  Negative  blurry vision or double vision  CARDIOVASCULAR:  Negative for chest pain or palpitations  RESPIRATORY:  Negative for cough, wheezing, or SOB   GASTROINTESTINAL:  Negative for nausea, vomiting, diarrhea, constipation, or abdominal pain  GENITOURINARY:  Negative frequency, urgency or dysuria  NEUROLOGIC:  No headache, confusion, dizziness, lightheadedness    Allergies    latex (Short breath)  No Known Drug Allergies    Intolerances        ANTIBIOTICS/RELEVANT:  antimicrobials  cefepime   IVPB 1000 milliGRAM(s) IV Intermittent every 8 hours  fluconAZOLE IVPB 400 milliGRAM(s) IV Intermittent every 24 hours  metroNIDAZOLE  IVPB 500 milliGRAM(s) IV Intermittent every 12 hours    immunologic:    OTHER:  acetaminophen     Tablet .. 975 milliGRAM(s) Oral every 6 hours  chlorhexidine 2% Cloths 1 Application(s) Topical <User Schedule>  enoxaparin Injectable 40 milliGRAM(s) SubCutaneous every 24 hours  HYDROmorphone  Injectable 0.5 milliGRAM(s) IV Push every 4 hours PRN  levothyroxine 50 MICROGram(s) Oral daily  melatonin 10 milliGRAM(s) Oral at bedtime  ondansetron Injectable 4 milliGRAM(s) IV Push once  oxyCODONE    IR 5 milliGRAM(s) Oral every 4 hours PRN  oxyCODONE    IR 10 milliGRAM(s) Oral every 4 hours PRN  pantoprazole    Tablet 40 milliGRAM(s) Oral daily      Objective:  Vital Signs Last 24 Hrs  T(C): 36.6 (17 Jun 2022 06:26), Max: 37.7 (16 Jun 2022 09:45)  T(F): 97.8 (17 Jun 2022 06:26), Max: 99.8 (16 Jun 2022 09:45)  HR: 74 (17 Jun 2022 06:26) (74 - 92)  BP: 114/73 (17 Jun 2022 06:26) (114/73 - 155/77)  BP(mean): --  RR: 18 (17 Jun 2022 06:26) (18 - 18)  SpO2: 99% (17 Jun 2022 06:26) (95% - 99%)     Eyes:ROLAND, EOMI  Ear/Nose/Throat: no oral lesion, no sinus tenderness on percussion	  Neck:no JVD, no lymphadenopathy, supple  Respiratory: CTA araceli  Cardiovascular: S1S2 RRR, no murmurs  Gastrointestinal:soft, (+) BS, no HSM  Extremities:no e/e/c        LABS:                        9.0    17.61 )-----------( 802      ( 16 Jun 2022 10:14 )             27.8     06-16    135  |  102  |  4<L>  ----------------------------<  109<H>  3.3<L>   |  23  |  0.41<L>    Ca    8.5      16 Jun 2022 10:14  Phos  2.8     06-16  Mg     2.0     06-16              MICROBIOLOGY:    RECENT CULTURES:        RESPIRATORY CULTURES:      Clostridium difficile GDH Toxins A&amp;B, EIA:   Negative (06-12 @ 16:13)          RADIOLOGY & ADDITIONAL STUDIES:        Pager [phone]  After 5 pm/weekends or if no response :[phone]

## 2022-06-17 NOTE — PROGRESS NOTE ADULT - ASSESSMENT
68 y/o female w/ a PMHx of moderate mitral regurgitation, hypothyroidism, GERD, osteoporosis, vitamin D deficiency, breast CA s/p right lumpectomy w/ chemo & radiation, bilateral breast reduction (2013), s/p hysterectomy (2005), s/p right elbow surgery (1970s), & s/p laparoscopic appendectomy (6/3/2022) who presented on 6/6 w/ pneumoperitoneum & a loculated fluid collection in the RLQ s/p exploratory laparotomy, washout, ileocolic anastomosis w/ primary anastomosis for two perforations in the terminal ileum, & temporary abdominal closure w/ Abthera VAC 6/6. s/p removal of abthera vac, abd washout, abd wall reconstruction with myocutaneous flaps and bridging with ovitex mesh 6/8. CT A/P 6/12 showed 4 cm pelvic collections not amenable to drainage, right pleural effusion with collapsed right lower lobe. C. diff negative 6/12    Plan:  - appreciate ID recs  - Appreciate pulm recs  - IV Abx: continue cefepime, flagyl, diflucan   - F/u WBC today  - Pain control PRN  - Diet: LRD   - Activity: ambulate as tolerated   - DVT ppx: LVX     Green Team Surgery  #[id number]

## 2022-06-17 NOTE — PROGRESS NOTE ADULT - SUBJECTIVE AND OBJECTIVE BOX
Surgery Progress Note    Subjective:     Patient seen and examined at bedside. VSS, AF. Home medications transitioned to PO yesterday.     OBJECTIVE:     T(C): 36.9 (06-17-22 @ 00:30), Max: 37.7 (06-16-22 @ 09:45)  HR: 901 (06-17-22 @ 00:30) (78 - 901)  BP: 132/75 (06-17-22 @ 00:30) (132/74 - 155/77)  RR: 18 (06-17-22 @ 00:30) (18 - 18)  SpO2: 97% (06-17-22 @ 00:30) (95% - 98%)  Wt(kg): --    I&O's Detail    15 Domiinck 2022 07:01  -  16 Jun 2022 07:00  --------------------------------------------------------  IN:    IV PiggyBack: 100 mL    IV PiggyBack: 100 mL    Oral Fluid: 250 mL  Total IN: 450 mL    OUT:    Bulb (mL): 110 mL  Total OUT: 110 mL    Total NET: 340 mL      16 Jun 2022 07:01  -  17 Jun 2022 01:02  --------------------------------------------------------  IN:    IV PiggyBack: 50 mL    Oral Fluid: 350 mL  Total IN: 400 mL    OUT:    Bulb (mL): 35 mL  Total OUT: 35 mL    Total NET: 365 mL          PHYSICAL EXAM:    General: NAD, resting comfortably in bed  Respiratory: Nonlabored respirations, normal CW expansion.  Cardio: regular rate and rhythm.  Abdomen: softly distended, appropriately tender, incision c/d/i. LILLIAM drain x1 ss    Vascular: extremities are warm and well perfused.     MEDICATIONS  (STANDING):  acetaminophen     Tablet .. 975 milliGRAM(s) Oral every 6 hours  cefepime   IVPB 1000 milliGRAM(s) IV Intermittent every 8 hours  chlorhexidine 2% Cloths 1 Application(s) Topical <User Schedule>  enoxaparin Injectable 40 milliGRAM(s) SubCutaneous every 24 hours  fluconAZOLE IVPB 400 milliGRAM(s) IV Intermittent every 24 hours  levothyroxine 50 MICROGram(s) Oral daily  melatonin 10 milliGRAM(s) Oral at bedtime  metroNIDAZOLE  IVPB 500 milliGRAM(s) IV Intermittent every 12 hours  ondansetron Injectable 4 milliGRAM(s) IV Push once  pantoprazole    Tablet 40 milliGRAM(s) Oral daily    MEDICATIONS  (PRN):  HYDROmorphone  Injectable 0.5 milliGRAM(s) IV Push every 4 hours PRN breakthrough pain  oxyCODONE    IR 5 milliGRAM(s) Oral every 4 hours PRN Moderate Pain (4 - 6)  oxyCODONE    IR 10 milliGRAM(s) Oral every 4 hours PRN Severe Pain (7 - 10)      LABS:                          9.0    17.61 )-----------( 802      ( 16 Jun 2022 10:14 )             27.8     06-16    135  |  102  |  4<L>  ----------------------------<  109<H>  3.3<L>   |  23  |  0.41<L>    Ca    8.5      16 Jun 2022 10:14  Phos  2.8     06-16  Mg     2.0     06-16                 Surgery Progress Note    Subjective:     Patient seen and examined at bedside. VSS, AF. Home medications transitioned to PO yesterday. +/+ bowl function    OBJECTIVE:     T(C): 36.9 (06-17-22 @ 00:30), Max: 37.7 (06-16-22 @ 09:45)  HR: 901 (06-17-22 @ 00:30) (78 - 901)  BP: 132/75 (06-17-22 @ 00:30) (132/74 - 155/77)  RR: 18 (06-17-22 @ 00:30) (18 - 18)  SpO2: 97% (06-17-22 @ 00:30) (95% - 98%)  Wt(kg): --    I&O's Detail    15 Dominick 2022 07:01  -  16 Jun 2022 07:00  --------------------------------------------------------  IN:    IV PiggyBack: 100 mL    IV PiggyBack: 100 mL    Oral Fluid: 250 mL  Total IN: 450 mL    OUT:    Bulb (mL): 110 mL  Total OUT: 110 mL    Total NET: 340 mL      16 Jun 2022 07:01  -  17 Jun 2022 01:02  --------------------------------------------------------  IN:    IV PiggyBack: 50 mL    Oral Fluid: 350 mL  Total IN: 400 mL    OUT:    Bulb (mL): 35 mL  Total OUT: 35 mL    Total NET: 365 mL          PHYSICAL EXAM:    General: NAD, resting comfortably in bed  Respiratory: Nonlabored respirations, normal CW expansion.  Cardio: regular rate and rhythm.  Abdomen: softly distended, appropriately tender, incision c/d/i. LILLIAM drain x1 ss    Vascular: extremities are warm and well perfused.     MEDICATIONS  (STANDING):  acetaminophen     Tablet .. 975 milliGRAM(s) Oral every 6 hours  cefepime   IVPB 1000 milliGRAM(s) IV Intermittent every 8 hours  chlorhexidine 2% Cloths 1 Application(s) Topical <User Schedule>  enoxaparin Injectable 40 milliGRAM(s) SubCutaneous every 24 hours  fluconAZOLE IVPB 400 milliGRAM(s) IV Intermittent every 24 hours  levothyroxine 50 MICROGram(s) Oral daily  melatonin 10 milliGRAM(s) Oral at bedtime  metroNIDAZOLE  IVPB 500 milliGRAM(s) IV Intermittent every 12 hours  ondansetron Injectable 4 milliGRAM(s) IV Push once  pantoprazole    Tablet 40 milliGRAM(s) Oral daily    MEDICATIONS  (PRN):  HYDROmorphone  Injectable 0.5 milliGRAM(s) IV Push every 4 hours PRN breakthrough pain  oxyCODONE    IR 5 milliGRAM(s) Oral every 4 hours PRN Moderate Pain (4 - 6)  oxyCODONE    IR 10 milliGRAM(s) Oral every 4 hours PRN Severe Pain (7 - 10)      LABS:                          9.0    17.61 )-----------( 802      ( 16 Jun 2022 10:14 )             27.8     06-16    135  |  102  |  4<L>  ----------------------------<  109<H>  3.3<L>   |  23  |  0.41<L>    Ca    8.5      16 Jun 2022 10:14  Phos  2.8     06-16  Mg     2.0     06-16

## 2022-06-17 NOTE — PROGRESS NOTE ADULT - ASSESSMENT
66 y/o F PMHx R breast CA (s/p lumpectomy and chemo/radiation last received 12/2021), hypothyroidism, and s/p recent elective laparoscopic appendectomy on 6/3, presented back to the ED with abdominal pain on 6/6. Found to have feculent peritonitis, taken to OR on 6/6 for ex-lap, found to have 2 perforations in terminal ileum s/p ileocecal resection with primary anastomosis and washout. S/p RTOR on 6/8 for repeat washout and abdomen closure. Initial blood cultures grew bacteroides. OR cultures growing enterobacter cloacae, candida albicans, lactobacillus. Repeat CT scan with no drainable collection. Still with worsening leukocytosis, poor PO intake, and distended abdomen. ID now consulted for antibiotic management.    Blood Cultures (6/6):  bacteroides ovatus in both anaerobic bottles  Blood Cultures (6/6): no growth   OR Cultures (6/6):  enterobacter cloacae, candida albicans, bacteroides spp, lactobacillus    CT Abd/Pelvis (6/12): residual tracking fluid without evidence of discrete drainable collection    Antibiotics/Antifungals:  Zosyn: 6/6 -> 6/15  Fluconazole 6/8 ->      Impression:  #Peritonitis - polymicrobial infection including enterobacter, anaerobes, and candida albicans. Will target all organisms on culture given tenuous clinical status and persistent leukocytosis.   #Leukocytosis - due to abdominal source. Interval CT post-washout with some residual fluid, will need to monitor for worsening        cefepime 1G IV q8H to better target enterobacter cloacae day3   Flagyl 500mg IV q12H for anaerobic coverage day 3  - c/w fluconazole 400mg q24H   wbc down somewhat and exam stable  eating without pain    she seems better  no change in therapy  repeat CT for any change

## 2022-06-17 NOTE — PROGRESS NOTE ADULT - NS ATTEND AMEND GEN_ALL_CORE FT
Pt care and plan discussed and reviewed with NP. Plan as outlined above edited by me to reflect our discussion.
Pt care and plan discussed and reviewed with NP. Plan as outlined above edited by me to reflect our discussion. Thirty five minutes spent on encounter, of which more than fifty percent of the encounter was spent on counseling and/or coordinating care by the attending physician.
Pt care and plan discussed and reviewed with NP. Plan as outlined above edited by me to reflect our discussion.
Pt care and plan discussed and reviewed with NP. Plan as outlined above edited by me to reflect our discussion.
pt on ra sat 95% breathing well in bed  abx as per id  keep sat>90% w o2 if needed  if desaturates would then reimage

## 2022-06-18 ENCOUNTER — TRANSCRIPTION ENCOUNTER (OUTPATIENT)
Age: 67
End: 2022-06-18

## 2022-06-18 LAB
ANION GAP SERPL CALC-SCNC: 11 MMOL/L — SIGNIFICANT CHANGE UP (ref 5–17)
BUN SERPL-MCNC: 6 MG/DL — LOW (ref 7–23)
C DIFF GDH STL QL: NEGATIVE — SIGNIFICANT CHANGE UP
C DIFF GDH STL QL: SIGNIFICANT CHANGE UP
CALCIUM SERPL-MCNC: 8.4 MG/DL — SIGNIFICANT CHANGE UP (ref 8.4–10.5)
CHLORIDE SERPL-SCNC: 104 MMOL/L — SIGNIFICANT CHANGE UP (ref 96–108)
CO2 SERPL-SCNC: 22 MMOL/L — SIGNIFICANT CHANGE UP (ref 22–31)
CREAT SERPL-MCNC: 0.38 MG/DL — LOW (ref 0.5–1.3)
EGFR: 108 ML/MIN/1.73M2 — SIGNIFICANT CHANGE UP
GLUCOSE SERPL-MCNC: 116 MG/DL — HIGH (ref 70–99)
HCT VFR BLD CALC: 25.5 % — LOW (ref 34.5–45)
HGB BLD-MCNC: 8 G/DL — LOW (ref 11.5–15.5)
MAGNESIUM SERPL-MCNC: 2 MG/DL — SIGNIFICANT CHANGE UP (ref 1.6–2.6)
MCHC RBC-ENTMCNC: 28.7 PG — SIGNIFICANT CHANGE UP (ref 27–34)
MCHC RBC-ENTMCNC: 31.4 GM/DL — LOW (ref 32–36)
MCV RBC AUTO: 91.4 FL — SIGNIFICANT CHANGE UP (ref 80–100)
NRBC # BLD: 0 /100 WBCS — SIGNIFICANT CHANGE UP (ref 0–0)
PHOSPHATE SERPL-MCNC: 2.8 MG/DL — SIGNIFICANT CHANGE UP (ref 2.5–4.5)
PLATELET # BLD AUTO: 886 K/UL — HIGH (ref 150–400)
POTASSIUM SERPL-MCNC: 3.3 MMOL/L — LOW (ref 3.5–5.3)
POTASSIUM SERPL-SCNC: 3.3 MMOL/L — LOW (ref 3.5–5.3)
RBC # BLD: 2.79 M/UL — LOW (ref 3.8–5.2)
RBC # FLD: 14.6 % — HIGH (ref 10.3–14.5)
SODIUM SERPL-SCNC: 137 MMOL/L — SIGNIFICANT CHANGE UP (ref 135–145)
WBC # BLD: 11.74 K/UL — HIGH (ref 3.8–10.5)
WBC # FLD AUTO: 11.74 K/UL — HIGH (ref 3.8–10.5)

## 2022-06-18 PROCEDURE — 74018 RADEX ABDOMEN 1 VIEW: CPT | Mod: 26

## 2022-06-18 PROCEDURE — 99232 SBSQ HOSP IP/OBS MODERATE 35: CPT

## 2022-06-18 RX ORDER — CEFEPIME 1 G/1
1000 INJECTION, POWDER, FOR SOLUTION INTRAMUSCULAR; INTRAVENOUS EVERY 8 HOURS
Refills: 0 | Status: DISCONTINUED | OUTPATIENT
Start: 2022-06-18 | End: 2022-06-20

## 2022-06-18 RX ORDER — POTASSIUM CHLORIDE 20 MEQ
40 PACKET (EA) ORAL
Refills: 0 | Status: COMPLETED | OUTPATIENT
Start: 2022-06-18 | End: 2022-06-18

## 2022-06-18 RX ORDER — METRONIDAZOLE 500 MG
500 TABLET ORAL EVERY 12 HOURS
Refills: 0 | Status: DISCONTINUED | OUTPATIENT
Start: 2022-06-18 | End: 2022-06-20

## 2022-06-18 RX ORDER — LOPERAMIDE HCL 2 MG
2 TABLET ORAL ONCE
Refills: 0 | Status: COMPLETED | OUTPATIENT
Start: 2022-06-18 | End: 2022-06-18

## 2022-06-18 RX ORDER — ONDANSETRON 8 MG/1
4 TABLET, FILM COATED ORAL ONCE
Refills: 0 | Status: COMPLETED | OUTPATIENT
Start: 2022-06-18 | End: 2022-06-19

## 2022-06-18 RX ORDER — FLUCONAZOLE 150 MG/1
400 TABLET ORAL EVERY 24 HOURS
Refills: 0 | Status: DISCONTINUED | OUTPATIENT
Start: 2022-06-18 | End: 2022-06-20

## 2022-06-18 RX ORDER — METRONIDAZOLE 500 MG
500 TABLET ORAL EVERY 8 HOURS
Refills: 0 | Status: DISCONTINUED | OUTPATIENT
Start: 2022-06-18 | End: 2022-06-18

## 2022-06-18 RX ORDER — FLUCONAZOLE 150 MG/1
400 TABLET ORAL EVERY 24 HOURS
Refills: 0 | Status: DISCONTINUED | OUTPATIENT
Start: 2022-06-18 | End: 2022-06-18

## 2022-06-18 RX ORDER — CIPROFLOXACIN LACTATE 400MG/40ML
500 VIAL (ML) INTRAVENOUS EVERY 12 HOURS
Refills: 0 | Status: DISCONTINUED | OUTPATIENT
Start: 2022-06-18 | End: 2022-06-18

## 2022-06-18 RX ADMIN — ONDANSETRON 4 MILLIGRAM(S): 8 TABLET, FILM COATED ORAL at 20:31

## 2022-06-18 RX ADMIN — ENOXAPARIN SODIUM 40 MILLIGRAM(S): 100 INJECTION SUBCUTANEOUS at 20:34

## 2022-06-18 RX ADMIN — Medication 50 MICROGRAM(S): at 05:18

## 2022-06-18 RX ADMIN — Medication 2 MILLIGRAM(S): at 13:37

## 2022-06-18 RX ADMIN — FLUCONAZOLE 400 MILLIGRAM(S): 150 TABLET ORAL at 13:48

## 2022-06-18 RX ADMIN — CEFEPIME 100 MILLIGRAM(S): 1 INJECTION, POWDER, FOR SOLUTION INTRAMUSCULAR; INTRAVENOUS at 23:12

## 2022-06-18 RX ADMIN — Medication 100 MILLIGRAM(S): at 23:09

## 2022-06-18 RX ADMIN — PANTOPRAZOLE SODIUM 40 MILLIGRAM(S): 20 TABLET, DELAYED RELEASE ORAL at 13:39

## 2022-06-18 RX ADMIN — Medication 100 MILLIGRAM(S): at 05:58

## 2022-06-18 RX ADMIN — Medication 975 MILLIGRAM(S): at 00:37

## 2022-06-18 RX ADMIN — Medication 40 MILLIEQUIVALENT(S): at 13:40

## 2022-06-18 RX ADMIN — Medication 40 MILLIEQUIVALENT(S): at 19:56

## 2022-06-18 RX ADMIN — Medication 975 MILLIGRAM(S): at 01:00

## 2022-06-18 RX ADMIN — CEFEPIME 100 MILLIGRAM(S): 1 INJECTION, POWDER, FOR SOLUTION INTRAMUSCULAR; INTRAVENOUS at 05:18

## 2022-06-18 RX ADMIN — Medication 500 MILLIGRAM(S): at 13:39

## 2022-06-18 RX ADMIN — Medication 975 MILLIGRAM(S): at 13:38

## 2022-06-18 NOTE — DISCHARGE NOTE PROVIDER - NSDCFUADDINST_GEN_ALL_CORE_FT
Please take tylenol around the clock every 4-6 hours as needed for pain. Do not exceed 4,000mg in 24 hours.

## 2022-06-18 NOTE — DISCHARGE NOTE PROVIDER - NSDCFUSCHEDAPPT_GEN_ALL_CORE_FT
Homero William  [facility]  [facility]  Scheduled Appointment: 07/22/2022    [facility]  MRI  [address]  Scheduled Appointment: 07/25/2022    Luc Mcintyre  [facility]  CARDIOLOGY [address]   Scheduled Appointment: 08/10/2022

## 2022-06-18 NOTE — DISCHARGE NOTE PROVIDER - NSDCFUADDAPPT_GEN_ALL_CORE_FT
Please make an appointment and follow up with your Primary Care Physician in 1-2 weeks   Please make an appointment and follow up with your Primary Care Physician in 1-2 weeks

## 2022-06-18 NOTE — DISCHARGE NOTE PROVIDER - PROVIDER TOKENS
PROVIDER:[TOKEN:[[id number]],FOLLOWUP:[1 week]] PROVIDER:[TOKEN:[[id number]],FOLLOWUP:[1 week]],PROVIDER:[TOKEN:[[id number]],FOLLOWUP:[1 week]] PROVIDER:[TOKEN:[[id number]],FOLLOWUP:[1 week]],PROVIDER:[TOKEN:[[id number]],FOLLOWUP:[1 week]],PROVIDER:[TOKEN:[[id number]],FOLLOWUP:[1 month]]

## 2022-06-18 NOTE — PROGRESS NOTE ADULT - ATTENDING COMMENTS
Surgery Attending    Afebrile   WBC ~12,000    Tolerating diet w +GI function    Continue anti'bx per ID in anticipation of discharge on home anti'bx

## 2022-06-18 NOTE — PROGRESS NOTE ADULT - SUBJECTIVE AND OBJECTIVE BOX
GRETEL MARTIN 67y [id number]    Patient is a 67y old  Female who presents with a chief complaint of Abdominal Sepsis (18 Jun 2022 00:54)      Follow Up/CC:  ID following for bacteremia    Interval History/ROS: no fever, some pain over abdomen     Allergies    latex (Short breath)  No Known Drug Allergies    Intolerances        ANTIMICROBIALS:  cefepime   IVPB 1000 every 8 hours  fluconAZOLE IVPB 400 every 24 hours  metroNIDAZOLE  IVPB 500 every 12 hours      MEDICATIONS  (STANDING):  acetaminophen     Tablet .. 975 milliGRAM(s) Oral every 6 hours  cefepime   IVPB 1000 milliGRAM(s) IV Intermittent every 8 hours  chlorhexidine 2% Cloths 1 Application(s) Topical <User Schedule>  enoxaparin Injectable 40 milliGRAM(s) SubCutaneous every 24 hours  fluconAZOLE IVPB 400 milliGRAM(s) IV Intermittent every 24 hours  levothyroxine 50 MICROGram(s) Oral daily  melatonin 10 milliGRAM(s) Oral at bedtime  metroNIDAZOLE  IVPB 500 milliGRAM(s) IV Intermittent every 12 hours  ondansetron Injectable 4 milliGRAM(s) IV Push once  pantoprazole    Tablet 40 milliGRAM(s) Oral daily  psyllium Powder 1 Packet(s) Oral once    MEDICATIONS  (PRN):  HYDROmorphone  Injectable 0.5 milliGRAM(s) IV Push every 4 hours PRN breakthrough pain  oxyCODONE    IR 5 milliGRAM(s) Oral every 4 hours PRN Moderate Pain (4 - 6)  oxyCODONE    IR 10 milliGRAM(s) Oral every 4 hours PRN Severe Pain (7 - 10)        Vital Signs Last 24 Hrs  T(C): 36.7 (18 Jun 2022 06:26), Max: 37.1 (17 Jun 2022 15:21)  T(F): 98.1 (18 Jun 2022 06:26), Max: 98.8 (17 Jun 2022 16:43)  HR: 87 (18 Jun 2022 06:26) (77 - 90)  BP: 136/78 (18 Jun 2022 06:26) (121/73 - 142/80)  BP(mean): --  RR: 18 (18 Jun 2022 06:26) (18 - 18)  SpO2: 97% (18 Jun 2022 06:26) (96% - 99%)    CBC Full  -  ( 17 Jun 2022 11:23 )  WBC Count : 12.27 K/uL  RBC Count : 2.96 M/uL  Hemoglobin : 8.5 g/dL  Hematocrit : 26.9 %  Platelet Count - Automated : 767 K/uL  Mean Cell Volume : 90.9 fl  Mean Cell Hemoglobin : 28.7 pg  Mean Cell Hemoglobin Concentration : 31.6 gm/dL  Auto Neutrophil # : x  Auto Lymphocyte # : x  Auto Monocyte # : x  Auto Eosinophil # : x  Auto Basophil # : x  Auto Neutrophil % : x  Auto Lymphocyte % : x  Auto Monocyte % : x  Auto Eosinophil % : x  Auto Basophil % : x    06-17    131<L>  |  101  |  6<L>  ----------------------------<  103<H>  3.7   |  19<L>  |  0.39<L>    Ca    8.2<L>      17 Jun 2022 08:44  Phos  3.2     06-17  Mg     2.0     06-17            MICROBIOLOGY:  .Blood Blood-Peripheral  06-08-22   No Growth Final  --  --      .Body Fluid Peritoneal Fluid  06-06-22   Rare Candida albicans "Susceptibilities not performed"  Growth in fluid media only Enterobacter cloacae complex  Few Bacteroides vulgatus group "Susceptibilities not performed"  Rare Lactobacillus rhamnosus "Susceptibilities not performed"  --  Enterobacter cloacae complex      .Blood Blood-Peripheral  06-06-22   No Growth Final  --  --      Clean Catch Clean Catch (Midstream)  06-06-22   <10,000 CFU/mL Normal Urogenital Odalis  --  --      .Blood Blood-Peripheral  06-06-22   No Growth Final  --  --      .Blood Blood-Peripheral  06-06-22   Growth in anaerobic bottle: Bacteroides ovatus group  "Susceptibilities not performed"  --    Growth in anaerobic bottle: Gram Negative Rods      .Blood Blood-Peripheral  06-06-22   Growth in anaerobic bottle: Bacteroides ovatus group "Susceptibilities  not performed"  ***Blood Panel PCR results on this specimen are available  approximately 3 hours after the Gram stain result.***  Gram stain, PCR, and/or culture results may not always  correspond due to difference in methodologies.  ************************************************************  This PCR assay was performed by multiplex PCR. This  Assay tests for 66 bacterial and resistance gene targets.  Please refer to [facility] test directory  at [email or web address] for details.  --  Blood Culture PCR          Clostridium difficile GDH Toxins A&amp;B, EIA:   Negative (06-12-22 @ 16:13)  Clostridium difficile GDH Interpretation: Negative for toxigenic C. Difficile.  This specimen is negative for C.  Difficile glutamate dehydrogenase (GDH) antigen and negative for C.  Difficile Toxins A & B, by EIA.  GDH is a highly sensitive screening  marker for C. Difficile that is produced in large amounts by all C.  Difficile strains, both toxigenic and nontoxigenic.  This assay has not  been validated as a test of cure.  Repeat testing during the same episode  of diarrhea is of limited value and is discouraged.  The results of this  assay should always be interpreted in conjunction with pateint's clinical  history. (06-12-22 @ 16:13)      RADIOLOGY    < from: CT Chest No Cont (06.13.22 @ 14:22) >  Bilateral pleural effusions, right greater than left. Passive atelectasis   of the right lower lobe and minimal left basilar atelctasis.    < end of copied text >

## 2022-06-18 NOTE — PROGRESS NOTE ADULT - ASSESSMENT
68 y/o F PMHx R breast CA (s/p lumpectomy and chemo/radiation last received 12/2021), hypothyroidism, and s/p recent elective laparoscopic appendectomy on 6/3, presented back to the ED with abdominal pain on 6/6. Found to have feculent peritonitis, taken to OR on 6/6 for ex-lap, found to have 2 perforations in terminal ileum s/p ileocecal resection with primary anastomosis and washout. S/p RTOR on 6/8 for repeat washout and abdomen closure. Initial blood cultures grew bacteroides. OR cultures growing enterobacter cloacae, candida albicans, lactobacillus. Repeat CT scan with no drainable collection. Still with worsening leukocytosis, poor PO intake, and distended abdomen. ID now consulted for antibiotic management.    Blood Cultures (6/6):  bacteroides ovatus in both anaerobic bottles  Blood Cultures (6/6): no growth   OR Cultures (6/6):  enterobacter cloacae, candida albicans, bacteroides spp, lactobacillus    CT Abd/Pelvis (6/12): residual tracking fluid without evidence of discrete drainable collection    Antibiotics/Antifungals:  Zosyn: 6/6 -> 6/15  Fluconazole 6/8 ->      Impression:  #Peritonitis - polymicrobial infection including enterobacter, anaerobes, and candida albicans. Will target all organisms on culture given tenuous clinical status and persistent leukocytosis.   #Leukocytosis - due to abdominal source. Interval CT post-washout with some residual fluid, will need to monitor for worsening        cefepime 1G IV q8H to better target enterobacter cloacae day3   Flagyl 500mg IV q12H for anaerobic coverage day 3  - c/w fluconazole 400mg q24H   wbc down somewhat and exam stable  eating without pain    she seems better  no change in therapy  repeat CT for any change   monitor creatinine and LFTs    Missael Márquez  Attending Physician   Division of Infectious Disease  Office #[phone]  Available on Microsoft Teams also  After 5pm/weekend or no response, call #[phone]

## 2022-06-18 NOTE — PROGRESS NOTE ADULT - ASSESSMENT
68 y/o female w/ a PMHx of moderate mitral regurgitation, hypothyroidism, GERD, osteoporosis, vitamin D deficiency, breast CA s/p right lumpectomy w/ chemo & radiation, bilateral breast reduction (2013), s/p hysterectomy (2005), s/p right elbow surgery (1970s), & s/p laparoscopic appendectomy (6/3/2022) who presented on 6/6 w/ pneumoperitoneum & a loculated fluid collection in the RLQ s/p exploratory laparotomy, washout, ileocolic anastomosis w/ primary anastomosis for two perforations in the terminal ileum, & temporary abdominal closure w/ Abthera VAC 6/6. s/p removal of abthera vac, abd washout, abd wall reconstruction with myocutaneous flaps and bridging with ovitex mesh 6/8. CT A/P 6/12 showed 4 cm pelvic collections not amenable to drainage, right pleural effusion with collapsed right lower lobe. C. diff negative 6/12    Plan:  - appreciate ID recs  - Appreciate pulm recs  - IV Abx: continue cefepime, flagyl, diflucan   - F/u WBC today  - Pain control PRN  - Diet: LRD   - Activity: ambulate as tolerated   - DVT ppx: LVX     [facility] Surgery  #[id number]     68 y/o female w/ a PMHx of moderate mitral regurgitation, hypothyroidism, GERD, osteoporosis, vitamin D deficiency, breast CA s/p right lumpectomy w/ chemo & radiation, bilateral breast reduction (2013), s/p hysterectomy (2005), s/p right elbow surgery (1970s), & s/p laparoscopic appendectomy (6/3/2022) who presented on 6/6 w/ pneumoperitoneum & a loculated fluid collection in the RLQ s/p exploratory laparotomy, washout, ileocolic anastomosis w/ primary anastomosis for two perforations in the terminal ileum, & temporary abdominal closure w/ Abthera VAC 6/6. s/p removal of abthera vac, abd washout, abd wall reconstruction with myocutaneous flaps and bridging with ovitex mesh 6/8. CT A/P 6/12 showed 4 cm pelvic collections not amenable to drainage, right pleural effusion with collapsed right lower lobe. C. diff negative 6/12    Plan:  - appreciate ID recs, can transition to PO cipro 500 BID, flagyl 500 TID, diflucan 400 QD   - Appreciate pulm recs  - F/u WBC today  - Pain control PRN  - Diet: LRD   - Activity: ambulate as tolerated   - DVT ppx: LVX     [facility] Surgery  #[id number]

## 2022-06-18 NOTE — DISCHARGE NOTE PROVIDER - NSDCCPTREATMENT_GEN_ALL_CORE_FT
PRINCIPAL PROCEDURE  Procedure: Exploratory laparotomy  Findings and Treatment: WOUND CARE:  Please keep incisions clean and dry. Please do not Scrub or rub incisions. Do not use lotion or powder on incisions.   BATHING: You may shower and/or sponge bathe. You may use warm soapy water in the shower and rinse, pat dry.  ACTIVITY: No heavy lifting or straining. Otherwise, you may return to your usual level of physical activity. If you are taking narcotic pain medication DO NOT drive a car, operate machinery or make important decisions.  DIET: Low fiber diet   NOTIFY YOUR SURGEON IF YOU HAVE: any bleeding that does not stop, any pus draining from your wound(s), any fever (over 100.4 F) persistent nausea/vomiting, or if your pain is not controlled on your discharge pain medications, unable to urinate.  FOLLOW UP:  1. Please follow up with your primary care physician in one week regarding your hospitalization, bring copies of your discharge paperwork.  2. Please follow up with your surgeon, Dr. Smith in 1 week.      SECONDARY PROCEDURE  Procedure: Reconstruction, abdominal wall, using mesh  Findings and Treatment:     Procedure: Ileocecal resection  Findings and Treatment:      PRINCIPAL PROCEDURE  Procedure: Exploratory laparotomy  Findings and Treatment: WOUND CARE:  Please keep incisions clean and dry. Please do not Scrub or rub incisions. Do not use lotion or powder on incisions. Please keep staples in place. They will be removed in office outpatinet   BATHING: You may shower and/or sponge bathe.  Allow soapy water to run over incision site. Do not rub incision site. Pat dry when out of shower.  ACTIVITY: No heavy lifting or straining. Otherwise, you may return to your usual level of physical activity. If you are taking narcotic pain medication DO NOT drive a car, operate machinery or make important decisions.  DIET: Low fiber diet   NOTIFY YOUR SURGEON IF YOU HAVE: any bleeding that does not stop, any pus draining from your wound(s), any fever (over 100.4 F) persistent nausea/vomiting, or if your pain is not controlled on your discharge pain medications, unable to urinate.  FOLLOW UP:  1. Please follow up with your primary care physician in one week regarding your hospitalization, bring copies of your discharge paperwork.  2. Please follow up with your surgeon, Dr. Smith in 1 week.      SECONDARY PROCEDURE  Procedure: Ileocecal resection  Findings and Treatment:     Procedure: Reconstruction, abdominal wall, using mesh  Findings and Treatment:

## 2022-06-18 NOTE — PROGRESS NOTE ADULT - SUBJECTIVE AND OBJECTIVE BOX
Surgery Progress Note    Subjective:     Patient seen and examined at bedside. VSS, AF. Patient having some diarrhea overnight.      OBJECTIVE:     T(C): 36.9 (06-18-22 @ 00:33), Max: 37.1 (06-17-22 @ 15:21)  HR: 90 (06-18-22 @ 00:33) (74 - 90)  BP: 141/71 (06-18-22 @ 00:33) (114/73 - 142/80)  RR: 18 (06-18-22 @ 00:33) (18 - 18)  SpO2: 97% (06-18-22 @ 00:33) (96% - 99%)  Wt(kg): --    I&O's Detail    16 Jun 2022 07:01  -  17 Jun 2022 07:00  --------------------------------------------------------  IN:    IV PiggyBack: 100 mL    IV PiggyBack: 100 mL    Oral Fluid: 350 mL  Total IN: 550 mL    OUT:    Bulb (mL): 50 mL  Total OUT: 50 mL    Total NET: 500 mL      17 Jun 2022 07:01  -  18 Jun 2022 00:55  --------------------------------------------------------  IN:    IV PiggyBack: 50 mL    IV PiggyBack: 100 mL    IV PiggyBack: 200 mL    Oral Fluid: 575 mL  Total IN: 925 mL    OUT:    Bulb (mL): 50 mL  Total OUT: 50 mL    Total NET: 875 mL          PHYSICAL EXAM:    General: NAD, resting comfortably in bed  Respiratory: Nonlabored respirations, normal CW expansion.  Cardio: regular rate and rhythm.  Abdomen: softly distended, appropriately tender, incision c/d/i. LILLIAM drain x1 ss    Vascular: extremities are warm and well perfused.       MEDICATIONS  (STANDING):  acetaminophen     Tablet .. 975 milliGRAM(s) Oral every 6 hours  cefepime   IVPB 1000 milliGRAM(s) IV Intermittent every 8 hours  chlorhexidine 2% Cloths 1 Application(s) Topical <User Schedule>  enoxaparin Injectable 40 milliGRAM(s) SubCutaneous every 24 hours  fluconAZOLE IVPB 400 milliGRAM(s) IV Intermittent every 24 hours  levothyroxine 50 MICROGram(s) Oral daily  melatonin 10 milliGRAM(s) Oral at bedtime  metroNIDAZOLE  IVPB 500 milliGRAM(s) IV Intermittent every 12 hours  ondansetron Injectable 4 milliGRAM(s) IV Push once  pantoprazole    Tablet 40 milliGRAM(s) Oral daily  psyllium Powder 1 Packet(s) Oral once    MEDICATIONS  (PRN):  HYDROmorphone  Injectable 0.5 milliGRAM(s) IV Push every 4 hours PRN breakthrough pain  oxyCODONE    IR 5 milliGRAM(s) Oral every 4 hours PRN Moderate Pain (4 - 6)  oxyCODONE    IR 10 milliGRAM(s) Oral every 4 hours PRN Severe Pain (7 - 10)      LABS:                          8.5    12.27 )-----------( 767      ( 17 Jun 2022 11:23 )             26.9     06-17    131<L>  |  101  |  6<L>  ----------------------------<  103<H>  3.7   |  19<L>  |  0.39<L>    Ca    8.2<L>      17 Jun 2022 08:44  Phos  3.2     06-17  Mg     2.0     06-17                 Surgery Progress Note    Subjective:     Patient seen and examined at bedside. VSS, AF. Patient having some diarrhea overnight.  Tolerating diet. Feels a little tired. No n/v. Pain controlled.     OBJECTIVE:     T(C): 36.9 (06-18-22 @ 00:33), Max: 37.1 (06-17-22 @ 15:21)  HR: 90 (06-18-22 @ 00:33) (74 - 90)  BP: 141/71 (06-18-22 @ 00:33) (114/73 - 142/80)  RR: 18 (06-18-22 @ 00:33) (18 - 18)  SpO2: 97% (06-18-22 @ 00:33) (96% - 99%)  Wt(kg): --    I&O's Detail    16 Jun 2022 07:01  -  17 Jun 2022 07:00  --------------------------------------------------------  IN:    IV PiggyBack: 100 mL    IV PiggyBack: 100 mL    Oral Fluid: 350 mL  Total IN: 550 mL    OUT:    Bulb (mL): 50 mL  Total OUT: 50 mL    Total NET: 500 mL      17 Jun 2022 07:01  -  18 Jun 2022 00:55  --------------------------------------------------------  IN:    IV PiggyBack: 50 mL    IV PiggyBack: 100 mL    IV PiggyBack: 200 mL    Oral Fluid: 575 mL  Total IN: 925 mL    OUT:    Bulb (mL): 50 mL  Total OUT: 50 mL    Total NET: 875 mL          PHYSICAL EXAM:    General: NAD, resting comfortably in bed  Respiratory: Nonlabored respirations, normal CW expansion.  Cardio: regular rate and rhythm.  Abdomen: softly ND, appropriately tender, incision c/d/i. LILLIAM drain x1 ss    Vascular: extremities are warm and well perfused.       MEDICATIONS  (STANDING):  acetaminophen     Tablet .. 975 milliGRAM(s) Oral every 6 hours  cefepime   IVPB 1000 milliGRAM(s) IV Intermittent every 8 hours  chlorhexidine 2% Cloths 1 Application(s) Topical <User Schedule>  enoxaparin Injectable 40 milliGRAM(s) SubCutaneous every 24 hours  fluconAZOLE IVPB 400 milliGRAM(s) IV Intermittent every 24 hours  levothyroxine 50 MICROGram(s) Oral daily  melatonin 10 milliGRAM(s) Oral at bedtime  metroNIDAZOLE  IVPB 500 milliGRAM(s) IV Intermittent every 12 hours  ondansetron Injectable 4 milliGRAM(s) IV Push once  pantoprazole    Tablet 40 milliGRAM(s) Oral daily  psyllium Powder 1 Packet(s) Oral once    MEDICATIONS  (PRN):  HYDROmorphone  Injectable 0.5 milliGRAM(s) IV Push every 4 hours PRN breakthrough pain  oxyCODONE    IR 5 milliGRAM(s) Oral every 4 hours PRN Moderate Pain (4 - 6)  oxyCODONE    IR 10 milliGRAM(s) Oral every 4 hours PRN Severe Pain (7 - 10)      LABS:                          8.5    12.27 )-----------( 767      ( 17 Jun 2022 11:23 )             26.9     06-17    131<L>  |  101  |  6<L>  ----------------------------<  103<H>  3.7   |  19<L>  |  0.39<L>    Ca    8.2<L>      17 Jun 2022 08:44  Phos  3.2     06-17  Mg     2.0     06-17

## 2022-06-18 NOTE — DISCHARGE NOTE PROVIDER - NSDCMRMEDTOKEN_GEN_ALL_CORE_FT
NexIUM 20 mg oral delayed release capsule: 1 cap(s) orally once a day, As Needed  oxyCODONE 5 mg oral tablet: 1 tab(s) orally every 6 hours MDD:4 tabs  Repatha 140 mg/mL subcutaneous solution: subcutaneous every 2 weeks  rolling walker:   Synthroid 50 mcg (0.05 mg) oral tablet: 1 tab(s) orally once a day   acetaminophen 325 mg oral tablet: 3 tab(s) orally every 6 hours  INVanz 1 g injection: 1 gram(s) intravenously once a day   NexIUM 20 mg oral delayed release capsule: 1 cap(s) orally once a day, As Needed  oxyCODONE 5 mg oral tablet: 1 tab(s) orally every 6 hours MDD:4 tabs  rolling walker:   Synthroid 50 mcg (0.05 mg) oral tablet: 1 tab(s) orally once a day

## 2022-06-18 NOTE — DISCHARGE NOTE PROVIDER - CARE PROVIDERS DIRECT ADDRESSES
,[email or web address]

## 2022-06-18 NOTE — DISCHARGE NOTE PROVIDER - CARE PROVIDER_API CALL
Joss Smith)  Surgery  [address], [address]  [place] [address]  Phone: [phone]  Fax: [phone]  Follow Up Time: 1 week   Joss Smith)  Surgery  [address], [address]  [place] [address]  Phone: [phone]  Fax: [phone]  Follow Up Time: 1 week    Channing Montes De Oca)  Infectious Disease; Internal Medicine  [address] [place] [address]  Phone: [phone]  Fax: [phone]  Follow Up Time: 1 week   Joss Smith)  Surgery  [address], [address]  [place] [address]  Phone: [phone]  Fax: [phone]  Follow Up Time: 1 week    Channing Montes De Oca)  Infectious Disease; Internal Medicine  [address] [place] [address]  Phone: [phone]  Fax: [phone]  Follow Up Time: 1 week    Ramon Avila)  Critical Care Medicine  [address] [place] [address], [address]  [place] [address]  Phone: [phone]  Fax: [phone]  Follow Up Time: 1 month

## 2022-06-19 LAB — SARS-COV-2 RNA SPEC QL NAA+PROBE: SIGNIFICANT CHANGE UP

## 2022-06-19 RX ORDER — LOPERAMIDE HCL 2 MG
2 TABLET ORAL DAILY
Refills: 0 | Status: DISCONTINUED | OUTPATIENT
Start: 2022-06-19 | End: 2022-06-20

## 2022-06-19 RX ORDER — ONDANSETRON 8 MG/1
4 TABLET, FILM COATED ORAL ONCE
Refills: 0 | Status: COMPLETED | OUTPATIENT
Start: 2022-06-19 | End: 2022-06-20

## 2022-06-19 RX ADMIN — Medication 100 MILLIGRAM(S): at 10:26

## 2022-06-19 RX ADMIN — Medication 100 MILLIGRAM(S): at 21:17

## 2022-06-19 RX ADMIN — CEFEPIME 100 MILLIGRAM(S): 1 INJECTION, POWDER, FOR SOLUTION INTRAMUSCULAR; INTRAVENOUS at 21:18

## 2022-06-19 RX ADMIN — ONDANSETRON 4 MILLIGRAM(S): 8 TABLET, FILM COATED ORAL at 06:28

## 2022-06-19 RX ADMIN — Medication 975 MILLIGRAM(S): at 23:58

## 2022-06-19 RX ADMIN — CEFEPIME 100 MILLIGRAM(S): 1 INJECTION, POWDER, FOR SOLUTION INTRAMUSCULAR; INTRAVENOUS at 05:20

## 2022-06-19 RX ADMIN — FLUCONAZOLE 100 MILLIGRAM(S): 150 TABLET ORAL at 05:20

## 2022-06-19 RX ADMIN — ENOXAPARIN SODIUM 40 MILLIGRAM(S): 100 INJECTION SUBCUTANEOUS at 21:18

## 2022-06-19 RX ADMIN — Medication 2 MILLIGRAM(S): at 17:28

## 2022-06-19 RX ADMIN — CEFEPIME 100 MILLIGRAM(S): 1 INJECTION, POWDER, FOR SOLUTION INTRAMUSCULAR; INTRAVENOUS at 13:41

## 2022-06-19 NOTE — PROGRESS NOTE ADULT - ATTENDING COMMENTS
WBC down - patient unable to tolerate PO ABX - will discuss with ID home ABX - tolerating diet with (+) GI Fx

## 2022-06-19 NOTE — PROGRESS NOTE ADULT - SUBJECTIVE AND OBJECTIVE BOX
Green Team Surgery Progress Note    SUBJECTIVE:  Patient seen and examined at bedside with surgical team.   No acute events overnight.   Nausea during the day yesterday with the oral medications and did not tolerate transition to PO abx.     OBJECTIVE:    Vital Signs Last 24 Hrs  T(C): 36.9 (18 Jun 2022 23:25), Max: 36.9 (18 Jun 2022 09:36)  T(F): 98.4 (18 Jun 2022 23:25), Max: 98.4 (18 Jun 2022 09:36)  HR: 87 (18 Jun 2022 23:25) (82 - 94)  BP: 130/73 (18 Jun 2022 23:25) (114/69 - 136/78)  BP(mean): --  RR: 18 (18 Jun 2022 23:25) (18 - 18)  SpO2: 95% (18 Jun 2022 23:25) (95% - 99%)    MEDICATIONS  (STANDING):  acetaminophen     Tablet .. 975 milliGRAM(s) Oral every 6 hours  cefepime   IVPB 1000 milliGRAM(s) IV Intermittent every 8 hours  enoxaparin Injectable 40 milliGRAM(s) SubCutaneous every 24 hours  fluconAZOLE IVPB 400 milliGRAM(s) IV Intermittent every 24 hours  levothyroxine 50 MICROGram(s) Oral daily  melatonin 10 milliGRAM(s) Oral at bedtime  metroNIDAZOLE  IVPB 500 milliGRAM(s) IV Intermittent every 12 hours  ondansetron Injectable 4 milliGRAM(s) IV Push once  pantoprazole    Tablet 40 milliGRAM(s) Oral daily  psyllium Powder 1 Packet(s) Oral once    MEDICATIONS  (PRN):  HYDROmorphone  Injectable 0.5 milliGRAM(s) IV Push every 4 hours PRN breakthrough pain  oxyCODONE    IR 5 milliGRAM(s) Oral every 4 hours PRN Moderate Pain (4 - 6)  oxyCODONE    IR 10 milliGRAM(s) Oral every 4 hours PRN Severe Pain (7 - 10)      Physical exam:  General; NAD  Respiratory: nonlabored breathing  Abdomen: soft, ND, NT. No rebound or guarding. Incision C/D/I  Extremities: WWP       LABS:                        8.0    11.74 )-----------( 886      ( 18 Jun 2022 09:13 )             25.5     06-18    137  |  104  |  6<L>  ----------------------------<  116<H>  3.3<L>   |  22  |  0.38<L>    Ca    8.4      18 Jun 2022 09:12  Phos  2.8     06-18  Mg     2.0     06-18

## 2022-06-19 NOTE — PROGRESS NOTE ADULT - ASSESSMENT
68 y/o female w/ a PMHx of moderate mitral regurgitation, hypothyroidism, GERD, osteoporosis, vitamin D deficiency, breast CA s/p right lumpectomy w/ chemo & radiation, bilateral breast reduction (2013), s/p hysterectomy (2005), s/p right elbow surgery (1970s), & s/p laparoscopic appendectomy (6/3/2022) who presented on 6/6 w/ pneumoperitoneum & a loculated fluid collection in the RLQ s/p exploratory laparotomy, washout, ileocolic anastomosis w/ primary anastomosis for two perforations in the terminal ileum, & temporary abdominal closure w/ Abthera VAC 6/6. s/p removal of abthera vac, abd washout, abd wall reconstruction with myocutaneous flaps and bridging with ovitex mesh 6/8. CT A/P 6/12 showed 4 cm pelvic collections not amenable to drainage, right pleural effusion with collapsed right lower lobe. C. diff negative 6/12    Plan:  - appreciate ID recs, can transition to PO cipro 500 BID, flagyl 500 TID, diflucan 400 QD (patient did not tolerate oral abx transition due to nausea)  - Appreciate pulm recs  - F/u WBC today  - Pain control PRN  - Diet: LRD   - Activity: ambulate as tolerated   - DVT ppx: LVX     Green Team Surgery  #[id number]

## 2022-06-20 ENCOUNTER — TRANSCRIPTION ENCOUNTER (OUTPATIENT)
Age: 67
End: 2022-06-20

## 2022-06-20 VITALS
DIASTOLIC BLOOD PRESSURE: 78 MMHG | HEART RATE: 75 BPM | TEMPERATURE: 98 F | SYSTOLIC BLOOD PRESSURE: 150 MMHG | RESPIRATION RATE: 18 BRPM | OXYGEN SATURATION: 98 %

## 2022-06-20 PROCEDURE — 87449 NOS EACH ORGANISM AG IA: CPT

## 2022-06-20 PROCEDURE — 94002 VENT MGMT INPAT INIT DAY: CPT

## 2022-06-20 PROCEDURE — 82435 ASSAY OF BLOOD CHLORIDE: CPT

## 2022-06-20 PROCEDURE — 80053 COMPREHEN METABOLIC PANEL: CPT

## 2022-06-20 PROCEDURE — 82962 GLUCOSE BLOOD TEST: CPT

## 2022-06-20 PROCEDURE — 97116 GAIT TRAINING THERAPY: CPT

## 2022-06-20 PROCEDURE — 93005 ELECTROCARDIOGRAM TRACING: CPT

## 2022-06-20 PROCEDURE — 84132 ASSAY OF SERUM POTASSIUM: CPT

## 2022-06-20 PROCEDURE — 85014 HEMATOCRIT: CPT

## 2022-06-20 PROCEDURE — 97162 PT EVAL MOD COMPLEX 30 MIN: CPT

## 2022-06-20 PROCEDURE — 36415 COLL VENOUS BLD VENIPUNCTURE: CPT

## 2022-06-20 PROCEDURE — 71045 X-RAY EXAM CHEST 1 VIEW: CPT

## 2022-06-20 PROCEDURE — 96375 TX/PRO/DX INJ NEW DRUG ADDON: CPT

## 2022-06-20 PROCEDURE — 86850 RBC ANTIBODY SCREEN: CPT

## 2022-06-20 PROCEDURE — 85730 THROMBOPLASTIN TIME PARTIAL: CPT

## 2022-06-20 PROCEDURE — 82803 BLOOD GASES ANY COMBINATION: CPT

## 2022-06-20 PROCEDURE — 85018 HEMOGLOBIN: CPT

## 2022-06-20 PROCEDURE — 88307 TISSUE EXAM BY PATHOLOGIST: CPT

## 2022-06-20 PROCEDURE — 85610 PROTHROMBIN TIME: CPT

## 2022-06-20 PROCEDURE — 83735 ASSAY OF MAGNESIUM: CPT

## 2022-06-20 PROCEDURE — 87040 BLOOD CULTURE FOR BACTERIA: CPT

## 2022-06-20 PROCEDURE — C1889: CPT

## 2022-06-20 PROCEDURE — 74018 RADEX ABDOMEN 1 VIEW: CPT

## 2022-06-20 PROCEDURE — 99291 CRITICAL CARE FIRST HOUR: CPT | Mod: 25

## 2022-06-20 PROCEDURE — 82947 ASSAY GLUCOSE BLOOD QUANT: CPT

## 2022-06-20 PROCEDURE — 36569 INSJ PICC 5 YR+ W/O IMAGING: CPT

## 2022-06-20 PROCEDURE — 84100 ASSAY OF PHOSPHORUS: CPT

## 2022-06-20 PROCEDURE — 88304 TISSUE EXAM BY PATHOLOGIST: CPT

## 2022-06-20 PROCEDURE — U0005: CPT

## 2022-06-20 PROCEDURE — 96374 THER/PROPH/DIAG INJ IV PUSH: CPT

## 2022-06-20 PROCEDURE — 82330 ASSAY OF CALCIUM: CPT

## 2022-06-20 PROCEDURE — 97530 THERAPEUTIC ACTIVITIES: CPT

## 2022-06-20 PROCEDURE — 87077 CULTURE AEROBIC IDENTIFY: CPT

## 2022-06-20 PROCEDURE — 83605 ASSAY OF LACTIC ACID: CPT

## 2022-06-20 PROCEDURE — C1781: CPT

## 2022-06-20 PROCEDURE — 80048 BASIC METABOLIC PNL TOTAL CA: CPT

## 2022-06-20 PROCEDURE — 87324 CLOSTRIDIUM AG IA: CPT

## 2022-06-20 PROCEDURE — 85025 COMPLETE CBC W/AUTO DIFF WBC: CPT

## 2022-06-20 PROCEDURE — 87205 SMEAR GRAM STAIN: CPT

## 2022-06-20 PROCEDURE — 84295 ASSAY OF SERUM SODIUM: CPT

## 2022-06-20 PROCEDURE — 86803 HEPATITIS C AB TEST: CPT

## 2022-06-20 PROCEDURE — 87086 URINE CULTURE/COLONY COUNT: CPT

## 2022-06-20 PROCEDURE — 85027 COMPLETE CBC AUTOMATED: CPT

## 2022-06-20 PROCEDURE — C1751: CPT

## 2022-06-20 PROCEDURE — 86901 BLOOD TYPING SEROLOGIC RH(D): CPT

## 2022-06-20 PROCEDURE — 84145 PROCALCITONIN (PCT): CPT

## 2022-06-20 PROCEDURE — 87070 CULTURE OTHR SPECIMN AEROBIC: CPT

## 2022-06-20 PROCEDURE — 87150 DNA/RNA AMPLIFIED PROBE: CPT

## 2022-06-20 PROCEDURE — C9399: CPT

## 2022-06-20 PROCEDURE — 74177 CT ABD & PELVIS W/CONTRAST: CPT | Mod: MA

## 2022-06-20 PROCEDURE — U0003: CPT

## 2022-06-20 PROCEDURE — 86900 BLOOD TYPING SEROLOGIC ABO: CPT

## 2022-06-20 PROCEDURE — 71250 CT THORAX DX C-: CPT

## 2022-06-20 PROCEDURE — 87075 CULTR BACTERIA EXCEPT BLOOD: CPT

## 2022-06-20 PROCEDURE — 87186 SC STD MICRODIL/AGAR DIL: CPT

## 2022-06-20 PROCEDURE — 99232 SBSQ HOSP IP/OBS MODERATE 35: CPT

## 2022-06-20 RX ORDER — ERTAPENEM SODIUM 1 G/1
1 INJECTION, POWDER, LYOPHILIZED, FOR SOLUTION INTRAMUSCULAR; INTRAVENOUS
Qty: 7 | Refills: 0
Start: 2022-06-20 | End: 2022-06-26

## 2022-06-20 RX ORDER — ERTAPENEM SODIUM 1 G/1
1000 INJECTION, POWDER, LYOPHILIZED, FOR SOLUTION INTRAMUSCULAR; INTRAVENOUS EVERY 24 HOURS
Refills: 0 | Status: DISCONTINUED | OUTPATIENT
Start: 2022-06-20 | End: 2022-06-20

## 2022-06-20 RX ORDER — ACETAMINOPHEN 500 MG
3 TABLET ORAL
Qty: 0 | Refills: 0 | DISCHARGE
Start: 2022-06-20

## 2022-06-20 RX ORDER — EVOLOCUMAB 140 MG/ML
0 INJECTION, SOLUTION SUBCUTANEOUS
Qty: 0 | Refills: 0 | DISCHARGE

## 2022-06-20 RX ADMIN — Medication 2 MILLIGRAM(S): at 13:30

## 2022-06-20 RX ADMIN — PANTOPRAZOLE SODIUM 40 MILLIGRAM(S): 20 TABLET, DELAYED RELEASE ORAL at 13:30

## 2022-06-20 RX ADMIN — CEFEPIME 100 MILLIGRAM(S): 1 INJECTION, POWDER, FOR SOLUTION INTRAMUSCULAR; INTRAVENOUS at 06:07

## 2022-06-20 RX ADMIN — FLUCONAZOLE 100 MILLIGRAM(S): 150 TABLET ORAL at 06:07

## 2022-06-20 RX ADMIN — ERTAPENEM SODIUM 120 MILLIGRAM(S): 1 INJECTION, POWDER, LYOPHILIZED, FOR SOLUTION INTRAMUSCULAR; INTRAVENOUS at 13:15

## 2022-06-20 RX ADMIN — Medication 975 MILLIGRAM(S): at 13:29

## 2022-06-20 NOTE — PROGRESS NOTE ADULT - SUBJECTIVE AND OBJECTIVE BOX
infectious diseases progress note:    Patient is a 67y old  Female who presents with a chief complaint of Abdominal Sepsis (20 Jun 2022 02:02)        Peritonitis               Allergies    latex (Short breath)  No Known Drug Allergies    Intolerances        ANTIBIOTICS/RELEVANT:  antimicrobials  cefepime   IVPB 1000 milliGRAM(s) IV Intermittent every 8 hours  fluconAZOLE IVPB 400 milliGRAM(s) IV Intermittent every 24 hours  metroNIDAZOLE  IVPB 500 milliGRAM(s) IV Intermittent every 12 hours    immunologic:    OTHER:  acetaminophen     Tablet .. 975 milliGRAM(s) Oral every 6 hours  enoxaparin Injectable 40 milliGRAM(s) SubCutaneous every 24 hours  HYDROmorphone  Injectable 0.5 milliGRAM(s) IV Push every 4 hours PRN  levothyroxine 50 MICROGram(s) Oral daily  loperamide 2 milliGRAM(s) Oral daily  melatonin 10 milliGRAM(s) Oral at bedtime  oxyCODONE    IR 5 milliGRAM(s) Oral every 4 hours PRN  oxyCODONE    IR 10 milliGRAM(s) Oral every 4 hours PRN  pantoprazole    Tablet 40 milliGRAM(s) Oral daily  psyllium Powder 1 Packet(s) Oral once      Objective:  Vital Signs Last 24 Hrs  T(C): 36.7 (20 Jun 2022 05:54), Max: 37.2 (19 Jun 2022 10:24)  T(F): 98.1 (20 Jun 2022 05:54), Max: 98.9 (19 Jun 2022 10:24)  HR: 73 (20 Jun 2022 05:54) (73 - 87)  BP: 124/82 (20 Jun 2022 05:54) (121/79 - 144/84)  BP(mean): --  RR: 18 (20 Jun 2022 05:54) (18 - 18)  SpO2: 96% (20 Jun 2022 05:54) (95% - 98%)     Eyes:ROLAND, EOMI  Ear/Nose/Throat: no oral lesion, no sinus tenderness on percussion	  Neck:no JVD, no lymphadenopathy, supple  Respiratory: CTA araceli  Cardiovascular: S1S2 RRR, no murmurs  Gastrointestinal:soft, (+) BS, no HSM  Extremities:no e/e/c        LABS:                        8.0    11.74 )-----------( 886      ( 18 Jun 2022 09:13 )             25.5     06-18    137  |  104  |  6<L>  ----------------------------<  116<H>  3.3<L>   |  22  |  0.38<L>    Ca    8.4      18 Jun 2022 09:12  Phos  2.8     06-18  Mg     2.0     06-18              MICROBIOLOGY:    RECENT CULTURES:        RESPIRATORY CULTURES:      Clostridium difficile GDH Toxins A&amp;B, EIA:   Negative (06-18 @ 16:24)          RADIOLOGY & ADDITIONAL STUDIES:        Pager [phone]  After 5 pm/weekends or if no response :[phone]

## 2022-06-20 NOTE — PROGRESS NOTE ADULT - PROBLEM SELECTOR PLAN 1
CT A/P with R pleural effusion with associated compressive atelectasis  -CT chest with b/l pl effusion R>L, likely sympathetic effusion  -Normoxic, no complaints of dyspnea  -Would monitor without drainage at this time.  -Incentive spirometry use encouraged
CT A/P with R pleural effusion with associated compressive atelectasis  -CT chest with b/l pl effusion R>L, likely sympathetic effusion  -Normoxic, no complaints of dyspnea  -Pain control per primary team  -Would monitor without drainage at this time.
CT A/P with R pleural effusion with associated compressive atelectasis  -CT chest with b/l pl effusion R>L, likely sympathetic effusion  -Normoxic, no complaints of dyspnea  -Would monitor without drainage at this time.  -Incentive spirometry use encouraged
CT A/P with R pleural effusion with associated compressive atelectasis  -CT chest with b/l pl effusion R>L, likely sympathetic effusion  -Normoxic, no complaints of dyspnea  -Would monitor without drainage at this time  -Incentive spirometry use encouraged  -D/c planning per primary team  -Repeat CT chest in 1 month.
CT A/P with R pleural effusion with associated compressive atelectasis  -CT chest with b/l pl effusion R>L, likely sympathetic effusion  -Normoxic, no complaints of dyspnea but does note R mid back pain   -Pain control per primary team  -Would monitor without drainage at this time.

## 2022-06-20 NOTE — PROGRESS NOTE ADULT - SUBJECTIVE AND OBJECTIVE BOX
Follow-up Pulm Progress Note    No new respiratory events overnight.  Denies SOB/CP.     Medications:  MEDICATIONS  (STANDING):  acetaminophen     Tablet .. 975 milliGRAM(s) Oral every 6 hours  enoxaparin Injectable 40 milliGRAM(s) SubCutaneous every 24 hours  ertapenem  IVPB 1000 milliGRAM(s) IV Intermittent every 24 hours  levothyroxine 50 MICROGram(s) Oral daily  loperamide 2 milliGRAM(s) Oral daily  melatonin 10 milliGRAM(s) Oral at bedtime  pantoprazole    Tablet 40 milliGRAM(s) Oral daily  psyllium Powder 1 Packet(s) Oral once    MEDICATIONS  (PRN):  HYDROmorphone  Injectable 0.5 milliGRAM(s) IV Push every 4 hours PRN breakthrough pain  oxyCODONE    IR 10 milliGRAM(s) Oral every 4 hours PRN Severe Pain (7 - 10)  oxyCODONE    IR 5 milliGRAM(s) Oral every 4 hours PRN Moderate Pain (4 - 6)    Vital Signs Last 24 Hrs  T(C): 36.7 (20 Jun 2022 09:32), Max: 36.9 (19 Jun 2022 17:30)  T(F): 98 (20 Jun 2022 09:32), Max: 98.5 (20 Jun 2022 00:28)  HR: 78 (20 Jun 2022 09:32) (73 - 87)  BP: 130/78 (20 Jun 2022 09:32) (121/79 - 144/84)  BP(mean): --  RR: 181 (20 Jun 2022 09:32) (18 - 181)  SpO2: 98% (20 Jun 2022 09:32) (95% - 98%)      06-19 @ 07:01  -  06-20 @ 07:00  --------------------------------------------------------  IN: 520 mL / OUT: 35 mL / NET: 485 mL    Physical Examination:  PULM: Decreased R base  CVS: RRR    RADIOLOGY REVIEWED    CT chest: < from: CT Chest No Cont (06.13.22 @ 14:22) >    FINDINGS:    LUNGS AND AIRWAYS: Passive atelectasis of the right lower lobe and left   basilar atelectasis.    PLEURA: Bilateral pleural effusions, right greater than left.    MEDIASTINUM AND AFUA: No lymphadenopathy.    VESSELS: Within normal limits.    HEART: Heart size is normal. No pericardial effusion.    CHEST WALL AND LOWER NECK: Within normal limits.    VISUALIZED UPPER ABDOMEN: Postsurgical changes of the imaged upper   abdomen, better evaluated on CT abdomen pelvis dated 6/12/2022.    BONES: Degenerative changes. Mild compression of a lumbar vertebral body.    IMPRESSION:    Bilateral pleural effusions, right greater than left. Passive atelectasis   of the right lower lobe and minimal left basilar atelctasis.    < end of copied text >

## 2022-06-20 NOTE — DISCHARGE NOTE NURSING/CASE MANAGEMENT/SOCIAL WORK - NSDCPEFALRISK_GEN_ALL_CORE
For information on Fall & Injury Prevention, visit: [email or web address] OR  [email or web address] OR  https://www.cdc.gov/steadi/patient.html

## 2022-06-20 NOTE — PROGRESS NOTE ADULT - ASSESSMENT
66 y/o female w/ a PMHx of moderate mitral regurgitation, hypothyroidism, GERD, osteoporosis, vitamin D deficiency, breast CA s/p right lumpectomy w/ chemo & radiation, bilateral breast reduction (2013), s/p hysterectomy (2005), s/p right elbow surgery (1970s), & s/p laparoscopic appendectomy (6/3/2022) who presented on 6/6 w/ pneumoperitoneum & a loculated fluid collection in the RLQ s/p exploratory laparotomy, washout, ileocolic anastomosis w/ primary anastomosis for two perforations in the terminal ileum, & temporary abdominal closure w/ Abthera VAC 6/6. s/p removal of abthera vac, abd washout, abd wall reconstruction with myocutaneous flaps and bridging with ovitex mesh 6/8. CT A/P 6/12 showed 4 cm pelvic collections not amenable to drainage, right pleural effusion with collapsed right lower lobe. C. diff negative 6/12    Plan:  - appreciate ID recs, can transition to PO cipro 500 BID, flagyl 500 TID, diflucan 400 QD (patient did not tolerate oral abx transition due to nausea)  - Appreciate pulm recs  - F/u WBC today  - Pain control PRN  - Diet: LRD   - Activity: ambulate as tolerated   - DVT ppx: LVX     Green Team Surgery  #[id number]   68 y/o female w/ a PMHx of moderate mitral regurgitation, hypothyroidism, GERD, osteoporosis, vitamin D deficiency, breast CA s/p right lumpectomy w/ chemo & radiation, bilateral breast reduction (2013), s/p hysterectomy (2005), s/p right elbow surgery (1970s), & s/p laparoscopic appendectomy (6/3/2022) who presented on 6/6 w/ pneumoperitoneum & a loculated fluid collection in the RLQ s/p exploratory laparotomy, washout, ileocolic anastomosis w/ primary anastomosis for two perforations in the terminal ileum, & temporary abdominal closure w/ Abthera VAC 6/6. s/p removal of abthera vac, abd washout, abd wall reconstruction with myocutaneous flaps and bridging with ovitex mesh 6/8. CT A/P 6/12 showed 4 cm pelvic collections not amenable to drainage, right pleural effusion with collapsed right lower lobe. C. diff negative 6/12    Plan:  - appreciate ID recs, can transition to PO cipro 500 BID, flagyl 500 TID, diflucan 400 QD (patient did not tolerate oral abx transition due to nausea)  - patient had midline placed, will dc home with IV antibiotics.   - Appreciate pulm recs  - Pain control PRN  - Diet: LRD   - Activity: ambulate as tolerated   - DVT ppx: LVX     Green Team Surgery  #[id number]   68 y/o female w/ a PMHx of moderate mitral regurgitation, hypothyroidism, GERD, osteoporosis, vitamin D deficiency, breast CA s/p right lumpectomy w/ chemo & radiation, bilateral breast reduction (2013), s/p hysterectomy (2005), s/p right elbow surgery (1970s), & s/p laparoscopic appendectomy (6/3/2022) who presented on 6/6 w/ pneumoperitoneum & a loculated fluid collection in the RLQ s/p exploratory laparotomy, washout, ileocolic anastomosis w/ primary anastomosis for two perforations in the terminal ileum, & temporary abdominal closure w/ Abthera VAC 6/6. s/p removal of abthera vac, abd washout, abd wall reconstruction with myocutaneous flaps and bridging with ovitex mesh 6/8. CT A/P 6/12 showed 4 cm pelvic collections not amenable to drainage, right pleural effusion with collapsed right lower lobe. C. diff negative 6/12    Plan:  - appreciate ID recs, can transition to PO cipro 500 BID, flagyl 500 TID, diflucan 400 QD (patient did not tolerate oral abx transition due to nausea)  - patient had midline placed, will dc home with IV antibiotics.   - Appreciate pulm recs  - Pain control PRN  - Diet: LRD   - Activity: ambulate as tolerated   - DVT ppx: LVX   - DC today    Green Team Surgery  #[id number]

## 2022-06-20 NOTE — PROGRESS NOTE ADULT - PROBLEM SELECTOR PLAN 2
Presented to the ED with severe abdominal pain x3 days after laparoscopic appendectomy on 6/3/2022  -Found to have pneumoperitoneum & a loculated fluid collection in the RLQ concerning for perforation  -S/p exploratory laparotomy, washout, ileocolic anastomosis for two perforations in the terminal ileum, & temporary abdominal closure w/ wound vac on 6/6  -S/p removal of wound vac, abdominal washout, abdominal wall reconstruction on 6/8  -OR tissue cx with rate enterobacter, few candida albicans, few bacteroides vulgatus group, few bacteroides ovatus group  -ABX per surgery team.
Presented to the ED with severe abdominal pain x3 days after laparoscopic appendectomy on 6/3/2022  -Found to have pneumoperitoneum & a loculated fluid collection in the RLQ concerning for perforation  -S/p exploratory laparotomy, washout, ileocolic anastomosis for two perforations in the terminal ileum, & temporary abdominal closure w/ wound vac on 6/6  -S/p removal of wound vac, abdominal washout, abdominal wall reconstruction on 6/8  -OR tissue cx with rate enterobacter, few candida albicans, few bacteroides vulgatus group, few bacteroides ovatus group  -ABX per ID.

## 2022-06-20 NOTE — PROGRESS NOTE ADULT - PROVIDER SPECIALTY LIST ADULT
Cardiology
SICU
Surgery
Surgery
Cardiology
SICU
Surgery
Surgery
Cardiology
Infectious Disease
Surgery
Infectious Disease
Infectious Disease
SICU
SICU
Surgery
Pulmonology
Infectious Disease

## 2022-06-20 NOTE — PROGRESS NOTE ADULT - ASSESSMENT
68 y/o F PMHx R breast CA (s/p lumpectomy and chemo/radiation last received 12/2021), hypothyroidism, and s/p recent elective laparoscopic appendectomy on 6/3, presented back to the ED with abdominal pain on 6/6. Found to have feculent peritonitis, taken to OR on 6/6 for ex-lap, found to have 2 perforations in terminal ileum s/p ileocecal resection with primary anastomosis and washout. S/p RTOR on 6/8 for repeat washout and abdomen closure. Initial blood cultures grew bacteroides. OR cultures growing enterobacter cloacae, candida albicans, lactobacillus. Repeat CT scan with no drainable collection. Still with worsening leukocytosis, poor PO intake, and distended abdomen. ID now consulted for antibiotic management.    Blood Cultures (6/6):  bacteroides ovatus in both anaerobic bottles  Blood Cultures (6/6): no growth   OR Cultures (6/6):  enterobacter cloacae, candida albicans, bacteroides spp, lactobacillus    CT Abd/Pelvis (6/12): residual tracking fluid without evidence of discrete drainable collection    Antibiotics/Antifungals:  Zosyn: 6/6 -> 6/15  Fluconazole 6/8 ->      Impression:  #Peritonitis - polymicrobial infection including enterobacter, anaerobes, and candida albicans. Will target all organisms on culture given tenuous clinical status and persistent leukocytosis.   #Leukocytosis - due to abdominal source. Interval CT post-washout with some residual fluid, will need to monitor for worsening        wbc down  better  only complaint is nausea which may be related  metronidazole   can dc cefepime and flagyl   invanz and po diflucan  for 7 additional  days and then dc   defer to surgery the need for additional scan

## 2022-06-20 NOTE — PROGRESS NOTE ADULT - REASON FOR ADMISSION
Abdominal Sepsis

## 2022-06-20 NOTE — PROGRESS NOTE ADULT - NUTRITIONAL ASSESSMENT
This patient has been assessed with a concern for Malnutrition and has been determined to have a diagnosis/diagnoses of Severe protein-calorie malnutrition.    This patient is being managed with:   Diet Low Fiber-  Entered: Jun 13 2022  7:18AM    
This patient has been assessed with a concern for Malnutrition and has been determined to have a diagnosis/diagnoses of Severe protein-calorie malnutrition.    This patient is being managed with:   Diet NPO-  With Ice Chips/Sips of Water  Entered: Jun 9 2022 10:57AM    
This patient has been assessed with a concern for Malnutrition and has been determined to have a diagnosis/diagnoses of Severe protein-calorie malnutrition.    This patient is being managed with:   Diet Clear Liquid-  Entered: Jun 12 2022  8:17AM    
This patient has been assessed with a concern for Malnutrition and has been determined to have a diagnosis/diagnoses of Severe protein-calorie malnutrition.    This patient is being managed with:   Diet Low Fiber-  Entered: Jun 13 2022  7:18AM    
This patient has been assessed with a concern for Malnutrition and has been determined to have a diagnosis/diagnoses of Severe protein-calorie malnutrition.    This patient is being managed with:   Diet NPO-  With Ice Chips/Sips of Water  Entered: Jun 9 2022 10:57AM    
This patient has been assessed with a concern for Malnutrition and has been determined to have a diagnosis/diagnoses of Severe protein-calorie malnutrition.    This patient is being managed with:   Diet Low Fiber-  Entered: Jun 13 2022  7:18AM    
This patient has been assessed with a concern for Malnutrition and has been determined to have a diagnosis/diagnoses of Severe protein-calorie malnutrition.    This patient is being managed with:   Diet NPO-  With Ice Chips/Sips of Water  Entered: Dominick 10 2022  2:02PM    
This patient has been assessed with a concern for Malnutrition and has been determined to have a diagnosis/diagnoses of Severe protein-calorie malnutrition.    This patient is being managed with:   Diet Low Fiber-  Entered: Jun 13 2022  7:18AM

## 2022-06-20 NOTE — PROGRESS NOTE ADULT - SUBJECTIVE AND OBJECTIVE BOX
DATE OF SERVICE: 06-20-22 @ 23:23    Patient is a 67y old  Female who presents with a chief complaint of Abdominal Sepsis (20 Jun 2022 13:31)      INTERVAL HISTORY: feels ok    REVIEW OF SYSTEMS:  CONSTITUTIONAL: No weakness  EYES/ENT: No visual changes;  No throat pain   NECK: No pain or stiffness  RESPIRATORY: No cough, wheezing; No shortness of breath  CARDIOVASCULAR: No chest pain or palpitations  GASTROINTESTINAL: No abdominal  pain. No nausea, vomiting, or hematemesis  GENITOURINARY: No dysuria, frequency or hematuria  NEUROLOGICAL: No stroke like symptoms  SKIN: No rashes      PHYSICAL EXAM:  T(C): 36.8 (06-20-22 @ 14:21), Max: 36.9 (06-20-22 @ 00:28)  HR: 75 (06-20-22 @ 14:21) (73 - 78)  BP: 150/78 (06-20-22 @ 14:21) (121/79 - 150/78)  RR: 18 (06-20-22 @ 14:21) (18 - 18)  SpO2: 98% (06-20-22 @ 14:21) (95% - 98%)  Wt(kg): --  I&O's Summary    19 Jun 2022 07:01  -  20 Jun 2022 07:00  --------------------------------------------------------  IN: 520 mL / OUT: 35 mL / NET: 485 mL    20 Jun 2022 07:01  -  20 Jun 2022 23:23  --------------------------------------------------------  IN: 340 mL / OUT: 0 mL / NET: 340 mL          Appearance: In no distress	  HEENT:    PERRL, EOMI	  Cardiovascular:  S1 S2, No JVD  Respiratory: Lungs clear to auscultation	  Gastrointestinal:  Soft, Non-tender, + BS	  Vascularature:  No edema of LE  Psychiatric: Appropriate affect   Neuro: no acute focal deficits         Labs personally reviewed    ASSESSMENT/PLAN: 	    The patient is a 67y Female who is now s/p exploratory laparotomy with ileocecal resection and abdominal washout on 6/6   s/p removal of abthera vac, abd washout, abd wall reconstruction with myocutaneous flaps and bridging with ovitex mesh 6/8.    1. Esperanza op risk stratification - tolerated surgery well, s/p closing of abd and washout  6/8  - HD stable   - SR on the monitor   - Pain management   - Tolerated surgery well  - BP stable, sat well on RA     2. HLD   - on Repatha, resume as OP    3. Mod MR - euvolemic   - OP follow up    4, Leukocytosis - post op  - surgical team following   - CT abd/pelvis done as noted above showing Complete right lower lobe atelectatic collapse.  - CXR show b/l pleural effusion   - Pulmn consult - recommend Repeat Chest CT which revealed b/l pleural effusions   - ID consult and recs appreciated   - WBC downtrending on escalated abx    5. Hypokalemia   - monitor lytes and replete PRN     OP follow up with surgery        Randy Santamaria DO [facility]  Cardiovascular Medicine  [address] [place] [address], [address]  Office: [phone]  Cell: [phone]

## 2022-06-20 NOTE — PROGRESS NOTE ADULT - ATTENDING COMMENTS
Patient seen/examined.  Agree w above note and plan and have discussed plan w house staff.  Still w occasional incisional pain, tolerating diet, +formed BM, afebrile.  Abdomen soft, wound clean.  Home w IV abx    Darío Verde MD

## 2022-06-20 NOTE — DISCHARGE NOTE NURSING/CASE MANAGEMENT/SOCIAL WORK - PATIENT PORTAL LINK FT
You can access the FollowMyHealth Patient Portal offered by [facility] by registering at the following website: [email or web address]. By joining [employer]’s FollowMyHealth portal, you will also be able to view your health information using other applications (apps) compatible with our system.

## 2022-06-20 NOTE — DISCHARGE NOTE NURSING/CASE MANAGEMENT/SOCIAL WORK - NSSCNAMETXT_GEN_ALL_CORE
[facility] Care- Visiting Nurse [phone]// [facility] infusion- [phone]

## 2022-06-20 NOTE — DISCHARGE NOTE NURSING/CASE MANAGEMENT/SOCIAL WORK - NSDCPNINST_GEN_ALL_CORE
call md // go toER:  temperature, nausea, vomiting; check site daily for redness, warmth, swelling, bleeding, drainage with foul odor

## 2022-06-20 NOTE — PROGRESS NOTE ADULT - PROBLEM SELECTOR PLAN 3
Likely in the setting of intraabdominal infection  -WBC downtrending, continue to trend   -ABX per ID.
Likely in the setting of intraabdominal infection  -WBC downtrending, continue to trend   -ABX per ID
Likely in the setting of intraabdominal infection  -Monitor trend  -ABX per surgery team.
Likely in the setting of intraabdominal infection  -WBC downtrending, continue to trend   -ABX per ID

## 2022-06-20 NOTE — PROGRESS NOTE ADULT - SUBJECTIVE AND OBJECTIVE BOX
Surgery Progress Note    Subjective:     Patient seen and examined at bedside. VSS, AF. No acute events.   OBJECTIVE:     T(C): 36.9 (06-20-22 @ 00:28), Max: 37.2 (06-19-22 @ 10:24)  HR: 78 (06-20-22 @ 00:28) (74 - 87)  BP: 121/79 (06-20-22 @ 00:28) (121/79 - 144/84)  RR: 18 (06-20-22 @ 00:28) (18 - 18)  SpO2: 95% (06-20-22 @ 00:28) (95% - 98%)  Wt(kg): --    I&O's Detail    18 Jun 2022 07:01  -  19 Jun 2022 07:00  --------------------------------------------------------  IN:    Oral Fluid: 480 mL  Total IN: 480 mL    OUT:    Bulb (mL): 50 mL  Total OUT: 50 mL    Total NET: 430 mL      19 Jun 2022 07:01  -  20 Jun 2022 02:02  --------------------------------------------------------  IN:    IV PiggyBack: 100 mL    IV PiggyBack: 50 mL    Oral Fluid: 370 mL  Total IN: 520 mL    OUT:    Bulb (mL): 25 mL  Total OUT: 25 mL    Total NET: 495 mL          PHYSICAL EXAM:    General; NAD  Respiratory: nonlabored breathing  Abdomen: soft, ND, NT. No rebound or guarding. Incision C/D/I  Extremities: WWP       MEDICATIONS  (STANDING):  acetaminophen     Tablet .. 975 milliGRAM(s) Oral every 6 hours  cefepime   IVPB 1000 milliGRAM(s) IV Intermittent every 8 hours  enoxaparin Injectable 40 milliGRAM(s) SubCutaneous every 24 hours  fluconAZOLE IVPB 400 milliGRAM(s) IV Intermittent every 24 hours  levothyroxine 50 MICROGram(s) Oral daily  loperamide 2 milliGRAM(s) Oral daily  melatonin 10 milliGRAM(s) Oral at bedtime  metroNIDAZOLE  IVPB 500 milliGRAM(s) IV Intermittent every 12 hours  ondansetron Injectable 4 milliGRAM(s) IV Push once  pantoprazole    Tablet 40 milliGRAM(s) Oral daily  psyllium Powder 1 Packet(s) Oral once    MEDICATIONS  (PRN):  HYDROmorphone  Injectable 0.5 milliGRAM(s) IV Push every 4 hours PRN breakthrough pain  oxyCODONE    IR 5 milliGRAM(s) Oral every 4 hours PRN Moderate Pain (4 - 6)  oxyCODONE    IR 10 milliGRAM(s) Oral every 4 hours PRN Severe Pain (7 - 10)      LABS:                          8.0    11.74 )-----------( 886      ( 18 Jun 2022 09:13 )             25.5     06-18    137  |  104  |  6<L>  ----------------------------<  116<H>  3.3<L>   |  22  |  0.38<L>    Ca    8.4      18 Jun 2022 09:12  Phos  2.8     06-18  Mg     2.0     06-18                 Surgery Progress Note    Subjective:     Patient seen and examined at bedside. VSS, AF. No acute events. Tolerating diet. No n/v. passing gas. Diarrhea improved.     OBJECTIVE:     T(C): 36.9 (06-20-22 @ 00:28), Max: 37.2 (06-19-22 @ 10:24)  HR: 78 (06-20-22 @ 00:28) (74 - 87)  BP: 121/79 (06-20-22 @ 00:28) (121/79 - 144/84)  RR: 18 (06-20-22 @ 00:28) (18 - 18)  SpO2: 95% (06-20-22 @ 00:28) (95% - 98%)  Wt(kg): --    I&O's Detail    18 Jun 2022 07:01  -  19 Jun 2022 07:00  --------------------------------------------------------  IN:    Oral Fluid: 480 mL  Total IN: 480 mL    OUT:    Bulb (mL): 50 mL  Total OUT: 50 mL    Total NET: 430 mL      19 Jun 2022 07:01  -  20 Jun 2022 02:02  --------------------------------------------------------  IN:    IV PiggyBack: 100 mL    IV PiggyBack: 50 mL    Oral Fluid: 370 mL  Total IN: 520 mL    OUT:    Bulb (mL): 25 mL  Total OUT: 25 mL    Total NET: 495 mL          PHYSICAL EXAM:    General; NAD  Respiratory: nonlabored breathing  Abdomen: soft, ND, NT. No rebound or guarding. Incision C/D/I  Extremities: WWP       MEDICATIONS  (STANDING):  acetaminophen     Tablet .. 975 milliGRAM(s) Oral every 6 hours  cefepime   IVPB 1000 milliGRAM(s) IV Intermittent every 8 hours  enoxaparin Injectable 40 milliGRAM(s) SubCutaneous every 24 hours  fluconAZOLE IVPB 400 milliGRAM(s) IV Intermittent every 24 hours  levothyroxine 50 MICROGram(s) Oral daily  loperamide 2 milliGRAM(s) Oral daily  melatonin 10 milliGRAM(s) Oral at bedtime  metroNIDAZOLE  IVPB 500 milliGRAM(s) IV Intermittent every 12 hours  ondansetron Injectable 4 milliGRAM(s) IV Push once  pantoprazole    Tablet 40 milliGRAM(s) Oral daily  psyllium Powder 1 Packet(s) Oral once    MEDICATIONS  (PRN):  HYDROmorphone  Injectable 0.5 milliGRAM(s) IV Push every 4 hours PRN breakthrough pain  oxyCODONE    IR 5 milliGRAM(s) Oral every 4 hours PRN Moderate Pain (4 - 6)  oxyCODONE    IR 10 milliGRAM(s) Oral every 4 hours PRN Severe Pain (7 - 10)      LABS:                          8.0    11.74 )-----------( 886      ( 18 Jun 2022 09:13 )             25.5     06-18    137  |  104  |  6<L>  ----------------------------<  116<H>  3.3<L>   |  22  |  0.38<L>    Ca    8.4      18 Jun 2022 09:12  Phos  2.8     06-18  Mg     2.0     06-18

## 2022-06-21 ENCOUNTER — NON-APPOINTMENT (OUTPATIENT)
Age: 67
End: 2022-06-21

## 2022-06-24 ENCOUNTER — NON-APPOINTMENT (OUTPATIENT)
Age: 67
End: 2022-06-24

## 2022-06-28 ENCOUNTER — APPOINTMENT (OUTPATIENT)
Dept: SURGERY | Facility: CLINIC | Age: 67
End: 2022-06-28
Payer: COMMERCIAL

## 2022-06-28 VITALS
TEMPERATURE: 97.7 F | DIASTOLIC BLOOD PRESSURE: 91 MMHG | RESPIRATION RATE: 16 BRPM | HEART RATE: 88 BPM | SYSTOLIC BLOOD PRESSURE: 125 MMHG | OXYGEN SATURATION: 95 %

## 2022-06-28 PROCEDURE — 99024 POSTOP FOLLOW-UP VISIT: CPT

## 2022-06-28 NOTE — HISTORY OF PRESENT ILLNESS
[de-identified] : Chrissy is a 66 y/o female here for post op. S/p Laparoscopic appendectomy on 06/03/22 with Dr. Verde. For Perforated terminal ileum on 06/06/22.\par S/p Ex-lap with abdominal wall washout, Development of myofascial flaps and Implantation of biologic mesh on 06/08/22. Today patient has abdominal discomfort. Has nausea but no vomiting. No fevers or chills. Appetite is good. BM are normal.  The patient had a CBC   drawn this past week which reveals a normal WBCand a hemoglobin of 10 \par

## 2022-06-28 NOTE — PHYSICAL EXAM
[de-identified] : Abdomen is flat and nontender except right in the incision.  Half the skin staples were removed along with the pulley stitches.  A white wick in the lower aspect of the wound was removed there is only some serous drainage in the area.

## 2022-06-28 NOTE — ASSESSMENT
[FreeTextEntry1] : Wound care, diet, and activity were all discussed with the patient and her  she is to return in 1 week.

## 2022-07-05 ENCOUNTER — APPOINTMENT (OUTPATIENT)
Dept: SURGERY | Facility: CLINIC | Age: 67
End: 2022-07-05

## 2022-07-05 VITALS
SYSTOLIC BLOOD PRESSURE: 114 MMHG | TEMPERATURE: 97.8 F | OXYGEN SATURATION: 98 % | RESPIRATION RATE: 17 BRPM | HEART RATE: 106 BPM | DIASTOLIC BLOOD PRESSURE: 81 MMHG

## 2022-07-05 DIAGNOSIS — R19.7 DIARRHEA, UNSPECIFIED: ICD-10-CM

## 2022-07-05 PROCEDURE — 99024 POSTOP FOLLOW-UP VISIT: CPT

## 2022-07-05 NOTE — ASSESSMENT
[FreeTextEntry1] : I have given the patient a prescription for Questran to see if this will help her diarrhea as she had an ileocolic resection.  She is to return in 1 week.

## 2022-07-05 NOTE — HISTORY OF PRESENT ILLNESS
[de-identified] : Chrissy is a 66 y/o female here for post op. S/p Laparoscopic appendectomy on 06/03/22 with Dr. Verde. For Perforated terminal ileum on 06/06/22. S/p Ex-lap with abdominal wall washout, Development of myofascial flaps and Implantation of biologic mesh on 06/08/22. Today patient reports having pain LUQ radiates to the middle of her upper abdomen. Believes it is gas because when she pass gas the pain goes away. Feeling nauseous all the time started taking Ondansetron. Appetite has decreased due to nausea. Has diarrhea at times.

## 2022-07-05 NOTE — PHYSICAL EXAM
[de-identified] : All remaining skin staples were removed.  There is 1 small opening in the incision with some excess granulation tissue which was cauterized.

## 2022-07-11 ENCOUNTER — OUTPATIENT (OUTPATIENT)
Dept: OUTPATIENT SERVICES | Facility: HOSPITAL | Age: 67
LOS: 1 days | Discharge: HOME | End: 2022-07-11

## 2022-07-11 ENCOUNTER — APPOINTMENT (OUTPATIENT)
Dept: INFECTIOUS DISEASE | Facility: CLINIC | Age: 67
End: 2022-07-11

## 2022-07-11 VITALS
DIASTOLIC BLOOD PRESSURE: 83 MMHG | OXYGEN SATURATION: 97 % | HEIGHT: 63 IN | WEIGHT: 131 LBS | HEART RATE: 107 BPM | TEMPERATURE: 98.4 F | BODY MASS INDEX: 23.21 KG/M2 | SYSTOLIC BLOOD PRESSURE: 133 MMHG

## 2022-07-11 DIAGNOSIS — Z90.49 ACQUIRED ABSENCE OF OTHER SPECIFIED PARTS OF DIGESTIVE TRACT: Chronic | ICD-10-CM

## 2022-07-11 DIAGNOSIS — Z98.890 OTHER SPECIFIED POSTPROCEDURAL STATES: Chronic | ICD-10-CM

## 2022-07-11 DIAGNOSIS — C50.919 MALIGNANT NEOPLASM OF UNSPECIFIED SITE OF UNSPECIFIED FEMALE BREAST: ICD-10-CM

## 2022-07-11 DIAGNOSIS — Z90.710 ACQUIRED ABSENCE OF BOTH CERVIX AND UTERUS: Chronic | ICD-10-CM

## 2022-07-11 PROCEDURE — 99213 OFFICE O/P EST LOW 20 MIN: CPT

## 2022-07-11 NOTE — ASSESSMENT
[FreeTextEntry1] : sp peritonitis post  AP  with abscess doing well off antibiotics \par no new issues

## 2022-07-11 NOTE — HISTORY OF PRESENT ILLNESS
[FreeTextEntry1] : doing well finished ab on june 24  roughly  feels well eating walking no fever\par \par no new issues \par no vomiting some diarrhea

## 2022-07-11 NOTE — PHYSICAL EXAM
[General Appearance - Alert] : alert [General Appearance - In No Acute Distress] : in no acute distress [Sclera] : the sclera and conjunctiva were normal [PERRL With Normal Accommodation] : pupils were equal in size, round, reactive to light [Extraocular Movements] : extraocular movements were intact [Outer Ear] : the ears and nose were normal in appearance [Oropharynx] : the oropharynx was normal with no thrush [Bowel Sounds] : normal bowel sounds [Abdomen Soft] : soft [Abdomen Tenderness] : non-tender [Abdomen Mass (___ Cm)] : no abdominal mass palpated [Costovertebral Angle Tenderness] : no CVA tenderness [Skin Color & Pigmentation] : normal skin color and pigmentation [] : no rash

## 2022-07-12 LAB
ALBUMIN SERPL ELPH-MCNC: 3.7 G/DL
ALP BLD-CCNC: 95 U/L
ALT SERPL-CCNC: 13 U/L
ANION GAP SERPL CALC-SCNC: 13 MMOL/L
AST SERPL-CCNC: 15 U/L
BASOPHILS # BLD AUTO: 0.04 K/UL
BASOPHILS NFR BLD AUTO: 0.3 %
BILIRUB SERPL-MCNC: 0.3 MG/DL
BUN SERPL-MCNC: 11 MG/DL
CALCIUM SERPL-MCNC: 10.2 MG/DL
CHLORIDE SERPL-SCNC: 100 MMOL/L
CO2 SERPL-SCNC: 24 MMOL/L
CREAT SERPL-MCNC: 0.54 MG/DL
EGFR: 100 ML/MIN/1.73M2
EOSINOPHIL # BLD AUTO: 0.11 K/UL
EOSINOPHIL NFR BLD AUTO: 0.9 %
GLUCOSE SERPL-MCNC: 110 MG/DL
HCT VFR BLD CALC: 37.6 %
HGB BLD-MCNC: 11.1 G/DL
IMM GRANULOCYTES NFR BLD AUTO: 0.7 %
LYMPHOCYTES # BLD AUTO: 3.89 K/UL
LYMPHOCYTES NFR BLD AUTO: 32.2 %
MAN DIFF?: NORMAL
MCHC RBC-ENTMCNC: 26.1 PG
MCHC RBC-ENTMCNC: 29.5 GM/DL
MCV RBC AUTO: 88.5 FL
MONOCYTES # BLD AUTO: 0.94 K/UL
MONOCYTES NFR BLD AUTO: 7.8 %
NEUTROPHILS # BLD AUTO: 7 K/UL
NEUTROPHILS NFR BLD AUTO: 58.1 %
PLATELET # BLD AUTO: 531 K/UL
POTASSIUM SERPL-SCNC: 4.9 MMOL/L
PROT SERPL-MCNC: 7.7 G/DL
RBC # BLD: 4.25 M/UL
RBC # FLD: 15.2 %
SODIUM SERPL-SCNC: 138 MMOL/L
WBC # FLD AUTO: 12.07 K/UL

## 2022-07-14 ENCOUNTER — APPOINTMENT (OUTPATIENT)
Dept: SURGERY | Facility: CLINIC | Age: 67
End: 2022-07-14

## 2022-07-14 VITALS
HEART RATE: 97 BPM | TEMPERATURE: 97.9 F | DIASTOLIC BLOOD PRESSURE: 82 MMHG | SYSTOLIC BLOOD PRESSURE: 130 MMHG | RESPIRATION RATE: 17 BRPM

## 2022-07-14 PROCEDURE — 99024 POSTOP FOLLOW-UP VISIT: CPT

## 2022-07-14 NOTE — PHYSICAL EXAM
[de-identified] : There is a pinhole size defect in the midline incision with some excess granulation tissue this was probed and it did not go more than quarter to half an inch into the abdominal wall the excess granulation tissue was cauterized

## 2022-07-14 NOTE — HISTORY OF PRESENT ILLNESS
[de-identified] : Chrissy is a 66 y/o female here for post op. S/p Laparoscopic appendectomy on 06/03/22 with Dr. Verde. For Perforated terminal ileum on 06/06/22. S/p Ex-lap with abdominal wall washout, Development of myofascial flaps and Implantation of biologic mesh on 06/08/22. Today patient reports doing better. \par She is having minimal pain. Denies any other symptoms.  Clinically the patient looks and feels much better than her last visit.  She has stopped taking the Questran because it made her nauseous but her bowel movements are returning more to normal.  Her recent blood studies done on July 11 are essentially unremarkable except her white count is around 12 which is not unexpected.

## 2022-07-14 NOTE — ASSESSMENT
[FreeTextEntry1] : Wound care and activity were discussed with the patient.  She is to return in 1 month.

## 2022-07-22 ENCOUNTER — APPOINTMENT (OUTPATIENT)
Dept: HEMATOLOGY ONCOLOGY | Facility: CLINIC | Age: 67
End: 2022-07-22

## 2022-07-25 ENCOUNTER — APPOINTMENT (OUTPATIENT)
Dept: MRI IMAGING | Facility: IMAGING CENTER | Age: 67
End: 2022-07-25

## 2022-07-26 NOTE — PHYSICAL THERAPY INITIAL EVALUATION ADULT - HEALTH SCREEN CRITERIA
yes
You can access the FollowMyHealth Patient Portal offered by [facility] by registering at the following website: [email or web address]. By joining [employer]’s FollowMyHealth portal, you will also be able to view your health information using other applications (apps) compatible with our system.

## 2022-08-09 ENCOUNTER — APPOINTMENT (OUTPATIENT)
Dept: SURGERY | Facility: CLINIC | Age: 67
End: 2022-08-09

## 2022-08-09 PROCEDURE — 99024 POSTOP FOLLOW-UP VISIT: CPT

## 2022-08-09 NOTE — PHYSICAL EXAM
[de-identified] : There is a sinus tract in the midportion of the midline incision with some drainage which looks like fat necrosis and not pus and also not GI content.

## 2022-08-09 NOTE — ASSESSMENT
[FreeTextEntry1] : Quarter inch packing was loosely placed into the draining sinus.  Wound care was discussed with the patient she is to return in a week to 10 days.

## 2022-08-09 NOTE — HISTORY OF PRESENT ILLNESS
[de-identified] : Chrissy is a 66 y/o female here for post op. S/p Laparoscopic appendectomy on 06/03/22 with Dr. Verde. For Perforated terminal  ileum on 06/06/22. S/p Ex-lap with abdominal wall washout and an ileo=colic resection, Development of myofascial flaps and Implantation of biologic mesh on 06/08/22 as a bridge.  This morning the patient states that she noticed some yellow drainage from the midportion of the midline incision.  The patient denies any fever or chills.

## 2022-08-10 ENCOUNTER — APPOINTMENT (OUTPATIENT)
Dept: CARDIOLOGY | Facility: CLINIC | Age: 67
End: 2022-08-10

## 2022-08-18 ENCOUNTER — APPOINTMENT (OUTPATIENT)
Dept: SURGERY | Facility: CLINIC | Age: 67
End: 2022-08-18

## 2022-08-18 VITALS
RESPIRATION RATE: 16 BRPM | HEART RATE: 67 BPM | TEMPERATURE: 97 F | SYSTOLIC BLOOD PRESSURE: 129 MMHG | DIASTOLIC BLOOD PRESSURE: 77 MMHG

## 2022-08-18 RX ORDER — ONDANSETRON 4 MG/1
4 TABLET, ORALLY DISINTEGRATING ORAL TWICE DAILY
Qty: 24 | Refills: 0 | Status: DISCONTINUED | COMMUNITY
Start: 2022-06-22 | End: 2022-08-18

## 2022-08-18 NOTE — PHYSICAL EXAM
[de-identified] : The patient still has persistent drainage from the midportion of her wound which is undermined for the entire length of the wound and draining some serous nonpurulent material.  Of note during her surgery she had a bridge of the abdominal wall with over tech and I suspect that the over tech will be gone in another month or so leaving just bowel underneath the skin.

## 2022-08-18 NOTE — HISTORY OF PRESENT ILLNESS
[de-identified] : Chrissy is a 66 y/o female here for post op. follow up.S/p Laparoscopic appendectomy on 06/03/22 with Dr. Verde. For Perforated terminal ileum on 06/06/22. S/p Ex-lap with abdominal wall washout and an ileo=colic resection, Development of myofascial flaps and Implantation of biologic mesh on 06/08/22 as a bridge.  \par Last seen 8/9/22 -There is a sinus tract in the midportion of the midline incision with some drainage which looks like fat necrosis and not pus and also not GI content. Quarter inch packing was loosely placed into the draining sinus. Wound care was discussed with the patient she is to return in a week to 10 days. \par \par Today patient reports pain 7/10 with movement and yellow drainage from surgical incision. 1-4 BM per day,occasional diarrhea, appetite improving, reports trying to eat healthy.denies nausea, vomiting. Denies fever, chills\par \par \par \par  \par

## 2022-08-23 ENCOUNTER — APPOINTMENT (OUTPATIENT)
Dept: HEMATOLOGY ONCOLOGY | Facility: CLINIC | Age: 67
End: 2022-08-23

## 2022-08-23 VITALS
BODY MASS INDEX: 24.34 KG/M2 | TEMPERATURE: 96.8 F | RESPIRATION RATE: 16 BRPM | OXYGEN SATURATION: 96 % | DIASTOLIC BLOOD PRESSURE: 81 MMHG | HEIGHT: 62.99 IN | WEIGHT: 137.35 LBS | SYSTOLIC BLOOD PRESSURE: 136 MMHG | HEART RATE: 83 BPM

## 2022-08-23 PROCEDURE — 99214 OFFICE O/P EST MOD 30 MIN: CPT

## 2022-08-24 NOTE — HISTORY OF PRESENT ILLNESS
[Disease: _____________________] : Disease: [unfilled] [T: ___] : T[unfilled] [N: ___] : N[unfilled] [M: ___] : M[unfilled] [AJCC Stage: ____] : AJCC Stage: [unfilled] [de-identified] : The patient's history of present illness began on 06/30/2020 when she had a routine mammogram with a finding of postsurgical changes compatible with her history of reduction mammoplasty; there was a focus of architectural distortion in the slightly lateral right breast at the level of the nipple, measuring approximately 10 mm with no associated microcalcifications, and no abnormal findings in the left breast; no axillary adenopathy was seen.  A right breast ultrasound performed on that same date demonstrated at the 9 o'clock axis 5 cm from the nipple, an irregularly marginated mass measuring 7 x 8 x 11 mm with ultrasound-guided core biopsy recommended; the left breast was unremarkable.  The patient then went on to have an ultrasound-guided core biopsy of the right breast on 07/18/2020 with a finding at the 8 o'clock axis 5 cm from nipple of an invasive moderately differentiated ductal carcinoma, Quincy score 6/9 with invasive tumor measuring at least 1 cm with evidence of DCIS, with microcalcifications identified in the invasive carcinoma and in the DCIS, with no evidence of lymphovascular invasion; the right axillary biopsy on that same date demonstrated invasive mammary carcinoma with lobular features, with necrosis and scattered lymphocytic infiltrate.  Estrogen receptor returned positive greater than 90%, progesterone receptor positive greater than 90% HER-2/charlotte negative.  An MRI of the bilateral breasts demonstrated a 3 cm enlarged lymph node in the right axilla and no significant left axillary or internal mammary adenopathy; in the right breast, there was a 15 mm irregular enhancing mass with a second enhancing irregular mass located 2.5 cm anterior, medial, inferior from the index mass at the 8 o'clock axis, anterior depth and measuring 11 mm.  The patient ultimately saw Dr. Liza Navarrete and on 07/28/2020 underwent a right lumpectomy/sentinel lymph node biopsy with a finding of multifocal invasive carcinoma, moderately differentiated with tubulolobular features measuring 1.1 cm in addition to an invasive moderately differentiated carcinoma with tubulolobular features measuring 1.5 cm with multiple separate small foci of invasive carcinoma with single file arrangement of tumor cells E-cadherin positive, DCIS, LCIS, with further shave margins specifically in the right superior breast margin returning negative for carcinoma but with evidence of DCIS and a single isolated duct measuring 0.2 cm with margins negative for DCIS, an additional shave margins returning negative; in the right axilla, metastatic ductal carcinoma involved 3/7 lymph nodes with a carcinoma being E-cadherin positive. \par \par The patient was seen in initial consultation on 8/3/20 regarding further treatment recommendations.  \par \par A Mammaprint was sent off and returned with a high risk score. An Oncotype for node positive cancer was also sent off (unintentionally) which resulted in a recurrence score of 23 translating to 19%  risk of distant recurrence and an absolute benefit from chemotherapy. Results of both these tests were discussed with her extensively and she has decided to proceed with chemotherapy with dose dense doxorubicin/cyclophosphamide followed by dose dense paclitaxel every 2 weeks x 4 with Pegfilgrastim support. \par \par 9/4/20  started on  treatment with  Dose dense doxorubicin/cyclophosphamide every 2 weeks followed by dose dense paclitaxel every 2 weeks x 4, both with pegfilgrastim,\par Withdrew participation  from  [id number] Olanzapine antinausea study on 9/7/20 C1D4\par \par S/P DD AC x 4 then DD Taxol #3 on 12/1/20. She developed gradually worsening CIPN and ultimately had gr 3 CIPN that did not resolve and DD Taxol # 4 / 4 was held / discontinued.  \par \par 1/13/2021 - 2/16/2021She completed adjuvant radiation - 4240cGy to R breast and regional LNs with a 1,000 boost to the R breast tumor bed under the care of Dr Evette Garcia. \par Started on Anastrozole in 2/2021. Which was subsequently discontinued due to arthralgias. Started on exemestane in 6/2021. Unable to tolerate. Subsequently switched to tamoxifen 20mg daily 7/23/2021. [de-identified] : ER+ [place]+ Her 2 charlotte - [de-identified] : Started on tamoxifen in 7/23/21.\par \par Pt seen 3/22 and apparently was tolerating dickinson fairly well. The called on 4/19/22 with c/o gradually worsening over 4 mo feeling depressed, didn’t want to wake up in morning, had no energy, no motivation, mood lability. She read about dickinson and that it can cause those symptoms. Recommended she hold 3-4 weeks and then call to discuss options ie prelim no further therapy vs trial of toremifene. Called back on 6/2/22 reporting all these symptoms resoled. We discussed option of a trial of toremifene vs no further anti-estrogen therapy - she agreed to a trial fo toremifene and an Rx was sent. \par \par Seen today in routine f/u.  She never started the toremifene. She noted her friend was staking exemestane and was tolerating it very well and wanted to start exemestane.  \par \par In the interim she had a lap AP complicated by subsequent bowel perf and infection requiring several surgeries, prolonged abx and has a persistent but improving drainage tract to her upper abd wall that is slowly closing. She c/o associated pain and discomfort which is gradually improving. \par \par She lost wt during these past weeks but stating to gain back again. Notes a good appetite and performance status that is gradually improving again after her surgeries.  \par \par \par mammo/sono 7/21 -BI-RADS2\par Breast MRI 12/2021 neg\par DEXA 4/21 osteopenia

## 2022-08-24 NOTE — PHYSICAL EXAM
[Restricted in physically strenuous activity but ambulatory and able to carry out work of a light or sedentary nature] : Status 1- Restricted in physically strenuous activity but ambulatory and able to carry out work of a light or sedentary nature, e.g., light house work, office work [Normal] : affect appropriate [de-identified] : Right; s/p reduction mastopexy as well as lumpectomy with well-healed scars, + chronic nipple retraction w/o change, no skin dimpling, or palpable masses. Left breast; s/p mastopexy with well-healed scar + chronic nipple retraction w/o change, no skin dimpling, or palpable masses. The bilateral axillae are without adenopathy.  [de-identified] : well healing midline abd surgical scar, dressing /packing dry / intact. NABS soft / NT, no HSM / masses

## 2022-09-01 ENCOUNTER — APPOINTMENT (OUTPATIENT)
Dept: SURGERY | Facility: CLINIC | Age: 67
End: 2022-09-01

## 2022-09-01 VITALS
SYSTOLIC BLOOD PRESSURE: 121 MMHG | TEMPERATURE: 97.3 F | RESPIRATION RATE: 16 BRPM | HEART RATE: 75 BPM | OXYGEN SATURATION: 98 % | DIASTOLIC BLOOD PRESSURE: 79 MMHG

## 2022-09-01 DIAGNOSIS — Z98.890 OTHER SPECIFIED POSTPROCEDURAL STATES: ICD-10-CM

## 2022-09-01 PROCEDURE — 99024 POSTOP FOLLOW-UP VISIT: CPT

## 2022-09-01 NOTE — HISTORY OF PRESENT ILLNESS
[de-identified] : Chrissy is a 68 y/o female here for post op. follow up.S/p Laparoscopic appendectomy on 06/03/22 with Dr. Verde. For Perforated terminal ileum on 06/06/22. S/p Ex-lap with abdominal wall washout and an ileo=colic resection, Development of myofascial flaps and Implantation of biologic mesh on 06/08/22 as a bridge. \par \par Today pt reports feeling well, no pain. Intermittent abdominal pain when moving bowels, takes Tylenol and Advil PRN. Pt reports feeling very tired and weak. Pt reports drainage from surgical incisions stopped 2 days ago. 2-4 mixed BMs daily. Appetite improving, no nausea, no vomiting. Denies fever and chills.

## 2022-09-01 NOTE — ASSESSMENT
[FreeTextEntry1] : I have again discussed with the patient that we were unable to close her abdominal wall completely due to her previous abdominoplasty and her distention and therefore there is a bridge of biologic mesh.  This may cause her to develop an incisional hernia in the future.

## 2022-09-01 NOTE — PHYSICAL EXAM
[de-identified] : There is no drainage from the midline incision.  When asked for the patient to set up there is no evidence of an impending or developing incisional hernia.

## 2022-09-14 ENCOUNTER — APPOINTMENT (OUTPATIENT)
Dept: DERMATOLOGY | Facility: CLINIC | Age: 67
End: 2022-09-14

## 2022-09-14 DIAGNOSIS — B35.1 TINEA UNGUIUM: ICD-10-CM

## 2022-09-14 DIAGNOSIS — B35.3 TINEA PEDIS: ICD-10-CM

## 2022-09-14 DIAGNOSIS — L91.0 HYPERTROPHIC SCAR: ICD-10-CM

## 2022-09-14 PROCEDURE — 99214 OFFICE O/P EST MOD 30 MIN: CPT

## 2022-10-04 ENCOUNTER — APPOINTMENT (OUTPATIENT)
Dept: SURGERY | Facility: CLINIC | Age: 67
End: 2022-10-04

## 2022-10-04 VITALS
SYSTOLIC BLOOD PRESSURE: 118 MMHG | OXYGEN SATURATION: 98 % | TEMPERATURE: 96.5 F | DIASTOLIC BLOOD PRESSURE: 81 MMHG | RESPIRATION RATE: 17 BRPM | HEART RATE: 80 BPM

## 2022-10-04 PROCEDURE — 99213 OFFICE O/P EST LOW 20 MIN: CPT

## 2022-10-04 NOTE — PHYSICAL EXAM
[de-identified] : There is still 1 small area of serous drainage which looks like less than a cc a day on the pad that is changed every day.  There is some swelling of the abdomen when the patient lifts her legs off the table suggestive of a developing incisional hernia.

## 2022-10-04 NOTE — HISTORY OF PRESENT ILLNESS
[de-identified] : Chrissy is a 68 y/o female here for post op. follow up.S/p Laparoscopic appendectomy on 06/03/22 with Dr. Verde. For Perforated terminal ileum on 06/06/22. S/p Ex-lap with abdominal wall washout and an ileocolic resection, Development of myofascial flaps and Implantation of biologic mesh on 06/08/22 as a bridge. \par \par Last seen on 9/1/22 - Gastrointestinal:. There is no drainage from the midline incision. When asked for the patient to set up there is no evidence of an impending or developing incisional hernia. I have again discussed with the patient that we were unable to close her abdominal wall completely due to her previous abdominoplasty and her distention and therefore there is a bridge of biologic mesh. This may cause her to develop an incisional hernia in the future\par \par Today pt reports 6/10 pain. Pain managed with Motrin. Denies swelling of surgical incision but does have daily yellow drainage and some redness. Denies nausea and vomiting. Denies fever and chills. Daily BM, soft, no bleeding, no straining. Good appetite. Not taking anticoagulants. \par \par Patient's main complaint today is ongoing hair loss.  I have told her I have no answer why that I doubt it is from her surgery.  She is told me that her primary care doctor is going to order a battery of tests and vitamin levels etc. to see if there is something that they can do for her.\par

## 2022-10-04 NOTE — ASSESSMENT
[FreeTextEntry1] : Wound care and activity were discussed with the patient she is to return in 1 month.

## 2022-10-07 ENCOUNTER — APPOINTMENT (OUTPATIENT)
Dept: INTERNAL MEDICINE | Facility: CLINIC | Age: 67
End: 2022-10-07

## 2022-10-07 VITALS
DIASTOLIC BLOOD PRESSURE: 86 MMHG | OXYGEN SATURATION: 98 % | TEMPERATURE: 98.2 F | BODY MASS INDEX: 24.1 KG/M2 | HEIGHT: 62.99 IN | WEIGHT: 136 LBS | HEART RATE: 64 BPM | SYSTOLIC BLOOD PRESSURE: 110 MMHG

## 2022-10-07 DIAGNOSIS — L65.9 NONSCARRING HAIR LOSS, UNSPECIFIED: ICD-10-CM

## 2022-10-07 PROCEDURE — 99214 OFFICE O/P EST MOD 30 MIN: CPT

## 2022-10-07 RX ORDER — AMMONIUM LACTATE 12 %
12 CREAM (GRAM) TOPICAL
Qty: 1 | Refills: 11 | Status: DISCONTINUED | COMMUNITY
Start: 2022-04-27 | End: 2022-10-07

## 2022-10-07 RX ORDER — TOREMIFENE CITRATE 60 MG/1
60 TABLET ORAL DAILY
Qty: 30 | Refills: 6 | Status: DISCONTINUED | COMMUNITY
Start: 2022-06-02 | End: 2022-10-07

## 2022-10-07 RX ORDER — PANTOPRAZOLE 40 MG/1
40 TABLET, DELAYED RELEASE ORAL
Qty: 30 | Refills: 0 | Status: DISCONTINUED | COMMUNITY
Start: 2022-07-27

## 2022-10-07 RX ORDER — CHOLESTYRAMINE 4 G/9G
4 POWDER, FOR SUSPENSION ORAL 3 TIMES DAILY
Qty: 14 | Refills: 1 | Status: DISCONTINUED | COMMUNITY
Start: 2022-07-05 | End: 2022-10-07

## 2022-10-09 ENCOUNTER — NON-APPOINTMENT (OUTPATIENT)
Age: 67
End: 2022-10-09

## 2022-10-10 ENCOUNTER — NON-APPOINTMENT (OUTPATIENT)
Age: 67
End: 2022-10-10

## 2022-10-10 PROBLEM — L65.9 HAIR LOSS: Status: ACTIVE | Noted: 2022-09-14

## 2022-10-10 LAB
25(OH)D3 SERPL-MCNC: 36.3 NG/ML
ALBUMIN SERPL ELPH-MCNC: 4.2 G/DL
ALP BLD-CCNC: 65 U/L
ALT SERPL-CCNC: 20 U/L
ANION GAP SERPL CALC-SCNC: 13 MMOL/L
AST SERPL-CCNC: 22 U/L
BASOPHILS # BLD AUTO: 0.02 K/UL
BASOPHILS NFR BLD AUTO: 0.3 %
BILIRUB SERPL-MCNC: 0.5 MG/DL
BUN SERPL-MCNC: 13 MG/DL
CALCIUM SERPL-MCNC: 10 MG/DL
CHLORIDE SERPL-SCNC: 103 MMOL/L
CHOLEST SERPL-MCNC: 159 MG/DL
CK SERPL-CCNC: 61 U/L
CO2 SERPL-SCNC: 25 MMOL/L
CREAT SERPL-MCNC: 0.56 MG/DL
EGFR: 99 ML/MIN/1.73M2
EOSINOPHIL # BLD AUTO: 0.14 K/UL
EOSINOPHIL NFR BLD AUTO: 2 %
ESTIMATED AVERAGE GLUCOSE: 105 MG/DL
FOLATE SERPL-MCNC: 13.5 NG/ML
GLUCOSE SERPL-MCNC: 95 MG/DL
HBA1C MFR BLD HPLC: 5.3 %
HCT VFR BLD CALC: 41.4 %
HDLC SERPL-MCNC: 78 MG/DL
HGB BLD-MCNC: 12.8 G/DL
IMM GRANULOCYTES NFR BLD AUTO: 0.1 %
LDLC SERPL CALC-MCNC: 52 MG/DL
LYMPHOCYTES # BLD AUTO: 3.69 K/UL
LYMPHOCYTES NFR BLD AUTO: 53.5 %
MAN DIFF?: NORMAL
MCHC RBC-ENTMCNC: 26.7 PG
MCHC RBC-ENTMCNC: 30.9 GM/DL
MCV RBC AUTO: 86.3 FL
MONOCYTES # BLD AUTO: 0.53 K/UL
MONOCYTES NFR BLD AUTO: 7.7 %
NEUTROPHILS # BLD AUTO: 2.51 K/UL
NEUTROPHILS NFR BLD AUTO: 36.4 %
NONHDLC SERPL-MCNC: 81 MG/DL
PLATELET # BLD AUTO: 240 K/UL
POTASSIUM SERPL-SCNC: 4.1 MMOL/L
PROT SERPL-MCNC: 8 G/DL
RBC # BLD: 4.8 M/UL
RBC # FLD: 18.6 %
SODIUM SERPL-SCNC: 141 MMOL/L
T4 FREE SERPL-MCNC: 1.1 NG/DL
TRIGL SERPL-MCNC: 144 MG/DL
TSH SERPL-ACNC: 1.78 UIU/ML
VIT B12 SERPL-MCNC: 381 PG/ML
WBC # FLD AUTO: 6.9 K/UL

## 2022-10-10 NOTE — PHYSICAL EXAM
[No Acute Distress] : no acute distress [Well Developed] : well developed [Well-Appearing] : well-appearing [Normal Sclera/Conjunctiva] : normal sclera/conjunctiva [Normal Outer Ear/Nose] : the outer ears and nose were normal in appearance [Normal Oropharynx] : the oropharynx was normal [No JVD] : no jugular venous distention [Supple] : supple [No Respiratory Distress] : no respiratory distress  [No Accessory Muscle Use] : no accessory muscle use [Clear to Auscultation] : lungs were clear to auscultation bilaterally [Normal Rate] : normal rate  [Regular Rhythm] : with a regular rhythm [Normal S1, S2] : normal S1 and S2 [No Murmur] : no murmur heard [No Carotid Bruits] : no carotid bruits [No Varicosities] : no varicosities [Pedal Pulses Present] : the pedal pulses are present [No Edema] : there was no peripheral edema [No Extremity Clubbing/Cyanosis] : no extremity clubbing/cyanosis [Soft] : abdomen soft [Non Tender] : non-tender [Non-distended] : non-distended [Normal Bowel Sounds] : normal bowel sounds [Normal Posterior Cervical Nodes] : no posterior cervical lymphadenopathy [Normal Anterior Cervical Nodes] : no anterior cervical lymphadenopathy [No CVA Tenderness] : no CVA  tenderness [No Spinal Tenderness] : no spinal tenderness [No Joint Swelling] : no joint swelling [Grossly Normal Strength/Tone] : grossly normal strength/tone [Coordination Grossly Intact] : coordination grossly intact [No Focal Deficits] : no focal deficits [Normal Gait] : normal gait [Deep Tendon Reflexes (DTR)] : deep tendon reflexes were 2+ and symmetric [Alert and Oriented x3] : oriented to person, place, and time [de-identified] : well vertical surgical scar [de-identified] : few scattered area of hair loss on scalp

## 2022-10-10 NOTE — HISTORY OF PRESENT ILLNESS
[FreeTextEntry1] : hair loss [de-identified] : 68 y/o female breast Ca,.S/p Laparoscopic appendectomy on 06/03/22 c/b Perforated terminal ileum on 06/06/22. S/p Ex-lap with abdominal wall washout and an ileocolic resection, Development of myofascial flaps and Implantation of biologic mesh on 06/08/22 as a bridge p/w hair loss. Pt says her last chemo was 2 years ago, last radiation 1 year ago but still having hair loss. She is on oral chemo but not taking toremifene although prescribed.\par Reports normal diet, no rashes.\par Patient feels well. Denies chest pain, sob, newman, dizziness, palpitations, LE swelling, syncope, n/v, headache.\par No abd pain, diarrhea. \par

## 2022-10-10 NOTE — REVIEW OF SYSTEMS
[Itching] : no itching [Mole Changes] : no mole changes [Nail Changes] : no nail changes [Hair Changes] : hair changes [Skin Rash] : no skin rash [Negative] : Heme/Lymph

## 2022-10-11 RX ORDER — LEVOTHYROXINE SODIUM 50 UG/1
50 TABLET ORAL DAILY
Qty: 90 | Refills: 0 | Status: ACTIVE | COMMUNITY
Start: 2022-10-10 | End: 1900-01-01

## 2022-10-19 ENCOUNTER — RX RENEWAL (OUTPATIENT)
Age: 67
End: 2022-10-19

## 2022-10-26 ENCOUNTER — APPOINTMENT (OUTPATIENT)
Dept: CARDIOLOGY | Facility: CLINIC | Age: 67
End: 2022-10-26

## 2022-10-26 VITALS
HEART RATE: 79 BPM | WEIGHT: 135 LBS | RESPIRATION RATE: 16 BRPM | DIASTOLIC BLOOD PRESSURE: 72 MMHG | OXYGEN SATURATION: 97 % | BODY MASS INDEX: 24.84 KG/M2 | HEIGHT: 62 IN | TEMPERATURE: 97.3 F | SYSTOLIC BLOOD PRESSURE: 115 MMHG

## 2022-10-26 PROCEDURE — 99213 OFFICE O/P EST LOW 20 MIN: CPT

## 2022-10-26 NOTE — DISCUSSION/SUMMARY
[FreeTextEntry1] : This is a 67-year-old female with past medical history significant for hypothyroidism, dyspepsia, status post appendectomy complicated by peritonitis June 2022 chemotherapy for breast cancer, familial hypercholesterolemia, who comes in for a lipid follow up evaluation.  She denies chest pain, shortness of breath, dizziness or syncope.\par The patient reports that she has not seen a cardiologist in over 3 years and is transferring her care to [facility] from [facility].\par Electrocardiogram done March 28, 2022 demonstrate normal sinus rhythm rate of 78 bpm is otherwise unremarkable.\par Lipid panel done October 7, 2022 demonstrated cholesterol 159, LDL calculated 52, HDL of 78 mg/dL, non-HDL cholesterol of 81 mg/dL triglycerides of 144 mg/dL.\par The patient will remain on her current dose of Repatha 140 mg subcutaneously twice per month.\par She will have new blood work done in 4 to 6 months.\par She is instructed to follow-up with her primary care physician and regular cardiologist.\par Blood work done October 13, 2021 demonstrated a cholesterol 134, direct LDL of 52 mg/dL, HDL 53 mg/dL, triglycerides 174 mg/dL, non-HDL cholesterol 81 mg/dL.\par Patient will also continue on her current diet and walking program\par \par She reports her mother had high cholesterol.\par She has no history of rheumatic fever.  She does not drink excessive caffeine or alcohol.  She reports that her diet is reasonable.\par \par Blood work done October 8, 2019 demonstrated a total cholesterol of 328 mg/dL,  mg/dL, HDL 52 mg/dL, triglycerides 230 mg/dL.  These findings are consistent with heterozygous familial hypercholesterolemia.\par   \par She has been on Zocor, Crestor, Lipitor and Pravachol associated with significant incapacitating muscle aches.  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 Normal TSH.\par \par The patient had blood work done April 16, 2013 which demonstrated an elevated C-reactive protein of 4.6, total cholesterol 280 mg/dL, LDL cholesterol calculated 173 mg/dL, HDL 51 mg/dL, triglycerides 281 mg/dL.  The patient has evidence for familial hypercholesterolemia. She has no physical stigmata of FH. I have advised the patient and  to have her children, and her own siblings tested for lipids.  She promises to do so.\par \par The patient understands that aerobic exercises must be increased to 40 minutes 4 times per week. A detailed discussion of lifestyle modification was done today. The patient has a good understanding of the diagnosis, and treatment plan. Lifestyle modification was also outlined.

## 2022-10-26 NOTE — ASSESSMENT
[FreeTextEntry1] : Prior note nurse practitioner Lisa October 12, 2021::\par \par This is a 66 year year old female here today for follow up cardiac evaluation. \par She has a past medical history significant for  hypothyroidism, dyspepsia, currently undergoing chemotherapy for breast cancer, familial hypercholesterolemia.\par \par Today she is feeling generally well and does not have any complaints at this time. She is here for cholesterol management and is currently on Repatha. her last injection was on 10/4/2021. She states that the injection before that done on 9/6/2021 gave her a reaction on her abdomen however, she had not experienced this reaction prior or after. She now gives herself injections on her thighs instead of her abdomen. She is on Tamoxifen for management of her breast CA and does follow up with her oncologist.\par \par -Pt is stable from a cardiac standpoint and does not have any complaints at this time. She states that she finished her chemo/radiation about 2 weeks ago.\par \par -She reports her mother had high cholesterol. She has no history of rheumatic fever.  She does not drink excessive caffeine or alcohol.\par \par BLOOD PRESSURE:\par -BP is well controlled in today's visit.\par \par BLOOD WORK:\par -New blood work was done today, 10/12/2021  to evaluate lipid profile, CBC, BMP, hepatic function, A1C and TSH. \par -Pt currently on Repatha.\par -She has been on Zocor, Crestor, Lipitor and Pravachol associated with significant incapacitating muscle aches.  All symptoms were relieved after discontinuing the medication.\par -This patient is clearly statin intolerant.\par \par CHOLESTEROL CONTROL:\par -Patient will continue the advised  TLC diet and to continue follow-up for treatment of hyperlipidemia and repeat blood testing with diet and exercise. I have discussed different exercises and the importance of maintenance of optimal body weight. The importance of staying within guidelines and recommendations was stressed to the patient today and they acknowledged that they understand this to me verbally.\par \par  -Ms. MARTIN was educated and advised that failure to follow-up with my medical recommendations to lower cholesterol can result in severe health consequences therefore, they will continuing a low saturated and low fat diet and to avoid excessive carbohydrates to help reduce triglycerides and that lowering LDL levels is associated with a significant decrease in serious cardiac events including but not limited to heart attack stroke and overall death. We will continue lipid lowering agents as advised based on blood test results and the patient understands to call if they develop severe muscle discomfort or if they have a reddish tinted discoloration in their urine.\par \par TESTING/REPORTS:\par -EKG done 10/12/2021  which demonstrated regular sinus rhythm with nonspecific ST-T wave changes, BPM of 68.\par \par PLAN:\par -The patient will continue with he Repatha injections.\par -She will follow up with her Cardiologist.\par -She will follow up with her Oncologist.\par \par I have discussed the plan of care with Ms. GRETEL MARTIN  and she  will follow up in 3 months. She is compliant with all of her medications.\par \par The patient understands that aerobic exercises must be increased to minutes 4 times/week and a detailed discussion of lifestyle modification was done today. \par The patient has a good understanding of the diagnosis, treatment plan and lifestyle modification. \par She will contact me at the office for any questions with their care or any changes in their health status.\par \par The plan of care was discussed with supervising physician, Dr. Barragan while present in the office at the time of the visit. \par \par Tamar ABRAHAM

## 2022-10-26 NOTE — REASON FOR VISIT
[CV Risk Factors and Non-Cardiac Disease] : CV risk factors and non-cardiac disease [Follow-Up - Clinic] : a clinic follow-up of [Hyperlipidemia] : hyperlipidemia [FreeTextEntry2] : Lipid Follow Up [FreeTextEntry1] : Hypercholesterolemia

## 2022-10-26 NOTE — PHYSICAL EXAM
[Well Developed] : well developed [Well Nourished] : well nourished [No Acute Distress] : no acute distress [Normal Venous Pressure] : normal venous pressure [No Carotid Bruit] : no carotid bruit [Normal S1, S2] : normal S1, S2 [No Murmur] : no murmur [No Rub] : no rub [Clear Lung Fields] : clear lung fields [Good Air Entry] : good air entry [No Respiratory Distress] : no respiratory distress  [Soft] : abdomen soft [Non Tender] : non-tender [No Masses/organomegaly] : no masses/organomegaly [Normal Bowel Sounds] : normal bowel sounds [Normal Gait] : normal gait [No Edema] : no edema [No Cyanosis] : no cyanosis [No Clubbing] : no clubbing [No Varicosities] : no varicosities [No Rash] : no rash [No Skin Lesions] : no skin lesions [Moves all extremities] : moves all extremities [No Focal Deficits] : no focal deficits [Normal Speech] : normal speech [Alert and Oriented] : alert and oriented [Normal memory] : normal memory [General Appearance - Well Developed] : well developed [Normal Appearance] : normal appearance [Well Groomed] : well groomed [General Appearance - Well Nourished] : well nourished [No Deformities] : no deformities [General Appearance - In No Acute Distress] : no acute distress [Normal Conjunctiva] : the conjunctiva exhibited no abnormalities [Normal Oral Mucosa] : normal oral mucosa [Normal Oropharynx] : normal oropharynx [Normal Jugular Venous A Waves Present] : normal jugular venous A waves present [Normal Jugular Venous V Waves Present] : normal jugular venous V waves present [No Jugular Venous Daniel A Waves] : no jugular venous daniel A waves [Respiration, Rhythm And Depth] : normal respiratory rhythm and effort [Exaggerated Use Of Accessory Muscles For Inspiration] : no accessory muscle use [Auscultation Breath Sounds / Voice Sounds] : lungs were clear to auscultation bilaterally [Bowel Sounds] : normal bowel sounds [Abdomen Soft] : soft [Abnormal Walk] : normal gait [Gait - Sufficient For Exercise Testing] : the gait was sufficient for exercise testing [Nail Clubbing] : no clubbing of the fingernails [Cyanosis, Localized] : no localized cyanosis [Skin Color & Pigmentation] : normal skin color and pigmentation [Skin Turgor] : normal skin turgor [] : no rash [Oriented To Time, Place, And Person] : oriented to person, place, and time [Affect] : the affect was normal [Impaired Insight] : insight and judgment were intact [Mood] : the mood was normal [No Anxiety] : not feeling anxious [5th Left ICS - MCL] : palpated at the 5th LICS in the midclavicular line [Normal] : normal [No Precordial Heave] : no precordial heave was noted [Normal Rate] : normal [Rhythm Regular] : regular [Normal S1] : normal S1 [Normal S2] : normal S2 [No Gallop] : no gallop heard [I] : a grade 1 [2+] : left 2+ [No Abnormalities] : the abdominal aorta was not enlarged and no bruit was heard [No Pitting Edema] : no pitting edema present [FreeTextEntry1] : No xanthomas [S3] : no S3 [S4] : no S4 [Right Carotid Bruit] : no bruit heard over the right carotid [Left Carotid Bruit] : no bruit heard over the left carotid [Right Femoral Bruit] : no bruit heard over the right femoral artery [Left Femoral Bruit] : no bruit heard over the left femoral artery

## 2022-10-28 ENCOUNTER — RESULT REVIEW (OUTPATIENT)
Age: 67
End: 2022-10-28

## 2022-11-01 ENCOUNTER — APPOINTMENT (OUTPATIENT)
Dept: ULTRASOUND IMAGING | Facility: IMAGING CENTER | Age: 67
End: 2022-11-01

## 2022-11-01 ENCOUNTER — APPOINTMENT (OUTPATIENT)
Dept: MAMMOGRAPHY | Facility: IMAGING CENTER | Age: 67
End: 2022-11-01

## 2022-11-01 ENCOUNTER — APPOINTMENT (OUTPATIENT)
Dept: SURGERY | Facility: CLINIC | Age: 67
End: 2022-11-01

## 2022-11-01 ENCOUNTER — OUTPATIENT (OUTPATIENT)
Dept: OUTPATIENT SERVICES | Facility: HOSPITAL | Age: 67
LOS: 1 days | End: 2022-11-01
Payer: COMMERCIAL

## 2022-11-01 DIAGNOSIS — Z00.8 ENCOUNTER FOR OTHER GENERAL EXAMINATION: ICD-10-CM

## 2022-11-01 DIAGNOSIS — Z90.710 ACQUIRED ABSENCE OF BOTH CERVIX AND UTERUS: Chronic | ICD-10-CM

## 2022-11-01 DIAGNOSIS — Z98.890 OTHER SPECIFIED POSTPROCEDURAL STATES: Chronic | ICD-10-CM

## 2022-11-01 DIAGNOSIS — Z90.49 ACQUIRED ABSENCE OF OTHER SPECIFIED PARTS OF DIGESTIVE TRACT: Chronic | ICD-10-CM

## 2022-11-01 PROCEDURE — 76641 ULTRASOUND BREAST COMPLETE: CPT | Mod: 26,50

## 2022-11-01 PROCEDURE — 77066 DX MAMMO INCL CAD BI: CPT

## 2022-11-01 PROCEDURE — G0279: CPT | Mod: 26

## 2022-11-01 PROCEDURE — 77066 DX MAMMO INCL CAD BI: CPT | Mod: 26

## 2022-11-01 PROCEDURE — 76641 ULTRASOUND BREAST COMPLETE: CPT

## 2022-11-01 PROCEDURE — G0279: CPT

## 2022-11-07 ENCOUNTER — RESULT REVIEW (OUTPATIENT)
Age: 67
End: 2022-11-07

## 2022-11-07 ENCOUNTER — OUTPATIENT (OUTPATIENT)
Dept: OUTPATIENT SERVICES | Facility: HOSPITAL | Age: 67
LOS: 1 days | End: 2022-11-07
Payer: COMMERCIAL

## 2022-11-07 ENCOUNTER — APPOINTMENT (OUTPATIENT)
Dept: ULTRASOUND IMAGING | Facility: IMAGING CENTER | Age: 67
End: 2022-11-07

## 2022-11-07 DIAGNOSIS — Z98.890 OTHER SPECIFIED POSTPROCEDURAL STATES: Chronic | ICD-10-CM

## 2022-11-07 DIAGNOSIS — Z90.710 ACQUIRED ABSENCE OF BOTH CERVIX AND UTERUS: Chronic | ICD-10-CM

## 2022-11-07 DIAGNOSIS — Z90.49 ACQUIRED ABSENCE OF OTHER SPECIFIED PARTS OF DIGESTIVE TRACT: Chronic | ICD-10-CM

## 2022-11-07 DIAGNOSIS — Z00.8 ENCOUNTER FOR OTHER GENERAL EXAMINATION: ICD-10-CM

## 2022-11-07 PROCEDURE — 88305 TISSUE EXAM BY PATHOLOGIST: CPT

## 2022-11-07 PROCEDURE — 88305 TISSUE EXAM BY PATHOLOGIST: CPT | Mod: 26

## 2022-11-07 PROCEDURE — 19083 BX BREAST 1ST LESION US IMAG: CPT

## 2022-11-07 PROCEDURE — A4648: CPT

## 2022-11-07 PROCEDURE — 19083 BX BREAST 1ST LESION US IMAG: CPT | Mod: RT

## 2022-11-07 PROCEDURE — 77065 DX MAMMO INCL CAD UNI: CPT

## 2022-11-07 PROCEDURE — 77065 DX MAMMO INCL CAD UNI: CPT | Mod: 26,RT

## 2022-11-08 ENCOUNTER — APPOINTMENT (OUTPATIENT)
Dept: SURGERY | Facility: CLINIC | Age: 67
End: 2022-11-08

## 2022-11-08 VITALS
SYSTOLIC BLOOD PRESSURE: 124 MMHG | RESPIRATION RATE: 16 BRPM | OXYGEN SATURATION: 99 % | TEMPERATURE: 96.3 F | DIASTOLIC BLOOD PRESSURE: 77 MMHG | HEART RATE: 72 BPM

## 2022-11-08 PROCEDURE — 99214 OFFICE O/P EST MOD 30 MIN: CPT

## 2022-11-08 NOTE — HISTORY OF PRESENT ILLNESS
[de-identified] : Chrissy is a 66 y/o female here for post op. follow up.S/p Laparoscopic appendectomy on 06/03/22 with Dr. Verde. For Perforated terminal ileum on 06/06/22. S/p Ex-lap with abdominal wall washout and an ileocolic resection, Development of myofascial flaps and Implantation of biologic mesh on 06/08/22 as a bridge. \par Last seen on 10/04/22- here is still 1 small area of serous drainage which looks like less than a cc a day on the pad that is changed every day. There is some swelling of the abdomen when the patient lifts her legs off the table suggestive of a developing incisional hernia. Wound care and activity were discussed with the patient \par \par Today pt reports feeling no pain.  Pt c/o bloating and abdominal swelling.  Formed BMs daily, occasional straining.   Decreased appetite since two months after surgery.   No c/o nausea/vomiting.  Denies fever and chills.  Not on any anticoagulants.  Abdominal incision midline SURYA, patient reported scant drainage light yellow non-odorous two days ago, currently no drainage.  \par

## 2022-11-08 NOTE — ASSESSMENT
[FreeTextEntry1] : I had a long and detailed discussion with the patient describing this incisional hernia that has developed.  The patient is leaving for [place] for 3 months I have told her that I should see her when she comes back.  I have discussed with her the reconstruction of the abdominal wall using mesh that stays with her permanently.  I have discussed with her the risk benefits and alternatives of the repair that I believe she will need.

## 2022-11-08 NOTE — PHYSICAL EXAM
[Normal Breath Sounds] : Normal breath sounds [Normal Heart Sounds] : normal heart sounds [No Rash or Lesion] : No rash or lesion [Alert] : alert [Oriented to Person] : oriented to person [Oriented to Place] : oriented to place [Oriented to Time] : oriented to time [Calm] : calm [Abdominal Masses] : No abdominal masses [Abdomen Tenderness] : ~T ~M No abdominal tenderness [No HSM] : no hepatosplenomegaly [de-identified] : WNL  [de-identified] : WNL [de-identified] : CEDRICKL [de-identified] : Peers to be a definite that the large incisional hernia present. [de-identified] : WNL [de-identified] : WNL ROM [de-identified] : WNL

## 2022-11-18 ENCOUNTER — OUTPATIENT (OUTPATIENT)
Dept: OUTPATIENT SERVICES | Facility: HOSPITAL | Age: 67
LOS: 1 days | Discharge: HOME | End: 2022-11-18

## 2022-11-18 DIAGNOSIS — Z98.890 OTHER SPECIFIED POSTPROCEDURAL STATES: Chronic | ICD-10-CM

## 2022-11-18 DIAGNOSIS — Z90.710 ACQUIRED ABSENCE OF BOTH CERVIX AND UTERUS: Chronic | ICD-10-CM

## 2022-11-18 DIAGNOSIS — Z90.49 ACQUIRED ABSENCE OF OTHER SPECIFIED PARTS OF DIGESTIVE TRACT: Chronic | ICD-10-CM

## 2022-11-18 DIAGNOSIS — C50.919 MALIGNANT NEOPLASM OF UNSPECIFIED SITE OF UNSPECIFIED FEMALE BREAST: ICD-10-CM

## 2022-11-23 ENCOUNTER — APPOINTMENT (OUTPATIENT)
Dept: HEMATOLOGY ONCOLOGY | Facility: CLINIC | Age: 67
End: 2022-11-23

## 2022-11-23 VITALS
SYSTOLIC BLOOD PRESSURE: 126 MMHG | TEMPERATURE: 97.3 F | WEIGHT: 137.99 LBS | DIASTOLIC BLOOD PRESSURE: 80 MMHG | BODY MASS INDEX: 25.39 KG/M2 | HEART RATE: 71 BPM | HEIGHT: 62 IN | RESPIRATION RATE: 16 BRPM | OXYGEN SATURATION: 97 %

## 2022-11-23 PROCEDURE — 99213 OFFICE O/P EST LOW 20 MIN: CPT

## 2022-11-24 NOTE — PHYSICAL EXAM
[Restricted in physically strenuous activity but ambulatory and able to carry out work of a light or sedentary nature] : Status 1- Restricted in physically strenuous activity but ambulatory and able to carry out work of a light or sedentary nature, e.g., light house work, office work [Normal] : affect appropriate [de-identified] : Right; s/p reduction mastopexy as well as lumpectomy with well-healed scars, + chronic nipple retraction w/o change, no skin dimpling, or palpable masses. Left breast; s/p mastopexy with well-healed scar + chronic nipple retraction w/o change, no skin dimpling, or palpable masses. The bilateral axillae are without adenopathy.  [de-identified] : well healing midline abd surgical scars, NABS soft / NT, no HSM / masses

## 2022-11-24 NOTE — HISTORY OF PRESENT ILLNESS
[Disease: _____________________] : Disease: [unfilled] [T: ___] : T[unfilled] [N: ___] : N[unfilled] [M: ___] : M[unfilled] [AJCC Stage: ____] : AJCC Stage: [unfilled] [de-identified] : The patient's history of present illness began on 06/30/2020 when she had a routine mammogram with a finding of postsurgical changes compatible with her history of reduction mammoplasty; there was a focus of architectural distortion in the slightly lateral right breast at the level of the nipple, measuring approximately 10 mm with no associated microcalcifications, and no abnormal findings in the left breast; no axillary adenopathy was seen.  A right breast ultrasound performed on that same date demonstrated at the 9 o'clock axis 5 cm from the nipple, an irregularly marginated mass measuring 7 x 8 x 11 mm with ultrasound-guided core biopsy recommended; the left breast was unremarkable.  The patient then went on to have an ultrasound-guided core biopsy of the right breast on 07/18/2020 with a finding at the 8 o'clock axis 5 cm from nipple of an invasive moderately differentiated ductal carcinoma, Cresson score 6/9 with invasive tumor measuring at least 1 cm with evidence of DCIS, with microcalcifications identified in the invasive carcinoma and in the DCIS, with no evidence of lymphovascular invasion; the right axillary biopsy on that same date demonstrated invasive mammary carcinoma with lobular features, with necrosis and scattered lymphocytic infiltrate.  Estrogen receptor returned positive greater than 90%, progesterone receptor positive greater than 90% HER-2/charlotte negative.  An MRI of the bilateral breasts demonstrated a 3 cm enlarged lymph node in the right axilla and no significant left axillary or internal mammary adenopathy; in the right breast, there was a 15 mm irregular enhancing mass with a second enhancing irregular mass located 2.5 cm anterior, medial, inferior from the index mass at the 8 o'clock axis, anterior depth and measuring 11 mm.  The patient ultimately saw Dr. Liza Navarrete and on 07/28/2020 underwent a right lumpectomy/sentinel lymph node biopsy with a finding of multifocal invasive carcinoma, moderately differentiated with tubulolobular features measuring 1.1 cm in addition to an invasive moderately differentiated carcinoma with tubulolobular features measuring 1.5 cm with multiple separate small foci of invasive carcinoma with single file arrangement of tumor cells E-cadherin positive, DCIS, LCIS, with further shave margins specifically in the right superior breast margin returning negative for carcinoma but with evidence of DCIS and a single isolated duct measuring 0.2 cm with margins negative for DCIS, an additional shave margins returning negative; in the right axilla, metastatic ductal carcinoma involved 3/7 lymph nodes with a carcinoma being E-cadherin positive. \par \par The patient was seen in initial consultation on 8/3/20 regarding further treatment recommendations.  \par \par A Mammaprint was sent off and returned with a high risk score. An Oncotype for node positive cancer was also sent off (unintentionally) which resulted in a recurrence score of 23 translating to 19%  risk of distant recurrence and an absolute benefit from chemotherapy. Results of both these tests were discussed with her extensively and she has decided to proceed with chemotherapy with dose dense doxorubicin/cyclophosphamide followed by dose dense paclitaxel every 2 weeks x 4 with Pegfilgrastim support. \par \par 9/4/20  started on  treatment with  Dose dense doxorubicin/cyclophosphamide every 2 weeks followed by dose dense paclitaxel every 2 weeks x 4, both with pegfilgrastim,\par Withdrew participation  from  [id number] Olanzapine antinausea study on 9/7/20 C1D4\par \par S/P DD AC x 4 then DD Taxol #3 on 12/1/20. She developed gradually worsening CIPN and ultimately had gr 3 CIPN that did not resolve and DD Taxol # 4 / 4 was held / discontinued.  \par \par 1/13/2021 - 2/16/2021She completed adjuvant radiation - 4240cGy to R breast and regional LNs with a 1,000 boost to the R breast tumor bed under the care of Dr Evette Garcia. \par Started on Anastrozole in 2/2021. Which was subsequently discontinued due to arthralgias. Started on exemestane in 6/2021. Unable to tolerate. Subsequently switched to tamoxifen 20mg daily 7/23/2021. [de-identified] : ER+ IL+ Her 2 charlotte - [de-identified] : Started on tamoxifen in 7/23/21.\par \par Pt seen 3/22 and apparently was tolerating dickinson fairly well. The called on 4/19/22 with c/o gradually worsening over 4 mo feeling depressed, didn’t want to wake up in morning, had no energy, no motivation, mood lability. She read about dickinson and that it can cause those symptoms. Recommended she hold 3-4 weeks and then call to discuss options ie prelim no further therapy vs trial of toremifene. Called back on 6/2/22 reporting all these symptoms resoled. We discussed option of a trial of toremifene vs no further anti-estrogen therapy - she agreed to a trial fo toremifene and an Rx was sent. \par \par On 8/22 visit she informed me she never started the toremifene. She noted her friend was staking exemestane and was tolerating it very well and wanted to start exemestane.  \par \par On 10/15/22 she called - had started toremifene- developed all body aches after 1 week. She d/c'd it. We discussed that she was not able to tolerate any anti-estrogen therapy. She agreed.\par \par Today she reports those symptoms resolved. \par \par She reports that she is anxious re not being able to tolerate anti-estrogen therapy. Has researched and spoke to other cancer pt (s) and has starte dto take "CBD-THC" paste orally / daily as well as "CBD oil" at  night to sleep. Reports that overall that this has improved her overall sense of well being.  \par \par Earlier this year had a lap AP complicated by subsequent bowel perf and infection requiring several surgeries, prolonged abx and has a persistent but improving drainage tract to her upper abd wall that was slowly closing. She c/o associated pain and discomfort. MOre recently she notes she has several abdominal hernias and is wearing a compression band to minimize her risk of incarceration and for comfort - planning on having repair surgery in the coming months after her prior scars heal over.  which is gradually improving. \par \par Notes a good appetite and performance status that is gradually improving again after her surgeries.  \par \par B/L mammo/sono 11/1/22\par IMPRESSION:\par 1. No radiographic evidence of malignancy and no significant radiographic \par change.\par 2. Newly seen hypoechoic nodule at the 7:00 right breast by sonography \par for which ultrasound-guided core biopsy is recommended.\par 3. No sonographic evidence of malignancy on the left.\par \par The findings and recommendations for this examination were reported to \par Dr. Navarrete by [facility] secure email on 11/1/2022 at 6:15 PM.\par \par RECOMMENDATION:  Ultrasound biopsy.\par \par US guided core bx 11/9/22 - fibroadenoma\par \par Breast MRI 12/2021 neg\par DEXA 4/21 osteopenia

## 2022-11-29 ENCOUNTER — APPOINTMENT (OUTPATIENT)
Dept: SURGERY | Facility: CLINIC | Age: 67
End: 2022-11-29

## 2022-11-29 VITALS
HEIGHT: 62 IN | SYSTOLIC BLOOD PRESSURE: 124 MMHG | DIASTOLIC BLOOD PRESSURE: 85 MMHG | OXYGEN SATURATION: 100 % | WEIGHT: 139 LBS | RESPIRATION RATE: 16 BRPM | HEART RATE: 81 BPM | TEMPERATURE: 96.9 F | BODY MASS INDEX: 25.58 KG/M2

## 2022-11-29 PROCEDURE — 99215 OFFICE O/P EST HI 40 MIN: CPT

## 2022-11-29 NOTE — PHYSICAL EXAM
[JVD] : no jugular venous distention  [Normal Breath Sounds] : Normal breath sounds [Normal Heart Sounds] : normal heart sounds [No Rash or Lesion] : No rash or lesion [Alert] : alert [Oriented to Person] : oriented to person [Oriented to Place] : oriented to place [Oriented to Time] : oriented to time [Calm] : calm [de-identified] : Well-developed white female in no acute distress [de-identified] : Within normal limits cranial nerves II through XII intact- [de-identified] : There is an obvious incisional hernia present which is soft and nontender [de-identified] : Normal strength and gait

## 2022-11-29 NOTE — HISTORY OF PRESENT ILLNESS
[de-identified] : Chrissy is a 68 y/o female here for a follow up. S/p Laparoscopic appendectomy on 06/03/22 with Dr. Verde.  Patient in the postoperative period had an inadvertent injury to the terminal ileum which required an ileocolic resection some 2 to 3 days postoperative.  The patient was to return to the operating room emergently and had the ileocolic resection and due to her previous abdominoplasty the abdominal bowel distention made it impossible to close the abdomen primarily and this was bridged with a piece of over tech.  The patient has subsequently done well but has developed an incisional hernia which is not unexpected.

## 2023-02-23 ENCOUNTER — APPOINTMENT (OUTPATIENT)
Dept: SURGERY | Facility: CLINIC | Age: 68
End: 2023-02-23
Payer: COMMERCIAL

## 2023-02-23 ENCOUNTER — RESULT REVIEW (OUTPATIENT)
Age: 68
End: 2023-02-23

## 2023-02-23 VITALS
RESPIRATION RATE: 16 BRPM | HEART RATE: 69 BPM | SYSTOLIC BLOOD PRESSURE: 122 MMHG | HEIGHT: 62 IN | BODY MASS INDEX: 25.4 KG/M2 | OXYGEN SATURATION: 98 % | DIASTOLIC BLOOD PRESSURE: 80 MMHG | WEIGHT: 138 LBS | TEMPERATURE: 95.2 F

## 2023-02-23 DIAGNOSIS — K43.2 INCISIONAL HERNIA W/OUT OBSTRUCTION OR GANGRENE: ICD-10-CM

## 2023-02-23 PROCEDURE — 99214 OFFICE O/P EST MOD 30 MIN: CPT

## 2023-02-23 NOTE — PHYSICAL EXAM
[JVD] : no jugular venous distention  [Normal Breath Sounds] : Normal breath sounds [Normal Heart Sounds] : normal heart sounds [Abdominal Masses] : No abdominal masses [Abdomen Tenderness] : ~T ~M No abdominal tenderness [No HSM] : no hepatosplenomegaly [No Rash or Lesion] : No rash or lesion [Alert] : alert [Oriented to Person] : oriented to person [Oriented to Place] : oriented to place [Oriented to Time] : oriented to time [Calm] : calm [de-identified] : Well-developed well-nourished white female in no acute distress [de-identified] : Thin normal limits cranial nerves II through XII intact [de-identified] : There is a large reducible incisional hernia with the rectus muscles pulled far lateral secondary to her previous abdominal wall abdominal plasty and the rigidity of the abdominal wall. [de-identified] : Normal strength and gait

## 2023-02-23 NOTE — ASSESSMENT
[FreeTextEntry1] : I had a long and detailed discussion with the patient about the repair of this large incisional hernia.  The patient understands that 30 days prior to the scheduled date of the repair she will be set up at [place] [facility] to have Botox injected into the lateral 3 muscle groups of the abdominal wall bilaterally.  The patient also understands that every attempt will be made to do a retrorectus repair using ProGrip mesh.  The patient understands that bridging may be needed as part of that procedure.\par \par The patient has asked about the umbilicus and what the abdominal wall will look like and has requested an evaluation by the plastic surgeons.  I have given the patient the card for Dr. Morrison's group and asked her to see them after she has the CT scan that I have ordered for her to evaluate the abdominal wall completely.\par \par I have discussed the risks and benefits  of surgery with the patient.  The risks which include  but are not exclusive of other issues included bleeding, bowel injury and the possibility of using mesh with the inherent risk of infection requiring the removal of the mesh.

## 2023-02-23 NOTE — HISTORY OF PRESENT ILLNESS
[de-identified] : GRETEL  is a 67 year  female  here for a consultation for possible abdominal hernia.  The patient has a large recurrent incisional hernia which is not an expected secondary to the original operation that needed to be done for her perforated terminal ileum.

## 2023-02-24 ENCOUNTER — APPOINTMENT (OUTPATIENT)
Dept: INTERNAL MEDICINE | Facility: CLINIC | Age: 68
End: 2023-02-24

## 2023-02-24 DIAGNOSIS — E03.9 HYPOTHYROIDISM, UNSPECIFIED: ICD-10-CM

## 2023-03-06 NOTE — HISTORY OF PRESENT ILLNESS
Previously Declined (within the last year)
[FreeTextEntry1] : Ms. 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 She underwent lumpectomy with negative margins and had 3 out of 7 involved axillary lymph nodes associated with focal extranodal extensions. She recently complete adjuvant chemotherapy. She is now receiving adjuvant radiation therapy to the right breast and regional lymph nodes. \par \par She is feeling generally well. She reports fatigue and intermittent breast pain. She has noted mild redness and itching of the skin. She is using Biafine and Aquaphor on the skin.

## 2023-03-31 ENCOUNTER — OUTPATIENT (OUTPATIENT)
Dept: OUTPATIENT SERVICES | Facility: HOSPITAL | Age: 68
LOS: 1 days | Discharge: HOME | End: 2023-03-31

## 2023-03-31 DIAGNOSIS — Z98.890 OTHER SPECIFIED POSTPROCEDURAL STATES: Chronic | ICD-10-CM

## 2023-03-31 DIAGNOSIS — C50.919 MALIGNANT NEOPLASM OF UNSPECIFIED SITE OF UNSPECIFIED FEMALE BREAST: ICD-10-CM

## 2023-03-31 DIAGNOSIS — Z90.49 ACQUIRED ABSENCE OF OTHER SPECIFIED PARTS OF DIGESTIVE TRACT: Chronic | ICD-10-CM

## 2023-03-31 DIAGNOSIS — Z90.710 ACQUIRED ABSENCE OF BOTH CERVIX AND UTERUS: Chronic | ICD-10-CM

## 2023-04-04 ENCOUNTER — APPOINTMENT (OUTPATIENT)
Dept: HEMATOLOGY ONCOLOGY | Facility: CLINIC | Age: 68
End: 2023-04-04

## 2023-04-10 ENCOUNTER — APPOINTMENT (OUTPATIENT)
Dept: SURGERY | Facility: CLINIC | Age: 68
End: 2023-04-10
Payer: COMMERCIAL

## 2023-04-10 PROCEDURE — 99213K: CUSTOM

## 2023-04-26 ENCOUNTER — APPOINTMENT (OUTPATIENT)
Dept: CT IMAGING | Facility: IMAGING CENTER | Age: 68
End: 2023-04-26

## 2023-04-26 ENCOUNTER — APPOINTMENT (OUTPATIENT)
Dept: ULTRASOUND IMAGING | Facility: IMAGING CENTER | Age: 68
End: 2023-04-26

## 2023-04-29 NOTE — ASSESSMENT
[FreeTextEntry1] :  I had a long discussion today with the patient and her  about the ultimate repair of this incisional hernia..  The patient and her husbacnd understand that I would like to wait at least 6 to 8 months after her surgery and to observe the incision to see that if it has been no longer pink but more in a whitish color to indicate that all of the inflammation and scarring and maturing of the scar had been completed.  At that time the patient understands that we will get an abdominal pelvic CT scan to truly visualize the extent of the defect and that Botox would be used approximately 30 days before the planned repair of this hernia to make the abdominal wall as compliant as possible.  Patient understands that mesh will be used in the repair in the retrorectus space if at all possible but that intra-abdominal placement of mesh may be necessary.  I have also discussed that with the patient that it is possible that due to the stiffness of the abdominal wall even after Botox that an anterior component separation will be necessary to allow better closure of the midline.  The patient was asked to return in 
1.4

## 2023-05-02 ENCOUNTER — LABORATORY RESULT (OUTPATIENT)
Age: 68
End: 2023-05-02

## 2023-05-02 ENCOUNTER — APPOINTMENT (OUTPATIENT)
Dept: CARDIOLOGY | Facility: CLINIC | Age: 68
End: 2023-05-02
Payer: COMMERCIAL

## 2023-05-02 VITALS
OXYGEN SATURATION: 97 % | HEART RATE: 77 BPM | RESPIRATION RATE: 16 BRPM | DIASTOLIC BLOOD PRESSURE: 78 MMHG | SYSTOLIC BLOOD PRESSURE: 125 MMHG | HEIGHT: 62 IN | BODY MASS INDEX: 25.76 KG/M2 | WEIGHT: 140 LBS | TEMPERATURE: 97.4 F

## 2023-05-02 PROCEDURE — 99214 OFFICE O/P EST MOD 30 MIN: CPT

## 2023-05-02 NOTE — DISCUSSION/SUMMARY
[FreeTextEntry1] : This is a 68-year-old female with past medical history significant for hypothyroidism, dyspepsia, status post appendectomy complicated by peritonitis June 2022 chemotherapy for breast cancer, familial hypercholesterolemia, who comes in for a lipid follow up evaluation.  She denies chest pain, shortness of breath, dizziness or syncope.\par The patient is going for ventral hernia and abdominal repair surgery in August 2023.\par She will have new blood work done today for lipid profile.  Prior lipid profile done October 7, 2022 demonstrated cholesterol 159, HDL 78, LDL calculated 52, non-HDL cholesterol 81, triglycerides 144 mg/dL.\par The patient is currently hemodynamically stable.  She will continue on her current dose of Repatha therapy.\par Lifestyle modification was reinforced.  I have also encouraged her to work with the registered dietitian as well.\par \par The patient reports that she has not seen a cardiologist in over 3 years and is transferring her care to [facility] from [facility].\par Electrocardiogram done March 28, 2022 demonstrate normal sinus rhythm rate of 78 bpm is otherwise unremarkable.\par Lipid panel done October 7, 2022 demonstrated cholesterol 159, LDL calculated 52, HDL of 78 mg/dL, non-HDL cholesterol of 81 mg/dL triglycerides of 144 mg/dL.\par \par She is instructed to follow-up with her primary care physician and regular cardiologist.\par Blood work done October 13, 2021 demonstrated a cholesterol 134, direct LDL of 52 mg/dL, HDL 53 mg/dL, triglycerides 174 mg/dL, non-HDL cholesterol 81 mg/dL.\par Patient will also continue on her current diet and walking program\par \par She reports her mother had high cholesterol.\par She has no history of rheumatic fever.  She does not drink excessive caffeine or alcohol.  She reports that her diet is reasonable.\par \par Blood work done October 8, 2019 demonstrated a total cholesterol of 328 mg/dL,  mg/dL, HDL 52 mg/dL, triglycerides 230 mg/dL.  These findings are consistent with heterozygous familial hypercholesterolemia.\par   \par She has been on Zocor, Crestor, Lipitor and Pravachol associated with significant incapacitating muscle aches.  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 Normal TSH.\par \par The patient had blood work done April 16, 2013 which demonstrated an elevated C-reactive protein of 4.6, total cholesterol 280 mg/dL, LDL cholesterol calculated 173 mg/dL, HDL 51 mg/dL, triglycerides 281 mg/dL.  The patient has evidence for familial hypercholesterolemia. She has no physical stigmata of FH. I have advised the patient and  to have her children, and her own siblings tested for lipids.  She promises to do so.\par \par The patient understands that aerobic exercises must be increased to 40 minutes 4 times per week. A detailed discussion of lifestyle modification was done today. The patient has a good understanding of the diagnosis, and treatment plan. Lifestyle modification was also outlined.

## 2023-05-28 ENCOUNTER — NON-APPOINTMENT (OUTPATIENT)
Age: 68
End: 2023-05-28

## 2023-05-31 ENCOUNTER — APPOINTMENT (OUTPATIENT)
Dept: SURGERY | Facility: CLINIC | Age: 68
End: 2023-05-31
Payer: COMMERCIAL

## 2023-05-31 VITALS
HEART RATE: 69 BPM | SYSTOLIC BLOOD PRESSURE: 121 MMHG | OXYGEN SATURATION: 98 % | HEIGHT: 62 IN | RESPIRATION RATE: 18 BRPM | WEIGHT: 140 LBS | BODY MASS INDEX: 25.76 KG/M2 | TEMPERATURE: 98 F | DIASTOLIC BLOOD PRESSURE: 82 MMHG

## 2023-05-31 PROCEDURE — 99214 OFFICE O/P EST MOD 30 MIN: CPT

## 2023-05-31 NOTE — PLAN
[FreeTextEntry1] : Advised elective, inpatient mesh repair with likely component separation.  Discussed at length with patient and  nature of, indications for and risks/benefits of surgery.  Patient will continue her ongoing care with Dr. Smith for eventual repair.

## 2023-05-31 NOTE — PHYSICAL EXAM
[de-identified] : Overweight, soft, nondistended, nontender.  No palpable mass or organomegaly.  Well-healed full-length midline surgical scar.  In the upper two thirds of the scar, large, nontender, reducible incisional hernia with rectus separation of approximately 10 to 12 cm.

## 2023-05-31 NOTE — HISTORY OF PRESENT ILLNESS
[de-identified] : Chrissy is a 68 year old female here for a consultation for incisional hernia.  [de-identified] : 68-year-old female 1 year status post laparoscopic appendectomy followed by emergent laparotomy for small bowel resection to treat small bowel perforation.  Postoperatively the midline wound was managed with a VAC dressing and eventually healed well.  However within several months following wound closure the patient began to notice a midline bulge which has gradually enlarged since then.  Patient notes some local discomfort but no generalized abdominal symptoms.  Bulge reduces with recumbency.

## 2023-06-06 ENCOUNTER — OUTPATIENT (OUTPATIENT)
Dept: OUTPATIENT SERVICES | Facility: HOSPITAL | Age: 68
LOS: 1 days | Discharge: HOME | End: 2023-06-06

## 2023-06-06 DIAGNOSIS — Z98.890 OTHER SPECIFIED POSTPROCEDURAL STATES: Chronic | ICD-10-CM

## 2023-06-06 DIAGNOSIS — Z90.49 ACQUIRED ABSENCE OF OTHER SPECIFIED PARTS OF DIGESTIVE TRACT: Chronic | ICD-10-CM

## 2023-06-06 DIAGNOSIS — C50.919 MALIGNANT NEOPLASM OF UNSPECIFIED SITE OF UNSPECIFIED FEMALE BREAST: ICD-10-CM

## 2023-06-06 DIAGNOSIS — Z90.710 ACQUIRED ABSENCE OF BOTH CERVIX AND UTERUS: Chronic | ICD-10-CM

## 2023-06-08 ENCOUNTER — TRANSCRIPTION ENCOUNTER (OUTPATIENT)
Age: 68
End: 2023-06-08

## 2023-06-16 ENCOUNTER — APPOINTMENT (OUTPATIENT)
Dept: HEMATOLOGY ONCOLOGY | Facility: CLINIC | Age: 68
End: 2023-06-16
Payer: COMMERCIAL

## 2023-06-16 VITALS
WEIGHT: 152.12 LBS | OXYGEN SATURATION: 94 % | RESPIRATION RATE: 16 BRPM | HEART RATE: 75 BPM | DIASTOLIC BLOOD PRESSURE: 76 MMHG | SYSTOLIC BLOOD PRESSURE: 115 MMHG | BODY MASS INDEX: 27.82 KG/M2 | TEMPERATURE: 96.5 F

## 2023-06-16 PROCEDURE — 99214 OFFICE O/P EST MOD 30 MIN: CPT

## 2023-06-16 NOTE — PHYSICAL EXAM
[Restricted in physically strenuous activity but ambulatory and able to carry out work of a light or sedentary nature] : Status 1- Restricted in physically strenuous activity but ambulatory and able to carry out work of a light or sedentary nature, e.g., light house work, office work [Normal] : affect appropriate [de-identified] : Right; s/p reduction mastopexy as well as lumpectomy with well-healed scars, + chronic nipple retraction w/o change, no skin dimpling, or palpable masses. Left breast; s/p mastopexy with well-healed scar + chronic nipple retraction w/o change, no skin dimpling, or palpable masses. The bilateral axillae are without adenopathy.  [de-identified] : well healing midline abd surgical scars, NABS soft / NT, no HSM / masses

## 2023-06-16 NOTE — HISTORY OF PRESENT ILLNESS
[Disease: _____________________] : Disease: [unfilled] [T: ___] : T[unfilled] [N: ___] : N[unfilled] [M: ___] : M[unfilled] [AJCC Stage: ____] : AJCC Stage: [unfilled] [de-identified] : The patient's history of present illness began on 06/30/2020 when she had a routine mammogram with a finding of postsurgical changes compatible with her history of reduction mammoplasty; there was a focus of architectural distortion in the slightly lateral right breast at the level of the nipple, measuring approximately 10 mm with no associated microcalcifications, and no abnormal findings in the left breast; no axillary adenopathy was seen.  A right breast ultrasound performed on that same date demonstrated at the 9 o'clock axis 5 cm from the nipple, an irregularly marginated mass measuring 7 x 8 x 11 mm with ultrasound-guided core biopsy recommended; the left breast was unremarkable.  The patient then went on to have an ultrasound-guided core biopsy of the right breast on 07/18/2020 with a finding at the 8 o'clock axis 5 cm from nipple of an invasive moderately differentiated ductal carcinoma, White score 6/9 with invasive tumor measuring at least 1 cm with evidence of DCIS, with microcalcifications identified in the invasive carcinoma and in the DCIS, with no evidence of lymphovascular invasion; the right axillary biopsy on that same date demonstrated invasive mammary carcinoma with lobular features, with necrosis and scattered lymphocytic infiltrate.  Estrogen receptor returned positive greater than 90%, progesterone receptor positive greater than 90% HER-2/charlotte negative.  An MRI of the bilateral breasts demonstrated a 3 cm enlarged lymph node in the right axilla and no significant left axillary or internal mammary adenopathy; in the right breast, there was a 15 mm irregular enhancing mass with a second enhancing irregular mass located 2.5 cm anterior, medial, inferior from the index mass at the 8 o'clock axis, anterior depth and measuring 11 mm.  The patient ultimately saw Dr. Liza Navarrete and on 07/28/2020 underwent a right lumpectomy/sentinel lymph node biopsy with a finding of multifocal invasive carcinoma, moderately differentiated with tubulolobular features measuring 1.1 cm in addition to an invasive moderately differentiated carcinoma with tubulolobular features measuring 1.5 cm with multiple separate small foci of invasive carcinoma with single file arrangement of tumor cells E-cadherin positive, DCIS, LCIS, with further shave margins specifically in the right superior breast margin returning negative for carcinoma but with evidence of DCIS and a single isolated duct measuring 0.2 cm with margins negative for DCIS, an additional shave margins returning negative; in the right axilla, metastatic ductal carcinoma involved 3/7 lymph nodes with a carcinoma being E-cadherin positive. \par \par The patient was seen in initial consultation on 8/3/20 regarding further treatment recommendations.  \par \par A Mammaprint was sent off and returned with a high risk score. An Oncotype for node positive cancer was also sent off (unintentionally) which resulted in a recurrence score of 23 translating to 19%  risk of distant recurrence and an absolute benefit from chemotherapy. Results of both these tests were discussed with her extensively and she has decided to proceed with chemotherapy with dose dense doxorubicin/cyclophosphamide followed by dose dense paclitaxel every 2 weeks x 4 with Pegfilgrastim support. \par \par 9/4/20  started on  treatment with  Dose dense doxorubicin/cyclophosphamide every 2 weeks followed by dose dense paclitaxel every 2 weeks x 4, both with pegfilgrastim,\par Withdrew participation  from  [id number] Olanzapine antinausea study on 9/7/20 C1D4\par \par S/P DD AC x 4 then DD Taxol #3 on 12/1/20. She developed gradually worsening CIPN and ultimately had gr 3 CIPN that did not resolve and DD Taxol # 4 / 4 was held / discontinued.  \par \par 1/13/2021 - 2/16/2021She completed adjuvant radiation - 4240cGy to R breast and regional LNs with a 1,000 boost to the R breast tumor bed under the care of Dr Evette Garcia. \par Started on Anastrozole in 2/2021. Which was subsequently discontinued due to arthralgias. Started on exemestane in 6/2021. Unable to tolerate. Subsequently switched to tamoxifen 20mg daily 7/23/2021.\par \par Started on tamoxifen in 7/23/21.\par \par Pt seen 3/22 and apparently was tolerating dickinson fairly well. The called on 4/19/22 with c/o gradually worsening over 4 mo feeling depressed, didn’t want to wake up in morning, had no energy, no motivation, mood lability. She read about dickinson and that it can cause those symptoms. Recommended she hold 3-4 weeks and then call to discuss options ie prelim no further therapy vs trial of toremifene. Called back on 6/2/22 reporting all these symptoms resoled. We discussed option of a trial of toremifene vs no further anti-estrogen therapy - she agreed to a trial fo toremifene and an Rx was sent. \par \par On 8/22 visit she informed me she never started the toremifene. She noted her friend was staking exemestane and was tolerating it very well and wanted to start exemestane.  \par \par On 10/15/22 she called - had started toremifene- developed all body aches after 1 week. She d/c'd it. We discussed that she was not able to tolerate any anti-estrogen therapy. She agreed.\par \par Today she reports those symptoms resolved. \par \par She reports that she is anxious re not being able to tolerate anti-estrogen therapy. Has researched and spoke to other cancer pt (s) and has starte dto take "CBD-THC" paste orally / daily as well as "CBD oil" at  night to sleep. Reports that overall that this has improved her overall sense of well being [de-identified] : ER+ [place]+ Her 2 charlotte - [de-identified] : Has a recurrent abd hernia - planning for surgery in 8/23 - has related discomfort.\par \par She c/o worsening of chronic positional vertigo\par \par Otherwise denies all other complaints noting a good appetite table wt and excellent performance status.  \par \par B/L mammo/sono 11/1/22\par IMPRESSION:\par 1. No radiographic evidence of malignancy and no significant radiographic \par change.\par 2. Newly seen hypoechoic nodule at the 7:00 right breast by sonography \par for which ultrasound-guided core biopsy is recommended.\par 3. No sonographic evidence of malignancy on the left.\par \par The findings and recommendations for this examination were reported to \par Dr. Navarrete by [employer] secure email on 11/1/2022 at 6:15 PM.\par \par RECOMMENDATION:  Ultrasound biopsy.\par \par US guided core bx 11/9/22 - fibroadenoma\par \par Breast MRI 12/2021 neg\par DEXA 4/21 osteopenia

## 2023-07-11 ENCOUNTER — OUTPATIENT (OUTPATIENT)
Dept: OUTPATIENT SERVICES | Facility: HOSPITAL | Age: 68
LOS: 1 days | End: 2023-07-11
Payer: COMMERCIAL

## 2023-07-11 ENCOUNTER — APPOINTMENT (OUTPATIENT)
Dept: CT IMAGING | Facility: IMAGING CENTER | Age: 68
End: 2023-07-11
Payer: COMMERCIAL

## 2023-07-11 DIAGNOSIS — Z90.49 ACQUIRED ABSENCE OF OTHER SPECIFIED PARTS OF DIGESTIVE TRACT: Chronic | ICD-10-CM

## 2023-07-11 DIAGNOSIS — K43.2 INCISIONAL HERNIA WITHOUT OBSTRUCTION OR GANGRENE: ICD-10-CM

## 2023-07-11 DIAGNOSIS — Z90.710 ACQUIRED ABSENCE OF BOTH CERVIX AND UTERUS: Chronic | ICD-10-CM

## 2023-07-11 DIAGNOSIS — Z98.890 OTHER SPECIFIED POSTPROCEDURAL STATES: Chronic | ICD-10-CM

## 2023-07-11 PROCEDURE — 74177 CT ABD & PELVIS W/CONTRAST: CPT

## 2023-07-11 PROCEDURE — 74177 CT ABD & PELVIS W/CONTRAST: CPT | Mod: 26

## 2023-07-19 ENCOUNTER — OUTPATIENT (OUTPATIENT)
Dept: OUTPATIENT SERVICES | Facility: HOSPITAL | Age: 68
LOS: 1 days | End: 2023-07-19
Payer: COMMERCIAL

## 2023-07-19 ENCOUNTER — APPOINTMENT (OUTPATIENT)
Dept: ULTRASOUND IMAGING | Facility: HOSPITAL | Age: 68
End: 2023-07-19

## 2023-07-19 ENCOUNTER — RESULT REVIEW (OUTPATIENT)
Age: 68
End: 2023-07-19

## 2023-07-19 DIAGNOSIS — Z00.00 ENCOUNTER FOR GENERAL ADULT MEDICAL EXAMINATION WITHOUT ABNORMAL FINDINGS: ICD-10-CM

## 2023-07-19 DIAGNOSIS — Z98.890 OTHER SPECIFIED POSTPROCEDURAL STATES: Chronic | ICD-10-CM

## 2023-07-19 DIAGNOSIS — Z90.49 ACQUIRED ABSENCE OF OTHER SPECIFIED PARTS OF DIGESTIVE TRACT: Chronic | ICD-10-CM

## 2023-07-19 DIAGNOSIS — K43.2 INCISIONAL HERNIA WITHOUT OBSTRUCTION OR GANGRENE: ICD-10-CM

## 2023-07-19 DIAGNOSIS — Z90.710 ACQUIRED ABSENCE OF BOTH CERVIX AND UTERUS: Chronic | ICD-10-CM

## 2023-07-19 PROCEDURE — 64647 CHEMODENERV TRUNK MUSC 6/>: CPT

## 2023-07-19 PROCEDURE — 64646 CHEMODENERV TRUNK MUSC 1-5: CPT

## 2023-07-19 PROCEDURE — 76942 ECHO GUIDE FOR BIOPSY: CPT

## 2023-07-19 PROCEDURE — 76942 ECHO GUIDE FOR BIOPSY: CPT | Mod: 26

## 2023-07-25 ENCOUNTER — OUTPATIENT (OUTPATIENT)
Dept: OUTPATIENT SERVICES | Facility: HOSPITAL | Age: 68
LOS: 1 days | End: 2023-07-25
Payer: COMMERCIAL

## 2023-07-25 VITALS
HEART RATE: 85 BPM | TEMPERATURE: 98 F | OXYGEN SATURATION: 99 % | WEIGHT: 149.25 LBS | SYSTOLIC BLOOD PRESSURE: 138 MMHG | HEIGHT: 62 IN | RESPIRATION RATE: 18 BRPM | DIASTOLIC BLOOD PRESSURE: 82 MMHG

## 2023-07-25 DIAGNOSIS — Z98.890 OTHER SPECIFIED POSTPROCEDURAL STATES: Chronic | ICD-10-CM

## 2023-07-25 DIAGNOSIS — S31.109A UNSPECIFIED OPEN WOUND OF ABDOMINAL WALL, UNSPECIFIED QUADRANT WITHOUT PENETRATION INTO PERITONEAL CAVITY, INITIAL ENCOUNTER: ICD-10-CM

## 2023-07-25 DIAGNOSIS — K43.2 INCISIONAL HERNIA WITHOUT OBSTRUCTION OR GANGRENE: ICD-10-CM

## 2023-07-25 DIAGNOSIS — Z90.49 ACQUIRED ABSENCE OF OTHER SPECIFIED PARTS OF DIGESTIVE TRACT: Chronic | ICD-10-CM

## 2023-07-25 DIAGNOSIS — S31.104A UNSPECIFIED OPEN WOUND OF ABDOMINAL WALL, LEFT LOWER QUADRANT WITHOUT PENETRATION INTO PERITONEAL CAVITY, INITIAL ENCOUNTER: ICD-10-CM

## 2023-07-25 DIAGNOSIS — Z90.710 ACQUIRED ABSENCE OF BOTH CERVIX AND UTERUS: Chronic | ICD-10-CM

## 2023-07-25 DIAGNOSIS — Z01.818 ENCOUNTER FOR OTHER PREPROCEDURAL EXAMINATION: ICD-10-CM

## 2023-07-25 DIAGNOSIS — Z29.9 ENCOUNTER FOR PROPHYLACTIC MEASURES, UNSPECIFIED: ICD-10-CM

## 2023-07-25 LAB
A1C WITH ESTIMATED AVERAGE GLUCOSE RESULT: 5.2 % — SIGNIFICANT CHANGE UP (ref 4–5.6)
ANION GAP SERPL CALC-SCNC: 12 MMOL/L — SIGNIFICANT CHANGE UP (ref 5–17)
BLD GP AB SCN SERPL QL: NEGATIVE — SIGNIFICANT CHANGE UP
BUN SERPL-MCNC: 14 MG/DL — SIGNIFICANT CHANGE UP (ref 7–23)
CALCIUM SERPL-MCNC: 9.6 MG/DL — SIGNIFICANT CHANGE UP (ref 8.4–10.5)
CHLORIDE SERPL-SCNC: 106 MMOL/L — SIGNIFICANT CHANGE UP (ref 96–108)
CO2 SERPL-SCNC: 23 MMOL/L — SIGNIFICANT CHANGE UP (ref 22–31)
CREAT SERPL-MCNC: 0.64 MG/DL — SIGNIFICANT CHANGE UP (ref 0.5–1.3)
EGFR: 95 ML/MIN/1.73M2 — SIGNIFICANT CHANGE UP
ESTIMATED AVERAGE GLUCOSE: 103 MG/DL — SIGNIFICANT CHANGE UP (ref 68–114)
GLUCOSE SERPL-MCNC: 100 MG/DL — HIGH (ref 70–99)
HCT VFR BLD CALC: 45.2 % — HIGH (ref 34.5–45)
HGB BLD-MCNC: 14.1 G/DL — SIGNIFICANT CHANGE UP (ref 11.5–15.5)
MCHC RBC-ENTMCNC: 28.8 PG — SIGNIFICANT CHANGE UP (ref 27–34)
MCHC RBC-ENTMCNC: 31.2 GM/DL — LOW (ref 32–36)
MCV RBC AUTO: 92.2 FL — SIGNIFICANT CHANGE UP (ref 80–100)
NRBC # BLD: 0 /100 WBCS — SIGNIFICANT CHANGE UP (ref 0–0)
PLATELET # BLD AUTO: 229 K/UL — SIGNIFICANT CHANGE UP (ref 150–400)
POTASSIUM SERPL-MCNC: 4.9 MMOL/L — SIGNIFICANT CHANGE UP (ref 3.5–5.3)
POTASSIUM SERPL-SCNC: 4.9 MMOL/L — SIGNIFICANT CHANGE UP (ref 3.5–5.3)
RBC # BLD: 4.9 M/UL — SIGNIFICANT CHANGE UP (ref 3.8–5.2)
RBC # FLD: 13.7 % — SIGNIFICANT CHANGE UP (ref 10.3–14.5)
RH IG SCN BLD-IMP: POSITIVE — SIGNIFICANT CHANGE UP
SODIUM SERPL-SCNC: 141 MMOL/L — SIGNIFICANT CHANGE UP (ref 135–145)
WBC # BLD: 7.48 K/UL — SIGNIFICANT CHANGE UP (ref 3.8–10.5)
WBC # FLD AUTO: 7.48 K/UL — SIGNIFICANT CHANGE UP (ref 3.8–10.5)

## 2023-07-25 PROCEDURE — 86850 RBC ANTIBODY SCREEN: CPT

## 2023-07-25 PROCEDURE — 36415 COLL VENOUS BLD VENIPUNCTURE: CPT

## 2023-07-25 PROCEDURE — 87640 STAPH A DNA AMP PROBE: CPT

## 2023-07-25 PROCEDURE — 87641 MR-STAPH DNA AMP PROBE: CPT

## 2023-07-25 PROCEDURE — 80048 BASIC METABOLIC PNL TOTAL CA: CPT

## 2023-07-25 PROCEDURE — 86901 BLOOD TYPING SEROLOGIC RH(D): CPT

## 2023-07-25 PROCEDURE — 85027 COMPLETE CBC AUTOMATED: CPT

## 2023-07-25 PROCEDURE — 87086 URINE CULTURE/COLONY COUNT: CPT

## 2023-07-25 PROCEDURE — 83036 HEMOGLOBIN GLYCOSYLATED A1C: CPT

## 2023-07-25 PROCEDURE — 86900 BLOOD TYPING SEROLOGIC ABO: CPT

## 2023-07-25 PROCEDURE — G0463: CPT

## 2023-07-25 RX ORDER — EVOLOCUMAB 140 MG/ML
0 INJECTION, SOLUTION SUBCUTANEOUS
Qty: 0 | Refills: 0 | DISCHARGE

## 2023-07-25 RX ORDER — ESOMEPRAZOLE MAGNESIUM 40 MG/1
1 CAPSULE, DELAYED RELEASE ORAL
Qty: 0 | Refills: 0 | DISCHARGE

## 2023-07-25 RX ORDER — ACETAMINOPHEN 500 MG
2 TABLET ORAL
Qty: 0 | Refills: 0 | DISCHARGE

## 2023-07-25 NOTE — H&P PST ADULT - NSICDXPASTMEDICALHX_GEN_ALL_CORE_FT
PAST MEDICAL HISTORY:  Breast cancer right breast CA s/p right breast lumpectomy (8/2021) and chemo/radiation treatment    GERD (gastroesophageal reflux disease) Nexium PRN    H/O osteoporosis on Repatha 2x per month    HLD (hyperlipidemia)     Hypothyroidism     Moderate mitral regurgitation Mild-To-Moderate - On echo report from 8/2020    Obesity     Other appendicitis Pending Laparoscopic Appendectomy with Dr. Verde on 6/3/22    Vitamin D deficiency      PAST MEDICAL HISTORY:  Breast cancer right breast CA s/p right breast lumpectomy (8/2021) and chemo/radiation treatment    GERD (gastroesophageal reflux disease) Nexium PRN    H/O appendicitis     H/O osteoporosis on Repatha 2x per month    HLD (hyperlipidemia)     Hypothyroidism     Incisional hernia     Moderate mitral regurgitation Mild-To-Moderate - On echo report from 8/2020    Obesity     Vitamin D deficiency

## 2023-07-25 NOTE — H&P PST ADULT - HISTORY OF PRESENT ILLNESS
68 year old female with PMH GERD, Hypothyroidism, R Breast CA s/p Right Breast Lumpectomy (8/2021) and Chemotherapy/Radiation (Last Tx 12/2021) reports recent c/o RLQ abdominal pain x 3 days. Her PCP sent her for blood work and noted an elevated CRP s/p CT A/P revealed a thickened fluid-filled appendix with no inflammatory changes. Pt denies any current abdominal pain, N/V, fever or chills. Pt now presents to [facility] for scheduled Laparoscopic Appendectomy with Dr. Verde on 6/3/22.    Covid PCR test scheduled for 6/1/22 after PST at [facility]  Covid vaccine Moderna 2nd dose received 2/16/21; Moderna booster received 12/14/21  No recent hospitalizations over the last 3 months  Denies recent travel, exposure or Covid symptoms     Covid19 6 months - took paxlovid 68 year old female presents to PST prior to scheduled ERP, Incisional hernia repair on 8/14/23 with Dr. Joss Smith. PMH GERD, Hypothyroidism, R Breast CA s/p Right Breast Lumpectomy (8/2021) and Chemotherapy/Radiation, Abdominoplasty (2006), appendectomy (6/2022) complicated by small bowel perforation; required emergent laparotomy for small bowel resection. Postoperatively the midline wound was managed with a VAC dressing and eventually healed well. However within several months following wound closure the patient began to notice a midline bulge which has gradually enlarged since. Patient notes some local discomfort but no generalized abdominal symptoms. Bulge reduces with recumbency.

## 2023-07-25 NOTE — H&P PST ADULT - PROBLEM SELECTOR PLAN 1
Incisional hernia repair on 8/14/23 with Dr. Joss Smith.  Dr. Morrison to add.  Medical evaluation requested. Patient recently treated for UTI. Urine cx repeated at PST.

## 2023-07-25 NOTE — H&P PST ADULT - NSICDXPASTSURGICALHX_GEN_ALL_CORE_FT
PAST SURGICAL HISTORY:  H/O elbow surgery Right side - 50+ years ago    H/O exploratory laparotomy     H/O: hysterectomy 2005 2/2 myoma    S/P bilateral breast reduction 2013    S/P laparoscopic appendectomy 6/3/2022    S/P lumpectomy, right breast 8/2021 2/2 right breast CA     PAST SURGICAL HISTORY:  H/O abdominoplasty     H/O elbow surgery Right side - 50+ years ago    H/O exploratory laparotomy     H/O: hysterectomy 2005 2/2 myoma    S/P bilateral breast reduction 2013    S/P laparoscopic appendectomy 6/3/2022    S/P lumpectomy, right breast 8/2021 2/2 right breast CA

## 2023-07-25 NOTE — H&P PST ADULT - ASSESSMENT
DASI score:  DASI activity:  Loose teeth or denture: denies DASI score: 7  DASI activity: folk dancing once a week (2 hrs), goes for walks, all house chores  Loose teeth or denture: denies    CAPRINI SCORE [CLOT]    AGE RELATED RISK FACTORS                                                       MOBILITY RELATED FACTORS  [ ] Age 41-60 years                                            (1 Point)                  [ ] Bed rest                                                        (1 Point)  [X ] Age: 61-74 years                                           (2 Points)                 [ ] Plaster cast                                                   (2 Points)  [ ] Age= 75 years                                              (3 Points)                 [ ] Bed bound for more than 72 hours                 (2 Points)    DISEASE RELATED RISK FACTORS                                               GENDER SPECIFIC FACTORS  [ ] Edema in the lower extremities                       (1 Point)                  [ ] Pregnancy                                                     (1 Point)  [ ] Varicose veins                                               (1 Point)                  [ ] Post-partum < 6 weeks                                   (1 Point)             [ X] BMI > 25 Kg/m2                                            (1 Point)                  [ ] Hormonal therapy  or oral contraception          (1 Point)                 [ ] Sepsis (in the previous month)                        (1 Point)                  [ ] History of pregnancy complications                 (1 point)  [ ] Pneumonia or serious lung disease                                               [ ] Unexplained or recurrent                     (1 Point)           (in the previous month)                               (1 Point)  [ ] Abnormal pulmonary function test                     (1 Point)                 SURGERY RELATED RISK FACTORS  [ ] Acute myocardial infarction                              (1 Point)                 [ ]  Section                                             (1 Point)  [ ] Congestive heart failure (in the previous month)  (1 Point)               [ ] Minor surgery                                                  (1 Point)   [ ] Inflammatory bowel disease                             (1 Point)                 [ ] Arthroscopic surgery                                        (2 Points)  [ ] Central venous access                                      (2 Points)                [X ] General surgery lasting more than 45 minutes   (2 Points)       [ ] Stroke (in the previous month)                          (5 Points)               [ ] Elective arthroplasty                                         (5 Points)                                                                                                                                               HEMATOLOGY RELATED FACTORS                                                 TRAUMA RELATED RISK FACTORS  [ ] Prior episodes of VTE                                     (3 Points)                [ ] Fracture of the hip, pelvis, or leg                       (5 Points)  [ ] Positive family history for VTE                         (3 Points)                 [ ] Acute spinal cord injury (in the previous month)  (5 Points)  [ ] Prothrombin 99070 A                                     (3 Points)                 [ ] Paralysis  (less than 1 month)                             (5 Points)  [ ] Factor V Leiden                                             (3 Points)                  [ ] Multiple Trauma within 1 month                        (5 Points)  [ ] Lupus anticoagulants                                     (3 Points)                                                           [ ] Anticardiolipin antibodies                               (3 Points)                                                       [ ] High homocysteine in the blood                      (3 Points)                                             [ ] Other congenital or acquired thrombophilia      (3 Points)                                                [ ] Heparin induced thrombocytopenia                  (3 Points)                                          Total Score [    5      ]    Caprini Score 0 - 2:  Low Risk, No VTE Prophylaxis required for most patients, encourage ambulation  Caprini Score 3 - 6:  At Risk, pharmacologic VTE prophylaxis is indicated for most patients (in the absence of a contraindication)  Caprini Score Greater than or = 7:  High Risk, pharmacologic VTE prophylaxis is indicated for most patients (in the absence of a contraindication)

## 2023-07-25 NOTE — H&P PST ADULT - OTHER CARE PROVIDERS
Dr. Luc Mcintyre (cardiologist) [phone] - Dr. Garvey/Alix (onc) Dr. Luc Mcintyre (cardiologist, seen 2022) [phone] - Dr. William (onc)

## 2023-07-26 PROBLEM — E78.5 HYPERLIPIDEMIA, UNSPECIFIED: Chronic | Status: ACTIVE | Noted: 2023-07-25

## 2023-07-26 LAB
MRSA PCR RESULT.: SIGNIFICANT CHANGE UP
S AUREUS DNA NOSE QL NAA+PROBE: SIGNIFICANT CHANGE UP

## 2023-07-27 LAB
CULTURE RESULTS: SIGNIFICANT CHANGE UP
SPECIMEN SOURCE: SIGNIFICANT CHANGE UP

## 2023-08-03 ENCOUNTER — APPOINTMENT (OUTPATIENT)
Dept: SURGERY | Facility: CLINIC | Age: 68
End: 2023-08-03
Payer: COMMERCIAL

## 2023-08-03 VITALS
DIASTOLIC BLOOD PRESSURE: 87 MMHG | RESPIRATION RATE: 17 BRPM | SYSTOLIC BLOOD PRESSURE: 155 MMHG | HEART RATE: 77 BPM | OXYGEN SATURATION: 96 % | TEMPERATURE: 97 F

## 2023-08-03 PROCEDURE — 99214 OFFICE O/P EST MOD 30 MIN: CPT

## 2023-08-03 NOTE — ASSESSMENT
[FreeTextEntry1] : I had a long and detailed discussion again with the patient and her  about the procedure that would be necessary to fix this large incisional hernia.  I have told the patient that is a good thing that after the Botox she thinks that her abdomen configuration has changed.  To my mind this means that the Botox has worked.  I have put the patient on a bowel prep over 2-day.  To clean out the colon as best possible so that if there was an inadvertent serosal tear in the colon that this would not cause us to convert back to using biologic mesh instead of a synthetic mesh.

## 2023-08-03 NOTE — HISTORY OF PRESENT ILLNESS
[de-identified] : GRETEL is a 68 year old female here for a follow up visit, to discuss surgery.  Last seen on 02/23/23- There is a large reducible incisional hernia with the rectus muscles pulled far lateral secondary to her previous abdominal wall abdominal plasty and the rigidity of the abdominal wall. No abdominal masses.  No abdominal tenderness.I had a long and detailed discussion with the patient about the repair of this large incisional hernia. The patient understands that 30 days prior to the scheduled date of the repair she will be set up at [place] ultrasound facility to have Botox injected into the lateral 3 muscle groups of the abdominal wall bilaterally. The patient also understands that every attempt will be made to do a retrorectus repair using ProGrip mesh. The patient understands that bridging may be needed as part of that procedure.

## 2023-08-03 NOTE — PHYSICAL EXAM
[de-identified] : The patient states that since the Botox injections which were done about 3 weeks ago her abdomin has changed its configuration.

## 2023-08-13 ENCOUNTER — TRANSCRIPTION ENCOUNTER (OUTPATIENT)
Age: 68
End: 2023-08-13

## 2023-08-14 ENCOUNTER — TRANSCRIPTION ENCOUNTER (OUTPATIENT)
Age: 68
End: 2023-08-14

## 2023-08-14 ENCOUNTER — APPOINTMENT (OUTPATIENT)
Dept: SURGERY | Facility: HOSPITAL | Age: 68
End: 2023-08-14
Payer: COMMERCIAL

## 2023-08-14 ENCOUNTER — INPATIENT (INPATIENT)
Facility: HOSPITAL | Age: 68
LOS: 1 days | Discharge: HOME | DRG: 355 | End: 2023-08-16
Attending: SURGERY | Admitting: SURGERY
Payer: COMMERCIAL

## 2023-08-14 ENCOUNTER — RESULT REVIEW (OUTPATIENT)
Age: 68
End: 2023-08-14

## 2023-08-14 VITALS
RESPIRATION RATE: 16 BRPM | DIASTOLIC BLOOD PRESSURE: 83 MMHG | HEART RATE: 92 BPM | TEMPERATURE: 98 F | OXYGEN SATURATION: 97 % | SYSTOLIC BLOOD PRESSURE: 132 MMHG | WEIGHT: 149.25 LBS | HEIGHT: 62.01 IN

## 2023-08-14 DIAGNOSIS — Z98.890 OTHER SPECIFIED POSTPROCEDURAL STATES: Chronic | ICD-10-CM

## 2023-08-14 DIAGNOSIS — S31.109A UNSPECIFIED OPEN WOUND OF ABDOMINAL WALL, UNSPECIFIED QUADRANT WITHOUT PENETRATION INTO PERITONEAL CAVITY, INITIAL ENCOUNTER: ICD-10-CM

## 2023-08-14 DIAGNOSIS — Z90.710 ACQUIRED ABSENCE OF BOTH CERVIX AND UTERUS: Chronic | ICD-10-CM

## 2023-08-14 DIAGNOSIS — Z90.49 ACQUIRED ABSENCE OF OTHER SPECIFIED PARTS OF DIGESTIVE TRACT: Chronic | ICD-10-CM

## 2023-08-14 DIAGNOSIS — K43.2 INCISIONAL HERNIA WITHOUT OBSTRUCTION OR GANGRENE: ICD-10-CM

## 2023-08-14 LAB — GLUCOSE BLDC GLUCOMTR-MCNC: 92 MG/DL — SIGNIFICANT CHANGE UP (ref 70–99)

## 2023-08-14 PROCEDURE — 88304 TISSUE EXAM BY PATHOLOGIST: CPT | Mod: 26

## 2023-08-14 PROCEDURE — 49595 RPR AA HRN 1ST > 10 RDC: CPT

## 2023-08-14 PROCEDURE — 15734 MUSCLE-SKIN GRAFT TRUNK: CPT | Mod: 82,59

## 2023-08-14 DEVICE — IMP OVITEX 2S MESH POLYPROP 10X20MM: Type: IMPLANTABLE DEVICE | Status: FUNCTIONAL

## 2023-08-14 DEVICE — CLIP APPLIER COVIDIEN SURGICLIP II 9.75" MEDIUM: Type: IMPLANTABLE DEVICE | Status: FUNCTIONAL

## 2023-08-14 DEVICE — MESH HERNIA INCISIONAL PARIETEX PROGRIP 20 X 15CM: Type: IMPLANTABLE DEVICE | Status: FUNCTIONAL

## 2023-08-14 RX ORDER — MORPHINE SULFATE 50 MG/1
0.15 CAPSULE, EXTENDED RELEASE ORAL ONCE
Refills: 0 | Status: DISCONTINUED | OUTPATIENT
Start: 2023-08-14 | End: 2023-08-15

## 2023-08-14 RX ORDER — GABAPENTIN 400 MG/1
600 CAPSULE ORAL ONCE
Refills: 0 | Status: COMPLETED | OUTPATIENT
Start: 2023-08-14 | End: 2023-08-14

## 2023-08-14 RX ORDER — HEPARIN SODIUM 5000 [USP'U]/ML
5000 INJECTION INTRAVENOUS; SUBCUTANEOUS EVERY 8 HOURS
Refills: 0 | Status: DISCONTINUED | OUTPATIENT
Start: 2023-08-14 | End: 2023-08-16

## 2023-08-14 RX ORDER — CEFOTETAN DISODIUM 1 G
2 VIAL (EA) INJECTION ONCE
Refills: 0 | Status: DISCONTINUED | OUTPATIENT
Start: 2023-08-14 | End: 2023-08-15

## 2023-08-14 RX ORDER — CEFAZOLIN SODIUM 1 G
2000 VIAL (EA) INJECTION EVERY 8 HOURS
Refills: 0 | Status: DISCONTINUED | OUTPATIENT
Start: 2023-08-14 | End: 2023-08-15

## 2023-08-14 RX ORDER — CHLORHEXIDINE GLUCONATE 213 G/1000ML
1 SOLUTION TOPICAL ONCE
Refills: 0 | Status: DISCONTINUED | OUTPATIENT
Start: 2023-08-14 | End: 2023-08-14

## 2023-08-14 RX ORDER — LEVOTHYROXINE SODIUM 125 MCG
50 TABLET ORAL DAILY
Refills: 0 | Status: DISCONTINUED | OUTPATIENT
Start: 2023-08-14 | End: 2023-08-16

## 2023-08-14 RX ORDER — SODIUM CHLORIDE 9 MG/ML
3 INJECTION INTRAMUSCULAR; INTRAVENOUS; SUBCUTANEOUS EVERY 8 HOURS
Refills: 0 | Status: DISCONTINUED | OUTPATIENT
Start: 2023-08-14 | End: 2023-08-14

## 2023-08-14 RX ORDER — CEFAZOLIN SODIUM 1 G
2000 VIAL (EA) INJECTION EVERY 8 HOURS
Refills: 0 | Status: DISCONTINUED | OUTPATIENT
Start: 2023-08-14 | End: 2023-08-14

## 2023-08-14 RX ORDER — OXYCODONE HYDROCHLORIDE 5 MG/1
5 TABLET ORAL EVERY 4 HOURS
Refills: 0 | Status: DISCONTINUED | OUTPATIENT
Start: 2023-08-14 | End: 2023-08-16

## 2023-08-14 RX ORDER — ACETAMINOPHEN 500 MG
1000 TABLET ORAL EVERY 6 HOURS
Refills: 0 | Status: COMPLETED | OUTPATIENT
Start: 2023-08-14 | End: 2023-08-15

## 2023-08-14 RX ORDER — CELECOXIB 200 MG/1
400 CAPSULE ORAL ONCE
Refills: 0 | Status: COMPLETED | OUTPATIENT
Start: 2023-08-14 | End: 2023-08-14

## 2023-08-14 RX ORDER — CEFAZOLIN SODIUM 1 G
VIAL (EA) INJECTION
Refills: 0 | Status: DISCONTINUED | OUTPATIENT
Start: 2023-08-14 | End: 2023-08-15

## 2023-08-14 RX ORDER — ONDANSETRON 8 MG/1
4 TABLET, FILM COATED ORAL EVERY 6 HOURS
Refills: 0 | Status: DISCONTINUED | OUTPATIENT
Start: 2023-08-14 | End: 2023-08-15

## 2023-08-14 RX ORDER — OXYCODONE HYDROCHLORIDE 5 MG/1
2.5 TABLET ORAL EVERY 4 HOURS
Refills: 0 | Status: DISCONTINUED | OUTPATIENT
Start: 2023-08-14 | End: 2023-08-16

## 2023-08-14 RX ORDER — DEXTROSE MONOHYDRATE, SODIUM CHLORIDE, AND POTASSIUM CHLORIDE 50; .745; 4.5 G/1000ML; G/1000ML; G/1000ML
1000 INJECTION, SOLUTION INTRAVENOUS
Refills: 0 | Status: DISCONTINUED | OUTPATIENT
Start: 2023-08-14 | End: 2023-08-15

## 2023-08-14 RX ORDER — NALOXONE HYDROCHLORIDE 4 MG/.1ML
0.1 SPRAY NASAL
Refills: 0 | Status: DISCONTINUED | OUTPATIENT
Start: 2023-08-14 | End: 2023-08-15

## 2023-08-14 RX ORDER — CEFAZOLIN SODIUM 1 G
2000 VIAL (EA) INJECTION ONCE
Refills: 0 | Status: COMPLETED | OUTPATIENT
Start: 2023-08-14 | End: 2023-08-14

## 2023-08-14 RX ORDER — DIPHENHYDRAMINE HCL 50 MG
25 CAPSULE ORAL EVERY 4 HOURS
Refills: 0 | Status: DISCONTINUED | OUTPATIENT
Start: 2023-08-14 | End: 2023-08-15

## 2023-08-14 RX ORDER — LIDOCAINE HCL 20 MG/ML
0.2 VIAL (ML) INJECTION ONCE
Refills: 0 | Status: DISCONTINUED | OUTPATIENT
Start: 2023-08-14 | End: 2023-08-14

## 2023-08-14 RX ADMIN — HEPARIN SODIUM 5000 UNIT(S): 5000 INJECTION INTRAVENOUS; SUBCUTANEOUS at 16:38

## 2023-08-14 RX ADMIN — Medication 100 MILLIGRAM(S): at 21:31

## 2023-08-14 RX ADMIN — DEXTROSE MONOHYDRATE, SODIUM CHLORIDE, AND POTASSIUM CHLORIDE 50 MILLILITER(S): 50; .745; 4.5 INJECTION, SOLUTION INTRAVENOUS at 16:14

## 2023-08-14 RX ADMIN — CELECOXIB 400 MILLIGRAM(S): 200 CAPSULE ORAL at 06:38

## 2023-08-14 RX ADMIN — Medication 100 MILLIGRAM(S): at 13:46

## 2023-08-14 RX ADMIN — GABAPENTIN 600 MILLIGRAM(S): 400 CAPSULE ORAL at 06:38

## 2023-08-14 NOTE — PRE-OP CHECKLIST - RESPIRATORY RATE (BREATHS/MIN)
June 20, 2019      Karen Donis  [address] [place] [address]              Dear Karen,     Your recent lab results showed the following:     -- A1c is controlled.   -- urine drug screen showed detected alcohol in addition to the medications you are taking.   -- please schedule an appointment with me next month or two to go over this result.     Please call or make an appointment if you have further questions.     Lakia Hunter MD-PhD       
16

## 2023-08-14 NOTE — PROVIDER CONTACT NOTE (OTHER) - BACKGROUND
"Requested Prescriptions   Pending Prescriptions Disp Refills     omeprazole (PRILOSEC) 20 MG DR capsule [Pharmacy Med Name: OMEPRAZOLE 20MG CAPSULES] 90 capsule 0     Sig: TAKE 1 CAPSULE(20 MG) BY MOUTH DAILY       PPI Protocol Passed - 9/1/2019  8:26 PM        Passed - Not on Clopidogrel (unless Pantoprazole ordered)        Passed - No diagnosis of osteoporosis on record        Passed - Recent (12 mo) or future (30 days) visit within the authorizing provider's specialty     Patient had office visit in the last 12 months or has a visit in the next 30 days with authorizing provider or within the authorizing provider's specialty.  See \"Patient Info\" tab in inbasket, or \"Choose Columns\" in Meds & Orders section of the refill encounter.              Passed - Medication is active on med list        Passed - Patient is age 18 or older        Passed - No active pregnacy on record        Passed - No positive pregnancy test in past 12 months        Last Written Prescription Date:  6/3/19  Last Fill Quantity: 90,  # refills: 0   Last office visit: 8/13/2019 with prescribing provider:  Marybeth Silver     Future Office Visit:      "
Prescription approved per [facility] Refill Protocol.  Daiana Hein, RN, BSN      
s/p hernia repair + exlap complicated by SBO

## 2023-08-14 NOTE — BRIEF OPERATIVE NOTE - NSICDXBRIEFPOSTOP_GEN_ALL_CORE_FT
POST-OP DIAGNOSIS:  Incisional hernia 14-Aug-2023 12:59:53  Елена Gary  
POST-OP DIAGNOSIS:  Incisional hernia 14-Aug-2023 12:59:53  Елена Gary

## 2023-08-14 NOTE — PRE-ANESTHESIA EVALUATION ADULT - NSANTHADDINFOFT_GEN_ALL_CORE
Epidural d/w patient and family in SDA.  Surgeon now requesting spinal duramorph.  D/w patient in OR prior to sedation.

## 2023-08-14 NOTE — PRE-ANESTHESIA EVALUATION ADULT - NSANTHPEFT_GEN_ALL_CORE
Lungs clear, S1, S2 normal, pt denies chest pain/sob. good exercise tolerance.  neuro grossly intact

## 2023-08-14 NOTE — BRIEF OPERATIVE NOTE - OPERATION/FINDINGS
See PRS note for initial dissection and creation of abdominal wall flaps as well as component release. Incisional hernia repair. Posterior sheath closed with 0 prolene from inferior aspect of defect until approx 1/3 of the way up. Remainder of defect bridged with progrip mesh. Underlay Ovitex mesh used to bridge fascial closure. PRS for complex closure. See PRS note for initial dissection and creation of abdominal wall flaps as well as component release. Incisional hernia repair. Posterior sheath closed with 0 prolene from inferior aspect of defect until approx 1/3 of the way up. Remainder of defect bridged with progrip mesh. All the mesh was extra peritoneal  Underlay Ovitex mesh used to bridge fascial closure in the upper third of the closure. PRS for complex closure.

## 2023-08-14 NOTE — BRIEF OPERATIVE NOTE - NSICDXBRIEFPROCEDURE_GEN_ALL_CORE_FT
PROCEDURES:  Repair of incisional hernia using component separation technique 14-Aug-2023 12:59:09  Елена Gary  
PROCEDURES:  Repair of incisional hernia using component separation technique 14-Aug-2023 12:59:09  Елена Gary

## 2023-08-14 NOTE — CHART NOTE - NSCHARTNOTESELECT_GEN_ALL_CORE
POC/Event Note Medical Necessity Information: It is in your best interest to select a reason for this procedure from the list below. All of these items fulfill various CMS LCD requirements except lesion extends to a margin. Include Z78.9 (Other Specified Conditions Influencing Health Status) As An Associated Diagnosis?: No Medical Necessity Clause: This procedure was medically necessary because the lesion that was treated was: Lab: [id number] Lab Facility: [id number] Size Of Lesion In Cm: 1.5 X Size Of Lesion In Cm (Optional): 0 Size Of Margin In Cm: 0.2 Anesthesia Volume In Cc: 3.5 Excision Method: Slit Saucerization Depth: dermis and superficial adipose tissue Repair Type: Intermediate Suturegard Retention Suture: 2-0 Nylon Retention Suture Bite Size: 3 mm Length To Time In Minutes Device Was In Place: 10 Number Of Hemigard Strips Per Side: 1 Intermediate / Complex Repair - Final Wound Length In Cm: 2.2 Undermining Type: Entire Wound Debridement Text: The wound edges were debrided prior to proceeding with the closure to facilitate wound healing. Helical Rim Text: The closure involved the helical rim. Vermilion Border Text: The closure involved the vermilion border. Nostril Rim Text: The closure involved the nostril rim. Retention Suture Text: Retention sutures were placed to support the closure and prevent dehiscence. Suture Removal: 14 days Epidermal Closure Graft Donor Site (Optional): simple interrupted Graft Donor Site Bandage (Optional-Leave Blank If You Don't Want In Note): Steri-strips and a pressure bandage were applied to the donor site. Detail Level: Detailed Excision Depth: adipose tissue Scalpel Size: 15 blade Anesthesia Type: 1% lidocaine with epinephrine Additional Anesthesia Volume In Cc: 6 Hemostasis: Pressure and suture ligation Estimated Blood Loss (Cc): minimal Deep Sutures: 4-0 Vicryl Epidermal Sutures: 5-0 Prolene Epidermal Closure: running subcuticular Wound Care: Vaseline Dressing: pressure dressing with telfa Suturegard Intro: Intraoperative tissue expansion was performed, utilizing the SUTUREGARD device, in order to reduce wound tension. Suturegard Body: The suture ends were repeatedly re-tightened and re-clamped to achieve the desired tissue expansion. Hemigard Intro: Due to skin fragility and wound tension, it was decided to use HEMIGARD adhesive retention suture devices to permit a linear closure. The skin was cleaned and dried for a 6cm distance away from the wound. Excessive hair, if present, was removed to allow for adhesion. Hemigard Postcare Instructions: The HEMIGARD strips are to remain completely dry for at least 5-7 days. Complex Repair Preamble Text (Leave Blank If You Do Not Want): Extensive wide undermining was performed. Intermediate Repair Preamble Text (Leave Blank If You Do Not Want): Undermining was performed with blunt dissection. Fusiform Excision Additional Text (Leave Blank If You Do Not Want): The margin was drawn around the clinically apparent lesion.  A fusiform shape was then drawn on the skin incorporating the lesion and margins.  Incisions were then made along these lines to the appropriate tissue plane and the lesion was extirpated. Eliptical Excision Additional Text (Leave Blank If You Do Not Want): The margin was drawn around the clinically apparent lesion.  An elliptical shape was then drawn on the skin incorporating the lesion and margins.  Incisions were then made along these lines to the appropriate tissue plane and the lesion was extirpated. Saucerization Excision Additional Text (Leave Blank If You Do Not Want): The margin was drawn around the clinically apparent lesion.  Incisions were then made along these lines, in a tangential fashion, to the appropriate tissue plane and the lesion was extirpated. Slit Excision Additional Text (Leave Blank If You Do Not Want): A linear line was drawn on the skin overlying the lesion. An incision was made slowly until the lesion was visualized.  Once visualized, the lesion was removed with blunt dissection. Excisional Biopsy Additional Text (Leave Blank If You Do Not Want): The margin was drawn around the clinically apparent lesion. An elliptical shape was then drawn on the skin incorporating the lesion and margins.  Incisions were then made along these lines to the appropriate tissue plane and the lesion was extirpated. Perilesional Excision Additional Text (Leave Blank If You Do Not Want): The margin was drawn around the clinically apparent lesion. Incisions were then made along these lines to the appropriate tissue plane and the lesion was extirpated. Repair Performed By Another Provider Text (Leave Blank If You Do Not Want): After the tissue was excised the defect was repaired by another provider. No Repair - Repaired With Adjacent Surgical Defect Text (Leave Blank If You Do Not Want): After the excision the defect was repaired concurrently with another surgical defect which was in close approximation. Adjacent Tissue Transfer Text: The defect edges were debeveled with a #15 scalpel blade.  Given the location of the defect and the proximity to free margins an adjacent tissue transfer was deemed most appropriate.  Using a sterile surgical marker, an appropriate flap was drawn incorporating the defect and placing the expected incisions within the relaxed skin tension lines where possible.    The area thus outlined was incised deep to adipose tissue with a #15 scalpel blade.  The skin margins were undermined to an appropriate distance in all directions utilizing iris scissors. Advancement Flap (Single) Text: The defect edges were debeveled with a #15 scalpel blade.  Given the location of the defect and the proximity to free margins a single advancement flap was deemed most appropriate.  Using a sterile surgical marker, an appropriate advancement flap was drawn incorporating the defect and placing the expected incisions within the relaxed skin tension lines where possible.    The area thus outlined was incised deep to adipose tissue with a #15 scalpel blade.  The skin margins were undermined to an appropriate distance in all directions utilizing iris scissors. Advancement Flap (Double) Text: The defect edges were debeveled with a #15 scalpel blade.  Given the location of the defect and the proximity to free margins a double advancement flap was deemed most appropriate.  Using a sterile surgical marker, the appropriate advancement flaps were drawn incorporating the defect and placing the expected incisions within the relaxed skin tension lines where possible.    The area thus outlined was incised deep to adipose tissue with a #15 scalpel blade.  The skin margins were undermined to an appropriate distance in all directions utilizing iris scissors. Burow's Advancement Flap Text: The defect edges were debeveled with a #15 scalpel blade.  Given the location of the defect and the proximity to free margins a Burow's advancement flap was deemed most appropriate.  Using a sterile surgical marker, the appropriate advancement flap was drawn incorporating the defect and placing the expected incisions within the relaxed skin tension lines where possible.    The area thus outlined was incised deep to adipose tissue with a #15 scalpel blade.  The skin margins were undermined to an appropriate distance in all directions utilizing iris scissors. Chonodrocutaneous Helical Advancement Flap Text: The defect edges were debeveled with a #15 scalpel blade.  Given the location of the defect and the proximity to free margins a chondrocutaneous helical advancement flap was deemed most appropriate.  Using a sterile surgical marker, the appropriate advancement flap was drawn incorporating the defect and placing the expected incisions within the relaxed skin tension lines where possible.    The area thus outlined was incised deep to adipose tissue with a #15 scalpel blade.  The skin margins were undermined to an appropriate distance in all directions utilizing iris scissors. Crescentic Advancement Flap Text: The defect edges were debeveled with a #15 scalpel blade.  Given the location of the defect and the proximity to free margins a crescentic advancement flap was deemed most appropriate.  Using a sterile surgical marker, the appropriate advancement flap was drawn incorporating the defect and placing the expected incisions within the relaxed skin tension lines where possible.    The area thus outlined was incised deep to adipose tissue with a #15 scalpel blade.  The skin margins were undermined to an appropriate distance in all directions utilizing iris scissors. A-T Advancement Flap Text: The defect edges were debeveled with a #15 scalpel blade.  Given the location of the defect, shape of the defect and the proximity to free margins an A-T advancement flap was deemed most appropriate.  Using a sterile surgical marker, an appropriate advancement flap was drawn incorporating the defect and placing the expected incisions within the relaxed skin tension lines where possible.    The area thus outlined was incised deep to adipose tissue with a #15 scalpel blade.  The skin margins were undermined to an appropriate distance in all directions utilizing iris scissors. O-T Advancement Flap Text: The defect edges were debeveled with a #15 scalpel blade.  Given the location of the defect, shape of the defect and the proximity to free margins an O-T advancement flap was deemed most appropriate.  Using a sterile surgical marker, an appropriate advancement flap was drawn incorporating the defect and placing the expected incisions within the relaxed skin tension lines where possible.    The area thus outlined was incised deep to adipose tissue with a #15 scalpel blade.  The skin margins were undermined to an appropriate distance in all directions utilizing iris scissors. O-L Flap Text: The defect edges were debeveled with a #15 scalpel blade.  Given the location of the defect, shape of the defect and the proximity to free margins an O-L flap was deemed most appropriate.  Using a sterile surgical marker, an appropriate advancement flap was drawn incorporating the defect and placing the expected incisions within the relaxed skin tension lines where possible.    The area thus outlined was incised deep to adipose tissue with a #15 scalpel blade.  The skin margins were undermined to an appropriate distance in all directions utilizing iris scissors. O-Z Flap Text: The defect edges were debeveled with a #15 scalpel blade.  Given the location of the defect, shape of the defect and the proximity to free margins an O-Z flap was deemed most appropriate.  Using a sterile surgical marker, an appropriate transposition flap was drawn incorporating the defect and placing the expected incisions within the relaxed skin tension lines where possible. The area thus outlined was incised deep to adipose tissue with a #15 scalpel blade.  The skin margins were undermined to an appropriate distance in all directions utilizing iris scissors. Double O-Z Flap Text: The defect edges were debeveled with a #15 scalpel blade.  Given the location of the defect, shape of the defect and the proximity to free margins a Double O-Z flap was deemed most appropriate.  Using a sterile surgical marker, an appropriate transposition flap was drawn incorporating the defect and placing the expected incisions within the relaxed skin tension lines where possible. The area thus outlined was incised deep to adipose tissue with a #15 scalpel blade.  The skin margins were undermined to an appropriate distance in all directions utilizing iris scissors. V-Y Flap Text: The defect edges were debeveled with a #15 scalpel blade.  Given the location of the defect, shape of the defect and the proximity to free margins a V-Y flap was deemed most appropriate.  Using a sterile surgical marker, an appropriate advancement flap was drawn incorporating the defect and placing the expected incisions within the relaxed skin tension lines where possible.    The area thus outlined was incised deep to adipose tissue with a #15 scalpel blade.  The skin margins were undermined to an appropriate distance in all directions utilizing iris scissors. Mercedes Flap Text: The defect edges were debeveled with a #15 scalpel blade.  Given the location of the defect, shape of the defect and the proximity to free margins a Mercedes flap was deemed most appropriate.  Using a sterile surgical marker, an appropriate advancement flap was drawn incorporating the defect and placing the expected incisions within the relaxed skin tension lines where possible. The area thus outlined was incised deep to adipose tissue with a #15 scalpel blade.  The skin margins were undermined to an appropriate distance in all directions utilizing iris scissors. Modified Advancement Flap Text: The defect edges were debeveled with a #15 scalpel blade.  Given the location of the defect, shape of the defect and the proximity to free margins a modified advancement flap was deemed most appropriate.  Using a sterile surgical marker, an appropriate advancement flap was drawn incorporating the defect and placing the expected incisions within the relaxed skin tension lines where possible.    The area thus outlined was incised deep to adipose tissue with a #15 scalpel blade.  The skin margins were undermined to an appropriate distance in all directions utilizing iris scissors. Mucosal Advancement Flap Text: Given the location of the defect, shape of the defect and the proximity to free margins a mucosal advancement flap was deemed most appropriate. Incisions were made with a 15 blade scalpel in the appropriate fashion along the cutaneous vermillion border and the mucosal lip. The remaining actinically damaged mucosal tissue was excised.  The mucosal advancement flap was then elevated to the gingival sulcus with care taken to preserve the neurovascular structures and advanced into the primary defect. Care was taken to ensure that precise realignment of the vermilion border was achieved. Hatchet Flap Text: The defect edges were debeveled with a #15 scalpel blade.  Given the location of the defect, shape of the defect and the proximity to free margins a hatchet flap was deemed most appropriate.  Using a sterile surgical marker, an appropriate hatchet flap was drawn incorporating the defect and placing the expected incisions within the relaxed skin tension lines where possible.    The area thus outlined was incised deep to adipose tissue with a #15 scalpel blade.  The skin margins were undermined to an appropriate distance in all directions utilizing iris scissors. Rotation Flap Text: The defect edges were debeveled with a #15 scalpel blade.  Given the location of the defect, shape of the defect and the proximity to free margins a rotation flap was deemed most appropriate.  Using a sterile surgical marker, an appropriate rotation flap was drawn incorporating the defect and placing the expected incisions within the relaxed skin tension lines where possible.    The area thus outlined was incised deep to adipose tissue with a #15 scalpel blade.  The skin margins were undermined to an appropriate distance in all directions utilizing iris scissors. Spiral Flap Text: The defect edges were debeveled with a #15 scalpel blade.  Given the location of the defect, shape of the defect and the proximity to free margins a spiral flap was deemed most appropriate.  Using a sterile surgical marker, an appropriate rotation flap was drawn incorporating the defect and placing the expected incisions within the relaxed skin tension lines where possible. The area thus outlined was incised deep to adipose tissue with a #15 scalpel blade.  The skin margins were undermined to an appropriate distance in all directions utilizing iris scissors. Staged Advancement Flap Text: The defect edges were debeveled with a #15 scalpel blade.  Given the location of the defect, shape of the defect and the proximity to free margins a staged advancement flap was deemed most appropriate.  Using a sterile surgical marker, an appropriate advancement flap was drawn incorporating the defect and placing the expected incisions within the relaxed skin tension lines where possible. The area thus outlined was incised deep to adipose tissue with a #15 scalpel blade.  The skin margins were undermined to an appropriate distance in all directions utilizing iris scissors. Star Wedge Flap Text: The defect edges were debeveled with a #15 scalpel blade.  Given the location of the defect, shape of the defect and the proximity to free margins a star wedge flap was deemed most appropriate.  Using a sterile surgical marker, an appropriate rotation flap was drawn incorporating the defect and placing the expected incisions within the relaxed skin tension lines where possible. The area thus outlined was incised deep to adipose tissue with a #15 scalpel blade.  The skin margins were undermined to an appropriate distance in all directions utilizing iris scissors. Transposition Flap Text: The defect edges were debeveled with a #15 scalpel blade.  Given the location of the defect and the proximity to free margins a transposition flap was deemed most appropriate.  Using a sterile surgical marker, an appropriate transposition flap was drawn incorporating the defect.    The area thus outlined was incised deep to adipose tissue with a #15 scalpel blade.  The skin margins were undermined to an appropriate distance in all directions utilizing iris scissors. Muscle Hinge Flap Text: The defect edges were debeveled with a #15 scalpel blade.  Given the size, depth and location of the defect and the proximity to free margins a muscle hinge flap was deemed most appropriate.  Using a sterile surgical marker, an appropriate hinge flap was drawn incorporating the defect. The area thus outlined was incised with a #15 scalpel blade.  The skin margins were undermined to an appropriate distance in all directions utilizing iris scissors. Mustarde Flap Text: The defect edges were debeveled with a #15 scalpel blade.  Given the size, depth and location of the defect and the proximity to free margins a Mustarde flap was deemed most appropriate.  Using a sterile surgical marker, an appropriate flap was drawn incorporating the defect. The area thus outlined was incised with a #15 scalpel blade.  The skin margins were undermined to an appropriate distance in all directions utilizing iris scissors. Nasal Turnover Hinge Flap Text: The defect edges were debeveled with a #15 scalpel blade.  Given the size, depth, location of the defect and the defect being full thickness a nasal turnover hinge flap was deemed most appropriate.  Using a sterile surgical marker, an appropriate hinge flap was drawn incorporating the defect. The area thus outlined was incised with a #15 scalpel blade. The flap was designed to recreate the nasal mucosal lining and the alar rim. The skin margins were undermined to an appropriate distance in all directions utilizing iris scissors. Nasalis-Muscle-Based Myocutaneous Island Pedicle Flap Text: Using a #15 blade, an incision was made around the donor flap to the level of the nasalis muscle. Wide lateral undermining was then performed in both the subcutaneous plane above the nasalis muscle, and in a submuscular plane just above periosteum. This allowed the formation of a free nasalis muscle axial pedicle (based on the angular artery) which was still attached to the actual cutaneous flap, increasing its mobility and vascular viability. Hemostasis was obtained with pinpoint electrocoagulation. The flap was mobilized into position and the pivotal anchor points positioned and stabilized with buried interrupted sutures. Subcutaneous and dermal tissues were closed in a multilayered fashion with sutures. Tissue redundancies were excised, and the epidermal edges were apposed without significant tension and sutured with sutures. Orbicularis Oris Muscle Flap Text: The defect edges were debeveled with a #15 scalpel blade.  Given that the defect affected the competency of the oral sphincter an orbicularis oris muscle flap was deemed most appropriate to restore this competency and normal muscle function.  Using a sterile surgical marker, an appropriate flap was drawn incorporating the defect. The area thus outlined was incised with a #15 scalpel blade. Melolabial Transposition Flap Text: The defect edges were debeveled with a #15 scalpel blade.  Given the location of the defect and the proximity to free margins a melolabial flap was deemed most appropriate.  Using a sterile surgical marker, an appropriate melolabial transposition flap was drawn incorporating the defect.    The area thus outlined was incised deep to adipose tissue with a #15 scalpel blade.  The skin margins were undermined to an appropriate distance in all directions utilizing iris scissors. Rhombic Flap Text: The defect edges were debeveled with a #15 scalpel blade.  Given the location of the defect and the proximity to free margins a rhombic flap was deemed most appropriate.  Using a sterile surgical marker, an appropriate rhombic flap was drawn incorporating the defect.    The area thus outlined was incised deep to adipose tissue with a #15 scalpel blade.  The skin margins were undermined to an appropriate distance in all directions utilizing iris scissors. Rhomboid Transposition Flap Text: The defect edges were debeveled with a #15 scalpel blade.  Given the location of the defect and the proximity to free margins a rhomboid transposition flap was deemed most appropriate.  Using a sterile surgical marker, an appropriate rhomboid flap was drawn incorporating the defect.    The area thus outlined was incised deep to adipose tissue with a #15 scalpel blade.  The skin margins were undermined to an appropriate distance in all directions utilizing iris scissors. Bi-Rhombic Flap Text: The defect edges were debeveled with a #15 scalpel blade.  Given the location of the defect and the proximity to free margins a bi-rhombic flap was deemed most appropriate.  Using a sterile surgical marker, an appropriate rhombic flap was drawn incorporating the defect. The area thus outlined was incised deep to adipose tissue with a #15 scalpel blade.  The skin margins were undermined to an appropriate distance in all directions utilizing iris scissors. Helical Rim Advancement Flap Text: The defect edges were debeveled with a #15 blade scalpel.  Given the location of the defect and the proximity to free margins (helical rim) a double helical rim advancement flap was deemed most appropriate.  Using a sterile surgical marker, the appropriate advancement flaps were drawn incorporating the defect and placing the expected incisions between the helical rim and antihelix where possible.  The area thus outlined was incised through and through with a #15 scalpel blade.  With a skin hook and iris scissors, the flaps were gently and sharply undermined and freed up. Bilateral Helical Rim Advancement Flap Text: The defect edges were debeveled with a #15 blade scalpel.  Given the location of the defect and the proximity to free margins (helical rim) a bilateral helical rim advancement flap was deemed most appropriate.  Using a sterile surgical marker, the appropriate advancement flaps were drawn incorporating the defect and placing the expected incisions between the helical rim and antihelix where possible.  The area thus outlined was incised through and through with a #15 scalpel blade.  With a skin hook and iris scissors, the flaps were gently and sharply undermined and freed up. Ear Star Wedge Flap Text: The defect edges were debeveled with a #15 blade scalpel.  Given the location of the defect and the proximity to free margins (helical rim) an ear star wedge flap was deemed most appropriate.  Using a sterile surgical marker, the appropriate flap was drawn incorporating the defect and placing the expected incisions between the helical rim and antihelix where possible.  The area thus outlined was incised through and through with a #15 scalpel blade. Banner Transposition Flap Text: The defect edges were debeveled with a #15 scalpel blade.  Given the location of the defect and the proximity to free margins a Banner transposition flap was deemed most appropriate.  Using a sterile surgical marker, an appropriate flap drawn around the defect. The area thus outlined was incised deep to adipose tissue with a #15 scalpel blade.  The skin margins were undermined to an appropriate distance in all directions utilizing iris scissors. Bilobed Flap Text: The defect edges were debeveled with a #15 scalpel blade.  Given the location of the defect and the proximity to free margins a bilobe flap was deemed most appropriate.  Using a sterile surgical marker, an appropriate bilobe flap drawn around the defect.    The area thus outlined was incised deep to adipose tissue with a #15 scalpel blade.  The skin margins were undermined to an appropriate distance in all directions utilizing iris scissors. Bilobed Transposition Flap Text: The defect edges were debeveled with a #15 scalpel blade.  Given the location of the defect and the proximity to free margins a bilobed transposition flap was deemed most appropriate.  Using a sterile surgical marker, an appropriate bilobe flap drawn around the defect.    The area thus outlined was incised deep to adipose tissue with a #15 scalpel blade.  The skin margins were undermined to an appropriate distance in all directions utilizing iris scissors. Trilobed Flap Text: The defect edges were debeveled with a #15 scalpel blade.  Given the location of the defect and the proximity to free margins a trilobed flap was deemed most appropriate.  Using a sterile surgical marker, an appropriate trilobed flap drawn around the defect.    The area thus outlined was incised deep to adipose tissue with a #15 scalpel blade.  The skin margins were undermined to an appropriate distance in all directions utilizing iris scissors. Dorsal Nasal Flap Text: The defect edges were debeveled with a #15 scalpel blade.  Given the location of the defect and the proximity to free margins a dorsal nasal flap was deemed most appropriate.  Using a sterile surgical marker, an appropriate dorsal nasal flap was drawn around the defect.    The area thus outlined was incised deep to adipose tissue with a #15 scalpel blade.  The skin margins were undermined to an appropriate distance in all directions utilizing iris scissors. Island Pedicle Flap Text: The defect edges were debeveled with a #15 scalpel blade.  Given the location of the defect, shape of the defect and the proximity to free margins an island pedicle advancement flap was deemed most appropriate.  Using a sterile surgical marker, an appropriate advancement flap was drawn incorporating the defect, outlining the appropriate donor tissue and placing the expected incisions within the relaxed skin tension lines where possible.    The area thus outlined was incised deep to adipose tissue with a #15 scalpel blade.  The skin margins were undermined to an appropriate distance in all directions around the primary defect and laterally outward around the island pedicle utilizing iris scissors.  There was minimal undermining beneath the pedicle flap. Island Pedicle Flap With Canthal Suspension Text: The defect edges were debeveled with a #15 scalpel blade.  Given the location of the defect, shape of the defect and the proximity to free margins an island pedicle advancement flap was deemed most appropriate.  Using a sterile surgical marker, an appropriate advancement flap was drawn incorporating the defect, outlining the appropriate donor tissue and placing the expected incisions within the relaxed skin tension lines where possible. The area thus outlined was incised deep to adipose tissue with a #15 scalpel blade.  The skin margins were undermined to an appropriate distance in all directions around the primary defect and laterally outward around the island pedicle utilizing iris scissors.  There was minimal undermining beneath the pedicle flap. A suspension suture was placed in the canthal tendon to prevent tension and prevent ectropion. Alar Island Pedicle Flap Text: The defect edges were debeveled with a #15 scalpel blade.  Given the location of the defect, shape of the defect and the proximity to the alar rim an island pedicle advancement flap was deemed most appropriate.  Using a sterile surgical marker, an appropriate advancement flap was drawn incorporating the defect, outlining the appropriate donor tissue and placing the expected incisions within the nasal ala running parallel to the alar rim. The area thus outlined was incised with a #15 scalpel blade.  The skin margins were undermined minimally to an appropriate distance in all directions around the primary defect and laterally outward around the island pedicle utilizing iris scissors.  There was minimal undermining beneath the pedicle flap. Double Island Pedicle Flap Text: The defect edges were debeveled with a #15 scalpel blade.  Given the location of the defect, shape of the defect and the proximity to free margins a double island pedicle advancement flap was deemed most appropriate.  Using a sterile surgical marker, an appropriate advancement flap was drawn incorporating the defect, outlining the appropriate donor tissue and placing the expected incisions within the relaxed skin tension lines where possible.    The area thus outlined was incised deep to adipose tissue with a #15 scalpel blade.  The skin margins were undermined to an appropriate distance in all directions around the primary defect and laterally outward around the island pedicle utilizing iris scissors.  There was minimal undermining beneath the pedicle flap. Island Pedicle Flap-Requiring Vessel Identification Text: The defect edges were debeveled with a #15 scalpel blade.  Given the location of the defect, shape of the defect and the proximity to free margins an island pedicle advancement flap was deemed most appropriate.  Using a sterile surgical marker, an appropriate advancement flap was drawn, based on the axial vessel mentioned above, incorporating the defect, outlining the appropriate donor tissue and placing the expected incisions within the relaxed skin tension lines where possible.    The area thus outlined was incised deep to adipose tissue with a #15 scalpel blade.  The skin margins were undermined to an appropriate distance in all directions around the primary defect and laterally outward around the island pedicle utilizing iris scissors.  There was minimal undermining beneath the pedicle flap. Keystone Flap Text: The defect edges were debeveled with a #15 scalpel blade.  Given the location of the defect, shape of the defect a keystone flap was deemed most appropriate.  Using a sterile surgical marker, an appropriate keystone flap was drawn incorporating the defect, outlining the appropriate donor tissue and placing the expected incisions within the relaxed skin tension lines where possible. The area thus outlined was incised deep to adipose tissue with a #15 scalpel blade.  The skin margins were undermined to an appropriate distance in all directions around the primary defect and laterally outward around the flap utilizing iris scissors. O-T Plasty Text: The defect edges were debeveled with a #15 scalpel blade.  Given the location of the defect, shape of the defect and the proximity to free margins an O-T plasty was deemed most appropriate.  Using a sterile surgical marker, an appropriate O-T plasty was drawn incorporating the defect and placing the expected incisions within the relaxed skin tension lines where possible.    The area thus outlined was incised deep to adipose tissue with a #15 scalpel blade.  The skin margins were undermined to an appropriate distance in all directions utilizing iris scissors. O-Z Plasty Text: The defect edges were debeveled with a #15 scalpel blade.  Given the location of the defect, shape of the defect and the proximity to free margins an O-Z plasty (double transposition flap) was deemed most appropriate.  Using a sterile surgical marker, the appropriate transposition flaps were drawn incorporating the defect and placing the expected incisions within the relaxed skin tension lines where possible.    The area thus outlined was incised deep to adipose tissue with a #15 scalpel blade.  The skin margins were undermined to an appropriate distance in all directions utilizing iris scissors.  Hemostasis was achieved with electrocautery.  The flaps were then transposed into place, one clockwise and the other counterclockwise, and anchored with interrupted buried subcutaneous sutures. Double O-Z Plasty Text: The defect edges were debeveled with a #15 scalpel blade.  Given the location of the defect, shape of the defect and the proximity to free margins a Double O-Z plasty (double transposition flap) was deemed most appropriate.  Using a sterile surgical marker, the appropriate transposition flaps were drawn incorporating the defect and placing the expected incisions within the relaxed skin tension lines where possible. The area thus outlined was incised deep to adipose tissue with a #15 scalpel blade.  The skin margins were undermined to an appropriate distance in all directions utilizing iris scissors.  Hemostasis was achieved with electrocautery.  The flaps were then transposed into place, one clockwise and the other counterclockwise, and anchored with interrupted buried subcutaneous sutures. V-Y Plasty Text: The defect edges were debeveled with a #15 scalpel blade.  Given the location of the defect, shape of the defect and the proximity to free margins an V-Y advancement flap was deemed most appropriate.  Using a sterile surgical marker, an appropriate advancement flap was drawn incorporating the defect and placing the expected incisions within the relaxed skin tension lines where possible.    The area thus outlined was incised deep to adipose tissue with a #15 scalpel blade.  The skin margins were undermined to an appropriate distance in all directions utilizing iris scissors. H Plasty Text: Given the location of the defect, shape of the defect and the proximity to free margins a H-plasty was deemed most appropriate for repair.  Using a sterile surgical marker, the appropriate advancement arms of the H-plasty were drawn incorporating the defect and placing the expected incisions within the relaxed skin tension lines where possible. The area thus outlined was incised deep to adipose tissue with a #15 scalpel blade. The skin margins were undermined to an appropriate distance in all directions utilizing iris scissors.  The opposing advancement arms were then advanced into place in opposite direction and anchored with interrupted buried subcutaneous sutures. W Plasty Text: The lesion was extirpated to the level of the fat with a #15 scalpel blade.  Given the location of the defect, shape of the defect and the proximity to free margins a W-plasty was deemed most appropriate for repair.  Using a sterile surgical marker, the appropriate transposition arms of the W-plasty were drawn incorporating the defect and placing the expected incisions within the relaxed skin tension lines where possible.    The area thus outlined was incised deep to adipose tissue with a #15 scalpel blade.  The skin margins were undermined to an appropriate distance in all directions utilizing iris scissors.  The opposing transposition arms were then transposed into place in opposite direction and anchored with interrupted buried subcutaneous sutures. Z Plasty Text: The lesion was extirpated to the level of the fat with a #15 scalpel blade.  Given the location of the defect, shape of the defect and the proximity to free margins a Z-plasty was deemed most appropriate for repair.  Using a sterile surgical marker, the appropriate transposition arms of the Z-plasty were drawn incorporating the defect and placing the expected incisions within the relaxed skin tension lines where possible.    The area thus outlined was incised deep to adipose tissue with a #15 scalpel blade.  The skin margins were undermined to an appropriate distance in all directions utilizing iris scissors.  The opposing transposition arms were then transposed into place in opposite direction and anchored with interrupted buried subcutaneous sutures. Zygomaticofacial Flap Text: Given the location of the defect, shape of the defect and the proximity to free margins a zygomaticofacial flap was deemed most appropriate for repair.  Using a sterile surgical marker, the appropriate flap was drawn incorporating the defect and placing the expected incisions within the relaxed skin tension lines where possible. The area thus outlined was incised deep to adipose tissue with a #15 scalpel blade with preservation of a vascular pedicle.  The skin margins were undermined to an appropriate distance in all directions utilizing iris scissors.  The flap was then placed into the defect and anchored with interrupted buried subcutaneous sutures. Cheek Interpolation Flap Text: A decision was made to reconstruct the defect utilizing an interpolation axial flap and a staged reconstruction.  A telfa template was made of the defect.  This telfa template was then used to outline the Cheek Interpolation flap.  The donor area for the pedicle flap was then injected with anesthesia.  The flap was excised through the skin and subcutaneous tissue down to the layer of the underlying musculature.  The interpolation flap was carefully excised within this deep plane to maintain its blood supply.  The edges of the donor site were undermined.   The donor site was closed in a primary fashion.  The pedicle was then rotated into position and sutured.  Once the tube was sutured into place, adequate blood supply was confirmed with blanching and refill.  The pedicle was then wrapped with xeroform gauze and dressed appropriately with a telfa and gauze bandage to ensure continued blood supply and protect the attached pedicle. Cheek-To-Nose Interpolation Flap Text: A decision was made to reconstruct the defect utilizing an interpolation axial flap and a staged reconstruction.  A telfa template was made of the defect.  This telfa template was then used to outline the Cheek-To-Nose Interpolation flap.  The donor area for the pedicle flap was then injected with anesthesia.  The flap was excised through the skin and subcutaneous tissue down to the layer of the underlying musculature.  The interpolation flap was carefully excised within this deep plane to maintain its blood supply.  The edges of the donor site were undermined.   The donor site was closed in a primary fashion.  The pedicle was then rotated into position and sutured.  Once the tube was sutured into place, adequate blood supply was confirmed with blanching and refill.  The pedicle was then wrapped with xeroform gauze and dressed appropriately with a telfa and gauze bandage to ensure continued blood supply and protect the attached pedicle. Interpolation Flap Text: A decision was made to reconstruct the defect utilizing an interpolation axial flap and a staged reconstruction.  A telfa template was made of the defect.  This telfa template was then used to outline the interpolation flap.  The donor area for the pedicle flap was then injected with anesthesia.  The flap was excised through the skin and subcutaneous tissue down to the layer of the underlying musculature.  The interpolation flap was carefully excised within this deep plane to maintain its blood supply.  The edges of the donor site were undermined.   The donor site was closed in a primary fashion.  The pedicle was then rotated into position and sutured.  Once the tube was sutured into place, adequate blood supply was confirmed with blanching and refill.  The pedicle was then wrapped with xeroform gauze and dressed appropriately with a telfa and gauze bandage to ensure continued blood supply and protect the attached pedicle. Melolabial Interpolation Flap Text: A decision was made to reconstruct the defect utilizing an interpolation axial flap and a staged reconstruction.  A telfa template was made of the defect.  This telfa template was then used to outline the melolabial interpolation flap.  The donor area for the pedicle flap was then injected with anesthesia.  The flap was excised through the skin and subcutaneous tissue down to the layer of the underlying musculature.  The pedicle flap was carefully excised within this deep plane to maintain its blood supply.  The edges of the donor site were undermined.   The donor site was closed in a primary fashion.  The pedicle was then rotated into position and sutured.  Once the tube was sutured into place, adequate blood supply was confirmed with blanching and refill.  The pedicle was then wrapped with xeroform gauze and dressed appropriately with a telfa and gauze bandage to ensure continued blood supply and protect the attached pedicle. Mastoid Interpolation Flap Text: A decision was made to reconstruct the defect utilizing an interpolation axial flap and a staged reconstruction.  A telfa template was made of the defect.  This telfa template was then used to outline the mastoid interpolation flap.  The donor area for the pedicle flap was then injected with anesthesia.  The flap was excised through the skin and subcutaneous tissue down to the layer of the underlying musculature.  The pedicle flap was carefully excised within this deep plane to maintain its blood supply.  The edges of the donor site were undermined.   The donor site was closed in a primary fashion.  The pedicle was then rotated into position and sutured.  Once the tube was sutured into place, adequate blood supply was confirmed with blanching and refill.  The pedicle was then wrapped with xeroform gauze and dressed appropriately with a telfa and gauze bandage to ensure continued blood supply and protect the attached pedicle. Posterior Auricular Interpolation Flap Text: A decision was made to reconstruct the defect utilizing an interpolation axial flap and a staged reconstruction.  A telfa template was made of the defect.  This telfa template was then used to outline the posterior auricular interpolation flap.  The donor area for the pedicle flap was then injected with anesthesia.  The flap was excised through the skin and subcutaneous tissue down to the layer of the underlying musculature.  The pedicle flap was carefully excised within this deep plane to maintain its blood supply.  The edges of the donor site were undermined.   The donor site was closed in a primary fashion.  The pedicle was then rotated into position and sutured.  Once the tube was sutured into place, adequate blood supply was confirmed with blanching and refill.  The pedicle was then wrapped with xeroform gauze and dressed appropriately with a telfa and gauze bandage to ensure continued blood supply and protect the attached pedicle. Paramedian Forehead Flap Text: A decision was made to reconstruct the defect utilizing an interpolation axial flap and a staged reconstruction.  A telfa template was made of the defect.  This telfa template was then used to outline the paramedian forehead pedicle flap.  The donor area for the pedicle flap was then injected with anesthesia.  The flap was excised through the skin and subcutaneous tissue down to the layer of the underlying musculature.  The pedicle flap was carefully excised within this deep plane to maintain its blood supply.  The edges of the donor site were undermined.   The donor site was closed in a primary fashion.  The pedicle was then rotated into position and sutured.  Once the tube was sutured into place, adequate blood supply was confirmed with blanching and refill.  The pedicle was then wrapped with xeroform gauze and dressed appropriately with a telfa and gauze bandage to ensure continued blood supply and protect the attached pedicle. Lip Wedge Excision Repair Text: Given the location of the defect and the proximity to free margins a full thickness wedge repair was deemed most appropriate.  Using a sterile surgical marker, the appropriate repair was drawn incorporating the defect and placing the expected incisions perpendicular to the vermilion border.  The vermilion border was also meticulously outlined to ensure appropriate reapproximation during the repair.  The area thus outlined was incised through and through with a #15 scalpel blade.  The muscularis and dermis were reaproximated with deep sutures following hemostasis. Care was taken to realign the vermilion border before proceeding with the superficial closure.  Once the vermilion was realigned the superfical and mucosal closure was finished. Ftsg Text: The defect edges were debeveled with a #15 scalpel blade.  Given the location of the defect, shape of the defect and the proximity to free margins a full thickness skin graft was deemed most appropriate.  Using a sterile surgical marker, the primary defect shape was transferred to the donor site. The area thus outlined was incised deep to adipose tissue with a #15 scalpel blade.  The harvested graft was then trimmed of adipose tissue until only dermis and epidermis was left.  The skin margins of the secondary defect were undermined to an appropriate distance in all directions utilizing iris scissors.  The secondary defect was closed with interrupted buried subcutaneous sutures.  The skin edges were then re-apposed with running  sutures.  The skin graft was then placed in the primary defect and oriented appropriately. Split-Thickness Skin Graft Text: The defect edges were debeveled with a #15 scalpel blade.  Given the location of the defect, shape of the defect and the proximity to free margins a split thickness skin graft was deemed most appropriate.  Using a sterile surgical marker, the primary defect shape was transferred to the donor site. The split thickness graft was then harvested.  The skin graft was then placed in the primary defect and oriented appropriately. Burow's Graft Text: The defect edges were debeveled with a #15 scalpel blade.  Given the location of the defect, shape of the defect, the proximity to free margins and the presence of a standing cone deformity a Burow's skin graft was deemed most appropriate. The standing cone was removed and this tissue was then trimmed to the shape of the primary defect. The adipose tissue was also removed until only dermis and epidermis were left.  The skin margins of the secondary defect were undermined to an appropriate distance in all directions utilizing iris scissors.  The secondary defect was closed with interrupted buried subcutaneous sutures.  The skin edges were then re-apposed with running  sutures.  The skin graft was then placed in the primary defect and oriented appropriately. Cartilage Graft Text: The defect edges were debeveled with a #15 scalpel blade.  Given the location of the defect, shape of the defect, the fact the defect involved a full thickness cartilage defect a cartilage graft was deemed most appropriate.  An appropriate donor site was identified, cleansed, and anesthetized. The cartilage graft was then harvested and transferred to the recipient site, oriented appropriately and then sutured into place.  The secondary defect was then repaired using a primary closure. Composite Graft Text: The defect edges were debeveled with a #15 scalpel blade.  Given the location of the defect, shape of the defect, the proximity to free margins and the fact the defect was full thickness a composite graft was deemed most appropriate.  The defect was outline and then transferred to the donor site.  A full thickness graft was then excised from the donor site. The graft was then placed in the primary defect, oriented appropriately and then sutured into place.  The secondary defect was then repaired using a primary closure. Epidermal Autograft Text: The defect edges were debeveled with a #15 scalpel blade.  Given the location of the defect, shape of the defect and the proximity to free margins an epidermal autograft was deemed most appropriate.  Using a sterile surgical marker, the primary defect shape was transferred to the donor site. The epidermal graft was then harvested.  The skin graft was then placed in the primary defect and oriented appropriately. Dermal Autograft Text: The defect edges were debeveled with a #15 scalpel blade.  Given the location of the defect, shape of the defect and the proximity to free margins a dermal autograft was deemed most appropriate.  Using a sterile surgical marker, the primary defect shape was transferred to the donor site. The area thus outlined was incised deep to adipose tissue with a #15 scalpel blade.  The harvested graft was then trimmed of adipose and epidermal tissue until only dermis was left.  The skin graft was then placed in the primary defect and oriented appropriately. Skin Substitute Text: The defect edges were debeveled with a #15 scalpel blade.  Given the location of the defect, shape of the defect and the proximity to free margins a skin substitute graft was deemed most appropriate.  The graft material was trimmed to fit the size of the defect. The graft was then placed in the primary defect and oriented appropriately. Tissue Cultured Epidermal Autograft Text: The defect edges were debeveled with a #15 scalpel blade.  Given the location of the defect, shape of the defect and the proximity to free margins a tissue cultured epidermal autograft was deemed most appropriate.  The graft was then trimmed to fit the size of the defect.  The graft was then placed in the primary defect and oriented appropriately. Xenograft Text: The defect edges were debeveled with a #15 scalpel blade.  Given the location of the defect, shape of the defect and the proximity to free margins a xenograft was deemed most appropriate.  The graft was then trimmed to fit the size of the defect.  The graft was then placed in the primary defect and oriented appropriately. Purse String (Intermediate) Text: Given the location of the defect and the characteristics of the surrounding skin a purse string intermediate closure was deemed most appropriate.  Undermining was performed circumferentially around the surgical defect.  A purse string suture was then placed and tightened. Purse String (Simple) Text: Given the location of the defect and the characteristics of the surrounding skin a purse string simple closure was deemed most appropriate.  Undermining was performed circumferentially around the surgical defect.  A purse string suture was then placed and tightened. Complex Repair And Single Advancement Flap Text: The defect edges were debeveled with a #15 scalpel blade.  The primary defect was closed partially with a complex linear closure.  Given the location of the remaining defect, shape of the defect and the proximity to free margins a single advancement flap was deemed most appropriate for complete closure of the defect.  Using a sterile surgical marker, an appropriate advancement flap was drawn incorporating the defect and placing the expected incisions within the relaxed skin tension lines where possible.    The area thus outlined was incised deep to adipose tissue with a #15 scalpel blade.  The skin margins were undermined to an appropriate distance in all directions utilizing iris scissors. Complex Repair And Double Advancement Flap Text: The defect edges were debeveled with a #15 scalpel blade.  The primary defect was closed partially with a complex linear closure.  Given the location of the remaining defect, shape of the defect and the proximity to free margins a double advancement flap was deemed most appropriate for complete closure of the defect.  Using a sterile surgical marker, an appropriate advancement flap was drawn incorporating the defect and placing the expected incisions within the relaxed skin tension lines where possible.    The area thus outlined was incised deep to adipose tissue with a #15 scalpel blade.  The skin margins were undermined to an appropriate distance in all directions utilizing iris scissors. Complex Repair And Modified Advancement Flap Text: The defect edges were debeveled with a #15 scalpel blade.  The primary defect was closed partially with a complex linear closure.  Given the location of the remaining defect, shape of the defect and the proximity to free margins a modified advancement flap was deemed most appropriate for complete closure of the defect.  Using a sterile surgical marker, an appropriate advancement flap was drawn incorporating the defect and placing the expected incisions within the relaxed skin tension lines where possible.    The area thus outlined was incised deep to adipose tissue with a #15 scalpel blade.  The skin margins were undermined to an appropriate distance in all directions utilizing iris scissors. Complex Repair And A-T Advancement Flap Text: The defect edges were debeveled with a #15 scalpel blade.  The primary defect was closed partially with a complex linear closure.  Given the location of the remaining defect, shape of the defect and the proximity to free margins an A-T advancement flap was deemed most appropriate for complete closure of the defect.  Using a sterile surgical marker, an appropriate advancement flap was drawn incorporating the defect and placing the expected incisions within the relaxed skin tension lines where possible.    The area thus outlined was incised deep to adipose tissue with a #15 scalpel blade.  The skin margins were undermined to an appropriate distance in all directions utilizing iris scissors. Complex Repair And O-T Advancement Flap Text: The defect edges were debeveled with a #15 scalpel blade.  The primary defect was closed partially with a complex linear closure.  Given the location of the remaining defect, shape of the defect and the proximity to free margins an O-T advancement flap was deemed most appropriate for complete closure of the defect.  Using a sterile surgical marker, an appropriate advancement flap was drawn incorporating the defect and placing the expected incisions within the relaxed skin tension lines where possible.    The area thus outlined was incised deep to adipose tissue with a #15 scalpel blade.  The skin margins were undermined to an appropriate distance in all directions utilizing iris scissors. Complex Repair And O-L Flap Text: The defect edges were debeveled with a #15 scalpel blade.  The primary defect was closed partially with a complex linear closure.  Given the location of the remaining defect, shape of the defect and the proximity to free margins an O-L flap was deemed most appropriate for complete closure of the defect.  Using a sterile surgical marker, an appropriate flap was drawn incorporating the defect and placing the expected incisions within the relaxed skin tension lines where possible.    The area thus outlined was incised deep to adipose tissue with a #15 scalpel blade.  The skin margins were undermined to an appropriate distance in all directions utilizing iris scissors. Complex Repair And Bilobe Flap Text: The defect edges were debeveled with a #15 scalpel blade.  The primary defect was closed partially with a complex linear closure.  Given the location of the remaining defect, shape of the defect and the proximity to free margins a bilobe flap was deemed most appropriate for complete closure of the defect.  Using a sterile surgical marker, an appropriate advancement flap was drawn incorporating the defect and placing the expected incisions within the relaxed skin tension lines where possible.    The area thus outlined was incised deep to adipose tissue with a #15 scalpel blade.  The skin margins were undermined to an appropriate distance in all directions utilizing iris scissors. Complex Repair And Melolabial Flap Text: The defect edges were debeveled with a #15 scalpel blade.  The primary defect was closed partially with a complex linear closure.  Given the location of the remaining defect, shape of the defect and the proximity to free margins a melolabial flap was deemed most appropriate for complete closure of the defect.  Using a sterile surgical marker, an appropriate advancement flap was drawn incorporating the defect and placing the expected incisions within the relaxed skin tension lines where possible.    The area thus outlined was incised deep to adipose tissue with a #15 scalpel blade.  The skin margins were undermined to an appropriate distance in all directions utilizing iris scissors. Complex Repair And Rotation Flap Text: The defect edges were debeveled with a #15 scalpel blade.  The primary defect was closed partially with a complex linear closure.  Given the location of the remaining defect, shape of the defect and the proximity to free margins a rotation flap was deemed most appropriate for complete closure of the defect.  Using a sterile surgical marker, an appropriate advancement flap was drawn incorporating the defect and placing the expected incisions within the relaxed skin tension lines where possible.    The area thus outlined was incised deep to adipose tissue with a #15 scalpel blade.  The skin margins were undermined to an appropriate distance in all directions utilizing iris scissors. Complex Repair And Rhombic Flap Text: The defect edges were debeveled with a #15 scalpel blade.  The primary defect was closed partially with a complex linear closure.  Given the location of the remaining defect, shape of the defect and the proximity to free margins a rhombic flap was deemed most appropriate for complete closure of the defect.  Using a sterile surgical marker, an appropriate advancement flap was drawn incorporating the defect and placing the expected incisions within the relaxed skin tension lines where possible.    The area thus outlined was incised deep to adipose tissue with a #15 scalpel blade.  The skin margins were undermined to an appropriate distance in all directions utilizing iris scissors. Complex Repair And Transposition Flap Text: The defect edges were debeveled with a #15 scalpel blade.  The primary defect was closed partially with a complex linear closure.  Given the location of the remaining defect, shape of the defect and the proximity to free margins a transposition flap was deemed most appropriate for complete closure of the defect.  Using a sterile surgical marker, an appropriate advancement flap was drawn incorporating the defect and placing the expected incisions within the relaxed skin tension lines where possible.    The area thus outlined was incised deep to adipose tissue with a #15 scalpel blade.  The skin margins were undermined to an appropriate distance in all directions utilizing iris scissors. Complex Repair And V-Y Plasty Text: The defect edges were debeveled with a #15 scalpel blade.  The primary defect was closed partially with a complex linear closure.  Given the location of the remaining defect, shape of the defect and the proximity to free margins a V-Y plasty was deemed most appropriate for complete closure of the defect.  Using a sterile surgical marker, an appropriate advancement flap was drawn incorporating the defect and placing the expected incisions within the relaxed skin tension lines where possible.    The area thus outlined was incised deep to adipose tissue with a #15 scalpel blade.  The skin margins were undermined to an appropriate distance in all directions utilizing iris scissors. Complex Repair And M Plasty Text: The defect edges were debeveled with a #15 scalpel blade.  The primary defect was closed partially with a complex linear closure.  Given the location of the remaining defect, shape of the defect and the proximity to free margins an M plasty was deemed most appropriate for complete closure of the defect.  Using a sterile surgical marker, an appropriate advancement flap was drawn incorporating the defect and placing the expected incisions within the relaxed skin tension lines where possible.    The area thus outlined was incised deep to adipose tissue with a #15 scalpel blade.  The skin margins were undermined to an appropriate distance in all directions utilizing iris scissors. Complex Repair And Double M Plasty Text: The defect edges were debeveled with a #15 scalpel blade.  The primary defect was closed partially with a complex linear closure.  Given the location of the remaining defect, shape of the defect and the proximity to free margins a double M plasty was deemed most appropriate for complete closure of the defect.  Using a sterile surgical marker, an appropriate advancement flap was drawn incorporating the defect and placing the expected incisions within the relaxed skin tension lines where possible.    The area thus outlined was incised deep to adipose tissue with a #15 scalpel blade.  The skin margins were undermined to an appropriate distance in all directions utilizing iris scissors. Complex Repair And W Plasty Text: The defect edges were debeveled with a #15 scalpel blade.  The primary defect was closed partially with a complex linear closure.  Given the location of the remaining defect, shape of the defect and the proximity to free margins a W plasty was deemed most appropriate for complete closure of the defect.  Using a sterile surgical marker, an appropriate advancement flap was drawn incorporating the defect and placing the expected incisions within the relaxed skin tension lines where possible.    The area thus outlined was incised deep to adipose tissue with a #15 scalpel blade.  The skin margins were undermined to an appropriate distance in all directions utilizing iris scissors. Complex Repair And Z Plasty Text: The defect edges were debeveled with a #15 scalpel blade.  The primary defect was closed partially with a complex linear closure.  Given the location of the remaining defect, shape of the defect and the proximity to free margins a Z plasty was deemed most appropriate for complete closure of the defect.  Using a sterile surgical marker, an appropriate advancement flap was drawn incorporating the defect and placing the expected incisions within the relaxed skin tension lines where possible.    The area thus outlined was incised deep to adipose tissue with a #15 scalpel blade.  The skin margins were undermined to an appropriate distance in all directions utilizing iris scissors. Complex Repair And Dorsal Nasal Flap Text: The defect edges were debeveled with a #15 scalpel blade.  The primary defect was closed partially with a complex linear closure.  Given the location of the remaining defect, shape of the defect and the proximity to free margins a dorsal nasal flap was deemed most appropriate for complete closure of the defect.  Using a sterile surgical marker, an appropriate flap was drawn incorporating the defect and placing the expected incisions within the relaxed skin tension lines where possible.    The area thus outlined was incised deep to adipose tissue with a #15 scalpel blade.  The skin margins were undermined to an appropriate distance in all directions utilizing iris scissors. Complex Repair And Ftsg Text: The defect edges were debeveled with a #15 scalpel blade.  The primary defect was closed partially with a complex linear closure.  Given the location of the defect, shape of the defect and the proximity to free margins a full thickness skin graft was deemed most appropriate to repair the remaining defect.  The graft was trimmed to fit the size of the remaining defect.  The graft was then placed in the primary defect, oriented appropriately, and sutured into place. Complex Repair And Burow's Graft Text: The defect edges were debeveled with a #15 scalpel blade.  The primary defect was closed partially with a complex linear closure.  Given the location of the defect, shape of the defect, the proximity to free margins and the presence of a standing cone deformity a Burow's graft was deemed most appropriate to repair the remaining defect.  The graft was trimmed to fit the size of the remaining defect.  The graft was then placed in the primary defect, oriented appropriately, and sutured into place. Complex Repair And Split-Thickness Skin Graft Text: The defect edges were debeveled with a #15 scalpel blade.  The primary defect was closed partially with a complex linear closure.  Given the location of the defect, shape of the defect and the proximity to free margins a split thickness skin graft was deemed most appropriate to repair the remaining defect.  The graft was trimmed to fit the size of the remaining defect.  The graft was then placed in the primary defect, oriented appropriately, and sutured into place. Complex Repair And Epidermal Autograft Text: The defect edges were debeveled with a #15 scalpel blade.  The primary defect was closed partially with a complex linear closure.  Given the location of the defect, shape of the defect and the proximity to free margins an epidermal autograft was deemed most appropriate to repair the remaining defect.  The graft was trimmed to fit the size of the remaining defect.  The graft was then placed in the primary defect, oriented appropriately, and sutured into place. Complex Repair And Dermal Autograft Text: The defect edges were debeveled with a #15 scalpel blade.  The primary defect was closed partially with a complex linear closure.  Given the location of the defect, shape of the defect and the proximity to free margins an dermal autograft was deemed most appropriate to repair the remaining defect.  The graft was trimmed to fit the size of the remaining defect.  The graft was then placed in the primary defect, oriented appropriately, and sutured into place. Complex Repair And Tissue Cultured Epidermal Autograft Text: The defect edges were debeveled with a #15 scalpel blade.  The primary defect was closed partially with a complex linear closure.  Given the location of the defect, shape of the defect and the proximity to free margins an tissue cultured epidermal autograft was deemed most appropriate to repair the remaining defect.  The graft was trimmed to fit the size of the remaining defect.  The graft was then placed in the primary defect, oriented appropriately, and sutured into place. Complex Repair And Xenograft Text: The defect edges were debeveled with a #15 scalpel blade.  The primary defect was closed partially with a complex linear closure.  Given the location of the defect, shape of the defect and the proximity to free margins a xenograft was deemed most appropriate to repair the remaining defect.  The graft was trimmed to fit the size of the remaining defect.  The graft was then placed in the primary defect, oriented appropriately, and sutured into place. Complex Repair And Skin Substitute Graft Text: The defect edges were debeveled with a #15 scalpel blade.  The primary defect was closed partially with a complex linear closure.  Given the location of the remaining defect, shape of the defect and the proximity to free margins a skin substitute graft was deemed most appropriate to repair the remaining defect.  The graft was trimmed to fit the size of the remaining defect.  The graft was then placed in the primary defect, oriented appropriately, and sutured into place. Consent was obtained from the patient. The risks and benefits to therapy were discussed in detail. Specifically, the risks of infection, scarring, bleeding, prolonged wound healing, incomplete removal, allergy to anesthesia, nerve injury and recurrence were addressed. Prior to the procedure, the treatment site was clearly identified and confirmed by the patient. All components of Universal Protocol/PAUSE Rule completed. Render Post-Care Instructions In Note?: yes Post-Care Instructions: I reviewed with the patient in detail post-care instructions. Patient is not to engage in any heavy lifting, exercise, or swimming for the next 14 days. Should the patient develop any fevers, chills, bleeding, severe pain patient will contact the office immediately. Home Suture Removal Text: Patient was provided a home suture removal kit and will remove their sutures at home.  If they have any questions or difficulties they will call the office. Where Do You Want The Question To Include Opioid Counseling Located?: Case Summary Tab Billing Type: Third-Party Bill Information: Selecting Yes will display possible errors in your note based on the variables you have selected. This validation is only offered as a suggestion for you. PLEASE NOTE THAT THE VALIDATION TEXT WILL BE REMOVED WHEN YOU FINALIZE YOUR NOTE. IF YOU WANT TO FAX A PRELIMINARY NOTE YOU WILL NEED TO TOGGLE THIS TO 'NO' IF YOU DO NOT WANT IT IN YOUR FAXED NOTE.

## 2023-08-14 NOTE — PROVIDER CONTACT NOTE (OTHER) - RECOMMENDATIONS
DVT prophylaxis, PAS in place. Pt safety maintained. Pt educated on importance of early, safe ambulation. Will cont to monitor

## 2023-08-14 NOTE — BRIEF OPERATIVE NOTE - OPERATION/FINDINGS
Pt presenting with a large incisional hernia, papi de lis incision was made, component separation achieved by dissection between the external and internal oblique muscles with advancement of the rectus muscles and fascia, closure of the inferior midline defect, placement of bridge mesh where muscles could not be approximated, placement of 3 LILLIAM drains, closure of papi de lis incision, placement of [place] wound vac

## 2023-08-14 NOTE — PATIENT PROFILE ADULT - FALL HARM RISK - RISK INTERVENTIONS
Assistance OOB with selected safe patient handling equipment/Assistance with ambulation/Communicate Fall Risk and Risk Factors to all staff, patient, and family/Monitor gait and stability/Reinforce activity limits and safety measures with patient and family/Sit up slowly, dangle for a short time, stand at bedside before walking/Use of alarms - bed, chair and/or voice tab/Visual Cue: Yellow wristband/Bed in lowest position, wheels locked, appropriate side rails in place/Call bell, personal items and telephone in reach/Instruct patient to call for assistance before getting out of bed or chair/Non-slip footwear when patient is out of bed/[place] to call system/Physically safe environment - no spills, clutter or unnecessary equipment/Purposeful Proactive Rounding/Room/bathroom lighting operational, light cord in reach

## 2023-08-14 NOTE — ASU PATIENT PROFILE, ADULT - FALL HARM RISK - UNIVERSAL INTERVENTIONS
Bed in lowest position, wheels locked, appropriate side rails in place/Call bell, personal items and telephone in reach/Instruct patient to call for assistance before getting out of bed or chair/Non-slip footwear when patient is out of bed/[place] to call system/Physically safe environment - no spills, clutter or unnecessary equipment/Purposeful Proactive Rounding/Room/bathroom lighting operational, light cord in reach

## 2023-08-14 NOTE — BRIEF OPERATIVE NOTE - NSICDXBRIEFPREOP_GEN_ALL_CORE_FT
PRE-OP DIAGNOSIS:  Incisional hernia 14-Aug-2023 12:59:43  Елена Gary  
PRE-OP DIAGNOSIS:  Incisional hernia 14-Aug-2023 12:59:43  Елена Gary

## 2023-08-14 NOTE — PROVIDER CONTACT NOTE (OTHER) - ACTION/TREATMENT ORDERED:
MD notified. Per MD "pt must remain flex @ waist if were to get up." RN will reattempt OOB in morning.

## 2023-08-14 NOTE — CHART NOTE - NSCHARTNOTEFT_GEN_A_CORE
POST-OPERATIVE NOTE    Subjective:  Patient is s/p repair of incisional hernia using component separation technique. Recovering appropriately. Denies N/V, SOB, chest pain, numbness/weakness of the extremities. Pain well controlled.    Vital Signs Last 24 Hrs  T(C): 36.4 (14 Aug 2023 14:00), Max: 36.8 (14 Aug 2023 06:25)  T(F): 97.5 (14 Aug 2023 14:00), Max: 98.2 (14 Aug 2023 06:25)  HR: 86 (14 Aug 2023 15:00) (83 - 110)  BP: 97/55 (14 Aug 2023 15:00) (92/53 - 132/83)  BP(mean): 68 (14 Aug 2023 15:00) (67 - 83)  RR: 16 (14 Aug 2023 15:00) (12 - 16)  SpO2: 96% (14 Aug 2023 15:00) (93% - 97%)    Parameters below as of 14 Aug 2023 14:00  Patient On (Oxygen Delivery Method): nasal cannula  O2 Flow (L/min): 2    I&O's Detail    14 Aug 2023 07:01  -  14 Aug 2023 16:16  --------------------------------------------------------  IN:  Total IN: 0 mL    OUT:    Bulb (mL): 10 mL    Bulb (mL): 40 mL    Bulb (mL): 10 mL    Indwelling Catheter - Urethral (mL): 100 mL  Total OUT: 160 mL    Total NET: -160 mL        ceFAZolin   IVPB   ceFAZolin   IVPB 2000  cefoTEtan  IVPB 2  ceFAZolin   IVPB 2000  ceFAZolin   IVPB   cefoTEtan  IVPB 2  heparin   Injectable 5000    PAST MEDICAL & SURGICAL HISTORY:  Breast cancer  right breast CA s/p right breast lumpectomy (8/2021) and chemo/radiation treatment      Hypothyroidism      Obesity      Vitamin D deficiency      H/O osteoporosis  on Repatha 2x per month      GERD (gastroesophageal reflux disease)  Nexium PRN      Moderate mitral regurgitation  Mild-To-Moderate - On echo report from 8/2020      HLD (hyperlipidemia)      H/O appendicitis      Incisional hernia      S/P bilateral breast reduction  2013      H/O: hysterectomy  2005 2/2 myoma      S/P lumpectomy, right breast  8/2021 2/2 right breast CA      H/O elbow surgery  Right side - 50+ years ago      S/P laparoscopic appendectomy  6/3/2022      H/O exploratory laparotomy      H/O abdominoplasty            Physical Exam:  General: NAD, resting comfortably in bed  Pulmonary: Nonlabored breathing, no respiratory distress  Cardiovascular: NSR  Abdominal: soft, NT/ND Incisions sites well appearing. Prevena holding suction and well functioning  Extremities: WWP      LABS:            CAPILLARY BLOOD GLUCOSE      POCT Blood Glucose.: 92 mg/dL (14 Aug 2023 06:10)      Radiology and Additional Studies:    Assessment:  The patient is a 68y Female who is now several hours post-op from a repair of incisional hernia using component separation technique.    Plan:  - Pain control as needed  - DVT ppx  - OOB and ambulating as tolerated with flexed waist  - F/u AM labs

## 2023-08-14 NOTE — PROVIDER CONTACT NOTE (OTHER) - ASSESSMENT
pt AOx4. All VSS on 2LNC. Pt denies pain or distress but "refuses to get OOB until daytime due to multiple drains & recent epidural" Pt laying comfortably @ rest w no s/s concern.

## 2023-08-15 ENCOUNTER — TRANSCRIPTION ENCOUNTER (OUTPATIENT)
Age: 68
End: 2023-08-15

## 2023-08-15 LAB
ANION GAP SERPL CALC-SCNC: 13 MMOL/L — SIGNIFICANT CHANGE UP (ref 5–17)
BUN SERPL-MCNC: 12 MG/DL — SIGNIFICANT CHANGE UP (ref 7–23)
CALCIUM SERPL-MCNC: 8.4 MG/DL — SIGNIFICANT CHANGE UP (ref 8.4–10.5)
CHLORIDE SERPL-SCNC: 103 MMOL/L — SIGNIFICANT CHANGE UP (ref 96–108)
CO2 SERPL-SCNC: 21 MMOL/L — LOW (ref 22–31)
CREAT SERPL-MCNC: 0.54 MG/DL — SIGNIFICANT CHANGE UP (ref 0.5–1.3)
EGFR: 99 ML/MIN/1.73M2 — SIGNIFICANT CHANGE UP
GLUCOSE SERPL-MCNC: 145 MG/DL — HIGH (ref 70–99)
HCT VFR BLD CALC: 36.5 % — SIGNIFICANT CHANGE UP (ref 34.5–45)
HCT VFR BLD CALC: 36.5 % — SIGNIFICANT CHANGE UP (ref 34.5–45)
HGB BLD-MCNC: 11.5 G/DL — SIGNIFICANT CHANGE UP (ref 11.5–15.5)
HGB BLD-MCNC: 11.7 G/DL — SIGNIFICANT CHANGE UP (ref 11.5–15.5)
MAGNESIUM SERPL-MCNC: 1.9 MG/DL — SIGNIFICANT CHANGE UP (ref 1.6–2.6)
MCHC RBC-ENTMCNC: 29 PG — SIGNIFICANT CHANGE UP (ref 27–34)
MCHC RBC-ENTMCNC: 29.7 PG — SIGNIFICANT CHANGE UP (ref 27–34)
MCHC RBC-ENTMCNC: 31.5 GM/DL — LOW (ref 32–36)
MCHC RBC-ENTMCNC: 32.1 GM/DL — SIGNIFICANT CHANGE UP (ref 32–36)
MCV RBC AUTO: 92.2 FL — SIGNIFICANT CHANGE UP (ref 80–100)
MCV RBC AUTO: 92.6 FL — SIGNIFICANT CHANGE UP (ref 80–100)
NRBC # BLD: 0 /100 WBCS — SIGNIFICANT CHANGE UP (ref 0–0)
NRBC # BLD: 0 /100 WBCS — SIGNIFICANT CHANGE UP (ref 0–0)
PHOSPHATE SERPL-MCNC: 2.8 MG/DL — SIGNIFICANT CHANGE UP (ref 2.5–4.5)
PLATELET # BLD AUTO: 136 K/UL — LOW (ref 150–400)
PLATELET # BLD AUTO: 188 K/UL — SIGNIFICANT CHANGE UP (ref 150–400)
POTASSIUM SERPL-MCNC: 4.4 MMOL/L — SIGNIFICANT CHANGE UP (ref 3.5–5.3)
POTASSIUM SERPL-SCNC: 4.4 MMOL/L — SIGNIFICANT CHANGE UP (ref 3.5–5.3)
RBC # BLD: 3.94 M/UL — SIGNIFICANT CHANGE UP (ref 3.8–5.2)
RBC # BLD: 3.96 M/UL — SIGNIFICANT CHANGE UP (ref 3.8–5.2)
RBC # FLD: 13.5 % — SIGNIFICANT CHANGE UP (ref 10.3–14.5)
RBC # FLD: 13.6 % — SIGNIFICANT CHANGE UP (ref 10.3–14.5)
SODIUM SERPL-SCNC: 137 MMOL/L — SIGNIFICANT CHANGE UP (ref 135–145)
WBC # BLD: 10.97 K/UL — HIGH (ref 3.8–10.5)
WBC # BLD: 11.72 K/UL — HIGH (ref 3.8–10.5)
WBC # FLD AUTO: 10.97 K/UL — HIGH (ref 3.8–10.5)
WBC # FLD AUTO: 11.72 K/UL — HIGH (ref 3.8–10.5)

## 2023-08-15 RX ORDER — SODIUM CHLORIDE 9 MG/ML
1000 INJECTION, SOLUTION INTRAVENOUS ONCE
Refills: 0 | Status: COMPLETED | OUTPATIENT
Start: 2023-08-15 | End: 2023-08-15

## 2023-08-15 RX ORDER — SODIUM CHLORIDE 9 MG/ML
1000 INJECTION, SOLUTION INTRAVENOUS
Refills: 0 | Status: DISCONTINUED | OUTPATIENT
Start: 2023-08-15 | End: 2023-08-15

## 2023-08-15 RX ORDER — MAGNESIUM SULFATE 500 MG/ML
2 VIAL (ML) INJECTION ONCE
Refills: 0 | Status: COMPLETED | OUTPATIENT
Start: 2023-08-15 | End: 2023-08-15

## 2023-08-15 RX ORDER — ACETAMINOPHEN 500 MG
975 TABLET ORAL EVERY 6 HOURS
Refills: 0 | Status: DISCONTINUED | OUTPATIENT
Start: 2023-08-15 | End: 2023-08-16

## 2023-08-15 RX ORDER — SODIUM,POTASSIUM PHOSPHATES 278-250MG
1 POWDER IN PACKET (EA) ORAL ONCE
Refills: 0 | Status: COMPLETED | OUTPATIENT
Start: 2023-08-15 | End: 2023-08-15

## 2023-08-15 RX ADMIN — Medication 50 MICROGRAM(S): at 05:30

## 2023-08-15 RX ADMIN — Medication 1000 MILLIGRAM(S): at 15:52

## 2023-08-15 RX ADMIN — HEPARIN SODIUM 5000 UNIT(S): 5000 INJECTION INTRAVENOUS; SUBCUTANEOUS at 00:03

## 2023-08-15 RX ADMIN — HEPARIN SODIUM 5000 UNIT(S): 5000 INJECTION INTRAVENOUS; SUBCUTANEOUS at 18:33

## 2023-08-15 RX ADMIN — HEPARIN SODIUM 5000 UNIT(S): 5000 INJECTION INTRAVENOUS; SUBCUTANEOUS at 10:14

## 2023-08-15 RX ADMIN — SODIUM CHLORIDE 4000 MILLILITER(S): 9 INJECTION, SOLUTION INTRAVENOUS at 13:29

## 2023-08-15 RX ADMIN — Medication 25 GRAM(S): at 11:29

## 2023-08-15 RX ADMIN — Medication 400 MILLIGRAM(S): at 10:14

## 2023-08-15 RX ADMIN — Medication 400 MILLIGRAM(S): at 15:37

## 2023-08-15 RX ADMIN — Medication 1 PACKET(S): at 10:15

## 2023-08-15 RX ADMIN — Medication 100 MILLIGRAM(S): at 05:30

## 2023-08-15 RX ADMIN — Medication 1000 MILLIGRAM(S): at 10:29

## 2023-08-15 NOTE — DISCHARGE NOTE PROVIDER - NSDCFUSCHEDAPPT_GEN_ALL_CORE_FT
Shilo Barragan  [facility] Physician [facility]  CARDIOLOGY [address] [place] [address]   Scheduled Appointment: 10/24/2023

## 2023-08-15 NOTE — DISCHARGE NOTE PROVIDER - CARE PROVIDER_API CALL
Joss Smith  Surgery  [address] [facility], [address]  [place] [address]  Phone: [phone]  Fax: [phone]  Follow Up Time: 1 week    Hugo Morrison  Plastic Surgery  [address] [facility] [facility] [address]  [place] [address]  Phone: [phone]  Fax: [phone]  Follow Up Time: 1 week

## 2023-08-15 NOTE — DISCHARGE NOTE PROVIDER - CARE PROVIDERS DIRECT ADDRESSES
[email or web address]

## 2023-08-15 NOTE — PHYSICAL THERAPY INITIAL EVALUATION ADULT - IMPAIRMENTS CONTRIBUTING TO GAIT DEVIATIONS, PT EVAL
+flexed at waist during OOB activities/impaired balance/impaired postural control/decreased strength

## 2023-08-15 NOTE — PROGRESS NOTE ADULT - SUBJECTIVE AND OBJECTIVE BOX
Patient resting comfortably, has "no pain" .  Flatus , no BM    BP 93/63 afebrile HR 82    Abd: prevena wound dressing in place, no leaks.  LILLIAM's with serosanguinous output bilaterally.  EXT: No edema, tenderness      A/P POD #1 s/p abdominal wall reconstruction.      Advance diet  OOB to chair  Continue antibiotics  incentive spirometry  Discussed with Dr Morrison

## 2023-08-15 NOTE — PHYSICAL THERAPY INITIAL EVALUATION ADULT - ACTIVE RANGE OF MOTION EXAMINATION, REHAB EVAL
Right elbow flexion to 90 degrees AROM.  Right shoulder flexion, elbow extension, wrist flexion/extension WFL./Left UE Active ROM was WFL (within functional limits)/bilateral  lower extremity Active ROM was WFL (within functional limits)

## 2023-08-15 NOTE — DISCHARGE NOTE PROVIDER - NSDCCPTREATMENT_GEN_ALL_CORE_FT
PRINCIPAL PROCEDURE  Procedure: Repair of incisional hernia using component separation technique  Findings and Treatment:       SECONDARY PROCEDURE  Procedure: Repair of incisional hernia using component separation technique  Findings and Treatment:

## 2023-08-15 NOTE — PROGRESS NOTE ADULT - SUBJECTIVE AND OBJECTIVE BOX
TEAM [ Plastic Surgery ] Surgery Daily Progress Note  =====================================================    SUBJECTIVE: Patient seen and examined at bedside on AM rounds. Pt in no acute pain. Pt passing gas    PMH:  PAST MEDICAL & SURGICAL HISTORY:  Breast cancer  right breast CA s/p right breast lumpectomy (8/2021) and chemo/radiation treatment      Hypothyroidism      Obesity      Vitamin D deficiency      H/O osteoporosis  on Repatha 2x per month      GERD (gastroesophageal reflux disease)  Nexium PRN      Moderate mitral regurgitation  Mild-To-Moderate - On echo report from 8/2020      HLD (hyperlipidemia)      H/O appendicitis      Incisional hernia      S/P bilateral breast reduction  2013      H/O: hysterectomy  2005 2/2 myoma      S/P lumpectomy, right breast  8/2021 2/2 right breast CA      H/O elbow surgery  Right side - 50+ years ago      S/P laparoscopic appendectomy  6/3/2022      H/O exploratory laparotomy      H/O abdominoplasty          ALLERGIES:  latex (Short breath)  No Known Drug Allergies      --------------------------------------------------------------------------------------    MEDICATIONS:    Neurologic Medications  acetaminophen   IVPB .. 1000 milliGRAM(s) IV Intermittent every 6 hours  oxyCODONE    IR 2.5 milliGRAM(s) Oral every 4 hours PRN Moderate Pain (4 - 6)  oxyCODONE    IR 5 milliGRAM(s) Oral every 4 hours PRN Severe Pain (7 - 10)    Respiratory Medications    Cardiovascular Medications    Gastrointestinal Medications  dextrose 5% + sodium chloride 0.45% with potassium chloride 20 mEq/L 1000 milliLiter(s) IV Continuous <Continuous>  magnesium sulfate  IVPB 2 Gram(s) IV Intermittent once    Genitourinary Medications    Hematologic/Oncologic Medications  heparin   Injectable 5000 Unit(s) SubCutaneous every 8 hours    Antimicrobial/Immunologic Medications    Endocrine/Metabolic Medications  levothyroxine 50 MICROGram(s) Oral daily    Topical/Other Medications    --------------------------------------------------------------------------------------    VITAL SIGNS:  Vital Signs Last 24 Hrs  T(C): 36.9 (15 Aug 2023 09:21), Max: 37.1 (14 Aug 2023 20:10)  T(F): 98.5 (15 Aug 2023 09:21), Max: 98.7 (14 Aug 2023 20:10)  HR: 74 (15 Aug 2023 09:21) (63 - 110)  BP: 91/57 (15 Aug 2023 09:21) (90/61 - 108/67)  BP(mean): 70 (14 Aug 2023 16:30) (61 - 83)  RR: 18 (15 Aug 2023 09:21) (12 - 18)  SpO2: 97% (15 Aug 2023 09:21) (92% - 100%)    Parameters below as of 15 Aug 2023 09:21  Patient On (Oxygen Delivery Method): nasal cannula  O2 Flow (L/min): 2    --------------------------------------------------------------------------------------    EXAM    General: NAD, resting in bed comfortably.  Cardiac: regular rate, warm and well perfused  Respiratory: Nonlabored respirations, normal cw expansion.  Abdomen: soft, nontender, nondistended. 3x LILLIAM drains in place with minimal SS fluid. Provena wound vac in place and functioning, c/d/i  Extremities: normal strength, FROM, no deformities    --------------------------------------------------------------------------------------    LABS                        11.7   10.97 )-----------( 136      ( 15 Aug 2023 07:18 )             36.5     08-15    137  |  103  |  12  ----------------------------<  145<H>  4.4   |  21<L>  |  0.54    Ca    8.4      15 Aug 2023 07:12  Phos  2.8     08-15  Mg     1.9     08-15        --------------------------------------------------------------------------------------    INS AND OUTS:  I&O's Detail    14 Aug 2023 07:01  -  15 Aug 2023 07:00  --------------------------------------------------------  IN:    dextrose 5% + sodium chloride 0.45% w/ Additives: 700 mL    IV PiggyBack: 100 mL    Oral Fluid: 320 mL  Total IN: 1120 mL    OUT:    Blood Loss (mL): 0 mL    Bulb (mL): 55 mL    Bulb (mL): 75 mL    Bulb (mL): 230 mL    Indwelling Catheter - Urethral (mL): 745 mL  Total OUT: 1105 mL    Total NET: 15 mL      15 Aug 2023 07:01  -  15 Aug 2023 11:19  --------------------------------------------------------  IN:    Oral Fluid: 200 mL  Total IN: 200 mL    OUT:    Bulb (mL): 0 mL    Bulb (mL): 0 mL    Bulb (mL): 0 mL    Drain (mL): 30 mL    Drain (mL): 10 mL    Drain (mL): 10 mL  Total OUT: 50 mL    Total NET: 150 mL        --------------------------------------------------------------------------------------

## 2023-08-15 NOTE — PHYSICAL THERAPY INITIAL EVALUATION ADULT - NSPTDISCHREC_GEN_A_CORE
TBD, pending further functional evaluation.  Anticipate discharge to Home HPT, to improve all bed mobility, transfers and ambulation, improve pt strength and endurance, and optimize safety.

## 2023-08-15 NOTE — PROGRESS NOTE ADULT - SUBJECTIVE AND OBJECTIVE BOX
General Surgery Progress Note    Overnight: No acute events since OR.    Subjective: Patient resting in bed.     Objective:  Vitals:  T(C): 36.8 (08-15-23 @ 00:17), Max: 37.1 (08-14-23 @ 20:10)  HR: 63 (08-15-23 @ 00:17) (63 - 110)  BP: 92/55 (08-15-23 @ 00:17) (90/61 - 132/83)  RR: 18 (08-15-23 @ 00:17) (12 - 18)  SpO2: 96% (08-15-23 @ 00:17) (92% - 100%)  Wt(kg): --    08-14 @ 07:01  -  08-15 @ 00:29  --------------------------------------------------------  IN:    dextrose 5% + sodium chloride 0.45% w/ Additives: 100 mL    IV PiggyBack: 50 mL    Oral Fluid: 120 mL  Total IN: 270 mL    OUT:    Blood Loss (mL): 0 mL    Bulb (mL): 200 mL    Bulb (mL): 55 mL    Bulb (mL): 45 mL    Indwelling Catheter - Urethral (mL): 525 mL  Total OUT: 825 mL    Total NET: -555 mL              Labs:       General Surgery Progress Note    Overnight: No acute events since OR.    Subjective: Patient resting in bed.     Objective:  Vitals:  T(C): 36.8 (08-15-23 @ 00:17), Max: 37.1 (08-14-23 @ 20:10)  HR: 63 (08-15-23 @ 00:17) (63 - 110)  BP: 92/55 (08-15-23 @ 00:17) (90/61 - 132/83)  RR: 18 (08-15-23 @ 00:17) (12 - 18)  SpO2: 96% (08-15-23 @ 00:17) (92% - 100%)  Wt(kg): --    08-14 @ 07:01  -  08-15 @ 00:29  --------------------------------------------------------  IN:    dextrose 5% + sodium chloride 0.45% w/ Additives: 100 mL    IV PiggyBack: 50 mL    Oral Fluid: 120 mL  Total IN: 270 mL    OUT:    Blood Loss (mL): 0 mL    Bulb (mL): 200 mL    Bulb (mL): 55 mL    Bulb (mL): 45 mL    Indwelling Catheter - Urethral (mL): 525 mL  Total OUT: 825 mL    Total NET: -555 mL          General Appearance: no acute distress, NTND   Chest: airway intact, non-labored breathing  CV: no JVD, palpable pulses b/l  Abdomen:   Extremities: WWP        General Surgery Progress Note    Overnight: No acute events since OR.    Subjective: Patient resting in bed.     Objective:  Vitals:  T(C): 36.8 (08-15-23 @ 00:17), Max: 37.1 (08-14-23 @ 20:10)  HR: 63 (08-15-23 @ 00:17) (63 - 110)  BP: 92/55 (08-15-23 @ 00:17) (90/61 - 132/83)  RR: 18 (08-15-23 @ 00:17) (12 - 18)  SpO2: 96% (08-15-23 @ 00:17) (92% - 100%)  Wt(kg): --    08-14 @ 07:01  -  08-15 @ 00:29  --------------------------------------------------------  IN:    dextrose 5% + sodium chloride 0.45% w/ Additives: 100 mL    IV PiggyBack: 50 mL    Oral Fluid: 120 mL  Total IN: 270 mL    OUT:    Blood Loss (mL): 0 mL    Bulb (mL): 200 mL    Bulb (mL): 55 mL    Bulb (mL): 45 mL    Indwelling Catheter - Urethral (mL): 525 mL  Total OUT: 825 mL    Total NET: -555 mL      General Appearance: no acute distress, NTND   Chest: airway intact, non-labored breathing  CV: no JVD, palpable pulses b/l  Abdominal: soft, NT/ND Incisions sites well appearing. Prevena holding suction and well functioning  Extremities: WWP       General Surgery Progress Note    Overnight: No acute events since OR.    Subjective: Patient resting in bed. Has not yet had food or drink.  Pain controlled.  Denies N/V.    Objective:  Vitals:  T(C): 36.8 (08-15-23 @ 00:17), Max: 37.1 (08-14-23 @ 20:10)  HR: 63 (08-15-23 @ 00:17) (63 - 110)  BP: 92/55 (08-15-23 @ 00:17) (90/61 - 132/83)  RR: 18 (08-15-23 @ 00:17) (12 - 18)  SpO2: 96% (08-15-23 @ 00:17) (92% - 100%)  Wt(kg): --    08-14 @ 07:01  -  08-15 @ 00:29  --------------------------------------------------------  IN:    dextrose 5% + sodium chloride 0.45% w/ Additives: 100 mL    IV PiggyBack: 50 mL    Oral Fluid: 120 mL  Total IN: 270 mL    OUT:    Blood Loss (mL): 0 mL    Bulb (mL): 200 mL    Bulb (mL): 55 mL    Bulb (mL): 45 mL    Indwelling Catheter - Urethral (mL): 525 mL  Total OUT: 825 mL    Total NET: -555 mL      General Appearance: no acute distress, NTND   Chest: airway intact, non-labored breathing  CV: no JVD, palpable pulses b/l  Abdominal: soft, NT/ND, Prevena holding suction and well functioning, JPx3 serosang  Extremities: WWP

## 2023-08-15 NOTE — DISCHARGE NOTE PROVIDER - NSDCCPCAREPLAN_GEN_ALL_CORE_FT
PRINCIPAL DISCHARGE DIAGNOSIS  Diagnosis: Incisional hernia without obstruction or gangrene  Assessment and Plan of Treatment: WOUND CARE: Keep incision clean and dry.  Prevena to be removed in office on Monday 8/21.    BATHING: Please do not submerge wound underwater. You may shower and/or sponge bathe.  ACTIVITY: No heavy lifting or straining. Otherwise, you may return to your usual level of physical activity. If you are taking narcotic pain medication (such as Percocet), do NOT drive a car, operate machinery or make important decisions.  DIET: Return to your usual diet.  NOTIFY YOUR SURGEON IF: You have any bleeding that does not stop, any pus draining from your wound, any fever (over 100.4 F) or chills, persistent nausea/vomiting, persistent diarrhea, or if your pain is not controlled on your discharge pain medications.  FOLLOW-UP:  1. Follow-up with Dr. Morrison on Monday 8/21 and with Dr. Smith within 1-2 weeks of discharge.  Please call office for appointment  2. Please follow up with your primary care physician in one week regarding your hospitalization.   You will be discharged with LILLIAM drains. You will need to empty them and record outputs accurately. This will be taught to you by the nursing staff. Please do not remove the LILLIAM drains. They will be removed in the office. Please bring to the office accurate records of output.

## 2023-08-15 NOTE — DISCHARGE NOTE PROVIDER - HOSPITAL COURSE
68 year old female presents to PST prior to scheduled ERP, Incisional hernia repair on 8/14/23 with Dr. Joss Smith. PMH GERD, Hypothyroidism, R Breast CA s/p Right Breast Lumpectomy (8/2021) and Chemotherapy/Radiation, Abdominoplasty (2006), appendectomy (6/2022) complicated by small bowel perforation; required emergent laparotomy for small bowel resection. Pt now has ventral hernia, presents for repair.    On 8/14/2023 pt underwent incisional hernia repair with component separation (by plastic surgery). She tolerated the procedure well, was extubated and sent to PACU in stable condition. She remained hemodynamically stable and was transferred to a surgical floor. Her pain was controlled by IV pain medications and then by PO pain medications. The patient was advanced from clears to regular diet and tolerated it well.  The patient is ambulating, voiding, tolerating a regular diet, and pain is controlled with oral pain medications.  Patient is stable for discharge home and will follow-up with Dr. Smith and Dr. Morrison within 1-2 weeks. 68 year old female presents to PST prior to scheduled ERP, Incisional hernia repair on 8/14/23 with Dr. Joss Smith. PMH GERD, Hypothyroidism, R Breast CA s/p Right Breast Lumpectomy (8/2021) and Chemotherapy/Radiation, Abdominoplasty (2006), appendectomy (6/2022) complicated by small bowel perforation; required emergent laparotomy for small bowel resection. Pt now has ventral hernia, presents for repair.    On 8/14/2023 pt underwent incisional hernia repair with component separation (by plastic surgery). She tolerated the procedure well, was extubated and sent to PACU in stable condition. She remained hemodynamically stable and was transferred to a surgical floor. Her pain was controlled by IV pain medications and then by PO pain medications. The patient was advanced from clears to regular diet and tolerated it well.  The patient is ambulating, voiding, tolerating a regular diet, and pain is controlled with oral pain medications.  Patient is stable for discharge home and will follow-up with Dr. Smith and Dr. Morrison within 1 week.  Prevena VAC to be removed in office on Monday 8/21. 68 year old female presents to PST prior to scheduled ERP, Incisional hernia repair on 8/14/23 with Dr. Joss Simth. PMH GERD, Hypothyroidism, R Breast CA s/p Right Breast Lumpectomy (8/2021) and Chemotherapy/Radiation, Abdominoplasty (2006), appendectomy (6/2022) complicated by small bowel perforation; required emergent laparotomy for small bowel resection. Pt now has ventral hernia, presents for repair.    On 8/14/2023 pt underwent incisional hernia repair with component separation (by plastic surgery). She tolerated the procedure well, was extubated and sent to PACU in stable condition. She remained hemodynamically stable and was transferred to a surgical floor. Her pain was controlled by IV pain medications and then by PO pain medications. She was evaluated by PT who determined-------------------The patient was advanced from clears to regular diet and tolerated it well.  The patient is ambulating, voiding, tolerating a regular diet, and pain is controlled with oral pain medications.  Patient is stable for discharge home and will follow-up with Dr. Smith and Dr. Morrison within 1 week.  Prevena VAC to be removed in office on Monday 8/21. 68 year old female presents to PST prior to scheduled ERP, Incisional hernia repair on 8/14/23 with Dr. Joss Smith. PMH GERD, Hypothyroidism, R Breast CA s/p Right Breast Lumpectomy (8/2021) and Chemotherapy/Radiation, Abdominoplasty (2006), appendectomy (6/2022) complicated by small bowel perforation; required emergent laparotomy for small bowel resection. Pt now has ventral hernia, presents for repair.    On 8/14/2023 pt underwent incisional hernia repair with component separation (by plastic surgery). She tolerated the procedure well, was extubated and sent to PACU in stable condition. She remained hemodynamically stable and was transferred to a surgical floor. Her pain was controlled by IV pain medications and then by PO pain medications. She was evaluated by PT who recommended home PT.  The patient was advanced from clears to regular diet and tolerated it well.  The patient is ambulating, voiding, tolerating a regular diet, and pain is controlled with oral pain medications.  During hospitalization, pt had hypophosphatemia and was repleted with IV phosphate.Patient is stable for discharge home and will follow-up with Dr. Smith and Dr. Morrison within 1 week.  Prevena VAC to be removed in office on Monday 8/21.

## 2023-08-15 NOTE — PROGRESS NOTE ADULT - SUBJECTIVE AND OBJECTIVE BOX
Anesthesia Pain Management Service    SUBJECTIVE:  Pain Scale Score:          [X] Refer to charted pain scores    THERAPY: Received PF neuraxial morphine as per pain service nurse's note and bilateral TAP block.    OBJECTIVE:    Sedation:        	[X] Alert	[ ] Drowsy	[ ] Arousable      [ ] Asleep       [ ] Unresponsive    Side Effects:	[X] None	[ ] Nausea	[ ] Vomiting         [ ] Pruritus  		[ ] Weakness            [ ] Numbness	          [ ] Other:    ASSESSMENT/ PLAN  [X] Patient transitioned to prn analgesics  [X] Pain management per primary service, pain service to sign off   [X]Documentation and Verification of current medications

## 2023-08-15 NOTE — PHYSICAL THERAPY INITIAL EVALUATION ADULT - LEVEL OF INDEPENDENCE: SIT/SUPINE, REHAB EVAL
Further functional mobility deferred, per nurse, pt had vasovagal epsiode 2 hours ago while sitting on edge of bed, +orthostatic.  Pt BP monitored in supine, 90/51 mmHg./unable to perform

## 2023-08-15 NOTE — PROGRESS NOTE ADULT - ATTENDING COMMENTS
discussed the operative procedure with the patient.  Patient states she has passed gas.  Will advance diet and discharge  when tolerating diet and ok with plastic surgery.

## 2023-08-15 NOTE — PHYSICAL THERAPY INITIAL EVALUATION ADULT - GENERAL OBSERVATIONS, REHAB EVAL
Spoke to [facility], pt able to ambulate, must remain flexed at waist if OOB. Pt rec'd semisupine in bed, +continuous pulse oximeter, +IV, +prevena vac, +LILLIAM drain x1, in NAD, family at bedside.  Pt cleared for PT session by RN XX.  Spoke to [facility], pt able to ambulate, must remain flexed at waist if OOB. Pt rec'd semisupine in bed, +continuous pulse oximeter, +IV, +prevena vac, +LILLIAM drain x1, in NAD, family at bedside.  Pt cleared for PT session by MELANIA Lemon.  Spoke to [facility], pt able to ambulate, must remain flexed at waist if OOB. Pt rec'd semisupine in bed, +continuous pulse oximeter, +IV, +prevena vac, +LILLIAM drain x3, in NAD, family at bedside.  Pt cleared for PT session by MELANIA Lemon.  Spoke to [facility], pt able to ambulate, must remain flexed at waist if OOB.

## 2023-08-15 NOTE — PROGRESS NOTE ADULT - ASSESSMENT
The patient is a 68y Female who is now POD1 from a repair of incisional hernia using component separation technique. Recovering well, afebrile and hemodynamically stable.    Plan:  - Pain control as needed  - DVT ppx  - OOB and ambulating as tolerated with flexed waist  - Monitor labs, replete as needed  - Diet as tolerated   The patient is a 68y Female who is now POD1 from a repair of incisional hernia using component separation technique. Recovering well, afebrile and hemodynamically stable.    Plan:  - Pain control as needed  - DVT ppx  - OOB and ambulating as tolerated with flexed waist  - Monitor labs, replete as needed  - Diet as tolerated  - Diaz in place  - No shower until seen at follow-up  - Preveena to stay in place until follow-up   The patient is a 68y Female who is now POD1 from a repair of incisional hernia using component separation technique. Recovering well, afebrile and hemodynamically stable.    Plan:  - Reg diet  - DVT ppx  - OOB and ambulating as tolerated with flexed waist  - Monitor labs, replete as needed  - D/c meyers, TOV  - No shower until seen at follow-up  - Prevena to stay in place until follow-up  - PT michelle  - LILLIAM teaching    [facility], pager #[phone]

## 2023-08-15 NOTE — PROGRESS NOTE ADULT - SUBJECTIVE AND OBJECTIVE BOX
Day 1 of Anesthesia Pain Management Service    SUBJECTIVE: Doing ok  Pain Scale Score:          [X] Refer to charted pain scores    THERAPY:    s/p    150 mcg PF morphine on 8\14\2023      MEDICATIONS  (STANDING):  acetaminophen   IVPB .. 1000 milliGRAM(s) IV Intermittent every 6 hours  dextrose 5% + sodium chloride 0.45% with potassium chloride 20 mEq/L 1000 milliLiter(s) (50 mL/Hr) IV Continuous <Continuous>  heparin   Injectable 5000 Unit(s) SubCutaneous every 8 hours  levothyroxine 50 MICROGram(s) Oral daily  magnesium sulfate  IVPB 2 Gram(s) IV Intermittent once  morphine PF Spinal 0.15 milliGRAM(s) IntraThecal. once  potassium phosphate / sodium phosphate Powder (PHOS-NaK) 1 Packet(s) Oral once    MEDICATIONS  (PRN):  diphenhydrAMINE Injectable 25 milliGRAM(s) IV Push every 4 hours PRN Pruritus  naloxone Injectable 0.1 milliGRAM(s) IV Push every 3 minutes PRN For ANY of the following changes in patient status:  A. RR LESS THAN 10 breaths per minute, B. Oxygen saturation LESS THAN 90%, C. Sedation score of 6  ondansetron Injectable 4 milliGRAM(s) IV Push every 6 hours PRN Nausea  oxyCODONE    IR 2.5 milliGRAM(s) Oral every 4 hours PRN Moderate Pain (4 - 6)  oxyCODONE    IR 5 milliGRAM(s) Oral every 4 hours PRN Severe Pain (7 - 10)      OBJECTIVE:    Sedation:        	[X] Alert	 [ ] Drowsy	[ ] Arousable      [ ] Asleep       [ ] Unresponsive    Side Effects:	[X] None 	[ ] Nausea	[ ] Vomiting         [ ] Pruritus  		[ ] Weakness            [ ] Numbness	          [ ] Other:    Vital Signs Last 24 Hrs  T(C): 36.6 (15 Aug 2023 05:42), Max: 37.1 (14 Aug 2023 20:10)  T(F): 97.8 (15 Aug 2023 05:42), Max: 98.7 (14 Aug 2023 20:10)  HR: 79 (15 Aug 2023 05:42) (63 - 110)  BP: 100/59 (15 Aug 2023 05:42) (90/61 - 108/67)  BP(mean): 70 (14 Aug 2023 16:30) (61 - 83)  RR: 18 (15 Aug 2023 05:42) (12 - 18)  SpO2: 96% (15 Aug 2023 05:42) (92% - 100%)    Parameters below as of 15 Aug 2023 05:42  Patient On (Oxygen Delivery Method): nasal cannula  O2 Flow (L/min): 2      ASSESSMENT/ PLAN  [X] Patient transitioned to prn analgesics  [X] Pain management per primary service, pain service to sign off   [X]Documentation and Verification of current medications

## 2023-08-15 NOTE — PHYSICAL THERAPY INITIAL EVALUATION ADULT - ADDITIONAL COMMENTS
Pt lives in a house with spouse with 3 CHEMA, +1 flight to the bedroom.  Pt is independent with ambulation and ADLs. Pt lives in a house with spouse with 3 CHEMA with handrail, +1 flight to the bedroom.  Pt is independent with ambulation and ADLs.  Pt owns a rolling walker from a prior surgery, but does not use it.  Pt bathes using a walk in shower.  Pt is Right handed.

## 2023-08-15 NOTE — DISCHARGE NOTE PROVIDER - NSDCFUADDAPPT_GEN_ALL_CORE_FT
You will be discharged with LILLIAM drains. You will need to empty them and record outputs accurately. This will be taught to you by the nursing staff. Please do not remove the LILLIAM drains. They will be removed in the office. Please bring to the office accurate records of output.      You will be discharged with LILLIAM drains. You will need to empty them and record outputs accurately. This will be taught to you by the nursing staff. Please do not remove the LILLIAM drains. They will be removed in the office. Please bring to the office accurate records of output.     Follow-up with Dr. Morrison on Monday 8/21 to remove Prevena VAC.  Please call office for appointment.

## 2023-08-15 NOTE — PROGRESS NOTE ADULT - ASSESSMENT
67 y/o F with psh of abdominoplasty (2006), appendectomy in 06/2022 complicated by small bowel perforation who required emergent laparotomy for small bowel resection, complicated by incisional hernia, now s/p incisional hernia repair with mesh and component separation.     - Maintain wound vac until follow-up appointment  - No showers  - Drain care, drain I/Os  - Pt will follow up with Dr. Caraballo on Monday  - Plastics will continue to follow   - Plan per primary team

## 2023-08-15 NOTE — PHYSICAL THERAPY INITIAL EVALUATION ADULT - PERTINENT HX OF CURRENT PROBLEM, REHAB EVAL
Pt is a 68 year old female with PMH significant for GERD, Hypothyroidism, R Breast CA s/p Right Breast Lumpectomy (8/2021) and Chemotherapy/Radiation, Abdominoplasty (2006), appendectomy (6/2022) complicated by small bowel perforation; required emergent laparotomy for small bowel resection.  Pt presents to PST prior to scheduled ERP, Incisional hernia repair on 8/14/23 with Dr. Joss Smith.  Pt is POD#1 from a repair of incisional hernia using component separation technique. Pt is a 68 year old female with PMH significant for GERD, Hypothyroidism, R Breast CA s/p Right Breast Lumpectomy (8/2021) and Chemotherapy/Radiation, Abdominoplasty (2006), appendectomy (6/2022) complicated by small bowel perforation; required emergent laparotomy for small bowel resection.  Pt presents to PST prior to scheduled ERP, Incisional hernia repair on 8/14/23 with Dr. Joss Smith.  Pt is POD#1 from a repair of incisional hernia using component separation technique.  Spoke to [facility], pt able to ambulate,

## 2023-08-15 NOTE — DISCHARGE NOTE PROVIDER - NSDCMRMEDTOKEN_GEN_ALL_CORE_FT
acetaminophen 325 mg oral tablet: 3 tab(s) orally every 6 hours  Repatha 140 mg/mL subcutaneous solution: 140 subcutaneously every 2 weeks  Synthroid 50 mcg (0.05 mg) oral tablet: 1 tab(s) orally once a day   acetaminophen 325 mg oral tablet: 3 tab(s) orally every 6 hours  oxyCODONE 5 mg oral tablet: 1 tab(s) orally every 6 hours as needed for  severe pain MDD: 4  Repatha 140 mg/mL subcutaneous solution: 140 subcutaneously every 2 weeks  Synthroid 50 mcg (0.05 mg) oral tablet: 1 tab(s) orally once a day

## 2023-08-16 ENCOUNTER — TRANSCRIPTION ENCOUNTER (OUTPATIENT)
Age: 68
End: 2023-08-16

## 2023-08-16 VITALS — SYSTOLIC BLOOD PRESSURE: 129 MMHG | HEART RATE: 92 BPM | OXYGEN SATURATION: 95 % | DIASTOLIC BLOOD PRESSURE: 78 MMHG

## 2023-08-16 LAB
ANION GAP SERPL CALC-SCNC: 12 MMOL/L — SIGNIFICANT CHANGE UP (ref 5–17)
BUN SERPL-MCNC: 8 MG/DL — SIGNIFICANT CHANGE UP (ref 7–23)
CALCIUM SERPL-MCNC: 8.6 MG/DL — SIGNIFICANT CHANGE UP (ref 8.4–10.5)
CHLORIDE SERPL-SCNC: 105 MMOL/L — SIGNIFICANT CHANGE UP (ref 96–108)
CO2 SERPL-SCNC: 24 MMOL/L — SIGNIFICANT CHANGE UP (ref 22–31)
CREAT SERPL-MCNC: 0.59 MG/DL — SIGNIFICANT CHANGE UP (ref 0.5–1.3)
EGFR: 97 ML/MIN/1.73M2 — SIGNIFICANT CHANGE UP
GLUCOSE SERPL-MCNC: 119 MG/DL — HIGH (ref 70–99)
HCT VFR BLD CALC: 35.7 % — SIGNIFICANT CHANGE UP (ref 34.5–45)
HGB BLD-MCNC: 11.3 G/DL — LOW (ref 11.5–15.5)
MAGNESIUM SERPL-MCNC: 2.1 MG/DL — SIGNIFICANT CHANGE UP (ref 1.6–2.6)
MCHC RBC-ENTMCNC: 29.4 PG — SIGNIFICANT CHANGE UP (ref 27–34)
MCHC RBC-ENTMCNC: 31.7 GM/DL — LOW (ref 32–36)
MCV RBC AUTO: 92.7 FL — SIGNIFICANT CHANGE UP (ref 80–100)
NRBC # BLD: 0 /100 WBCS — SIGNIFICANT CHANGE UP (ref 0–0)
PHOSPHATE SERPL-MCNC: 2.2 MG/DL — LOW (ref 2.5–4.5)
PLATELET # BLD AUTO: 181 K/UL — SIGNIFICANT CHANGE UP (ref 150–400)
POTASSIUM SERPL-MCNC: 4 MMOL/L — SIGNIFICANT CHANGE UP (ref 3.5–5.3)
POTASSIUM SERPL-SCNC: 4 MMOL/L — SIGNIFICANT CHANGE UP (ref 3.5–5.3)
RBC # BLD: 3.85 M/UL — SIGNIFICANT CHANGE UP (ref 3.8–5.2)
RBC # FLD: 13.7 % — SIGNIFICANT CHANGE UP (ref 10.3–14.5)
SODIUM SERPL-SCNC: 141 MMOL/L — SIGNIFICANT CHANGE UP (ref 135–145)
WBC # BLD: 11.83 K/UL — HIGH (ref 3.8–10.5)
WBC # FLD AUTO: 11.83 K/UL — HIGH (ref 3.8–10.5)

## 2023-08-16 PROCEDURE — 97162 PT EVAL MOD COMPLEX 30 MIN: CPT

## 2023-08-16 PROCEDURE — 83735 ASSAY OF MAGNESIUM: CPT

## 2023-08-16 PROCEDURE — 97116 GAIT TRAINING THERAPY: CPT

## 2023-08-16 PROCEDURE — 86850 RBC ANTIBODY SCREEN: CPT

## 2023-08-16 PROCEDURE — 86900 BLOOD TYPING SEROLOGIC ABO: CPT

## 2023-08-16 PROCEDURE — C9399: CPT

## 2023-08-16 PROCEDURE — C1781: CPT

## 2023-08-16 PROCEDURE — 85027 COMPLETE CBC AUTOMATED: CPT

## 2023-08-16 PROCEDURE — 80048 BASIC METABOLIC PNL TOTAL CA: CPT

## 2023-08-16 PROCEDURE — 88304 TISSUE EXAM BY PATHOLOGIST: CPT

## 2023-08-16 PROCEDURE — 86901 BLOOD TYPING SEROLOGIC RH(D): CPT

## 2023-08-16 PROCEDURE — C1889: CPT

## 2023-08-16 PROCEDURE — 82962 GLUCOSE BLOOD TEST: CPT

## 2023-08-16 PROCEDURE — 84100 ASSAY OF PHOSPHORUS: CPT

## 2023-08-16 RX ORDER — ONDANSETRON 8 MG/1
4 TABLET, FILM COATED ORAL ONCE
Refills: 0 | Status: COMPLETED | OUTPATIENT
Start: 2023-08-16 | End: 2023-08-16

## 2023-08-16 RX ORDER — OXYCODONE HYDROCHLORIDE 5 MG/1
1 TABLET ORAL
Qty: 8 | Refills: 0
Start: 2023-08-16 | End: 2023-08-17

## 2023-08-16 RX ORDER — SODIUM,POTASSIUM PHOSPHATES 278-250MG
2 POWDER IN PACKET (EA) ORAL ONCE
Refills: 0 | Status: COMPLETED | OUTPATIENT
Start: 2023-08-16 | End: 2023-08-16

## 2023-08-16 RX ORDER — ONDANSETRON 8 MG/1
4 TABLET, FILM COATED ORAL ONCE
Refills: 0 | Status: DISCONTINUED | OUTPATIENT
Start: 2023-08-16 | End: 2023-08-16

## 2023-08-16 RX ADMIN — HEPARIN SODIUM 5000 UNIT(S): 5000 INJECTION INTRAVENOUS; SUBCUTANEOUS at 01:32

## 2023-08-16 RX ADMIN — Medication 50 MICROGRAM(S): at 05:11

## 2023-08-16 RX ADMIN — Medication 2 PACKET(S): at 10:37

## 2023-08-16 RX ADMIN — ONDANSETRON 4 MILLIGRAM(S): 8 TABLET, FILM COATED ORAL at 10:34

## 2023-08-16 RX ADMIN — HEPARIN SODIUM 5000 UNIT(S): 5000 INJECTION INTRAVENOUS; SUBCUTANEOUS at 10:34

## 2023-08-16 RX ADMIN — Medication 975 MILLIGRAM(S): at 10:20

## 2023-08-16 RX ADMIN — Medication 975 MILLIGRAM(S): at 01:31

## 2023-08-16 RX ADMIN — Medication 975 MILLIGRAM(S): at 02:31

## 2023-08-16 RX ADMIN — Medication 975 MILLIGRAM(S): at 09:15

## 2023-08-16 NOTE — PROGRESS NOTE ADULT - ASSESSMENT
[facility] Treatment [facility] Staffing Note    2019    Patient Name: Timoteo Rueda  MRN: [id number]  : 1993     Attending Physician: Dr. Rader    Safety Concerns:   None at this time    Progress Toward Treatment Goals:   Patient has attended 4 sessions of IOP since his admission on 18. Patient denies all use and reports compliance with MAT program. This is his second lifetime episode in treatment. About 1.5 years ago he completed residential, IOP and sober living at [facility] in [place] and remained sober for 6-8 months post-treatment. Patient is active in group process but often appears to \"tune out\" during skills, which he reports is because he has heard some of the material prior. Patient was encouraged to try to get something new out of this experience even though many ideas may be similar. Patient continues to live at home with his parents. His stated plan is to look for work once he has completed IOP. His goal is to become a  and take CDL classes in 2019. In the past patient was active in NA but has not reestablished with local  friends or meetings since beginning programming.     Medication(s):   See medication list, on MAT     Indicators for Current Level of Care:   Patient meets placement criteria for Level 2.1 IOP Level of Care      Sis Melo MA [facility]-IT  Stanley Gupta LCSW,  C-SAC, CS-IT  2019  11:35 AM        The patient is a 68y Female who is now POD2 from a repair of incisional hernia using component separation technique 8/14. Recovering well, afebrile and hemodynamically stable. Remains asymptomatic after 1L bolus. Voiding spontaneously.     Plan:  - Reg diet  - DVT ppx  - OOB and ambulating as tolerated with flexed waist  - Monitor labs, replete as needed  - No shower until seen at follow-up  - Prevena to stay in place until follow-up  - PT michelle  - LILLIAM teaching    [facility], pager #[phone]  The patient is a 68y Female who is s/p repair of incisional hernia using component separation technique 8/14. Recovering well, afebrile and hemodynamically stable. Remains asymptomatic after 1L bolus. Voiding spontaneously.     Plan:  - Reg diet  - DVT ppx  - OOB and ambulating as tolerated with flexed waist  - Monitor labs, replete as needed  - No shower until seen at follow-up  - Prevena to stay in place until follow-up  - PT eval  - LILLIAM teaching  - Reassess pain in pm, if well controlled on PO pain regimen possibly d/c    [facility], pager #[phone]

## 2023-08-16 NOTE — PROGRESS NOTE ADULT - SUBJECTIVE AND OBJECTIVE BOX
Pt awake alert  Complains of incisional discomfort  Has nausea this morning, no vomit, + loose BM    VSS Afeb  Abd with Prevena intact, no collection appreciated appropriate incisional tenderness  Drains-serosang output    A/P stable POD#2  -diet per Dr. Smith et al  -ambulate  -cont drains and Prevena

## 2023-08-16 NOTE — PROGRESS NOTE ADULT - SUBJECTIVE AND OBJECTIVE BOX
General Surgery Progress Note    24hr: 1 watery BM followed by lightheadedness and SBP 80s on standing. Labs WNL, no recurrence after 1L bolus. Voiding after Diaz removal. No acute events overnight.    Subjective: Patient resting in bed.     Objective:  Vitals:  T(C): 36.8 (08-16-23 @ 00:35), Max: 37.2 (08-15-23 @ 12:15)  HR: 94 (08-16-23 @ 00:35) (72 - 97)  BP: 109/75 (08-16-23 @ 00:35) (90/51 - 120/75)  RR: 18 (08-16-23 @ 00:35) (18 - 18)  SpO2: 98% (08-16-23 @ 00:35) (92% - 98%)  Wt(kg): --    08-14 @ 07:01  -  08-15 @ 07:00  --------------------------------------------------------  IN:    dextrose 5% + sodium chloride 0.45% w/ Additives: 700 mL    IV PiggyBack: 100 mL    Oral Fluid: 320 mL  Total IN: 1120 mL    OUT:    Blood Loss (mL): 0 mL    Bulb (mL): 55 mL    Bulb (mL): 75 mL    Bulb (mL): 230 mL    Indwelling Catheter - Urethral (mL): 745 mL  Total OUT: 1105 mL    Total NET: 15 mL      08-15 @ 07:01  -  08-16 @ 00:59  --------------------------------------------------------  IN:    Oral Fluid: 490 mL  Total IN: 490 mL    OUT:    Bulb (mL): 90 mL    Bulb (mL): 45 mL    Bulb (mL): 115 mL    Voided (mL): 1650 mL  Total OUT: 1900 mL    Total NET: -1410 mL      Physical Exam:  General Appearance: no acute distress, NTND   Chest: airway intact, non-labored breathing  CV: no JVD, palpable pulses b/l  Abdominal: soft, NT/ND, Prevena holding suction and well functioning, JPx3 serosang  Extremities: WWP        Labs:                        11.5   11.72 )-----------( 188      ( 15 Aug 2023 13:04 )             36.5     08-15    137  |  103  |  12  ----------------------------<  145<H>  4.4   |  21<L>  |  0.54    Ca    8.4      15 Aug 2023 07:12  Phos  2.8     08-15  Mg     1.9     08-15          Urinalysis Basic - ( 15 Aug 2023 07:12 )    Color: x / Appearance: x / SG: x / pH: x  Gluc: 145 mg/dL / Ketone: x  / Bili: x / Urobili: x   Blood: x / Protein: x / Nitrite: x   Leuk Esterase: x / RBC: x / WBC x   Sq Epi: x / Non Sq Epi: x / Bacteria: x General Surgery Progress Note    24hr: 1 watery BM followed by lightheadedness and SBP 80s on standing. Labs WNL, appropriate BP response after 1L bolus (120/75), no recurrence of orthostatic s/s. Voiding after Diaz removal. No acute events overnight.    Subjective: Patient resting in bed.     Objective:  Vitals:  T(C): 36.8 (08-16-23 @ 00:35), Max: 37.2 (08-15-23 @ 12:15)  HR: 94 (08-16-23 @ 00:35) (72 - 97)  BP: 109/75 (08-16-23 @ 00:35) (90/51 - 120/75)  RR: 18 (08-16-23 @ 00:35) (18 - 18)  SpO2: 98% (08-16-23 @ 00:35) (92% - 98%)  Wt(kg): --    08-14 @ 07:01  -  08-15 @ 07:00  --------------------------------------------------------  IN:    dextrose 5% + sodium chloride 0.45% w/ Additives: 700 mL    IV PiggyBack: 100 mL    Oral Fluid: 320 mL  Total IN: 1120 mL    OUT:    Blood Loss (mL): 0 mL    Bulb (mL): 55 mL    Bulb (mL): 75 mL    Bulb (mL): 230 mL    Indwelling Catheter - Urethral (mL): 745 mL  Total OUT: 1105 mL    Total NET: 15 mL      08-15 @ 07:01  -  08-16 @ 00:59  --------------------------------------------------------  IN:    Oral Fluid: 490 mL  Total IN: 490 mL    OUT:    Bulb (mL): 90 mL    Bulb (mL): 45 mL    Bulb (mL): 115 mL    Voided (mL): 1650 mL  Total OUT: 1900 mL    Total NET: -1410 mL      Physical Exam:  General Appearance: no acute distress, NTND   Chest: airway intact, non-labored breathing  CV: no JVD, palpable pulses b/l  Abdominal: soft, NT/ND, Prevena holding suction and well functioning, JPx3 serosang  Extremities: WWP        Labs:                        11.5   11.72 )-----------( 188      ( 15 Aug 2023 13:04 )             36.5     08-15    137  |  103  |  12  ----------------------------<  145<H>  4.4   |  21<L>  |  0.54    Ca    8.4      15 Aug 2023 07:12  Phos  2.8     08-15  Mg     1.9     08-15          Urinalysis Basic - ( 15 Aug 2023 07:12 )    Color: x / Appearance: x / SG: x / pH: x  Gluc: 145 mg/dL / Ketone: x  / Bili: x / Urobili: x   Blood: x / Protein: x / Nitrite: x   Leuk Esterase: x / RBC: x / WBC x   Sq Epi: x / Non Sq Epi: x / Bacteria: x General Surgery Progress Note    24hr: 1 watery BM followed by lightheadedness and SBP 80s on standing. Labs WNL, appropriate BP response after 1L bolus (120/75), no recurrence of orthostatic s/s. Voiding after Diaz removal. No acute events overnight.    Subjective: Patient resting in bed. She endorses pain along primary incision site as well as some nausea w/o vomit. Patient denies subjective fever/chills, CP, or SOB. Tolerating diet, +ambulating.     Objective:  Vitals:  T(C): 36.8 (08-16-23 @ 00:35), Max: 37.2 (08-15-23 @ 12:15)  HR: 94 (08-16-23 @ 00:35) (72 - 97)  BP: 109/75 (08-16-23 @ 00:35) (90/51 - 120/75)  RR: 18 (08-16-23 @ 00:35) (18 - 18)  SpO2: 98% (08-16-23 @ 00:35) (92% - 98%)  Wt(kg): --    08-14 @ 07:01  -  08-15 @ 07:00  --------------------------------------------------------  IN:    dextrose 5% + sodium chloride 0.45% w/ Additives: 700 mL    IV PiggyBack: 100 mL    Oral Fluid: 320 mL  Total IN: 1120 mL    OUT:    Blood Loss (mL): 0 mL    Bulb (mL): 55 mL    Bulb (mL): 75 mL    Bulb (mL): 230 mL    Indwelling Catheter - Urethral (mL): 745 mL  Total OUT: 1105 mL    Total NET: 15 mL      08-15 @ 07:01  -  08-16 @ 00:59  --------------------------------------------------------  IN:    Oral Fluid: 490 mL  Total IN: 490 mL    OUT:    Bulb (mL): 90 mL    Bulb (mL): 45 mL    Bulb (mL): 115 mL    Voided (mL): 1650 mL  Total OUT: 1900 mL    Total NET: -1410 mL      Physical Exam:  General Appearance: no acute distress, NTND   Chest: airway intact, non-labored breathing  CV: no JVD, palpable pulses b/l  Abdominal: soft, ATTP, ND, Prevena holding suction and well functioning, JPx3 cdi w/ serosang  Extremities: WWP        Labs:                        11.5   11.72 )-----------( 188      ( 15 Aug 2023 13:04 )             36.5     08-15    137  |  103  |  12  ----------------------------<  145<H>  4.4   |  21<L>  |  0.54    Ca    8.4      15 Aug 2023 07:12  Phos  2.8     08-15  Mg     1.9     08-15          Urinalysis Basic - ( 15 Aug 2023 07:12 )    Color: x / Appearance: x / SG: x / pH: x  Gluc: 145 mg/dL / Ketone: x  / Bili: x / Urobili: x   Blood: x / Protein: x / Nitrite: x   Leuk Esterase: x / RBC: x / WBC x   Sq Epi: x / Non Sq Epi: x / Bacteria: x

## 2023-08-16 NOTE — DISCHARGE NOTE NURSING/CASE MANAGEMENT/SOCIAL WORK - NSDCPEFALRISK_GEN_ALL_CORE
For information on Fall & Injury Prevention, visit: [email or web address] OR  [email or web address] OR  https://www.cdc.gov/steadi/patient.html

## 2023-08-16 NOTE — DISCHARGE NOTE NURSING/CASE MANAGEMENT/SOCIAL WORK - PATIENT PORTAL LINK FT
You can access the FollowMyHealth Patient Portal offered by [facility] by registering at the following website: [email or web address]. By joining [employer]’s FollowMyHealth portal, you will also be able to view your health information using other applications (apps) compatible with our system.

## 2023-08-16 NOTE — DISCHARGE NOTE NURSING/CASE MANAGEMENT/SOCIAL WORK - NSDCFUADDAPPT_GEN_ALL_CORE_FT
You will be discharged with LILLIAM drains. You will need to empty them and record outputs accurately. This will be taught to you by the nursing staff. Please do not remove the LILLIAM drains. They will be removed in the office. Please bring to the office accurate records of output.     Follow-up with Dr. Morrison on Monday 8/21 to remove Prevena VAC.  Please call office for appointment.

## 2023-08-17 ENCOUNTER — TRANSCRIPTION ENCOUNTER (OUTPATIENT)
Age: 68
End: 2023-08-17

## 2023-08-17 PROBLEM — K43.2 INCISIONAL HERNIA WITHOUT OBSTRUCTION OR GANGRENE: Chronic | Status: ACTIVE | Noted: 2023-07-25

## 2023-08-17 PROBLEM — Z87.19 PERSONAL HISTORY OF OTHER DISEASES OF THE DIGESTIVE SYSTEM: Chronic | Status: ACTIVE | Noted: 2023-07-25

## 2023-08-22 ENCOUNTER — TRANSCRIPTION ENCOUNTER (OUTPATIENT)
Age: 68
End: 2023-08-22

## 2023-08-24 ENCOUNTER — APPOINTMENT (OUTPATIENT)
Dept: SURGERY | Facility: CLINIC | Age: 68
End: 2023-08-24
Payer: COMMERCIAL

## 2023-08-24 VITALS
TEMPERATURE: 96.7 F | RESPIRATION RATE: 16 BRPM | WEIGHT: 152 LBS | BODY MASS INDEX: 27.97 KG/M2 | HEIGHT: 62 IN | OXYGEN SATURATION: 98 % | DIASTOLIC BLOOD PRESSURE: 82 MMHG | SYSTOLIC BLOOD PRESSURE: 124 MMHG | HEART RATE: 86 BPM

## 2023-08-24 PROCEDURE — 99213 OFFICE O/P EST LOW 20 MIN: CPT

## 2023-08-24 NOTE — PHYSICAL EXAM
[Abdominal Masses] : No abdominal masses [Abdomen Tenderness] : ~T ~M No abdominal tenderness [No HSM] : no hepatosplenomegaly

## 2023-08-24 NOTE — HISTORY OF PRESENT ILLNESS
[de-identified] : Chrissy is a 69 y/o female here for a post op visit. S/p Repair of incisional hernia using component separation technique 14-Aug-2023.  The patient was seen by the plastic surgeons where they removed 2 of the Reddy drains.  1 Reddy drain was left in and according to the patient they will reevaluate her next week for possible drain removal.  Surgical incisions are clean and dry and the incisional hernia repair is intact.

## 2023-08-29 LAB — SURGICAL PATHOLOGY STUDY: SIGNIFICANT CHANGE UP

## 2023-09-09 ENCOUNTER — EMERGENCY (EMERGENCY)
Facility: HOSPITAL | Age: 68
LOS: 1 days | Discharge: HOME | End: 2023-09-09
Attending: EMERGENCY MEDICINE
Payer: COMMERCIAL

## 2023-09-09 VITALS
RESPIRATION RATE: 20 BRPM | WEIGHT: 139.99 LBS | TEMPERATURE: 99 F | HEART RATE: 96 BPM | OXYGEN SATURATION: 97 % | DIASTOLIC BLOOD PRESSURE: 86 MMHG | HEIGHT: 62 IN | SYSTOLIC BLOOD PRESSURE: 130 MMHG

## 2023-09-09 VITALS
SYSTOLIC BLOOD PRESSURE: 117 MMHG | RESPIRATION RATE: 18 BRPM | TEMPERATURE: 99 F | HEART RATE: 84 BPM | DIASTOLIC BLOOD PRESSURE: 80 MMHG | OXYGEN SATURATION: 96 %

## 2023-09-09 DIAGNOSIS — Z90.49 ACQUIRED ABSENCE OF OTHER SPECIFIED PARTS OF DIGESTIVE TRACT: Chronic | ICD-10-CM

## 2023-09-09 DIAGNOSIS — Z98.890 OTHER SPECIFIED POSTPROCEDURAL STATES: Chronic | ICD-10-CM

## 2023-09-09 DIAGNOSIS — Z90.710 ACQUIRED ABSENCE OF BOTH CERVIX AND UTERUS: Chronic | ICD-10-CM

## 2023-09-09 LAB
ALBUMIN SERPL ELPH-MCNC: 3.9 G/DL — SIGNIFICANT CHANGE UP (ref 3.3–5)
ALP SERPL-CCNC: 72 U/L — SIGNIFICANT CHANGE UP (ref 40–120)
ALT FLD-CCNC: 14 U/L — SIGNIFICANT CHANGE UP (ref 10–45)
ANION GAP SERPL CALC-SCNC: 14 MMOL/L — SIGNIFICANT CHANGE UP (ref 5–17)
ANION GAP SERPL CALC-SCNC: 16 MMOL/L — SIGNIFICANT CHANGE UP (ref 5–17)
APPEARANCE UR: CLEAR — SIGNIFICANT CHANGE UP
APPEARANCE UR: CLEAR — SIGNIFICANT CHANGE UP
AST SERPL-CCNC: 46 U/L — HIGH (ref 10–40)
BACTERIA # UR AUTO: NEGATIVE — SIGNIFICANT CHANGE UP
BACTERIA # UR AUTO: NEGATIVE — SIGNIFICANT CHANGE UP
BASOPHILS # BLD AUTO: 0 K/UL — SIGNIFICANT CHANGE UP (ref 0–0.2)
BASOPHILS NFR BLD AUTO: 0 % — SIGNIFICANT CHANGE UP (ref 0–2)
BILIRUB SERPL-MCNC: 0.6 MG/DL — SIGNIFICANT CHANGE UP (ref 0.2–1.2)
BILIRUB UR-MCNC: NEGATIVE — SIGNIFICANT CHANGE UP
BILIRUB UR-MCNC: NEGATIVE — SIGNIFICANT CHANGE UP
BUN SERPL-MCNC: 10 MG/DL — SIGNIFICANT CHANGE UP (ref 7–23)
BUN SERPL-MCNC: 10 MG/DL — SIGNIFICANT CHANGE UP (ref 7–23)
CALCIUM SERPL-MCNC: 9.8 MG/DL — SIGNIFICANT CHANGE UP (ref 8.4–10.5)
CALCIUM SERPL-MCNC: 9.9 MG/DL — SIGNIFICANT CHANGE UP (ref 8.4–10.5)
CHLORIDE SERPL-SCNC: 102 MMOL/L — SIGNIFICANT CHANGE UP (ref 96–108)
CHLORIDE SERPL-SCNC: 104 MMOL/L — SIGNIFICANT CHANGE UP (ref 96–108)
CO2 SERPL-SCNC: 20 MMOL/L — LOW (ref 22–31)
CO2 SERPL-SCNC: 22 MMOL/L — SIGNIFICANT CHANGE UP (ref 22–31)
COLOR SPEC: COLORLESS — SIGNIFICANT CHANGE UP
COLOR SPEC: SIGNIFICANT CHANGE UP
CREAT SERPL-MCNC: 0.5 MG/DL — SIGNIFICANT CHANGE UP (ref 0.5–1.3)
CREAT SERPL-MCNC: 0.57 MG/DL — SIGNIFICANT CHANGE UP (ref 0.5–1.3)
DIFF PNL FLD: ABNORMAL
DIFF PNL FLD: ABNORMAL
EGFR: 101 ML/MIN/1.73M2 — SIGNIFICANT CHANGE UP
EGFR: 98 ML/MIN/1.73M2 — SIGNIFICANT CHANGE UP
EOSINOPHIL # BLD AUTO: 0.41 K/UL — SIGNIFICANT CHANGE UP (ref 0–0.5)
EOSINOPHIL NFR BLD AUTO: 5 % — SIGNIFICANT CHANGE UP (ref 0–6)
EPI CELLS # UR: 6 /HPF — HIGH
EPI CELLS # UR: 8 /HPF — HIGH
GIANT PLATELETS BLD QL SMEAR: PRESENT — SIGNIFICANT CHANGE UP
GLUCOSE SERPL-MCNC: 103 MG/DL — HIGH (ref 70–99)
GLUCOSE SERPL-MCNC: 107 MG/DL — HIGH (ref 70–99)
GLUCOSE UR QL: NEGATIVE — SIGNIFICANT CHANGE UP
GLUCOSE UR QL: NEGATIVE — SIGNIFICANT CHANGE UP
HCT VFR BLD CALC: 39.7 % — SIGNIFICANT CHANGE UP (ref 34.5–45)
HGB BLD-MCNC: 12.6 G/DL — SIGNIFICANT CHANGE UP (ref 11.5–15.5)
HYALINE CASTS # UR AUTO: 2 /LPF — SIGNIFICANT CHANGE UP (ref 0–2)
HYALINE CASTS # UR AUTO: 2 /LPF — SIGNIFICANT CHANGE UP (ref 0–2)
KETONES UR-MCNC: NEGATIVE — SIGNIFICANT CHANGE UP
KETONES UR-MCNC: NEGATIVE — SIGNIFICANT CHANGE UP
LEUKOCYTE ESTERASE UR-ACNC: ABNORMAL
LEUKOCYTE ESTERASE UR-ACNC: ABNORMAL
LYMPHOCYTES # BLD AUTO: 2.62 K/UL — SIGNIFICANT CHANGE UP (ref 1–3.3)
LYMPHOCYTES # BLD AUTO: 31.7 % — SIGNIFICANT CHANGE UP (ref 13–44)
MANUAL SMEAR VERIFICATION: SIGNIFICANT CHANGE UP
MCHC RBC-ENTMCNC: 28.5 PG — SIGNIFICANT CHANGE UP (ref 27–34)
MCHC RBC-ENTMCNC: 31.7 GM/DL — LOW (ref 32–36)
MCV RBC AUTO: 89.8 FL — SIGNIFICANT CHANGE UP (ref 80–100)
MONOCYTES # BLD AUTO: 0.76 K/UL — SIGNIFICANT CHANGE UP (ref 0–0.9)
MONOCYTES NFR BLD AUTO: 9.2 % — SIGNIFICANT CHANGE UP (ref 2–14)
NEUTROPHILS # BLD AUTO: 4.47 K/UL — SIGNIFICANT CHANGE UP (ref 1.8–7.4)
NEUTROPHILS NFR BLD AUTO: 54.1 % — SIGNIFICANT CHANGE UP (ref 43–77)
NITRITE UR-MCNC: NEGATIVE — SIGNIFICANT CHANGE UP
NITRITE UR-MCNC: NEGATIVE — SIGNIFICANT CHANGE UP
PH UR: 6 — SIGNIFICANT CHANGE UP (ref 5–8)
PH UR: 6.5 — SIGNIFICANT CHANGE UP (ref 5–8)
PLAT MORPH BLD: ABNORMAL
PLATELET # BLD AUTO: 337 K/UL — SIGNIFICANT CHANGE UP (ref 150–400)
POTASSIUM SERPL-MCNC: 4.1 MMOL/L — SIGNIFICANT CHANGE UP (ref 3.5–5.3)
POTASSIUM SERPL-MCNC: 5.5 MMOL/L — HIGH (ref 3.5–5.3)
POTASSIUM SERPL-SCNC: 4.1 MMOL/L — SIGNIFICANT CHANGE UP (ref 3.5–5.3)
POTASSIUM SERPL-SCNC: 5.5 MMOL/L — HIGH (ref 3.5–5.3)
PROT SERPL-MCNC: 8.5 G/DL — HIGH (ref 6–8.3)
PROT UR-MCNC: NEGATIVE — SIGNIFICANT CHANGE UP
PROT UR-MCNC: NEGATIVE — SIGNIFICANT CHANGE UP
RAPID RVP RESULT: SIGNIFICANT CHANGE UP
RBC # BLD: 4.42 M/UL — SIGNIFICANT CHANGE UP (ref 3.8–5.2)
RBC # FLD: 13.4 % — SIGNIFICANT CHANGE UP (ref 10.3–14.5)
RBC BLD AUTO: SIGNIFICANT CHANGE UP
RBC CASTS # UR COMP ASSIST: 6 /HPF — HIGH (ref 0–4)
RBC CASTS # UR COMP ASSIST: 6 /HPF — HIGH (ref 0–4)
SARS-COV-2 RNA SPEC QL NAA+PROBE: SIGNIFICANT CHANGE UP
SODIUM SERPL-SCNC: 138 MMOL/L — SIGNIFICANT CHANGE UP (ref 135–145)
SODIUM SERPL-SCNC: 140 MMOL/L — SIGNIFICANT CHANGE UP (ref 135–145)
SP GR SPEC: 1.01 — LOW (ref 1.01–1.02)
SP GR SPEC: 1.01 — SIGNIFICANT CHANGE UP (ref 1.01–1.02)
UROBILINOGEN FLD QL: NEGATIVE — SIGNIFICANT CHANGE UP
UROBILINOGEN FLD QL: NEGATIVE — SIGNIFICANT CHANGE UP
WBC # BLD: 8.26 K/UL — SIGNIFICANT CHANGE UP (ref 3.8–10.5)
WBC # FLD AUTO: 8.26 K/UL — SIGNIFICANT CHANGE UP (ref 3.8–10.5)
WBC UR QL: 12 /HPF — HIGH (ref 0–5)
WBC UR QL: 15 /HPF — HIGH (ref 0–5)

## 2023-09-09 PROCEDURE — 71046 X-RAY EXAM CHEST 2 VIEWS: CPT

## 2023-09-09 PROCEDURE — 0225U NFCT DS DNA&RNA 21 SARSCOV2: CPT

## 2023-09-09 PROCEDURE — 36415 COLL VENOUS BLD VENIPUNCTURE: CPT

## 2023-09-09 PROCEDURE — 81001 URINALYSIS AUTO W/SCOPE: CPT

## 2023-09-09 PROCEDURE — 99285 EMERGENCY DEPT VISIT HI MDM: CPT

## 2023-09-09 PROCEDURE — 71046 X-RAY EXAM CHEST 2 VIEWS: CPT | Mod: 26

## 2023-09-09 PROCEDURE — 85025 COMPLETE CBC W/AUTO DIFF WBC: CPT

## 2023-09-09 PROCEDURE — 80053 COMPREHEN METABOLIC PANEL: CPT

## 2023-09-09 PROCEDURE — 80048 BASIC METABOLIC PNL TOTAL CA: CPT

## 2023-09-09 PROCEDURE — 99283 EMERGENCY DEPT VISIT LOW MDM: CPT | Mod: 25

## 2023-09-09 RX ORDER — ACETAMINOPHEN 500 MG
1000 TABLET ORAL ONCE
Refills: 0 | Status: COMPLETED | OUTPATIENT
Start: 2023-09-09 | End: 2023-09-09

## 2023-09-09 RX ORDER — SODIUM CHLORIDE 9 MG/ML
1000 INJECTION INTRAMUSCULAR; INTRAVENOUS; SUBCUTANEOUS ONCE
Refills: 0 | Status: COMPLETED | OUTPATIENT
Start: 2023-09-09 | End: 2023-09-09

## 2023-09-09 RX ORDER — CEFPODOXIME PROXETIL 100 MG
1 TABLET ORAL
Qty: 14 | Refills: 0
Start: 2023-09-09 | End: 2023-09-15

## 2023-09-09 RX ORDER — CEFPODOXIME PROXETIL 100 MG
100 TABLET ORAL ONCE
Refills: 0 | Status: DISCONTINUED | OUTPATIENT
Start: 2023-09-09 | End: 2023-09-12

## 2023-09-09 NOTE — ED PROVIDER NOTE - NSICDXPASTMEDICALHX_GEN_ALL_CORE_FT
PAST MEDICAL HISTORY:  Breast cancer right breast CA s/p right breast lumpectomy (8/2021) and chemo/radiation treatment    GERD (gastroesophageal reflux disease) Nexium PRN    H/O appendicitis     H/O osteoporosis on Repatha 2x per month    HLD (hyperlipidemia)     Hypothyroidism     Incisional hernia     Moderate mitral regurgitation Mild-To-Moderate - On echo report from 8/2020    Obesity     Vitamin D deficiency

## 2023-09-09 NOTE — ED PROVIDER NOTE - NSICDXPASTSURGICALHX_GEN_ALL_CORE_FT
PAST SURGICAL HISTORY:  H/O abdominoplasty     H/O elbow surgery Right side - 50+ years ago    H/O exploratory laparotomy     H/O: hysterectomy 2005 2/2 myoma    S/P bilateral breast reduction 2013    S/P laparoscopic appendectomy 6/3/2022    S/P lumpectomy, right breast 8/2021 2/2 right breast CA

## 2023-09-09 NOTE — ED PROVIDER NOTE - PATIENT PORTAL LINK FT
You can access the FollowMyHealth Patient Portal offered by [facility] by registering at the following website: [email or web address]. By joining [employer]’s FollowMyHealth portal, you will also be able to view your health information using other applications (apps) compatible with our system.

## 2023-09-09 NOTE — ED ADULT NURSE NOTE - OBJECTIVE STATEMENT
69 y/o female PMHx Breast cancer right breast CA s/p right breast lumpectomy (8/2021) and chemo/radiation treatment, GERD, appendicitis, osteoporosis, HLD, Hypothyroidism, Incisional hernia, Moderate mitral regurgitation, Obesity, Vitamin D deficiency arrives to [facility] ED by car from home with  with c/o fever. Pt states sudden onset fever since yesterday Tmax 101F. Patient is A&Ox4. Hernia repair 67 y/o female PMHx Breast cancer right breast CA s/p right breast lumpectomy (8/2021) and chemo/radiation treatment, GERD, appendicitis, osteoporosis, HLD, Hypothyroidism, Incisional hernia, Moderate mitral regurgitation, Obesity, Vitamin D deficiency arrives to [facility] ED by car from home with  with c/o fever. Pt states sudden onset fever since yesterday Tmax 101F, hx SBO complicated by incisional hernia with incisional hernia repair performed on August 14. Patient is A&Ox4. Respirations spontaneous and unlabored. Denies SOB, dyspnea, cough, chest pain, palpitations. No abdominal pain, soft NT/ND. No n/v/d. Denies urinary symptoms. Pt declined rectal temp, afebrile orally in ED. Skin is warm/dry and normal for race. Ambulates independently at baseline.

## 2023-09-09 NOTE — ED PROVIDER NOTE - NSFOLLOWUPINSTRUCTIONS_ED_ALL_ED_FT
Urinary Tract Infection    A urinary tract infection (UTI) is an infection of any part of the urinary tract, which includes the kidneys, ureters, bladder, and urethra. Risk factors include ignoring your need to urinate, wiping back to front if female, being an uncircumcised male, and having diabetes or a weak immune system. Symptoms include frequent urination, pain or burning with urination, foul smelling urine, cloudy urine, pain in the lower abdomen, blood in the urine, and fever. If you were prescribed an antibiotic medicine, take it as told by your health care provider. Do not stop taking the antibiotic even if you start to feel better.    SEEK IMMEDIATE MEDICAL CARE IF YOU HAVE ANY OF THE FOLLOWING SYMPTOMS: severe back or abdominal pain, fever, inability to keep fluids or medicine down, dizziness/lightheadedness, or a change in mental status.    Please follow up with your primary medical doctor within two days.  Return to the emergency department if you feel that your condition is worsening    You can take Tylenol or Advil for pain. Please follow the instructions on the bottles.    To control your pain at home, you should take Ibuprofen 400 mg along with Tylenol 650mg-1000mg every 6 to 8 hours. Limit your maximum daily Tylenol from all sources to 4000mg. Be aware that many other medications contain acetaminophen which is also known as Tylenol. Taking Tylenol and Ibuprofen together has been shown to be more effective at relieving pain than taking them separately. These are both over the counter medications that you can  at your local pharmacy without a prescription. You need to respect all of the warnings on the bottles. You shouldn’t take these medications for more than a week without following up with your doctor. Both medications come with certain risks and side effects that you need to discuss with your doctor, especially if you are taking them for a prolonged period.    PLEASE FOLLOW UP WITH YOUR PCP

## 2023-09-09 NOTE — ED PROVIDER NOTE - PHYSICAL EXAMINATION
Memo Ann DO (PGY2)   Physical Exam:    Gen: NAD, AOx3  Head: NCAT  HEENT: EOMI, PEERLA  Lung: CTAB, no respiratory distress, no wheezes/rhonchi/rales B/L  CV: RRR, no murmurs, rubs or gallops  Abd: soft, clear incisional hernia surgical wound site with no surrounding redness, NT, ND, no guarding, no rigidity, no rebound tenderness, no CVA tenderness   MSK: no visible deformities, ROM normal in UE/LE, no back pain  Neuro: No focal sensory or motor deficits  Skin: Warm, well perfused, no rash, no leg swelling

## 2023-09-09 NOTE — ED PROVIDER NOTE - PROGRESS NOTE DETAILS
Attending MD Zaragoza: Spoke with surgery to make aware of patient as patient sent in by surgeon Memo Ann DO (PGY2)  spoke with surgery resident. states unsure why patient was sent in. states as patient has no abdominal complaints / issues with surgical site - patient is cleared from surgical standpoint. recommending dispo as per ED team. Memo Ann DO (PGY2)  Repeat urinalysis showing white blood cells and squamous epithelial cells.  Patient offered cefpodoxime for asymptomatic UTI.  Will give first dose here and DC with cefpodoxime. Memo Ann DO (PGY2)  patient refusing first dose abx. will d/c. sent to pharmacy

## 2023-09-09 NOTE — ED ADULT NURSE NOTE - NSFALLUNIVINTERV_ED_ALL_ED
Bed/Stretcher in lowest position, wheels locked, appropriate side rails in place/Call bell, personal items and telephone in reach/Instruct patient to call for assistance before getting out of bed/chair/stretcher/Non-slip footwear applied when patient is off stretcher/[place] to call system/Physically safe environment - no spills, clutter or unnecessary equipment/Purposeful proactive rounding/Room/bathroom lighting operational, light cord in reach

## 2023-09-09 NOTE — ED PROVIDER NOTE - OBJECTIVE STATEMENT
68-year-old female history of hypothyroidism, abdominoplasty, appendectomy, SBO complicated by incisional hernia with incisional hernia repair on August 14, 2023, hx of breast cancer with lumpectomy presenting with weakness and subjective fevers.  Patient states she took her temperature yesterday and at home it was 100.9.  Denies any abdominal pain, vomiting, diarrhea, nausea, constipation, urinary complaints.  States she has been having normal bowel movements.  States she just feels weak overall however denies any body aches, URI symptoms, sore throat, ear pain.  Denies any lower extremity swelling bilaterally.  Denies any dizziness or headache.  Denies any recent travel or sick contacts.

## 2023-09-09 NOTE — ED PROVIDER NOTE - CLINICAL SUMMARY MEDICAL DECISION MAKING FREE TEXT BOX
68-year-old female history of hypothyroidism, abdominoplasty, appendectomy, SBO complicated by incisional hernia with incisional hernia repair on August 14, 2023, hx of breast cancer with lumpectomy presenting with weakness and subjective fevers.  Patient states she took her temperature yesterday and at home it was 100.9.  Denies any abdominal pain, vomiting, diarrhea, nausea, constipation, urinary complaints  Vital signs remarkable for low grade temp 99.1, normotensive not hypoxic. Plan for rectal temp, basic labs, ekg, cxr, UA, flu/covid swab  Differential diagnosis includes but not limited to viral syndrome vs. infectious etiology vs. metabolic derangement

## 2023-09-09 NOTE — ED ADULT NURSE REASSESSMENT NOTE - NS ED NURSE REASSESS COMMENT FT1
Pt declined medication in ED due to not eating. MD Zaragoza states to patient she can  medication from pharmacy and start it today when she picks it up. Pt made aware of d/c instructions by ED RN.

## 2023-09-09 NOTE — ED PROVIDER NOTE - ATTENDING CONTRIBUTION TO CARE
Attending MD Zaragoza: I personally have seen and examined this patient.  Resident note reviewed and agree on plan of care and except where noted.  See below for details.     Seen in [place] [facility], accompanied by     68F with PMH/PSH including hypothyroidism, breast ca s/p lumpectomy, appendicitis s/p lap appendectomy (Dr. Verde, 6/3/22), perf'ed viscus s/p Ex Lap, ileocecal resection, abd washout (6/6/23, Dr. Smith/Dr. Hamlin), s/p Ex Lap, abd wall recon (6/8/23, Dr. Smith), s/p incisional hernia repair (8/14/23, Dr. Smith/Dr. Morrison) presents to the ED with fever.  Reports has been having temp between 100 and 101 since yesterday.  Reports placed call to Dr. Joss Smith's office and reports "I was told to come in for labs".  Denies abdominal pain, nausea, vomiting, diarrhea, bloody or black stools. Denies dysuria, hematuria, change in urinary habits including frequency, urgency. Denies chest pain, shortness of breath, URI symptoms including cough, sore throat, runny nose.      Exam:   General: NAD  HENT: head NCAT, airway patent  Eyes: anicteric, no conjunctival injection   Lungs: lungs CTAB with good inspiratory effort, no wheezing, no rhonchi, no rales  Cardiac: +S1S2, no obvious m/r/g  GI: abdomen soft with +BS, NT, ND  : no CVAT  MSK: ranging neck and extremities freely  Neuro: moving all extremities spontaneously, nonfocal  Psych: normal mood and affect     TO BE COMPLETED Attending MD Zaragoza: I personally have seen and examined this patient.  Resident note reviewed and agree on plan of care and except where noted.  See below for details.     Seen in [place] [facility], accompanied by     68F with PMH/PSH including hypothyroidism, breast ca s/p lumpectomy, appendicitis s/p lap appendectomy (Dr. Verde, 6/3/22), perf'ed viscus s/p Ex Lap, ileocecal resection, abd washout (6/6/23, Dr. Smith/Dr. Hamlin), s/p Ex Lap, abd wall recon (6/8/23, Dr. Smith), s/p incisional hernia repair (8/14/23, Dr. Smith/Dr. Morrison) presents to the ED with fever.  Reports has been having temp between 100 and 101 since yesterday.  Reports placed call to Dr. Joss Smith's office and reports "I was told to come in for labs".  Denies abdominal pain, nausea, vomiting, diarrhea, bloody or black stools. Denies dysuria, hematuria, change in urinary habits including frequency, urgency. Denies chest pain, shortness of breath, URI symptoms including cough, sore throat, runny nose.      Exam:   General: NAD  HENT: head NCAT, airway patent  Eyes: anicteric, no conjunctival injection   Lungs: lungs CTAB with good inspiratory effort, no wheezing, no rhonchi, no rales  Cardiac: +S1S2, no obvious m/r/g  GI: abdomen soft with +BS, NT, midline and lower abdominal incisions, C/D/I, NT including no incisional tenderness, ND  : no CVAT  MSK: ranging neck and extremities freely  Neuro: moving all extremities spontaneously, nonfocal  Psych: normal mood and affect     A/P: 68F with fever, unclear etiology, reports "I am here for labs", declines IV, IVFs, Tylenol, amenable to CXR and UA, allowed nurse to butterfly for labs, will discuss with surgery as patient reports sent in by surgeon's office

## 2023-09-11 ENCOUNTER — TRANSCRIPTION ENCOUNTER (OUTPATIENT)
Age: 68
End: 2023-09-11

## 2023-09-14 ENCOUNTER — RESULT REVIEW (OUTPATIENT)
Age: 68
End: 2023-09-14

## 2023-09-15 ENCOUNTER — OUTPATIENT (OUTPATIENT)
Dept: OUTPATIENT SERVICES | Facility: HOSPITAL | Age: 68
LOS: 1 days | End: 2023-09-15
Payer: COMMERCIAL

## 2023-09-15 ENCOUNTER — APPOINTMENT (OUTPATIENT)
Dept: CT IMAGING | Facility: HOSPITAL | Age: 68
End: 2023-09-15
Payer: COMMERCIAL

## 2023-09-15 ENCOUNTER — INPATIENT (INPATIENT)
Facility: HOSPITAL | Age: 68
LOS: 2 days | Discharge: HOME | DRG: 862 | End: 2023-09-18
Attending: SURGERY | Admitting: SURGERY
Payer: COMMERCIAL

## 2023-09-15 ENCOUNTER — APPOINTMENT (OUTPATIENT)
Dept: SURGERY | Facility: CLINIC | Age: 68
End: 2023-09-15

## 2023-09-15 VITALS
DIASTOLIC BLOOD PRESSURE: 76 MMHG | WEIGHT: 139.99 LBS | HEART RATE: 105 BPM | OXYGEN SATURATION: 98 % | SYSTOLIC BLOOD PRESSURE: 121 MMHG | RESPIRATION RATE: 20 BRPM | HEIGHT: 62 IN | TEMPERATURE: 99 F

## 2023-09-15 DIAGNOSIS — Z98.890 OTHER SPECIFIED POSTPROCEDURAL STATES: Chronic | ICD-10-CM

## 2023-09-15 DIAGNOSIS — Z90.710 ACQUIRED ABSENCE OF BOTH CERVIX AND UTERUS: Chronic | ICD-10-CM

## 2023-09-15 DIAGNOSIS — L02.211 CUTANEOUS ABSCESS OF ABDOMINAL WALL: ICD-10-CM

## 2023-09-15 DIAGNOSIS — Z90.49 ACQUIRED ABSENCE OF OTHER SPECIFIED PARTS OF DIGESTIVE TRACT: Chronic | ICD-10-CM

## 2023-09-15 DIAGNOSIS — Z00.8 ENCOUNTER FOR OTHER GENERAL EXAMINATION: ICD-10-CM

## 2023-09-15 LAB
ALBUMIN SERPL ELPH-MCNC: 3.9 G/DL — SIGNIFICANT CHANGE UP (ref 3.3–5)
ALP SERPL-CCNC: 112 U/L — SIGNIFICANT CHANGE UP (ref 40–120)
ALT FLD-CCNC: 29 U/L — SIGNIFICANT CHANGE UP (ref 10–45)
ANION GAP SERPL CALC-SCNC: 17 MMOL/L — SIGNIFICANT CHANGE UP (ref 5–17)
APTT BLD: 20.8 SEC — LOW (ref 24.5–35.6)
AST SERPL-CCNC: 26 U/L — SIGNIFICANT CHANGE UP (ref 10–40)
BASOPHILS # BLD AUTO: 0.04 K/UL — SIGNIFICANT CHANGE UP (ref 0–0.2)
BASOPHILS NFR BLD AUTO: 0.3 % — SIGNIFICANT CHANGE UP (ref 0–2)
BILIRUB SERPL-MCNC: 0.4 MG/DL — SIGNIFICANT CHANGE UP (ref 0.2–1.2)
BLD GP AB SCN SERPL QL: NEGATIVE — SIGNIFICANT CHANGE UP
BUN SERPL-MCNC: 9 MG/DL — SIGNIFICANT CHANGE UP (ref 7–23)
CALCIUM SERPL-MCNC: 10.4 MG/DL — SIGNIFICANT CHANGE UP (ref 8.4–10.5)
CHLORIDE SERPL-SCNC: 101 MMOL/L — SIGNIFICANT CHANGE UP (ref 96–108)
CO2 SERPL-SCNC: 19 MMOL/L — LOW (ref 22–31)
CREAT SERPL-MCNC: 0.53 MG/DL — SIGNIFICANT CHANGE UP (ref 0.5–1.3)
EGFR: 99 ML/MIN/1.73M2 — SIGNIFICANT CHANGE UP
EOSINOPHIL # BLD AUTO: 0.18 K/UL — SIGNIFICANT CHANGE UP (ref 0–0.5)
EOSINOPHIL NFR BLD AUTO: 1.4 % — SIGNIFICANT CHANGE UP (ref 0–6)
GLUCOSE SERPL-MCNC: 107 MG/DL — HIGH (ref 70–99)
HCT VFR BLD CALC: 41.7 % — SIGNIFICANT CHANGE UP (ref 34.5–45)
HGB BLD-MCNC: 13.1 G/DL — SIGNIFICANT CHANGE UP (ref 11.5–15.5)
IMM GRANULOCYTES NFR BLD AUTO: 0.7 % — SIGNIFICANT CHANGE UP (ref 0–0.9)
INR BLD: 1.18 RATIO — SIGNIFICANT CHANGE UP (ref 0.85–1.18)
LYMPHOCYTES # BLD AUTO: 18.9 % — SIGNIFICANT CHANGE UP (ref 13–44)
LYMPHOCYTES # BLD AUTO: 2.48 K/UL — SIGNIFICANT CHANGE UP (ref 1–3.3)
MCHC RBC-ENTMCNC: 27.7 PG — SIGNIFICANT CHANGE UP (ref 27–34)
MCHC RBC-ENTMCNC: 31.4 GM/DL — LOW (ref 32–36)
MCV RBC AUTO: 88.2 FL — SIGNIFICANT CHANGE UP (ref 80–100)
MONOCYTES # BLD AUTO: 1 K/UL — HIGH (ref 0–0.9)
MONOCYTES NFR BLD AUTO: 7.6 % — SIGNIFICANT CHANGE UP (ref 2–14)
NEUTROPHILS # BLD AUTO: 9.33 K/UL — HIGH (ref 1.8–7.4)
NEUTROPHILS NFR BLD AUTO: 71.1 % — SIGNIFICANT CHANGE UP (ref 43–77)
NRBC # BLD: 0 /100 WBCS — SIGNIFICANT CHANGE UP (ref 0–0)
PLATELET # BLD AUTO: 376 K/UL — SIGNIFICANT CHANGE UP (ref 150–400)
POTASSIUM SERPL-MCNC: 4.1 MMOL/L — SIGNIFICANT CHANGE UP (ref 3.5–5.3)
POTASSIUM SERPL-SCNC: 4.1 MMOL/L — SIGNIFICANT CHANGE UP (ref 3.5–5.3)
PROT SERPL-MCNC: 8.8 G/DL — HIGH (ref 6–8.3)
PROTHROM AB SERPL-ACNC: 12.9 SEC — SIGNIFICANT CHANGE UP (ref 9.5–13)
RBC # BLD: 4.73 M/UL — SIGNIFICANT CHANGE UP (ref 3.8–5.2)
RBC # FLD: 13.2 % — SIGNIFICANT CHANGE UP (ref 10.3–14.5)
RH IG SCN BLD-IMP: POSITIVE — SIGNIFICANT CHANGE UP
SODIUM SERPL-SCNC: 137 MMOL/L — SIGNIFICANT CHANGE UP (ref 135–145)
WBC # BLD: 13.12 K/UL — HIGH (ref 3.8–10.5)
WBC # FLD AUTO: 13.12 K/UL — HIGH (ref 3.8–10.5)

## 2023-09-15 PROCEDURE — 99285 EMERGENCY DEPT VISIT HI MDM: CPT

## 2023-09-15 PROCEDURE — 10030 IMG GID FLU COLL DRG SFT TIS: CPT

## 2023-09-15 PROCEDURE — 74176 CT ABD & PELVIS W/O CONTRAST: CPT

## 2023-09-15 PROCEDURE — 74176 CT ABD & PELVIS W/O CONTRAST: CPT | Mod: 26

## 2023-09-15 RX ORDER — SODIUM CHLORIDE 9 MG/ML
1000 INJECTION, SOLUTION INTRAVENOUS
Refills: 0 | Status: DISCONTINUED | OUTPATIENT
Start: 2023-09-15 | End: 2023-09-16

## 2023-09-15 RX ORDER — OXYCODONE HYDROCHLORIDE 5 MG/1
2.5 TABLET ORAL EVERY 6 HOURS
Refills: 0 | Status: DISCONTINUED | OUTPATIENT
Start: 2023-09-15 | End: 2023-09-18

## 2023-09-15 RX ORDER — ACETAMINOPHEN 500 MG
1000 TABLET ORAL ONCE
Refills: 0 | Status: COMPLETED | OUTPATIENT
Start: 2023-09-15 | End: 2023-09-15

## 2023-09-15 RX ORDER — PIPERACILLIN AND TAZOBACTAM 4; .5 G/20ML; G/20ML
3.38 INJECTION, POWDER, LYOPHILIZED, FOR SOLUTION INTRAVENOUS EVERY 8 HOURS
Refills: 0 | Status: DISCONTINUED | OUTPATIENT
Start: 2023-09-15 | End: 2023-09-18

## 2023-09-15 RX ORDER — ENOXAPARIN SODIUM 100 MG/ML
40 INJECTION SUBCUTANEOUS EVERY 24 HOURS
Refills: 0 | Status: DISCONTINUED | OUTPATIENT
Start: 2023-09-15 | End: 2023-09-18

## 2023-09-15 RX ORDER — ACETAMINOPHEN 500 MG
975 TABLET ORAL EVERY 6 HOURS
Refills: 0 | Status: DISCONTINUED | OUTPATIENT
Start: 2023-09-15 | End: 2023-09-18

## 2023-09-15 RX ORDER — INFLUENZA VIRUS VACCINE 15; 15; 15; 15 UG/.5ML; UG/.5ML; UG/.5ML; UG/.5ML
0.7 SUSPENSION INTRAMUSCULAR ONCE
Refills: 0 | Status: DISCONTINUED | OUTPATIENT
Start: 2023-09-15 | End: 2023-09-18

## 2023-09-15 RX ORDER — VANCOMYCIN HCL 1 G
1000 VIAL (EA) INTRAVENOUS ONCE
Refills: 0 | Status: COMPLETED | OUTPATIENT
Start: 2023-09-15 | End: 2023-09-15

## 2023-09-15 RX ORDER — VANCOMYCIN HCL 1 G
1000 VIAL (EA) INTRAVENOUS EVERY 12 HOURS
Refills: 0 | Status: DISCONTINUED | OUTPATIENT
Start: 2023-09-16 | End: 2023-09-18

## 2023-09-15 RX ORDER — LEVOTHYROXINE SODIUM 125 MCG
50 TABLET ORAL DAILY
Refills: 0 | Status: DISCONTINUED | OUTPATIENT
Start: 2023-09-15 | End: 2023-09-18

## 2023-09-15 RX ORDER — PIPERACILLIN AND TAZOBACTAM 4; .5 G/20ML; G/20ML
3.38 INJECTION, POWDER, LYOPHILIZED, FOR SOLUTION INTRAVENOUS ONCE
Refills: 0 | Status: COMPLETED | OUTPATIENT
Start: 2023-09-15 | End: 2023-09-15

## 2023-09-15 RX ORDER — VANCOMYCIN HCL 1 G
VIAL (EA) INTRAVENOUS
Refills: 0 | Status: DISCONTINUED | OUTPATIENT
Start: 2023-09-15 | End: 2023-09-18

## 2023-09-15 RX ADMIN — PIPERACILLIN AND TAZOBACTAM 25 GRAM(S): 4; .5 INJECTION, POWDER, LYOPHILIZED, FOR SOLUTION INTRAVENOUS at 21:46

## 2023-09-15 RX ADMIN — PIPERACILLIN AND TAZOBACTAM 200 GRAM(S): 4; .5 INJECTION, POWDER, LYOPHILIZED, FOR SOLUTION INTRAVENOUS at 16:24

## 2023-09-15 RX ADMIN — Medication 250 MILLIGRAM(S): at 18:05

## 2023-09-15 NOTE — ED PROVIDER NOTE - PHYSICAL EXAMINATION
CONSTITUTIONAL: Well appearing and in no apparent distress.  ENT: Airway patent, moist mucous membranes.   EYES: Pupils equal, round and reactive to light. EOMI. Conjunctiva normal appearing.   CARDIAC: Normal rate, regular rhythm.  Heart sounds S1, S2.    RESPIRATORY: Breath sounds clear and equal bilaterally.   GASTROINTESTINAL: Abdomen soft, mildly TTP upper abd and periumbilical are, mildly distended. Incision well appearing w/o erythema, discharge or dehiscence.   MUSCULOSKELETAL: Spine appears normal.  NEUROLOGICAL: Alert and oriented x3, no focal deficits, no motor or sensory deficits. 5/5 muscle strength throughout.  SKIN: Skin normal color, warm, dry and intact.   PSYCHIATRIC: Normal mood and affect.

## 2023-09-15 NOTE — CONSULT NOTE ADULT - SUBJECTIVE AND OBJECTIVE BOX
Interventional Radiology    HPI: 68y Female with abdominoplasty, appendectomy, SBO complicated by incisional hernia with s/p incisional hernia repair 8/14/23 sent in from clinic by Dr. Smith for fever and abdominal pain. CT with anterior abdominal wall collection. IR consulted for drain placement.      Allergies: latex (Short breath)  No Known Drug Allergies    Medications (Abx/Cardiac/Anticoagulation/Blood Products)  piperacillin/tazobactam IVPB...: 200 mL/Hr IV Intermittent (09-15 @ 16:24)    Home Medications  acetaminophen 325 mg oral tablet: 3 tab(s) orally every 6 hours  cefpodoxime 100 mg oral tablet: 1 tab(s) orally 2 times a day  oxyCODONE 5 mg oral tablet: 1 tab(s) orally every 6 hours as needed for  severe pain MDD: 4  Repatha 140 mg/mL subcutaneous solution: 140 subcutaneously every 2 weeks  Synthroid 50 mcg (0.05 mg) oral tablet: 1 tab(s) orally once a day    Data:  157.5  63.5  T(C): 36.8  HR: 108  BP: 132/86  RR: 18  SpO2: 98%    -WBC 13.12 / HgB 13.1 / Hct 41.7 / Plt 376  -Na 137 / Cl 101 / BUN 9 / Glucose 107  -K 4.1 / CO2 19 / Cr 0.53  -ALT 29 / Alk Phos 112 / T.Bili 0.4  -INR 1.18 / PTT 20.8      Assessment/Plan:   68y Female with abdominoplasty, appendectomy, SBO complicated by incisional hernia with s/p incisional hernia repair 8/14/23 sent in from clinic by Dr. Smith for fever and abdominal pain. CT with anterior abdominal wall collection. IR consulted for drain placement.    Case discussed with Dr. Carvajal.    Plan for abdominal wall collection drain placement today.  - Please place IR procedure order under Dr. Carvajal  - Continue to hold anticoagulation      Mabel Wagner MD PGY3  Interventional Radiology     Contact on Microsoft Teams for nonemergent issues    - Nonemergent consults:  [facility] order "Consult- Interventional Radiology", no page required  - Emergent issues (pager): [facility] [phone]; [facility] [phone]; [phone]; DO NOT PAGE FOR SCHEDULING QUESTIONS  - Scheduling questions 8am-6pm : [facility] [phone]; [facility] [phone],   - Clinic/outpatient booking: [facility] [phone]; [facility] [phone]

## 2023-09-15 NOTE — ED PROVIDER NOTE - NS ED MD DISPO SPECIAL CONSIDERATION1
No COVID test required Azathioprine Counseling:  I discussed with the patient the risks of azathioprine including but not limited to myelosuppression, immunosuppression, hepatotoxicity, lymphoma, and infections.  The patient understands that monitoring is required including baseline LFTs, Creatinine, possible TPMP genotyping and weekly CBCs for the first month and then every 2 weeks thereafter.  The patient verbalized understanding of the proper use and possible adverse effects of azathioprine.  All of the patient's questions and concerns were addressed.

## 2023-09-15 NOTE — ED PROVIDER NOTE - CLINICAL SUMMARY MEDICAL DECISION MAKING FREE TEXT BOX
Adult female pmh as above, including multiple surg pw c/o fever, abd swelling and ct showing abd wall abscess. Sent to hosp for IR drainage. Check labs .cs ir surg, tx abx.  Rogelio Curran MD, Facep

## 2023-09-15 NOTE — ED PROVIDER NOTE - ATTENDING APP SHARED VISIT CONTRIBUTION OF CARE
Private Physician Sarah. Joss Surg, Damian Hammond PCP  68y pmh Hernia Repair one mo ago. Breast ca, chemo/rt, Gerd, HLD, Hypothyroidism, PT comes to ed referred to ed for c/o abd wall abscess. Pt was seen by pcp and had ct today. showing abd abscess. Tmax 101. past 10d. No nausea and vomiting,cough,sob,dysuria, abd pain. Private Physician Sarah. Joss Surg, Damian Hammond PCP  68y pmh Hernia Repair one mo ago. Breast ca, chemo/rt, Gerd, HLD, Hypothyroidism, PT comes to ed referred to ed for c/o abd wall abscess. Pt was seen by pcp and had ct today. showing abd abscess. Tmax 101. past 10d. No nausea and vomiting,cough,sob,dysuria, abd pain.  PT noted abd wall swelling over past few days,wo pain erythema. PE WDWN female awake alert nad normocephalic atraumatic neck supple chest clear anterior & posterior abd soft +bs no mass guarding neruo no focal defects. Abd +mild swelling ant abd wall w/o tenderness.  neuro no focal defects  Rogelio Curran MD, Facep

## 2023-09-15 NOTE — H&P ADULT - HISTORY OF PRESENT ILLNESS
68 F s/p incisional hernia repair sent in from clinic by Dr. Smith for fever and abdominal pain w/ outpatient CT showing infected collection.

## 2023-09-15 NOTE — ED ADULT NURSE NOTE - NSFALLUNIVINTERV_ED_ALL_ED
Bed/Stretcher in lowest position, wheels locked, appropriate side rails in place/Call bell, personal items and telephone in reach/Instruct patient to call for assistance before getting out of bed/chair/stretcher/Non-slip footwear applied when patient is off stretcher/[place] to call system/Physically safe environment - no spills, clutter or unnecessary equipment/Purposeful proactive rounding/Room/bathroom lighting operational, light cord in reach

## 2023-09-15 NOTE — ED PROVIDER NOTE - RAPID ASSESSMENT
68-year-old female history of hypothyroidism, abdominoplasty, appendectomy, SBO complicated by incisional hernia with incisional hernia repair on August 14, 2023, hx of breast cancer with lumpectomy presents to ED referred by surgeon Dr. Joss Smith for IR drainage of abdominal wall abscess seen on oupt CT today. Reports f/c for 10 days. NAD in triage.    Patient was seen as a QPA patient. The patient will be seen and further worked up in the main emergency department and their care will be completed by the main emergency department team along with a thorough physical exam. Receiving team will follow up on labs, analgesia, any clinical imaging, reassess and disposition as clinically indicated, all decisions regarding the progression of care will be made at their discretion. - Gregg NASSAR

## 2023-09-15 NOTE — H&P ADULT - ASSESSMENT
68 F w/ large anterior abdominal wall collection.    -Admit to Dr. Smith  -IR consult for drainage  -Vanc/Zosyn  -Blood cx  -NPO  -Pain control    D/w Dr. Sarah Tolentino MD  PGY-3  [facility]

## 2023-09-15 NOTE — ED ADULT NURSE NOTE - OBJECTIVE STATEMENT
Pt is a 68y F, AxO3, PMH hypothyroidism, breast ca, SBO with incisional hernia- repaired on 8/14, presents to ED for abdominal abscess. Pt endorsing fever and chills x1 week. Received outpatient CT today revealing abscess, brought in for drainage and eval. On assessment, RUQ is firm and swollen. Breathing spontaneous and unlabored, skin warm dry intact. Denies diarrhea,  symptoms, vomiting, dizziness. Pt is well appearing, VSS, speaking full sentences, family at bedside, no acute distress at this time.

## 2023-09-15 NOTE — H&P ADULT - NSHPPHYSICALEXAM_GEN_ALL_CORE
"PRIMARY DIAGNOSIS: CHEST PAIN  OUTPATIENT/OBSERVATION GOALS TO BE MET BEFORE DISCHARGE:  1. Ruled out acute coronary syndrome (negative or stable Troponin):  Yes  2. Pain Status: Pain free.  3. Appropriate provocative testing performed: No, pending Angio this afternoon  - Stress Test Procedure: N/A  - Interpretation of cardiac rhythm per telemetry tech:     4. Cleared by Consultants (if applicable):No  5. Return to near baseline physical activity: Yes  Discharge Planner Nurse   Safe discharge environment identified: Yes  Barriers to discharge: Yes, pending Angiogram        Entered by: Millie Patel 04/06/2021   /65 (BP Location: Left arm)   Pulse 71   Temp 98.3  F (36.8  C) (Oral)   Resp 16   Ht 1.575 m (5' 2\")   Wt 49.4 kg (108 lb 12.8 oz)   LMP  (LMP Unknown)   SpO2 99%   BMI 19.90 kg/m    VSS stable and on room air. Patient denies chest pain at this time. Did take nitroglycerin in the ED. Heparin running when she came from the ED. Up independently. Will continue to monitor and provide cares.   Please review provider order for any additional goals. Nurse to notify provider when observation goals have been met and patient is ready for discharge.    "
PRIMARY DIAGNOSIS: TR BAND RECOVERY   OUTPATIENT/OBSERVATION GOALS TO BE MET BEFORE DISCHARGE:  1. TR band status:  removing air every 15 minutes  2. Radial pulse and CMS (circulation, motion, sensation) are WDL: Yes  3. Bleeding or hematoma present at site: Yes.  Small amount of bleeding     4. Activity restriction education reviewed with patient: Yes.   5. Stable vital signs:  Yes  6. ADLs back to baseline:  No  7. Activity and level of assistance: Up with standby assistance.  8. Interpretation of rhythm per telemetry tech:  Sinus Rhythm , no ectopy       VSS.  LS clear, adequate sats on room air.  Patient denies any chest pain or SOB.  Currently removing air per protocol from TR band.  Slight ooze noted.  Small amount of discoloration noted around site.  CMS intact, however pulse is weak.  Patient tolerating clear liquids; advanced to regular meal tray.  Maintenance IV dcd as patient is taking adequate amounts of   Liquids.  Patient is a standby assist, ambulating to the bathroom prn.  Plan:  Will discharge after TR band removal  And stable vital signs.  Continue at this time to provide supportive cares.      Discharge Planner Nurse   Safe discharge environment identified: Yes  Barriers to discharge: Yes, unless medically cleared.          Entered by: Karen M. Lesch 04/06/2021 5:36 PM     Please review provider order for any additional goals.   Nurse to notify provider when observation goals have been met and patient is ready for discharge.  
Patient's After Visit Summary was reviewed with patient and spouse  Patient verbalized understanding of After Visit Summary, recommended follow up and was given an opportunity to ask questions.   Discharge medications sent home with patient/family:  Yes, filled prescription for Plavix and Fomatadine    OBSERVATION patient END time: 20:19  
ROOM # 202-1    Living Situation (if not independent, order SW consult): Home with   Facility name:  :     Activity level at baseline: Independent  Activity level on admit: SBA      Patient registered to observation; given Patient Bill of Rights; given the opportunity to ask questions about observation status and their plan of care.  Patient has been oriented to the observation room, bathroom and call light is in place.    Discussed discharge goals and expectations with patient/family.           
General- NAD. Pleasant and responsive  Lungs- Comfortable on room air  Abdomen- Midline laparotomy with erythema, mild drainage

## 2023-09-15 NOTE — PROCEDURE NOTE - PROCEDURE FINDINGS AND DETAILS
Preprocedure US demonstrated complex mid-anterior abdominal wall collection as seen on prior CT. 10 fr drain placed. 500 cc of purulent material aspirated. Attached to bulb. Stitched into place.

## 2023-09-15 NOTE — ED PROVIDER NOTE - OBJECTIVE STATEMENT
69 yo F with a PMH of breast ca sp R lumpectomy, hypothyroidism, abdominoplasty, appendectomy, SBO complicated by incisional hernia with incisional hernia repair on August 14 2023 by Dr. Joss Smith presents as referral by surgeon Dr. Joss Smith for IR drainage of abdominal wall abscess seen on oupt CT today (available for review in HIE.) Reports f/c for 10 days, tmax today 101. Pt initially presented to office for upper abd pain and fever. Denies nausea, vomiting, chills, chest pain, diarrhea, dysuria, headache.

## 2023-09-15 NOTE — ED ADULT TRIAGE NOTE - CHIEF COMPLAINT QUOTE
hernia sx a month ago, was here on saturday and results were unremarkable  had outpatient CT scan done today and was found to have a cyst

## 2023-09-15 NOTE — PATIENT PROFILE ADULT - FALL HARM RISK - RISK INTERVENTIONS
Assistance OOB with selected safe patient handling equipment/Assistance with ambulation/Communicate Fall Risk and Risk Factors to all staff, patient, and family/Monitor gait and stability/Reinforce activity limits and safety measures with patient and family/Sit up slowly, dangle for a short time, stand at bedside before walking/Use of alarms - bed, chair and/or voice tab/Visual Cue: Yellow wristband/Bed in lowest position, wheels locked, appropriate side rails in place/Call bell, personal items and telephone in reach/Instruct patient to call for assistance before getting out of bed or chair/Non-slip footwear when patient is out of bed/[place] to call system/Physically safe environment - no spills, clutter or unnecessary equipment/Purposeful Proactive Rounding/Room/bathroom lighting operational, light cord in reach

## 2023-09-15 NOTE — ED CLERICAL - NS ED CLERK NOTE PRE-ARRIVAL INFORMATION; ADDITIONAL PRE-ARRIVAL INFORMATION
CC/Reason For referral:  ct today shows large abdominal wall cyst with fluid collection  Preferred Consultant(if applicable):  [facility]  Who admits for you (if needed):  Do you have documents you would like to fax over?  Would you still like to speak to an ED attending?  please call as soon as patient is seen

## 2023-09-15 NOTE — PRE PROCEDURE NOTE - PRE PROCEDURE EVALUATION
Interventional Radiology    HPI: 68y Female s/p incisional hernia repair with outside CT showing superficial abdominal collection, likely infected.     Allergies: latex (Short breath)  No Known Drug Allergies    Medications (Abx/Cardiac/Anticoagulation/Blood Products)    piperacillin/tazobactam IVPB...: 200 mL/Hr IV Intermittent (09-15 @ 16:24)    Data:  157.5  63.5  T(C): 36.5  HR: 89  BP: 131/86  RR: 15  SpO2: 99%    Exam  General: No acute distress  Chest: Non labored breathing  Abdomen: Non-distended  Extremities: No swelling, warm    -WBC 13.12 / HgB 13.1 / Hct 41.7 / Plt 376  -Na 137 / Cl 101 / BUN 9 / Glucose 107  -K 4.1 / CO2 19 / Cr 0.53  -ALT 29 / Alk Phos 112 / T.Bili 0.4  -INR1.18    Imaging: CT reviewed    Plan: 68y Female presents for abdominal collection drainage  -Risks/Benefits/alternatives explained with the patient and/or healthcare proxy and witnessed informed consent obtained.

## 2023-09-15 NOTE — ED PROVIDER NOTE - NS ED ATTENDING STATEMENT MOD
This was a shared visit with the ROS. I reviewed and verified the documentation and independently performed the documented:

## 2023-09-15 NOTE — ED ADULT NURSE REASSESSMENT NOTE - NS ED NURSE REASSESS COMMENT FT1
ED MELANIA Steen spoke to IR MELANIA Zacarias, pt going to IR, will be returning to ED. No allergies, no heparin drips.

## 2023-09-15 NOTE — PATIENT PROFILE ADULT - FUNCTIONAL ASSESSMENT - DAILY ACTIVITY 6.
Mental Health Visit Note    Patient: Anahi Stockton    : 1981 MRN: [id number]    10/16/2020    Start time: 10:00am    Stop Time: 10:51am   Session # 14    Session Type: Patient is presenting for an Individual session.    Anahi Stockton is a 39 y.o. female is being seen today for    Chief Complaint   Patient presents with     MH Follow Up     Psychotherapy follow-up visit for anxiety and depression   .     Telemedicine Visit: The patient's condition can be safely assessed and treated via synchronous audio and visual telemedicine encounter.      Reason for Telemedicine Visit: Services only offered telehealth    Originating Site (Patient Location): Patient's home    Distant Site (Provider Location): Provider Remote Setting    Consent:  The patient/guardian has verbally consented to: the potential risks and benefits of telemedicine (video visit) versus in person care; bill my insurance or make self-payment for services provided; and responsibility for payment of non-covered services.     Mode of Communication:  Video Conference via [employer]     As the provider I attest to compliance with applicable laws and regulations related to telemedicine.    Those present for this visit include patient and therapist    Follow up in regards to ongoing symptom management of anxiety and depression    New symptoms or complaints: None    Functional Impairment:   Personal: 4  Family: 3  Social: 2  Work: 4    Clinical assessment of mental status:   Anahi Stockton presented on time.   She was oriented x3, open and cooperative, and dressed appropriately for this session and weather. Her memory was Normal cognitive functioning .  Her speech was  Within normal.  Language was congruent with speech.  Concentration and focus is Within normal. Psychosis is not noted or reported. She reports her mood is Anxious and Depressed.  Affect is congruent with speech and is Congruent w/content of speech.  Fund of knowledge is adequate. Insight is  "adequate for therapy.    Suicidal/Homicidal Ideation present: Patient denies suicidal and homicidal ideations/means or plans.     Patient's impression of their current status:   Patient reports waking up with a headache today. She agrees that the constant worry and stress causes physical symptoms in her body (headaches, body aches, numbness in hands/feet). She reports feeling stressed over the past week due to concerns about COVID19 cases increasing in [place]. She reports feeling overwhelmed at times. She continues to get down on herself for \"weakness.\"  She worries about \"how am I going to get better?\" She worries about returning to work. She continues to experience a lot of self-doubt.   Patient requests to switch to visits every other week due to financial cost of appointments.     Therapist impression of patients current state:   The therapist prompted patient to express thoughts and feelings following a CBT framework. Therapist provided unconditional positive regard and support, normalizing and validating her experiences. Therapist reviewed the importance of focusing on things within her control. Therapist reviewed thought re-framing exercises to help manage anxious thought patterns.     Type of psychotherapeutic technique provided: Client centered and CBT    Progress toward short term goals:Progress as expected, with patient adequately engaging in the therapeutic process.. Patient appeared very open to following therapist recommendations and was receptive to feedback. She is working on therapy skills outside of session and continues to appear vulnerable while in session (talking about topics she has never discussed with anyone before).     Review of long term goals:   Not done at today's visit and Date of last review 7/13/2020   *will update by end of October     **process sacrifices in relationship (from a grief and loss perspective) during upcoming visits    *long-term goal is to return to work     Clinical " "Global Impressions  Most recent:  Considering your total clinical experience with this particular patient population, how severe are the patient's symptoms at this time?: 4 - Moderately ill  Compared to the patient's condition at the START of treatment, this patient's condition is:: 3 - Minimally improved      Diagnosis:   1. Adjustment disorder with mixed anxiety and depressed mood    improving   R/O Generalized Anxiety Disorder   R/O Major Depressive Disorder   R/O PTSD    Plan and Follow-up:   Put some visual reminders around the house to help reduce self-doubt and increase positive self-talk      HW: practice radical self-acceptance  *HW look up modified yoga for pain and/or chair yoga - start exercising again   *HW also to continue practicing deep breathing techniques  *HW practice positive self-talk - (demonstrate same levels of compassion for self as she does for others)  *HW practice thought re-framing   Ie: \"Just get over it\" to \"it's okay to take a break\"  Ie: \"I can't deal with this\" to \"I'm doing the best that I can\"   Ie: \"my best is not good enough\" to \"I always try my best and I trust that my best is good enough\"     *plan to practice deep breathing strategies at the end of scheduled visit for consistent practice and guidance        Discharge Criteria/Planning: Patient will continue with follow-up until therapy can be discontinued without return of signs and symptoms.    I have reviewed the note as documented above.  This accurately captures the substance of my conversation with the patient.    As the provider I attest to compliance with applicable laws and regulations related to telemedicine.  Performed and documented by KI Josue 10/16/2020  " 3 = A little assistance

## 2023-09-15 NOTE — CHART NOTE - NSCHARTNOTEFT_GEN_A_CORE
Post Operative Check    Patient is post op from IR drainage and drain placement for abdominal collection. IR was able to drain 500 cc of purulent material and left 10 fr drain. Patient reports pain is significant improved (almost non-existent). Reports having minimal nausea after the procedure, but no longer nauseous. Ordered for LRD but has not tried to eat much yet, only drank some water and tolerated well. Voiding. Has not passed gas or BM since procedure.     Vitals    T(C): 37.2 (09-15-23 @ 19:40), Max: 37.3 (09-15-23 @ 12:02)  HR: 93 (09-15-23 @ 19:40) (89 - 108)  BP: 119/76 (09-15-23 @ 19:40) (119/76 - 132/86)  RR: 18 (09-15-23 @ 19:40) (15 - 20)  SpO2: 98% (09-15-23 @ 19:40) (98% - 99%)      09-15 @ 07:01  -  09-15 @ 22:22  --------------------------------------------------------  IN:  Total IN: 0 mL    OUT:    Bulb (mL): 50 mL    Voided (mL): 300 mL  Total OUT: 350 mL    Total NET: -350 mL      Labs                        13.1   13.12 )-----------( 376      ( 15 Sep 2023 16:23 )             41.7       CBC Full  -  ( 15 Sep 2023 16:23 )  WBC Count : 13.12 K/uL  Hemoglobin : 13.1 g/dL  Hematocrit : 41.7 %  Platelet Count - Automated : 376 K/uL  Mean Cell Volume : 88.2 fl  Mean Cell Hemoglobin : 27.7 pg  Mean Cell Hemoglobin Concentration : 31.4 gm/dL  Auto Neutrophil # : 9.33 K/uL  Auto Lymphocyte # : 2.48 K/uL  Auto Monocyte # : 1.00 K/uL  Auto Eosinophil # : 0.18 K/uL  Auto Basophil # : 0.04 K/uL  Auto Neutrophil % : 71.1 %  Auto Lymphocyte % : 18.9 %  Auto Monocyte % : 7.6 %  Auto Eosinophil % : 1.4 %  Auto Basophil % : 0.3 %      Physical Exam  General: nad, well appearing  Abdomen: soft, nontender, mildly distended, 10 fr drain in right upper quadrant draining s/s mildly purulent fluid      68 F PMH GERD, Hypothyroidism, R Breast CA s/p Right Breast Lumpectomy (8/2021) and Chemotherapy/Radiation, Abdominoplasty (2006), appendectomy (6/2022) complicated by small bowel perforation; required emergent laparotomy for small bowel resection and incisional hernia repair with Dr. Smith 8/14 now s/p IR drainage for infected collection. Patient recovering well.     Plan  - Diet: reg  - IVF , d/c when tolerating PO diet  - Abx: vanc, zosyn  - Lovenox  - pain control  - c/w home meds  - monitor drain output  - f/u abx plan    [facility]  [id number]

## 2023-09-15 NOTE — ED PROVIDER NOTE - PROGRESS NOTE DETAILS
Dr Smith and Surg and IR jerrica Curran MD, Facep Surg attending, Dr. Hamlin and IR leo Ernandez PA-C

## 2023-09-15 NOTE — H&P ADULT - NSHPLABSRESULTS_GEN_ALL_CORE
ACC: [id number] EXAM: CT ABDOMEN AND PELVIS ORDERED BY: DANIEL ANAND    PROCEDURE DATE: 09/15/2023        INTERPRETATION: CLINICAL INFORMATION: Postoperative fever.    COMPARISON: 07/11/2023.    CONTRAST/COMPLICATIONS:  IV Contrast: NONE  Oral Contrast: NONE  Complications: None reported at time of study completion    PROCEDURE:  CT of the Abdomen and Pelvis was performed.  Sagittal and coronal reformats were performed.    FINDINGS:  LOWER CHEST: Trace bibasilar reticular scarring/atelectasis.    LIVER: Within normal limits.  BILE DUCTS: Normal caliber.  GALLBLADDER: Within normal limits.  SPLEEN: Within normal limits.  PANCREAS: Within normal limits.  ADRENALS: Within normal limits.  KIDNEYS/URETERS: No renal stones or hydronephrosis.    BLADDER: Minimally distended.  REPRODUCTIVE ORGANS: Hysterectomy.    BOWEL: No bowel obstruction. Appendectomy. Evidence of a small bowel loop in close apposition to the anterior abdominal wall at the level of abdominal wall collection described below.  PERITONEUM: No ascites.  VESSELS: Within normal limits.  RETROPERITONEUM/LYMPH NODES: No lymphadenopathy.  ABDOMINAL WALL: Evidence of an approximately 12.5 x 11.5 x 7 cm sized collection involving the anterior abdominal wall at the level of the umbilicus and above. Multiple intrinsic pockets of gas/air are identified. There is peripheral haziness and stranding involving the subcutaneous soft tissue. Evidence of some intra-abdominal stranding is noted, however, no other convincing evidence of any intra-abdominal extension is seen.  BONES: Redemonstration of spondylolysis and associated listhesis at L5-S1 level and anterior compression of T12 and L1.    IMPRESSION:  Evidence of an anterior abdominal wall collection concerning for an abscess. Correlate clinically.    Findings were discussed with MELANIA Dougherty [phone] 9/15/2023 10:38 AM by Dr. Jacobson with read back confirmation.    --- End of Report ---            BINTA JACOBSON MD; Attending Radiologist  This document has been electronically signed. Sep 15 2023 10:47AM

## 2023-09-16 LAB
ANION GAP SERPL CALC-SCNC: 22 MMOL/L — HIGH (ref 5–17)
BUN SERPL-MCNC: 6 MG/DL — LOW (ref 7–23)
CALCIUM SERPL-MCNC: 8.4 MG/DL — SIGNIFICANT CHANGE UP (ref 8.4–10.5)
CHLORIDE SERPL-SCNC: 89 MMOL/L — LOW (ref 96–108)
CO2 SERPL-SCNC: 19 MMOL/L — LOW (ref 22–31)
CREAT SERPL-MCNC: 0.59 MG/DL — SIGNIFICANT CHANGE UP (ref 0.5–1.3)
EGFR: 97 ML/MIN/1.73M2 — SIGNIFICANT CHANGE UP
GLUCOSE BLDC GLUCOMTR-MCNC: 108 MG/DL — HIGH (ref 70–99)
GLUCOSE SERPL-MCNC: 483 MG/DL — CRITICAL HIGH (ref 70–99)
GRAM STN FLD: SIGNIFICANT CHANGE UP
HCT VFR BLD CALC: 33.6 % — LOW (ref 34.5–45)
HGB BLD-MCNC: 10.5 G/DL — LOW (ref 11.5–15.5)
MAGNESIUM SERPL-MCNC: 2 MG/DL — SIGNIFICANT CHANGE UP (ref 1.6–2.6)
MCHC RBC-ENTMCNC: 27.6 PG — SIGNIFICANT CHANGE UP (ref 27–34)
MCHC RBC-ENTMCNC: 31.3 GM/DL — LOW (ref 32–36)
MCV RBC AUTO: 88.2 FL — SIGNIFICANT CHANGE UP (ref 80–100)
NRBC # BLD: 0 /100 WBCS — SIGNIFICANT CHANGE UP (ref 0–0)
PHOSPHATE SERPL-MCNC: 3.7 MG/DL — SIGNIFICANT CHANGE UP (ref 2.5–4.5)
PLATELET # BLD AUTO: 303 K/UL — SIGNIFICANT CHANGE UP (ref 150–400)
POTASSIUM SERPL-MCNC: 3.3 MMOL/L — LOW (ref 3.5–5.3)
POTASSIUM SERPL-SCNC: 3.3 MMOL/L — LOW (ref 3.5–5.3)
RBC # BLD: 3.81 M/UL — SIGNIFICANT CHANGE UP (ref 3.8–5.2)
RBC # FLD: 13.3 % — SIGNIFICANT CHANGE UP (ref 10.3–14.5)
SODIUM SERPL-SCNC: 130 MMOL/L — LOW (ref 135–145)
SPECIMEN SOURCE: SIGNIFICANT CHANGE UP
WBC # BLD: 6.54 K/UL — SIGNIFICANT CHANGE UP (ref 3.8–10.5)
WBC # FLD AUTO: 6.54 K/UL — SIGNIFICANT CHANGE UP (ref 3.8–10.5)

## 2023-09-16 RX ORDER — LOPERAMIDE HCL 2 MG
2 TABLET ORAL ONCE
Refills: 0 | Status: COMPLETED | OUTPATIENT
Start: 2023-09-16 | End: 2023-09-16

## 2023-09-16 RX ADMIN — Medication 250 MILLIGRAM(S): at 05:07

## 2023-09-16 RX ADMIN — Medication 50 MICROGRAM(S): at 05:06

## 2023-09-16 RX ADMIN — Medication 2 MILLIGRAM(S): at 14:59

## 2023-09-16 RX ADMIN — PIPERACILLIN AND TAZOBACTAM 25 GRAM(S): 4; .5 INJECTION, POWDER, LYOPHILIZED, FOR SOLUTION INTRAVENOUS at 06:23

## 2023-09-16 RX ADMIN — PIPERACILLIN AND TAZOBACTAM 25 GRAM(S): 4; .5 INJECTION, POWDER, LYOPHILIZED, FOR SOLUTION INTRAVENOUS at 14:59

## 2023-09-16 RX ADMIN — Medication 250 MILLIGRAM(S): at 22:15

## 2023-09-16 RX ADMIN — ENOXAPARIN SODIUM 40 MILLIGRAM(S): 100 INJECTION SUBCUTANEOUS at 05:10

## 2023-09-16 NOTE — PROGRESS NOTE ADULT - ASSESSMENT
68 F PMH GERD, Hypothyroidism, R Breast CA s/p Right Breast Lumpectomy (8/2021) and Chemotherapy/Radiation, Abdominoplasty (2006), appendectomy (6/2022) complicated by small bowel perforation; required emergent laparotomy for small bowel resection and incisional hernia repair with Dr. Smith 8/14 now s/p IR drainage for infected collection. Patient recovering well.     Plan  - Diet: reg  - IVF , d/c when tolerating PO diet  - Abx: vanc, zosyn  - Lovenox  - pain control  - c/w home meds  - monitor drain output  - f/u abx plan    [facility]  [id number].   68 F PMH GERD, Hypothyroidism, R Breast CA s/p Right Breast Lumpectomy (8/2021) and Chemotherapy/Radiation, Abdominoplasty (2006), appendectomy (6/2022) complicated by small bowel perforation; required emergent laparotomy for small bowel resection and incisional hernia repair with Dr. Smith 8/14 now s/p IR drainage for infected collection. Patient recovering well.     Plan  - Diet: reg  - IVF , d/c when tolerating PO diet  - Abx: vanc, zosyn  - Lovenox  - c/w home meds  - monitor drain output  - f/u abx plan after final results on culture  -dispo: home likely Monday    [facility]  [id number].

## 2023-09-16 NOTE — PROGRESS NOTE ADULT - SUBJECTIVE AND OBJECTIVE BOX
Overnight: POC completed.     SUBJECTIVE: Patient seen and examined on AM rounds. Patient denies any complaints. Tolerating regular diet without nausea or vomiting. + Flatus. - BM. Voiding appropriately. Ambulating well. Denies fevers, chills, SOB, CP.      Vital Signs Last 24 Hrs  T(C): 36.7 (16 Sep 2023 00:23), Max: 37.3 (15 Sep 2023 12:02)  T(F): 98 (16 Sep 2023 00:23), Max: 99.1 (15 Sep 2023 12:02)  HR: 70 (16 Sep 2023 00:23) (70 - 108)  BP: 93/64 (16 Sep 2023 00:23) (93/64 - 132/86)  BP(mean): --  RR: 18 (16 Sep 2023 00:23) (15 - 20)  SpO2: 98% (16 Sep 2023 00:23) (98% - 99%)    Parameters below as of 16 Sep 2023 00:23  Patient On (Oxygen Delivery Method): room air        I&O's Detail    15 Sep 2023 07:01  -  16 Sep 2023 01:07  --------------------------------------------------------  IN:  Total IN: 0 mL    OUT:    Bulb (mL): 50 mL    Voided (mL): 300 mL  Total OUT: 350 mL    Total NET: -350 mL      Physical Exam:  General Appearance: Appears well, NAD  Respiratory: No acute resp distress, no accesory muscle use  Abdomen: soft, nontender, mildly distended, 10 fr drain in right upper quadrant draining s/s mildly purulent fluid  Extremities: Grossly symmetric, SCD's in place     LABS:                        13.1   13.12 )-----------( 376      ( 15 Sep 2023 16:23 )             41.7     09-15    137  |  101  |  9   ----------------------------<  107<H>  4.1   |  19<L>  |  0.53    Ca    10.4      15 Sep 2023 16:23    TPro  8.8<H>  /  Alb  3.9  /  TBili  0.4  /  DBili  x   /  AST  26  /  ALT  29  /  AlkPhos  112  09-15    PT/INR - ( 15 Sep 2023 16:23 )   PT: 12.9 sec;   INR: 1.18 ratio         PTT - ( 15 Sep 2023 16:23 )  PTT:20.8 sec  Urinalysis Basic - ( 15 Sep 2023 16:23 )    Color: x / Appearance: x / SG: x / pH: x  Gluc: 107 mg/dL / Ketone: x  / Bili: x / Urobili: x   Blood: x / Protein: x / Nitrite: x   Leuk Esterase: x / RBC: x / WBC x   Sq Epi: x / Non Sq Epi: x / Bacteria: x        RADIOLOGY & ADDITIONAL STUDIES:   Overnight: POC completed.     SUBJECTIVE: Patient seen and examined on AM rounds. Patient denies any complaints. Tolerating regular diet without nausea or vomiting. + Flatus. + BM. Voiding appropriately. Ambulating well. Denies fevers, chills, SOB, CP.      Vital Signs Last 24 Hrs  T(C): 36.7 (16 Sep 2023 00:23), Max: 37.3 (15 Sep 2023 12:02)  T(F): 98 (16 Sep 2023 00:23), Max: 99.1 (15 Sep 2023 12:02)  HR: 70 (16 Sep 2023 00:23) (70 - 108)  BP: 93/64 (16 Sep 2023 00:23) (93/64 - 132/86)  BP(mean): --  RR: 18 (16 Sep 2023 00:23) (15 - 20)  SpO2: 98% (16 Sep 2023 00:23) (98% - 99%)    Parameters below as of 16 Sep 2023 00:23  Patient On (Oxygen Delivery Method): room air        I&O's Detail    15 Sep 2023 07:01  -  16 Sep 2023 01:07  --------------------------------------------------------  IN:  Total IN: 0 mL    OUT:    Bulb (mL): 50 mL    Voided (mL): 300 mL  Total OUT: 350 mL    Total NET: -350 mL      Physical Exam:  General Appearance: Appears well, NAD  Respiratory: No acute resp distress, no accesory muscle use  Abdomen: soft, nontender, mildly distended, 10 fr drain in right upper quadrant draining s/s mildly purulent fluid  Extremities: Grossly symmetric    LABS:                        13.1   13.12 )-----------( 376      ( 15 Sep 2023 16:23 )             41.7     09-15    137  |  101  |  9   ----------------------------<  107<H>  4.1   |  19<L>  |  0.53    Ca    10.4      15 Sep 2023 16:23    TPro  8.8<H>  /  Alb  3.9  /  TBili  0.4  /  DBili  x   /  AST  26  /  ALT  29  /  AlkPhos  112  09-15    PT/INR - ( 15 Sep 2023 16:23 )   PT: 12.9 sec;   INR: 1.18 ratio         PTT - ( 15 Sep 2023 16:23 )  PTT:20.8 sec  Urinalysis Basic - ( 15 Sep 2023 16:23 )    Color: x / Appearance: x / SG: x / pH: x  Gluc: 107 mg/dL / Ketone: x  / Bili: x / Urobili: x   Blood: x / Protein: x / Nitrite: x   Leuk Esterase: x / RBC: x / WBC x   Sq Epi: x / Non Sq Epi: x / Bacteria: x        RADIOLOGY & ADDITIONAL STUDIES:   Overnight: POC completed.     SUBJECTIVE: Patient seen and examined on AM rounds. Patient denies any complaints. Tolerating regular diet without nausea or vomiting. + Flatus. + BM. Voiding appropriately. Ambulating well. Denies fevers, chills, SOB, CP.      Vital Signs Last 24 Hrs  T(C): 36.7 (16 Sep 2023 00:23), Max: 37.3 (15 Sep 2023 12:02)  T(F): 98 (16 Sep 2023 00:23), Max: 99.1 (15 Sep 2023 12:02)  HR: 70 (16 Sep 2023 00:23) (70 - 108)  BP: 93/64 (16 Sep 2023 00:23) (93/64 - 132/86)  BP(mean): --  RR: 18 (16 Sep 2023 00:23) (15 - 20)  SpO2: 98% (16 Sep 2023 00:23) (98% - 99%)    Parameters below as of 16 Sep 2023 00:23  Patient On (Oxygen Delivery Method): room air        I&O's Detail    15 Sep 2023 07:01  -  16 Sep 2023 01:07  --------------------------------------------------------  IN:  Total IN: 0 mL    OUT:    Bulb (mL): 50 mL    Voided (mL): 300 mL  Total OUT: 350 mL    Total NET: -350 mL      Physical Exam:  General Appearance: Appears well, NAD  Respiratory: No acute resp distress, no accesory muscle use  Abdomen: soft, nontender, mildly distended, 10 fr drain in right upper quadrant draining s/s serosang  Extremities: Grossly symmetric    LABS:                        13.1   13.12 )-----------( 376      ( 15 Sep 2023 16:23 )             41.7     09-15    137  |  101  |  9   ----------------------------<  107<H>  4.1   |  19<L>  |  0.53    Ca    10.4      15 Sep 2023 16:23    TPro  8.8<H>  /  Alb  3.9  /  TBili  0.4  /  DBili  x   /  AST  26  /  ALT  29  /  AlkPhos  112  09-15    PT/INR - ( 15 Sep 2023 16:23 )   PT: 12.9 sec;   INR: 1.18 ratio         PTT - ( 15 Sep 2023 16:23 )  PTT:20.8 sec  Urinalysis Basic - ( 15 Sep 2023 16:23 )    Color: x / Appearance: x / SG: x / pH: x  Gluc: 107 mg/dL / Ketone: x  / Bili: x / Urobili: x   Blood: x / Protein: x / Nitrite: x   Leuk Esterase: x / RBC: x / WBC x   Sq Epi: x / Non Sq Epi: x / Bacteria: x        RADIOLOGY & ADDITIONAL STUDIES:

## 2023-09-16 NOTE — PROVIDER CONTACT NOTE (CRITICAL VALUE NOTIFICATION) - ASSESSMENT
pt alert and oriented. pt tolerating diet no c/o n/v. pt denies any pain at this time. no s/s of hyperglycemia

## 2023-09-17 LAB
ANION GAP SERPL CALC-SCNC: 14 MMOL/L — SIGNIFICANT CHANGE UP (ref 5–17)
BUN SERPL-MCNC: 6 MG/DL — LOW (ref 7–23)
CALCIUM SERPL-MCNC: 9.6 MG/DL — SIGNIFICANT CHANGE UP (ref 8.4–10.5)
CHLORIDE SERPL-SCNC: 103 MMOL/L — SIGNIFICANT CHANGE UP (ref 96–108)
CO2 SERPL-SCNC: 23 MMOL/L — SIGNIFICANT CHANGE UP (ref 22–31)
CREAT SERPL-MCNC: 0.74 MG/DL — SIGNIFICANT CHANGE UP (ref 0.5–1.3)
EGFR: 87 ML/MIN/1.73M2 — SIGNIFICANT CHANGE UP
GLUCOSE SERPL-MCNC: 105 MG/DL — HIGH (ref 70–99)
HCT VFR BLD CALC: 35 % — SIGNIFICANT CHANGE UP (ref 34.5–45)
HGB BLD-MCNC: 11.5 G/DL — SIGNIFICANT CHANGE UP (ref 11.5–15.5)
MAGNESIUM SERPL-MCNC: 2.3 MG/DL — SIGNIFICANT CHANGE UP (ref 1.6–2.6)
MCHC RBC-ENTMCNC: 28.6 PG — SIGNIFICANT CHANGE UP (ref 27–34)
MCHC RBC-ENTMCNC: 32.9 GM/DL — SIGNIFICANT CHANGE UP (ref 32–36)
MCV RBC AUTO: 87.1 FL — SIGNIFICANT CHANGE UP (ref 80–100)
NRBC # BLD: 0 /100 WBCS — SIGNIFICANT CHANGE UP (ref 0–0)
PHOSPHATE SERPL-MCNC: 3.9 MG/DL — SIGNIFICANT CHANGE UP (ref 2.5–4.5)
PLATELET # BLD AUTO: 347 K/UL — SIGNIFICANT CHANGE UP (ref 150–400)
POTASSIUM SERPL-MCNC: 3.9 MMOL/L — SIGNIFICANT CHANGE UP (ref 3.5–5.3)
POTASSIUM SERPL-SCNC: 3.9 MMOL/L — SIGNIFICANT CHANGE UP (ref 3.5–5.3)
RBC # BLD: 4.02 M/UL — SIGNIFICANT CHANGE UP (ref 3.8–5.2)
RBC # FLD: 13.2 % — SIGNIFICANT CHANGE UP (ref 10.3–14.5)
SODIUM SERPL-SCNC: 140 MMOL/L — SIGNIFICANT CHANGE UP (ref 135–145)
VANCOMYCIN TROUGH SERPL-MCNC: 10.7 UG/ML — SIGNIFICANT CHANGE UP (ref 10–20)
WBC # BLD: 7.5 K/UL — SIGNIFICANT CHANGE UP (ref 3.8–10.5)
WBC # FLD AUTO: 7.5 K/UL — SIGNIFICANT CHANGE UP (ref 3.8–10.5)

## 2023-09-17 RX ORDER — ONDANSETRON 8 MG/1
4 TABLET, FILM COATED ORAL ONCE
Refills: 0 | Status: DISCONTINUED | OUTPATIENT
Start: 2023-09-17 | End: 2023-09-18

## 2023-09-17 RX ADMIN — Medication 50 MICROGRAM(S): at 05:45

## 2023-09-17 RX ADMIN — PIPERACILLIN AND TAZOBACTAM 25 GRAM(S): 4; .5 INJECTION, POWDER, LYOPHILIZED, FOR SOLUTION INTRAVENOUS at 14:00

## 2023-09-17 RX ADMIN — Medication 250 MILLIGRAM(S): at 11:04

## 2023-09-17 RX ADMIN — Medication 250 MILLIGRAM(S): at 20:53

## 2023-09-17 RX ADMIN — PIPERACILLIN AND TAZOBACTAM 25 GRAM(S): 4; .5 INJECTION, POWDER, LYOPHILIZED, FOR SOLUTION INTRAVENOUS at 01:55

## 2023-09-17 NOTE — PROGRESS NOTE ADULT - ASSESSMENT
68 F PMH GERD, Hypothyroidism, R Breast CA s/p Right Breast Lumpectomy (8/2021) and Chemotherapy/Radiation, Abdominoplasty (2006), appendectomy (6/2022) complicated by small bowel perforation; required emergent laparotomy for small bowel resection and incisional hernia repair with Dr. Smith 8/14 now s/p IR drainage for infected collection. Patient recovering well.     Plan  - Reg diet, IV locked  - Abx: vanc, zosyn  - Lovenox  - c/w home meds  - monitor drain output  - f/u abx plan after final results on culture  - dispo: home likely Monday    [facility]  [id number].   68 F PMH GERD, Hypothyroidism, R Breast CA s/p Right Breast Lumpectomy (8/2021) and Chemotherapy/Radiation, Abdominoplasty (2006), appendectomy (6/2022) complicated by small bowel perforation; required emergent laparotomy for small bowel resection and incisional hernia repair with Dr. Smith 8/14 now s/p IR drainage for infected collection. Patient recovering well.     Plan  - Reg diet, IV locked  - Abx: vanc, zosyn  - Lovenox  - c/w home meds  - monitor drain output  - f/u abx plan after final results on culture  - dispo: home likely Monday      Tomás Hugo PGY6  Minimally Invasive Surgery  [facility]  [id number].

## 2023-09-17 NOTE — PROGRESS NOTE ADULT - ATTENDING COMMENTS
No acute events overnight   No pain  Afebrile  Drain purulent output  Abd remains soft    Cont IV abx  Awaiting cx/sensitivities results    Clark Corcoran MD
S/p incisional hernia repair c/b postoperative fluid collection, now s/p IR drainage yesterday  Feels well, no pain, no nausea  + flatus  Afebrile  Abd soft, nondistended  Drain purulent but clearing    Plan  Cont IV abx  Awaiting cx sensitivities before determining abx for discharge  Reg diet    Clark Corcoran MD

## 2023-09-17 NOTE — PROVIDER CONTACT NOTE (OTHER) - ASSESSMENT
pt refusing Lovenox. states she is walking around. pt educated on risk and benefits, pt still refusing

## 2023-09-17 NOTE — PROGRESS NOTE ADULT - SUBJECTIVE AND OBJECTIVE BOX
SURGERY PROGRESS NOTE    Cutaneous abscess of abdominal wall      GRETEL FIEG  |  [id number]      Patient is a 68y Female PPD2 s/p IR drainage of abdominal wall collection       S: RILEY overnight. Pt seen and evaluated bedside this AM. Pt resting comfortably on exam. Tolerating Diet. Ambulating and voiding without issue. Pain well controlled.    MEDICATIONS  (STANDING):  acetaminophen     Tablet .. 975 milliGRAM(s) Oral every 6 hours  enoxaparin Injectable 40 milliGRAM(s) SubCutaneous every 24 hours  influenza  Vaccine (HIGH DOSE) 0.7 milliLiter(s) IntraMuscular once  levothyroxine 50 MICROGram(s) Oral daily  piperacillin/tazobactam IVPB.. 3.375 Gram(s) IV Intermittent every 8 hours  vancomycin  IVPB      vancomycin  IVPB 1000 milliGRAM(s) IV Intermittent every 12 hours    MEDICATIONS  (PRN):  oxyCODONE    IR 2.5 milliGRAM(s) Oral every 6 hours PRN Moderate Pain (4 - 6)      O:   Vital Signs Last 24 Hrs  T(C): 36.9 (17 Sep 2023 00:27), Max: 37.7 (16 Sep 2023 12:34)  T(F): 98.4 (17 Sep 2023 00:27), Max: 99.9 (16 Sep 2023 12:34)  HR: 75 (17 Sep 2023 00:27) (70 - 87)  BP: 116/68 (17 Sep 2023 00:27) (107/71 - 125/77)  BP(mean): --  RR: 18 (17 Sep 2023 00:27) (18 - 18)  SpO2: 100% (17 Sep 2023 00:27) (95% - 100%)    Parameters below as of 17 Sep 2023 00:27  Patient On (Oxygen Delivery Method): room air        PHYSICAL EXAM:  General Appearance: Appears well, NAD  Respiratory: No acute resp distress, no accesory muscle use  Abdomen: soft, nontender, mildly distended, 10 fr drain in right upper quadrant draining s/s serosang  Extremities: Grossly symmetric                          10.5   6.54  )-----------( 303      ( 16 Sep 2023 10:20 )             33.6     09-16    130<L>  |  89<L>  |  6<L>  ----------------------------<  483<HH>  3.3<L>   |  19<L>  |  0.59    Ca    8.4      16 Sep 2023 10:20  Phos  3.7     09-16  Mg     2.0     09-16    TPro  8.8<H>  /  Alb  3.9  /  TBili  0.4  /  DBili  x   /  AST  26  /  ALT  29  /  AlkPhos  112  09-15        09-15-23 @ 07:01  -  09-16-23 @ 07:00  --------------------------------------------------------  IN: 1650 mL / OUT: 700 mL / NET: 950 mL    09-16-23 @ 07:01  -  09-17-23 @ 00:29  --------------------------------------------------------  IN: 250 mL / OUT: 240 mL / NET: 10 mL        IMAGING STUDIES:       SURGERY PROGRESS NOTE    Cutaneous abscess of abdominal wall      GRETEL FIEG  |  [id number]      Patient is a 68y Female PPD2 s/p IR drainage of abdominal wall collection     S: RILEY overnight. Pt seen and evaluated bedside this AM. Pt resting comfortably on exam. Tolerating Diet. feeling a little nauseous and frustrated. Ambulating and voiding without issue. Pain well controlled.    MEDICATIONS  (STANDING):  acetaminophen     Tablet .. 975 milliGRAM(s) Oral every 6 hours  enoxaparin Injectable 40 milliGRAM(s) SubCutaneous every 24 hours  influenza  Vaccine (HIGH DOSE) 0.7 milliLiter(s) IntraMuscular once  levothyroxine 50 MICROGram(s) Oral daily  piperacillin/tazobactam IVPB.. 3.375 Gram(s) IV Intermittent every 8 hours  vancomycin  IVPB      vancomycin  IVPB 1000 milliGRAM(s) IV Intermittent every 12 hours    MEDICATIONS  (PRN):  oxyCODONE    IR 2.5 milliGRAM(s) Oral every 6 hours PRN Moderate Pain (4 - 6)      O:   Vital Signs Last 24 Hrs  T(C): 36.9 (17 Sep 2023 00:27), Max: 37.7 (16 Sep 2023 12:34)  T(F): 98.4 (17 Sep 2023 00:27), Max: 99.9 (16 Sep 2023 12:34)  HR: 75 (17 Sep 2023 00:27) (70 - 87)  BP: 116/68 (17 Sep 2023 00:27) (107/71 - 125/77)  BP(mean): --  RR: 18 (17 Sep 2023 00:27) (18 - 18)  SpO2: 100% (17 Sep 2023 00:27) (95% - 100%)    Parameters below as of 17 Sep 2023 00:27  Patient On (Oxygen Delivery Method): room air        PHYSICAL EXAM:  General Appearance: Appears well, NAD  Respiratory: No acute resp distress, no accesory muscle use  Abdomen: soft, nontender, mildly distended, 10 fr drain in right upper quadrant draining s/s serosang  Extremities: Grossly symmetric                          10.5   6.54  )-----------( 303      ( 16 Sep 2023 10:20 )             33.6     09-16    130<L>  |  89<L>  |  6<L>  ----------------------------<  483<HH>  3.3<L>   |  19<L>  |  0.59    Ca    8.4      16 Sep 2023 10:20  Phos  3.7     09-16  Mg     2.0     09-16    TPro  8.8<H>  /  Alb  3.9  /  TBili  0.4  /  DBili  x   /  AST  26  /  ALT  29  /  AlkPhos  112  09-15        09-15-23 @ 07:01  -  09-16-23 @ 07:00  --------------------------------------------------------  IN: 1650 mL / OUT: 700 mL / NET: 950 mL    09-16-23 @ 07:01  -  09-17-23 @ 00:29  --------------------------------------------------------  IN: 250 mL / OUT: 240 mL / NET: 10 mL        IMAGING STUDIES:

## 2023-09-18 ENCOUNTER — TRANSCRIPTION ENCOUNTER (OUTPATIENT)
Age: 68
End: 2023-09-18

## 2023-09-18 VITALS
DIASTOLIC BLOOD PRESSURE: 71 MMHG | HEART RATE: 87 BPM | RESPIRATION RATE: 18 BRPM | OXYGEN SATURATION: 95 % | TEMPERATURE: 98 F | SYSTOLIC BLOOD PRESSURE: 102 MMHG

## 2023-09-18 LAB
ANION GAP SERPL CALC-SCNC: 15 MMOL/L — SIGNIFICANT CHANGE UP (ref 5–17)
BUN SERPL-MCNC: 7 MG/DL — SIGNIFICANT CHANGE UP (ref 7–23)
CALCIUM SERPL-MCNC: 9.8 MG/DL — SIGNIFICANT CHANGE UP (ref 8.4–10.5)
CHLORIDE SERPL-SCNC: 101 MMOL/L — SIGNIFICANT CHANGE UP (ref 96–108)
CO2 SERPL-SCNC: 23 MMOL/L — SIGNIFICANT CHANGE UP (ref 22–31)
CREAT SERPL-MCNC: 0.91 MG/DL — SIGNIFICANT CHANGE UP (ref 0.5–1.3)
EGFR: 68 ML/MIN/1.73M2 — SIGNIFICANT CHANGE UP
GLUCOSE SERPL-MCNC: 127 MG/DL — HIGH (ref 70–99)
HCT VFR BLD CALC: 39.5 % — SIGNIFICANT CHANGE UP (ref 34.5–45)
HGB BLD-MCNC: 12.7 G/DL — SIGNIFICANT CHANGE UP (ref 11.5–15.5)
MAGNESIUM SERPL-MCNC: 2.2 MG/DL — SIGNIFICANT CHANGE UP (ref 1.6–2.6)
MCHC RBC-ENTMCNC: 28.3 PG — SIGNIFICANT CHANGE UP (ref 27–34)
MCHC RBC-ENTMCNC: 32.2 GM/DL — SIGNIFICANT CHANGE UP (ref 32–36)
MCV RBC AUTO: 88 FL — SIGNIFICANT CHANGE UP (ref 80–100)
NRBC # BLD: 0 /100 WBCS — SIGNIFICANT CHANGE UP (ref 0–0)
PHOSPHATE SERPL-MCNC: 4.4 MG/DL — SIGNIFICANT CHANGE UP (ref 2.5–4.5)
PLATELET # BLD AUTO: 474 K/UL — HIGH (ref 150–400)
POTASSIUM SERPL-MCNC: 3.5 MMOL/L — SIGNIFICANT CHANGE UP (ref 3.5–5.3)
POTASSIUM SERPL-SCNC: 3.5 MMOL/L — SIGNIFICANT CHANGE UP (ref 3.5–5.3)
RBC # BLD: 4.49 M/UL — SIGNIFICANT CHANGE UP (ref 3.8–5.2)
RBC # FLD: 13.3 % — SIGNIFICANT CHANGE UP (ref 10.3–14.5)
SODIUM SERPL-SCNC: 139 MMOL/L — SIGNIFICANT CHANGE UP (ref 135–145)
WBC # BLD: 10.98 K/UL — HIGH (ref 3.8–10.5)
WBC # FLD AUTO: 10.98 K/UL — HIGH (ref 3.8–10.5)

## 2023-09-18 PROCEDURE — 87040 BLOOD CULTURE FOR BACTERIA: CPT

## 2023-09-18 PROCEDURE — C1729: CPT

## 2023-09-18 PROCEDURE — 82962 GLUCOSE BLOOD TEST: CPT

## 2023-09-18 PROCEDURE — 96375 TX/PRO/DX INJ NEW DRUG ADDON: CPT

## 2023-09-18 PROCEDURE — 86901 BLOOD TYPING SEROLOGIC RH(D): CPT

## 2023-09-18 PROCEDURE — 87205 SMEAR GRAM STAIN: CPT

## 2023-09-18 PROCEDURE — C1769: CPT

## 2023-09-18 PROCEDURE — 85610 PROTHROMBIN TIME: CPT

## 2023-09-18 PROCEDURE — 99285 EMERGENCY DEPT VISIT HI MDM: CPT | Mod: 25

## 2023-09-18 PROCEDURE — 84100 ASSAY OF PHOSPHORUS: CPT

## 2023-09-18 PROCEDURE — 85027 COMPLETE CBC AUTOMATED: CPT

## 2023-09-18 PROCEDURE — 80202 ASSAY OF VANCOMYCIN: CPT

## 2023-09-18 PROCEDURE — 80053 COMPREHEN METABOLIC PANEL: CPT

## 2023-09-18 PROCEDURE — 86900 BLOOD TYPING SEROLOGIC ABO: CPT

## 2023-09-18 PROCEDURE — 36415 COLL VENOUS BLD VENIPUNCTURE: CPT

## 2023-09-18 PROCEDURE — 96374 THER/PROPH/DIAG INJ IV PUSH: CPT

## 2023-09-18 PROCEDURE — 80048 BASIC METABOLIC PNL TOTAL CA: CPT

## 2023-09-18 PROCEDURE — 85025 COMPLETE CBC W/AUTO DIFF WBC: CPT

## 2023-09-18 PROCEDURE — 85730 THROMBOPLASTIN TIME PARTIAL: CPT

## 2023-09-18 PROCEDURE — 83735 ASSAY OF MAGNESIUM: CPT

## 2023-09-18 PROCEDURE — 87070 CULTURE OTHR SPECIMN AEROBIC: CPT

## 2023-09-18 PROCEDURE — 10030 IMG GID FLU COLL DRG SFT TIS: CPT

## 2023-09-18 PROCEDURE — 86850 RBC ANTIBODY SCREEN: CPT

## 2023-09-18 RX ORDER — POTASSIUM CHLORIDE 20 MEQ
40 PACKET (EA) ORAL ONCE
Refills: 0 | Status: COMPLETED | OUTPATIENT
Start: 2023-09-18 | End: 2023-09-18

## 2023-09-18 RX ADMIN — PIPERACILLIN AND TAZOBACTAM 25 GRAM(S): 4; .5 INJECTION, POWDER, LYOPHILIZED, FOR SOLUTION INTRAVENOUS at 00:22

## 2023-09-18 RX ADMIN — Medication 40 MILLIEQUIVALENT(S): at 14:22

## 2023-09-18 RX ADMIN — Medication 50 MICROGRAM(S): at 05:20

## 2023-09-18 NOTE — DISCHARGE NOTE PROVIDER - NSDCFUADDAPPT_GEN_ALL_CORE_FT
Please call to make an appointment with IR by calling the IR booking office at [phone]; recommend IR follow in 7-10 days after discharge for tube evaluation.

## 2023-09-18 NOTE — DISCHARGE NOTE PROVIDER - NSDCCPCAREPLAN_GEN_ALL_CORE_FT
PRINCIPAL DISCHARGE DIAGNOSIS  Diagnosis: Abdominal wall abscess  Assessment and Plan of Treatment: Drain Instructions:  -Flush drain with 10cc NS daily forward only; DO NOT aspirate  - Change dressing q3 days or when dressing is saturated.  - Please call to make an appointment with IR by calling the IR booking office at [phone]; recommend IR follow in 7-10 days after discharge for tube evaluation.  ACTIVITY: No heavy lifting anything more than 10-15lbs or straining. Otherwise, you may return to your usual level of physical activity  DIET: Regular diet  NOTIFY YOUR SURGEON IF: You have any fever (over 100.4 F) or chills, persistent nausea/vomiting with inability to tolerate food or liquids, persistent diarrhea, or if your pain is not controlled on your discharge pain medications.  FOLLOW-UP:  1. Please call to make a follow-up appointment within one week of discharge with Dr. Smith in 1-2 weeks.  2. Please follow up with your primary care physician in one week regarding your hospitalization.  3.  Please follow up with IR for tube evaluation in 7-10 days.       PRINCIPAL DISCHARGE DIAGNOSIS  Diagnosis: Abdominal wall abscess  Assessment and Plan of Treatment: MEDICATIONS:  Please complete your antibiotics as prescribed (augmentin - 1 tab twice a day x 10 days).  You may take tylenol as needed for pain.  Drain Instructions:  -Flush drain with 10cc NS daily forward only; DO NOT aspirate  - Change dressing q3 days or when dressing is saturated.  - Please call to make an appointment with IR by calling the IR booking office at [phone]; recommend IR follow in 7-10 days after discharge for tube evaluation.  ACTIVITY: No heavy lifting anything more than 10-15lbs or straining. Otherwise, you may return to your usual level of physical activity  DIET: Regular diet  NOTIFY YOUR SURGEON IF: You have any fever (over 100.4 F) or chills, persistent nausea/vomiting with inability to tolerate food or liquids, persistent diarrhea, or if your pain is not controlled on your discharge pain medications.  FOLLOW-UP:  1. Please call to make a follow-up appointment within one week of discharge with Dr. Smith in 1-2 weeks.  2. Please follow up with your primary care physician in one week regarding your hospitalization.  3.  Please follow up with IR for tube evaluation in 7-10 days.

## 2023-09-18 NOTE — CHART NOTE - NSCHARTNOTEFT_GEN_A_CORE
Interventional Radiology Follow-Up Note.    - Flush drain with 10cc NS daily forward only; DO NOT aspirate  - Change dressing q3 days or when dressing is saturated.  - If the patient is d/c home with drainage catheter, pt can make an appointment with IR by calling the IR booking office at [phone]; recommend IR follow in 7-10 days after discharge for tube evaluation.  - Pt will benefit from VNS service to help with drainage catheter care; Pt should continue same drainage catheter care as an outpatient.     Please call IR at [phone] with any questions, concerns, or issues regarding above.

## 2023-09-18 NOTE — DISCHARGE NOTE PROVIDER - CARE PROVIDER_API CALL
Joss Smith  Surgery  [address] [place] [address], [facility] [[email or web address]] [address]  [place] [address]  Phone: [phone]  Fax: [phone]  Follow Up Time:

## 2023-09-18 NOTE — DISCHARGE NOTE PROVIDER - NSDCFUSCHEDAPPT_GEN_ALL_CORE_FT
Shilo Barragan  [facility] Physician [facility]  CARDIOLOGY [address] [facility]   Scheduled Appointment: 10/24/2023    Homero William  [facility] Physician [facility]  [address]  Scheduled Appointment: 12/15/2023     Shilo Barragan  [facility]  CARDIOLOGY [address] [facility]   Scheduled Appointment: 10/24/2023    Luc Mcintyre  [facility]  CARDIOLOGY [address]   Scheduled Appointment: 10/31/2023    Homero William  [facility]  [address] [facility]  Scheduled Appointment: 12/15/2023

## 2023-09-18 NOTE — PROGRESS NOTE ADULT - SUBJECTIVE AND OBJECTIVE BOX
Overnight: No acute events. Patient was refusing abx in evening, but was in agreement to continue abx when it was discussed with resident. Risks and benefits were discussed.    SUBJECTIVE: Pt seen and evaluated bedside this AM. Pt resting comfortably on exam. Tolerating Diet. Ambulating and voiding without issue. Pain well controlled.    Vital Signs Last 24 Hrs  T(C): 36.6 (17 Sep 2023 20:28), Max: 37.1 (17 Sep 2023 05:25)  T(F): 97.9 (17 Sep 2023 20:28), Max: 98.7 (17 Sep 2023 05:25)  HR: 90 (17 Sep 2023 20:28) (68 - 90)  BP: 112/78 (17 Sep 2023 20:28) (111/75 - 120/79)  BP(mean): --  RR: 18 (17 Sep 2023 20:28) (18 - 18)  SpO2: 95% (17 Sep 2023 20:28) (95% - 100%)    Parameters below as of 17 Sep 2023 20:28  Patient On (Oxygen Delivery Method): room air        I&O's Detail    16 Sep 2023 07:01  -  17 Sep 2023 07:00  --------------------------------------------------------  IN:    IV PiggyBack: 250 mL    IV PiggyBack: 100 mL  Total IN: 350 mL    OUT:    Bulb (mL): 75 mL    Voided (mL): 800 mL  Total OUT: 875 mL    Total NET: -525 mL      17 Sep 2023 07:01  -  18 Sep 2023 00:06  --------------------------------------------------------  IN:    Oral Fluid: 580 mL  Total IN: 580 mL    OUT:    Bulb (mL): 50 mL    Voided (mL): 600 mL  Total OUT: 650 mL    Total NET: -70 mL          Physical Exam:  General Appearance: Appears well, NAD  Respiratory: No acute resp distress, no accesory muscle use  Abdomen:soft, nontender, mildly distended, 10 fr drain in right upper quadrant draining s/s serosang  Extremities: Grossly symmetric, SCD's in place     LABS:                        11.5   7.50  )-----------( 347      ( 17 Sep 2023 09:25 )             35.0     09-17    140  |  103  |  6<L>  ----------------------------<  105<H>  3.9   |  23  |  0.74    Ca    9.6      17 Sep 2023 09:26  Phos  3.9     09-17  Mg     2.3     09-17        Urinalysis Basic - ( 17 Sep 2023 09:26 )    Color: x / Appearance: x / SG: x / pH: x  Gluc: 105 mg/dL / Ketone: x  / Bili: x / Urobili: x   Blood: x / Protein: x / Nitrite: x   Leuk Esterase: x / RBC: x / WBC x   Sq Epi: x / Non Sq Epi: x / Bacteria: x        RADIOLOGY & ADDITIONAL STUDIES:   Overnight: No acute events. Patient was refusing abx in evening, but was in agreement to continue abx when it was discussed with resident. Risks and benefits were discussed.    SUBJECTIVE: Pt seen and evaluated bedside this AM. Pt resting comfortably on exam. endorses not eating a lot but tolerating Diet. Ambulating and voiding without issue. Pain well controlled.     Vital Signs Last 24 Hrs  T(C): 36.6 (17 Sep 2023 20:28), Max: 37.1 (17 Sep 2023 05:25)  T(F): 97.9 (17 Sep 2023 20:28), Max: 98.7 (17 Sep 2023 05:25)  HR: 90 (17 Sep 2023 20:28) (68 - 90)  BP: 112/78 (17 Sep 2023 20:28) (111/75 - 120/79)  BP(mean): --  RR: 18 (17 Sep 2023 20:28) (18 - 18)  SpO2: 95% (17 Sep 2023 20:28) (95% - 100%)    Parameters below as of 17 Sep 2023 20:28  Patient On (Oxygen Delivery Method): room air        I&O's Detail    16 Sep 2023 07:01  -  17 Sep 2023 07:00  --------------------------------------------------------  IN:    IV PiggyBack: 250 mL    IV PiggyBack: 100 mL  Total IN: 350 mL    OUT:    Bulb (mL): 75 mL    Voided (mL): 800 mL  Total OUT: 875 mL    Total NET: -525 mL      17 Sep 2023 07:01  -  18 Sep 2023 00:06  --------------------------------------------------------  IN:    Oral Fluid: 580 mL  Total IN: 580 mL    OUT:    Bulb (mL): 50 mL    Voided (mL): 600 mL  Total OUT: 650 mL    Total NET: -70 mL          Physical Exam:  General Appearance: Appears well, NAD  Respiratory: No acute resp distress, no accesory muscle use  Abdomen:soft, nontender, mildly distended, 10 fr drain in right upper quadrant draining s/s serosang  Extremities: Grossly symmetric, SCD's in place     LABS:                        11.5   7.50  )-----------( 347      ( 17 Sep 2023 09:25 )             35.0     09-17    140  |  103  |  6<L>  ----------------------------<  105<H>  3.9   |  23  |  0.74    Ca    9.6      17 Sep 2023 09:26  Phos  3.9     09-17  Mg     2.3     09-17        Urinalysis Basic - ( 17 Sep 2023 09:26 )    Color: x / Appearance: x / SG: x / pH: x  Gluc: 105 mg/dL / Ketone: x  / Bili: x / Urobili: x   Blood: x / Protein: x / Nitrite: x   Leuk Esterase: x / RBC: x / WBC x   Sq Epi: x / Non Sq Epi: x / Bacteria: x        RADIOLOGY & ADDITIONAL STUDIES:

## 2023-09-18 NOTE — DISCHARGE NOTE NURSING/CASE MANAGEMENT/SOCIAL WORK - NSDCFUADDAPPT_GEN_ALL_CORE_FT
Please call to make an appointment with IR by calling the IR booking office at [phone]; recommend IR follow in 7-10 days after discharge for tube evaluation.

## 2023-09-18 NOTE — DISCHARGE NOTE NURSING/CASE MANAGEMENT/SOCIAL WORK - PATIENT PORTAL LINK FT
You can access the FollowMyHealth Patient Portal offered by [facility] by registering at the following website: [email or web address]. By joining [employer]’s FollowMyHealth portal, you will also be able to view your health information using other applications (apps) compatible with our system.

## 2023-09-18 NOTE — DISCHARGE NOTE PROVIDER - NSDCMRMEDTOKEN_GEN_ALL_CORE_FT
acetaminophen 325 mg oral tablet: 3 tab(s) orally every 6 hours  cefpodoxime 100 mg oral tablet: 1 tab(s) orally 2 times a day  oxyCODONE 5 mg oral tablet: 1 tab(s) orally every 6 hours as needed for  severe pain MDD: 4  Repatha 140 mg/mL subcutaneous solution: 140 subcutaneously every 2 weeks  Synthroid 50 mcg (0.05 mg) oral tablet: 1 tab(s) orally once a day   acetaminophen 325 mg oral tablet: 3 tab(s) orally every 6 hours  amoxicillin-clavulanate 875 mg-125 mg oral tablet: 1 tab(s) orally 2 times a day  Repatha 140 mg/mL subcutaneous solution: 140 subcutaneously every 2 weeks  Synthroid 50 mcg (0.05 mg) oral tablet: 1 tab(s) orally once a day

## 2023-09-18 NOTE — PROGRESS NOTE ADULT - ASSESSMENT
68 F PMH GERD, Hypothyroidism, R Breast CA s/p Right Breast Lumpectomy (8/2021) and Chemotherapy/Radiation, Abdominoplasty (2006), appendectomy (6/2022) complicated by small bowel perforation; required emergent laparotomy for small bowel resection and incisional hernia repair with Dr. Smith 8/14 now s/p IR drainage for infected collection. Patient recovering well.     Plan  - Reg diet  - Abx: vanc, zosyn  - Lovenox  - c/w home meds  - monitor drain output  - f/u abx plan after final results on culture  - dispo: home possible today    [facility]  1820   68 F PMH GERD, Hypothyroidism, R Breast CA s/p Right Breast Lumpectomy (8/2021) and Chemotherapy/Radiation, Abdominoplasty (2006), appendectomy (6/2022) complicated by small bowel perforation; required emergent laparotomy for small bowel resection and incisional hernia repair with Dr. Smith 8/14 now s/p IR drainage for infected collection. Patient recovering well.     Plan  - Reg diet  - BCx x2 from 09/15 NGTD. Abscess culture no growth (polymorpholeukocytes)  - Abx: vanc, zosyn - transition to PO abx for outpt course  - Lovenox  - c/w home meds  - monitor drain output    - dispo: home possible today    [facility]  2528   68 F PMH GERD, Hypothyroidism, R Breast CA s/p Right Breast Lumpectomy (8/2021) and Chemotherapy/Radiation, Abdominoplasty (2006), appendectomy (6/2022) complicated by small bowel perforation; required emergent laparotomy for small bowel resection and incisional hernia repair with Dr. Smith 8/14 now s/p IR drainage for infected collection. Patient recovering well.     Plan  - Reg diet  - BCx x2 from 09/15 NGTD. Abscess culture no growth (polymorpholeukocytes)  - Abx: vanc, zosyn - transition to PO abx for outpt course  - Lovenox  - c/w home meds  - monitor drain output      - dispo: home possible today    [facility]  2952

## 2023-09-18 NOTE — DISCHARGE NOTE PROVIDER - HOSPITAL COURSE
68 F s/p incisional hernia repair sent in from clinic by Dr. Smith for fever and abdominal pain w/ outpatient CT showing infected collection.     Patient was admitted to [facility] for further management.  Patient was made NPO and stated on IV antibiotics.  Interventional Radiology was consulted and patient underwent drain placement on 9/15/23.  500 cc of purulent fluid.  Post procedure patient recovered on the surgical floor.     On day of discharge, patient was tolerating regular diet, pain was controlled and she was ambulatory.  She is to follow up in the office with Dr. Smith in 1-2 weeks.  She is also to follow up outpatient with Interventional Radiology for drain check.   68 F s/p incisional hernia repair sent in from clinic by Dr. Smith for fever and abdominal pain w/ outpatient CT showing infected collection.     Patient was admitted to [facility] for further management.  Patient was made NPO and stated on IV antibiotics.  Interventional Radiology was consulted and patient underwent drain placement on 9/15/23.  500 cc of purulent fluid.  Post procedure patient recovered on the surgical floor.     On day of discharge, patient was tolerating regular diet, pain was controlled and she was ambulatory.  She is to follow up in the office with Dr. Smith in 1-2 weeks.  She will complete her antibiotics with 10 days of augmentin. She is also to follow up outpatient with Interventional Radiology for drain check.

## 2023-09-19 ENCOUNTER — TRANSCRIPTION ENCOUNTER (OUTPATIENT)
Age: 68
End: 2023-09-19

## 2023-09-20 DIAGNOSIS — S30.1XXA CONTUSION OF ABDOMINAL WALL, INITIAL ENCOUNTER: ICD-10-CM

## 2023-09-20 LAB
CULTURE RESULTS: SIGNIFICANT CHANGE UP
CULTURE RESULTS: SIGNIFICANT CHANGE UP
SPECIMEN SOURCE: SIGNIFICANT CHANGE UP
SPECIMEN SOURCE: SIGNIFICANT CHANGE UP

## 2023-09-21 LAB
CULTURE RESULTS: NO GROWTH — SIGNIFICANT CHANGE UP
SPECIMEN SOURCE: SIGNIFICANT CHANGE UP

## 2023-09-26 ENCOUNTER — TRANSCRIPTION ENCOUNTER (OUTPATIENT)
Age: 68
End: 2023-09-26

## 2023-09-26 ENCOUNTER — OUTPATIENT (OUTPATIENT)
Dept: OUTPATIENT SERVICES | Facility: HOSPITAL | Age: 68
LOS: 1 days | End: 2023-09-26
Payer: COMMERCIAL

## 2023-09-26 ENCOUNTER — RESULT REVIEW (OUTPATIENT)
Age: 68
End: 2023-09-26

## 2023-09-26 ENCOUNTER — APPOINTMENT (OUTPATIENT)
Dept: CT IMAGING | Facility: HOSPITAL | Age: 68
End: 2023-09-26

## 2023-09-26 VITALS
DIASTOLIC BLOOD PRESSURE: 87 MMHG | WEIGHT: 139.99 LBS | RESPIRATION RATE: 18 BRPM | TEMPERATURE: 98 F | HEIGHT: 62 IN | HEART RATE: 76 BPM | OXYGEN SATURATION: 100 % | SYSTOLIC BLOOD PRESSURE: 139 MMHG

## 2023-09-26 DIAGNOSIS — Z90.710 ACQUIRED ABSENCE OF BOTH CERVIX AND UTERUS: Chronic | ICD-10-CM

## 2023-09-26 DIAGNOSIS — Z98.890 OTHER SPECIFIED POSTPROCEDURAL STATES: Chronic | ICD-10-CM

## 2023-09-26 DIAGNOSIS — K65.1 PERITONEAL ABSCESS: ICD-10-CM

## 2023-09-26 DIAGNOSIS — Z90.49 ACQUIRED ABSENCE OF OTHER SPECIFIED PARTS OF DIGESTIVE TRACT: Chronic | ICD-10-CM

## 2023-09-26 PROCEDURE — 74176 CT ABD & PELVIS W/O CONTRAST: CPT

## 2023-09-26 PROCEDURE — 76080 X-RAY EXAM OF FISTULA: CPT | Mod: 26

## 2023-09-26 PROCEDURE — 49424 ASSESS CYST CONTRAST INJECT: CPT

## 2023-09-26 PROCEDURE — 76080 X-RAY EXAM OF FISTULA: CPT

## 2023-09-26 PROCEDURE — 74176 CT ABD & PELVIS W/O CONTRAST: CPT | Mod: 26

## 2023-09-26 RX ORDER — EVOLOCUMAB 140 MG/ML
140 INJECTION, SOLUTION SUBCUTANEOUS
Refills: 0 | DISCHARGE

## 2023-09-26 RX ORDER — LEVOTHYROXINE SODIUM 125 MCG
1 TABLET ORAL
Qty: 0 | Refills: 0 | DISCHARGE

## 2023-09-26 NOTE — ASU DISCHARGE PLAN (ADULT/PEDIATRIC) - NURSING INSTRUCTIONS
Please feel free to contact us at [phone] if any problems arise. After 6PM, Monday through Friday, on weekends and on holidays, please call [phone] and ask for the radiology resident on call to be paged.

## 2023-09-26 NOTE — ASU DISCHARGE PLAN (ADULT/PEDIATRIC) - NS MD DC FALL RISK RISK
For information on Fall & Injury Prevention, visit: [email or web address] OR  [email or web address] OR  https://www.cdc.gov/steadi/patient.html

## 2023-09-26 NOTE — PRE PROCEDURE NOTE - PRE PROCEDURE EVALUATION
Interventional Radiology    HPI: 68y Female with hx of complex mid-anterior abdominal wall collection s/p IR drain placement on 9/15 and 500cc of purulent material drained.  Patient now presents for repeat CT and abdominal drain evaluation.    Allergies: No Known Drug Allergies  latex (Short breath)    Medications (Abx/Cardiac/Anticoagulation/Blood Products)      Data:    T(C): --  HR: --  BP: --  RR: --  SpO2: --    Exam  General: No acute distress  Chest: Non labored breathing  Abdomen: Non-distended  Extremities: No swelling, warm          Imaging: Pending today    Plan: 68y Female presents for abdominal drain evaluation.  -Risks/Benefits/alternatives explained with the patient and/or healthcare proxy and witnessed informed consent obtained.    Interventional Radiology    HPI: 68y Female with hx of complex mid-anterior abdominal wall collection s/p IR drain placement on 9/15 and 500cc of purulent material drained.  Patient now with low drain output, approx 10cc/day.  She is afebrile.  Due to complete antibiotic therapy today. Patient now presents for repeat CT and abdominal drain evaluation.    Allergies: No Known Drug Allergies  latex (Short breath)    Medications (Abx/Cardiac/Anticoagulation/Blood Products)      Data:    T(C): --  HR: --  BP: --  RR: --  SpO2: --    Exam  General: No acute distress  Chest: Non labored breathing  Abdomen: Non-distended  Extremities: No swelling, warm          Imaging: Pending today    Plan: 68y Female presents for abdominal drain evaluation.  -Risks/Benefits/alternatives explained with the patient and/or healthcare proxy and witnessed informed consent obtained.

## 2023-09-26 NOTE — ASU PATIENT PROFILE, ADULT - FALL HARM RISK - UNIVERSAL INTERVENTIONS
Bed in lowest position, wheels locked, appropriate side rails in place/Call bell, personal items and telephone in reach/Instruct patient to call for assistance before getting out of bed or chair/Non-slip footwear when patient is out of bed/[place] to call system/Physically safe environment - no spills, clutter or unnecessary equipment/Purposeful Proactive Rounding/Room/bathroom lighting operational, light cord in reach

## 2023-09-26 NOTE — ASU DISCHARGE PLAN (ADULT/PEDIATRIC) - ASU DC SPECIAL INSTRUCTIONSFT
Drain Check    Discharge Instructions  - You have had a drain checked.  - Keep the area clean and dry.  - Do not soak in a tub or pool with the drain, however you may shower with the drain and dressing covered in plastic wrap.  - Do not put traction on the drain and be careful that the drain does not get accidentally dislodged or kinked.  - Record output daily from the drain. Empty the bag as needed.  - You may resume your normal diet.  - You may resume your normal medications.  - It is normal to experience some pain over the site for the next few days. You may take apply ice to the area (20 minutes on, 20 minutes off) and take Tylenol for that pain. Do not take more frequently than every 6 hours and do not exceed more than 3000mg of Tylenol in a 24 hour period.    Notify your primary physician and/or Interventional Radiology IMMEDIATELY if you experience any of the following       - Fever of 100.4F  or 38C       - Chills or Rigors/ Shakes       - Swelling and/or Redness in the area of the puncture site       - Worsening Pain       - Blood soaked bandages or worsening bleeding       - Lightheadedness and/or dizziness upon standing       - Chest Pain/ Tightness       - Shortness of Breath       - Difficulty walking    If you have a problem that you believe requires IMMEDIATE attention, please go to your NEAREST Emergency Room. If you believe your problem can safely wait until you speak to a physician, please call Interventional Radiology for any concerns.    Please feel free to contact us at [phone] if any problems arise. After 6PM, Monday through Friday, on weekends and on holidays, please call [phone] and ask for the radiology resident on call to be paged. Drain Check and Removal    Discharge Instructions  - You have had a drain checked and removed  - Keep the area clean and dry.  - Do not soak in a tub or pool x 1 week  - You may resume your normal diet.  - You may resume your normal medications.  - It is normal to experience some pain over the site for the next few days. You may take apply ice to the area (20 minutes on, 20 minutes off) and take Tylenol for that pain. Do not take more frequently than every 6 hours and do not exceed more than 3000mg of Tylenol in a 24 hour period.    Notify your primary physician and/or Interventional Radiology IMMEDIATELY if you experience any of the following       - Fever of 100.4F  or 38C       - Chills or Rigors/ Shakes       - Swelling and/or Redness in the area of the puncture site       - Worsening Pain       - Blood soaked bandages or worsening bleeding       - Lightheadedness and/or dizziness upon standing       - Chest Pain/ Tightness       - Shortness of Breath       - Difficulty walking    If you have a problem that you believe requires IMMEDIATE attention, please go to your NEAREST Emergency Room. If you believe your problem can safely wait until you speak to a physician, please call Interventional Radiology for any concerns.    Please feel free to contact us at [phone] if any problems arise. After 6PM, Monday through Friday, on weekends and on holidays, please call [phone] and ask for the radiology resident on call to be paged.

## 2023-09-26 NOTE — PROCEDURE NOTE - PROCEDURE FINDINGS AND DETAILS
Contrast injection into existing drain demonstrates resolution of cavity with no communication to nearby structures.  Images confirmed with Dr. Stuart.  Drain catheter removed over guidewire.  Dry sterile dressing placed.  Full report to follow

## 2023-09-28 ENCOUNTER — APPOINTMENT (OUTPATIENT)
Dept: SURGERY | Facility: CLINIC | Age: 68
End: 2023-09-28
Payer: COMMERCIAL

## 2023-09-28 VITALS
SYSTOLIC BLOOD PRESSURE: 136 MMHG | DIASTOLIC BLOOD PRESSURE: 76 MMHG | HEART RATE: 82 BPM | RESPIRATION RATE: 17 BRPM | OXYGEN SATURATION: 99 %

## 2023-09-28 DIAGNOSIS — K43.2 INCISIONAL HERNIA W/OUT OBSTRUCTION OR GANGRENE: ICD-10-CM

## 2023-09-28 DIAGNOSIS — M54.50 LOW BACK PAIN, UNSPECIFIED: ICD-10-CM

## 2023-09-28 PROCEDURE — 99212 OFFICE O/P EST SF 10 MIN: CPT

## 2023-09-28 RX ORDER — SODIUM SULFATE, POTASSIUM SULFATE AND MAGNESIUM SULFATE 1.6; 3.13; 17.5 G/177ML; G/177ML; G/177ML
17.5-3.13-1.6 SOLUTION ORAL
Qty: 1 | Refills: 0 | Status: DISCONTINUED | COMMUNITY
Start: 2023-08-03 | End: 2023-09-28

## 2023-10-02 DIAGNOSIS — Z46.82 ENCOUNTER FOR FITTING AND ADJUSTMENT OF NON-VASCULAR CATHETER: ICD-10-CM

## 2023-10-04 ENCOUNTER — RX RENEWAL (OUTPATIENT)
Age: 68
End: 2023-10-04

## 2023-10-24 ENCOUNTER — APPOINTMENT (OUTPATIENT)
Dept: CARDIOLOGY | Facility: CLINIC | Age: 68
End: 2023-10-24
Payer: COMMERCIAL

## 2023-10-24 VITALS
BODY MASS INDEX: 25.95 KG/M2 | TEMPERATURE: 97.8 F | HEART RATE: 68 BPM | DIASTOLIC BLOOD PRESSURE: 76 MMHG | RESPIRATION RATE: 16 BRPM | WEIGHT: 141 LBS | OXYGEN SATURATION: 98 % | HEIGHT: 62 IN | SYSTOLIC BLOOD PRESSURE: 124 MMHG

## 2023-10-24 DIAGNOSIS — Z87.898 PERSONAL HISTORY OF OTHER SPECIFIED CONDITIONS: ICD-10-CM

## 2023-10-24 PROCEDURE — 99214 OFFICE O/P EST MOD 30 MIN: CPT

## 2023-10-31 ENCOUNTER — APPOINTMENT (OUTPATIENT)
Dept: CARDIOLOGY | Facility: CLINIC | Age: 68
End: 2023-10-31

## 2023-11-08 ENCOUNTER — APPOINTMENT (OUTPATIENT)
Dept: ULTRASOUND IMAGING | Facility: IMAGING CENTER | Age: 68
End: 2023-11-08
Payer: COMMERCIAL

## 2023-11-08 ENCOUNTER — APPOINTMENT (OUTPATIENT)
Dept: MAMMOGRAPHY | Facility: IMAGING CENTER | Age: 68
End: 2023-11-08
Payer: COMMERCIAL

## 2023-11-08 ENCOUNTER — OUTPATIENT (OUTPATIENT)
Dept: OUTPATIENT SERVICES | Facility: HOSPITAL | Age: 68
LOS: 1 days | End: 2023-11-08
Payer: COMMERCIAL

## 2023-11-08 DIAGNOSIS — Z98.890 OTHER SPECIFIED POSTPROCEDURAL STATES: Chronic | ICD-10-CM

## 2023-11-08 DIAGNOSIS — Z90.49 ACQUIRED ABSENCE OF OTHER SPECIFIED PARTS OF DIGESTIVE TRACT: Chronic | ICD-10-CM

## 2023-11-08 DIAGNOSIS — Z90.710 ACQUIRED ABSENCE OF BOTH CERVIX AND UTERUS: Chronic | ICD-10-CM

## 2023-11-08 DIAGNOSIS — Z00.8 ENCOUNTER FOR OTHER GENERAL EXAMINATION: ICD-10-CM

## 2023-11-08 PROCEDURE — G0279: CPT | Mod: 26

## 2023-11-08 PROCEDURE — G0279: CPT

## 2023-11-08 PROCEDURE — 76641 ULTRASOUND BREAST COMPLETE: CPT

## 2023-11-08 PROCEDURE — 76641 ULTRASOUND BREAST COMPLETE: CPT | Mod: 26,50

## 2023-11-08 PROCEDURE — 77066 DX MAMMO INCL CAD BI: CPT

## 2023-11-08 PROCEDURE — 77066 DX MAMMO INCL CAD BI: CPT | Mod: 26

## 2023-11-09 RX ORDER — KETOCONAZOLE 20 MG/G
2 CREAM TOPICAL TWICE DAILY
Qty: 60 | Refills: 1 | Status: COMPLETED | COMMUNITY
Start: 2022-09-14 | End: 2023-11-09

## 2023-11-30 ENCOUNTER — OUTPATIENT (OUTPATIENT)
Dept: OUTPATIENT SERVICES | Facility: HOSPITAL | Age: 68
LOS: 1 days | Discharge: HOME | End: 2023-11-30

## 2023-11-30 DIAGNOSIS — Z98.890 OTHER SPECIFIED POSTPROCEDURAL STATES: Chronic | ICD-10-CM

## 2023-11-30 DIAGNOSIS — Z90.49 ACQUIRED ABSENCE OF OTHER SPECIFIED PARTS OF DIGESTIVE TRACT: Chronic | ICD-10-CM

## 2023-11-30 DIAGNOSIS — Z90.710 ACQUIRED ABSENCE OF BOTH CERVIX AND UTERUS: Chronic | ICD-10-CM

## 2023-11-30 DIAGNOSIS — C50.919 MALIGNANT NEOPLASM OF UNSPECIFIED SITE OF UNSPECIFIED FEMALE BREAST: ICD-10-CM

## 2023-12-04 ENCOUNTER — RX RENEWAL (OUTPATIENT)
Age: 68
End: 2023-12-04

## 2023-12-08 NOTE — PHYSICAL THERAPY INITIAL EVALUATION ADULT - PLANNED THERAPY INTERVENTIONS, PT EVAL
Calm Stairs: GOAL: Pt will ascend/descend 5 stairs independently using unilateral rail in order to return home safety in 2 weeks./balance training/gait training/transfer training

## 2023-12-15 ENCOUNTER — APPOINTMENT (OUTPATIENT)
Dept: HEMATOLOGY ONCOLOGY | Facility: CLINIC | Age: 68
End: 2023-12-15
Payer: COMMERCIAL

## 2023-12-15 VITALS
HEIGHT: 62.01 IN | RESPIRATION RATE: 16 BRPM | SYSTOLIC BLOOD PRESSURE: 117 MMHG | BODY MASS INDEX: 25.24 KG/M2 | WEIGHT: 138.89 LBS | DIASTOLIC BLOOD PRESSURE: 83 MMHG | OXYGEN SATURATION: 98 % | TEMPERATURE: 97.7 F | HEART RATE: 74 BPM

## 2023-12-15 PROCEDURE — 99213 OFFICE O/P EST LOW 20 MIN: CPT

## 2023-12-15 NOTE — PHYSICAL EXAM
[Fully active, able to carry on all pre-disease performance without restriction] : Status 0 - Fully active, able to carry on all pre-disease performance without restriction [Normal] : affect appropriate [de-identified] : Right; s/p reduction mastopexy as well as lumpectomy with well-healed scars, + chronic nipple retraction w/o change, no skin dimpling, or palpable masses. Left breast; s/p mastopexy with well-healed scar + chronic nipple retraction w/o change, no skin dimpling, or palpable masses. The bilateral axillae are without adenopathy.

## 2023-12-15 NOTE — HISTORY OF PRESENT ILLNESS
[Disease: _____________________] : Disease: [unfilled] [T: ___] : T[unfilled] [N: ___] : N[unfilled] [M: ___] : M[unfilled] [AJCC Stage: ____] : AJCC Stage: [unfilled] [de-identified] : The patient's history of present illness began on 06/30/2020 when she had a routine mammogram with a finding of postsurgical changes compatible with her history of reduction mammoplasty; there was a focus of architectural distortion in the slightly lateral right breast at the level of the nipple, measuring approximately 10 mm with no associated microcalcifications, and no abnormal findings in the left breast; no axillary adenopathy was seen.  A right breast ultrasound performed on that same date demonstrated at the 9 o'clock axis 5 cm from the nipple, an irregularly marginated mass measuring 7 x 8 x 11 mm with ultrasound-guided core biopsy recommended; the left breast was unremarkable.  The patient then went on to have an ultrasound-guided core biopsy of the right breast on 07/18/2020 with a finding at the 8 o'clock axis 5 cm from nipple of an invasive moderately differentiated ductal carcinoma, Kamrar score 6/9 with invasive tumor measuring at least 1 cm with evidence of DCIS, with microcalcifications identified in the invasive carcinoma and in the DCIS, with no evidence of lymphovascular invasion; the right axillary biopsy on that same date demonstrated invasive mammary carcinoma with lobular features, with necrosis and scattered lymphocytic infiltrate.  Estrogen receptor returned positive greater than 90%, progesterone receptor positive greater than 90% HER-2/charlotte negative.  An MRI of the bilateral breasts demonstrated a 3 cm enlarged lymph node in the right axilla and no significant left axillary or internal mammary adenopathy; in the right breast, there was a 15 mm irregular enhancing mass with a second enhancing irregular mass located 2.5 cm anterior, medial, inferior from the index mass at the 8 o'clock axis, anterior depth and measuring 11 mm.  The patient ultimately saw Dr. Liza Navarrete and on 07/28/2020 underwent a right lumpectomy/sentinel lymph node biopsy with a finding of multifocal invasive carcinoma, moderately differentiated with tubulolobular features measuring 1.1 cm in addition to an invasive moderately differentiated carcinoma with tubulolobular features measuring 1.5 cm with multiple separate small foci of invasive carcinoma with single file arrangement of tumor cells E-cadherin positive, DCIS, LCIS, with further shave margins specifically in the right superior breast margin returning negative for carcinoma but with evidence of DCIS and a single isolated duct measuring 0.2 cm with margins negative for DCIS, an additional shave margins returning negative; in the right axilla, metastatic ductal carcinoma involved 3/7 lymph nodes with a carcinoma being E-cadherin positive. \par  \par  The patient was seen in initial consultation on 8/3/20 regarding further treatment recommendations.  \par  \par  A Mammaprint was sent off and returned with a high risk score. An Oncotype for node positive cancer was also sent off (unintentionally) which resulted in a recurrence score of 23 translating to 19%  risk of distant recurrence and an absolute benefit from chemotherapy. Results of both these tests were discussed with her extensively and she has decided to proceed with chemotherapy with dose dense doxorubicin/cyclophosphamide followed by dose dense paclitaxel every 2 weeks x 4 with Pegfilgrastim support. \par  \par  9/4/20  started on  treatment with  Dose dense doxorubicin/cyclophosphamide every 2 weeks followed by dose dense paclitaxel every 2 weeks x 4, both with pegfilgrastim,\par  Withdrew participation  from  [id number] Olanzapine antinausea study on 9/7/20 C1D4\par  \par  S/P DD AC x 4 then DD Taxol #3 on 12/1/20. She developed gradually worsening CIPN and ultimately had gr 3 CIPN that did not resolve and DD Taxol # 4 / 4 was held / discontinued.  \par  \par  1/13/2021 - 2/16/2021She completed adjuvant radiation - 4240cGy to R breast and regional LNs with a 1,000 boost to the R breast tumor bed under the care of Dr Evette Garcia. \par  Started on Anastrozole in 2/2021. Which was subsequently discontinued due to arthralgias. Started on exemestane in 6/2021. Unable to tolerate. Subsequently switched to tamoxifen 20mg daily 7/23/2021.\par  \par  Started on tamoxifen in 7/23/21.\par  \par  Pt seen 3/22 and apparently was tolerating dickinson fairly well. The called on 4/19/22 with c/o gradually worsening over 4 mo feeling depressed, didn't want to wake up in morning, had no energy, no motivation, mood lability. She read about dickinson and that it can cause those symptoms. Recommended she hold 3-4 weeks and then call to discuss options ie prelim no further therapy vs trial of toremifene. Called back on 6/2/22 reporting all these symptoms resoled. We discussed option of a trial of toremifene vs no further anti-estrogen therapy - she agreed to a trial fo toremifene and an Rx was sent. \par  \par  On 8/22 visit she informed me she never started the toremifene. She noted her friend was staking exemestane and was tolerating it very well and wanted to start exemestane.  \par  \par  On 10/15/22 she called - had started toremifene- developed all body aches after 1 week. She d/c'd it. We discussed that she was not able to tolerate any anti-estrogen therapy. She agreed.\par  \par  Today she reports those symptoms resolved. \par  \par  She reports that she is anxious re not being able to tolerate anti-estrogen therapy. Has researched and spoke to other cancer pt (s) and has starte dto take "CBD-THC" paste orally / daily as well as "CBD oil" at  night to sleep. Reports that overall that this has improved her overall sense of well being [de-identified] : ER+ NC+ Her 2 charlotte - [de-identified] : In interim s/p abd hernia - surgery in 8/23..  Has stable int positional vertigo and seen by neuro - "OK".   Uses CBC "paste" for sxs as needed/   Otherwise denies all other complaints noting a good appetite table wt and excellent performance status.    B/L mammo/sono 11/23 neg

## 2024-02-14 ENCOUNTER — APPOINTMENT (OUTPATIENT)
Dept: SURGERY | Facility: CLINIC | Age: 69
End: 2024-02-14
Payer: COMMERCIAL

## 2024-02-14 PROCEDURE — 99213K: CUSTOM

## 2024-02-27 ENCOUNTER — RX RENEWAL (OUTPATIENT)
Age: 69
End: 2024-02-27

## 2024-04-30 ENCOUNTER — APPOINTMENT (OUTPATIENT)
Dept: CARDIOLOGY | Facility: CLINIC | Age: 69
End: 2024-04-30
Payer: COMMERCIAL

## 2024-04-30 VITALS
BODY MASS INDEX: 26.5 KG/M2 | TEMPERATURE: 98 F | DIASTOLIC BLOOD PRESSURE: 78 MMHG | SYSTOLIC BLOOD PRESSURE: 121 MMHG | HEART RATE: 84 BPM | HEIGHT: 62 IN | WEIGHT: 144 LBS | RESPIRATION RATE: 16 BRPM | OXYGEN SATURATION: 97 %

## 2024-04-30 DIAGNOSIS — Z78.9 OTHER SPECIFIED HEALTH STATUS: ICD-10-CM

## 2024-04-30 DIAGNOSIS — I34.0 NONRHEUMATIC MITRAL (VALVE) INSUFFICIENCY: ICD-10-CM

## 2024-04-30 DIAGNOSIS — E78.01 FAMILIAL HYPERCHOLESTEROLEMIA: ICD-10-CM

## 2024-04-30 PROCEDURE — 99214 OFFICE O/P EST MOD 30 MIN: CPT

## 2024-04-30 PROCEDURE — G2211 COMPLEX E/M VISIT ADD ON: CPT

## 2024-04-30 NOTE — PHYSICAL EXAM
[Well Developed] : well developed [Well Nourished] : well nourished [No Acute Distress] : no acute distress [Normal Venous Pressure] : normal venous pressure [No Carotid Bruit] : no carotid bruit [Normal S1, S2] : normal S1, S2 [No Murmur] : no murmur [No Rub] : no rub [Clear Lung Fields] : clear lung fields [Good Air Entry] : good air entry [No Respiratory Distress] : no respiratory distress  [Soft] : abdomen soft [Non Tender] : non-tender [No Masses/organomegaly] : no masses/organomegaly [Normal Bowel Sounds] : normal bowel sounds [Normal Gait] : normal gait [No Edema] : no edema [No Cyanosis] : no cyanosis [No Clubbing] : no clubbing [No Varicosities] : no varicosities [No Rash] : no rash [No Skin Lesions] : no skin lesions [Moves all extremities] : moves all extremities [No Focal Deficits] : no focal deficits [Normal Speech] : normal speech [Alert and Oriented] : alert and oriented [Normal memory] : normal memory [General Appearance - Well Developed] : well developed [Normal Appearance] : normal appearance [Well Groomed] : well groomed [No Deformities] : no deformities [General Appearance - Well Nourished] : well nourished [General Appearance - In No Acute Distress] : no acute distress [Normal Conjunctiva] : the conjunctiva exhibited no abnormalities [Normal Oral Mucosa] : normal oral mucosa [Normal Oropharynx] : normal oropharynx [Normal Jugular Venous A Waves Present] : normal jugular venous A waves present [Normal Jugular Venous V Waves Present] : normal jugular venous V waves present [No Jugular Venous Daniel A Waves] : no jugular venous daniel A waves [Respiration, Rhythm And Depth] : normal respiratory rhythm and effort [Exaggerated Use Of Accessory Muscles For Inspiration] : no accessory muscle use [Auscultation Breath Sounds / Voice Sounds] : lungs were clear to auscultation bilaterally [Bowel Sounds] : normal bowel sounds [Abdomen Soft] : soft [Abnormal Walk] : normal gait [Gait - Sufficient For Exercise Testing] : the gait was sufficient for exercise testing [Nail Clubbing] : no clubbing of the fingernails [Cyanosis, Localized] : no localized cyanosis [Skin Color & Pigmentation] : normal skin color and pigmentation [Skin Turgor] : normal skin turgor [] : no rash [Oriented To Time, Place, And Person] : oriented to person, place, and time [Impaired Insight] : insight and judgment were intact [Affect] : the affect was normal [Mood] : the mood was normal [No Anxiety] : not feeling anxious [5th Left ICS - MCL] : palpated at the 5th LICS in the midclavicular line [Normal] : normal [No Precordial Heave] : no precordial heave was noted [Normal Rate] : normal [Rhythm Regular] : regular [Normal S1] : normal S1 [Normal S2] : normal S2 [No Gallop] : no gallop heard [I] : a grade 1 [2+] : left 2+ [No Abnormalities] : the abdominal aorta was not enlarged and no bruit was heard [No Pitting Edema] : no pitting edema present [FreeTextEntry1] : No xanthomas [S3] : no S3 [S4] : no S4 [Right Carotid Bruit] : no bruit heard over the right carotid [Left Carotid Bruit] : no bruit heard over the left carotid [Right Femoral Bruit] : no bruit heard over the right femoral artery [Left Femoral Bruit] : no bruit heard over the left femoral artery

## 2024-04-30 NOTE — REASON FOR VISIT
[CV Risk Factors and Non-Cardiac Disease] : CV risk factors and non-cardiac disease [Follow-Up - Clinic] : a clinic follow-up of [Hyperlipidemia] : hyperlipidemia [FreeTextEntry3] : Dr. Alexei Hammond [FreeTextEntry2] : Lipid Follow Up [FreeTextEntry1] : Hypercholesterolemia

## 2024-04-30 NOTE — ASSESSMENT
[FreeTextEntry1] : Prior note nurse practitioner Lisa October 12, 2021::  This is a 66 year year old female here today for follow up cardiac evaluation.  She has a past medical history significant for hypothyroidism, dyspepsia, currently undergoing chemotherapy for breast cancer, familial hypercholesterolemia.   Today she is feeling generally well and does not have any complaints at this time. She is here for cholesterol management and is currently on Repatha. her last injection was on 10/4/2021. She states that the injection before that done on 9/6/2021 gave her a reaction on her abdomen however, she had not experienced this reaction prior or after. She now gives herself injections on her thighs instead of her abdomen. She is on Tamoxifen for management of her breast CA and does follow up with her oncologist.   -Pt is stable from a cardiac standpoint and does not have any complaints at this time. She states that she finished her chemo/radiation about 2 weeks ago.   -She reports her mother had high cholesterol. She has no history of rheumatic fever. She does not drink excessive caffeine or alcohol.   BLOOD PRESSURE: -BP is well controlled in today's visit.   BLOOD WORK: -New blood work was done today, 10/12/2021 to evaluate lipid profile, CBC, BMP, hepatic function, A1C and TSH.  -Pt currently on Repatha.  -She has been on Zocor, Crestor, Lipitor and Pravachol associated with significant incapacitating muscle aches. All symptoms were relieved after discontinuing the medication.  -This patient is clearly statin intolerant.   CHOLESTEROL CONTROL: -Patient will continue the advised TLC diet and to continue follow-up for treatment of hyperlipidemia and repeat blood testing with diet and exercise. I have discussed different exercises and the importance of maintenance of optimal body weight. The importance of staying within guidelines and recommendations was stressed to the patient today and they acknowledged that they understand this to me verbally.     -Ms. MARTIN was educated and advised that failure to follow-up with my medical recommendations to lower cholesterol can result in severe health consequences therefore, they will continuing a low saturated and low fat diet and to avoid excessive carbohydrates to help reduce triglycerides and that lowering LDL levels is associated with a significant decrease in serious cardiac events including but not limited to heart attack stroke and overall death. We will continue lipid lowering agents as advised based on blood test results and the patient understands to call if they develop severe muscle discomfort or if they have a reddish tinted discoloration in their urine.    TESTING/REPORTS: -EKG done 10/12/2021 which demonstrated regular sinus rhythm with nonspecific ST-T wave changes, BPM of 68.    PLAN: -The patient will continue with he Repatha injections. -She will follow up with her Cardiologist. -She will follow up with her Oncologist.   I have discussed the plan of care with Ms. GRETEL MARTIN and she will follow up in 3 months. She is compliant with all of her medications.   The patient understands that aerobic exercises must be increased to minutes 4 times/week and a detailed discussion of lifestyle modification was done today.  The patient has a good understanding of the diagnosis, treatment plan and lifestyle modification.  She will contact me at the office for any questions with their care or any changes in their health status.   The plan of care was discussed with supervising physician, Dr. Barragan while present in the office at the time of the visit.  Tamar ABRAHAM.

## 2024-04-30 NOTE — DISCUSSION/SUMMARY
[FreeTextEntry1] : This is a 69-year-old female with past medical history significant for hypothyroidism, dyspepsia, status post appendectomy complicated by peritonitis June 2022 chemotherapy for breast cancer, familial hypercholesterolemia, who comes in for a lipid/cardiac follow up evaluation.  She denies chest pain, shortness of breath, dizziness or syncope. Lipid panel done April 12, 2024 demonstrated hemoglobin A1c of 5.4, cholesterol 177, triglycerides 239, HDL of 76, LDL calculated 63 mg/dL non-HDL cholesterol under 1 mg/dL. The patient will continue on her current dose of Repatha 140 mg subcutaneously every 2 weeks. She will schedule an echo Doppler examination to evaluate her left ventricular function, chamber size, mitral valve regurgitation, rule out mitral valve prolapse and rule out hypertrophy. Nonfasting lipid panel done May 2, 2023 demonstrated cholesterol 162, HDL 66, triglycerides 343, LDL calculated 27, non-HDL cholesterol 95, LDL direct 56 mg/dL. The patient will go for new blood work November 2023 in a fasting state. I have also recommended she work with our registered dietitian if her triglycerides remain elevated.  She will continue on her regular aerobic activity exercise program. The patient had ventral hernia and abdominal repair surgery in August 2023. She will have new blood work done today for lipid profile.  Prior lipid profile done October 7, 2022 demonstrated cholesterol 159, HDL 78, LDL calculated 52, non-HDL cholesterol 81, triglycerides 144 mg/dL. The patient is currently hemodynamically stable.  She will continue on her current dose of Repatha therapy. Lifestyle modification was reinforced.  I have also encouraged her to work with the registered dietitian as well. The patient reports that she has not seen a cardiologist in over 3 years and is transferring her care to [facility] from [facility]. Electrocardiogram done March 28, 2022 demonstrate normal sinus rhythm rate of 78 bpm is otherwise unremarkable. Lipid panel done October 7, 2022 demonstrated cholesterol 159, LDL calculated 52, HDL of 78 mg/dL, non-HDL cholesterol of 81 mg/dL triglycerides of 144 mg/dL. Blood work done October 13, 2021 demonstrated a cholesterol 134, direct LDL of 52 mg/dL, HDL 53 mg/dL, triglycerides 174 mg/dL, non-HDL cholesterol 81 mg/dL. Patient will also continue on her current diet and walking program She reports her mother had high cholesterol. She has no history of rheumatic fever.  She does not drink excessive caffeine or alcohol.  She reports that her diet is reasonable.  Blood work done October 8, 2019 demonstrated a total cholesterol of 328 mg/dL,  mg/dL, HDL 52 mg/dL, triglycerides 230 mg/dL.  These findings are consistent with heterozygous familial hypercholesterolemia.    She has been on Zocor, Crestor, Lipitor and Pravachol associated with significant incapacitating muscle aches.  All symptoms were relieved after discontinuing the medication. This patient is clearly statin intolerant. 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  Blood work on October 8, 2019 by her cardiologist demonstrated total cholesterol 328 mg/dL, triglycerides 230 mg/dL, HDL 52 mg/dL, LDL cholesterol calculated 230 mg/dL. Normal TSH.  The patient had blood work done April 16, 2013 which demonstrated an elevated C-reactive protein of 4.6, total cholesterol 280 mg/dL, LDL cholesterol calculated 173 mg/dL, HDL 51 mg/dL, triglycerides 281 mg/dL.  The patient has evidence for familial hypercholesterolemia. She has no physical stigmata of FH. I have advised the patient and  to have her children, and her own siblings tested for lipids.  She promises to do so.  The patient understands that aerobic exercises must be increased to 40 minutes 4 times per week. A detailed discussion of lifestyle modification was done today. The patient has a good understanding of the diagnosis, and treatment plan. Lifestyle modification was also outlined.

## 2024-05-02 RX ORDER — EVOLOCUMAB 140 MG/ML
140 INJECTION, SOLUTION SUBCUTANEOUS
Qty: 3 | Refills: 1 | Status: ACTIVE | COMMUNITY
Start: 2019-11-12

## 2024-05-08 RX ORDER — DICLOFENAC SODIUM 75 MG/1
75 TABLET, DELAYED RELEASE ORAL
Qty: 21 | Refills: 0 | Status: DISCONTINUED | COMMUNITY
Start: 2023-09-28 | End: 2024-05-08

## 2024-06-07 DIAGNOSIS — Z92.89 PERSONAL HISTORY OF OTHER MEDICAL TREATMENT: ICD-10-CM

## 2024-06-07 PROBLEM — C50.911 MALIGNANT NEOPLASM OF RIGHT BREAST IN FEMALE, ESTROGEN RECEPTOR POSITIVE, UNSPECIFIED SITE OF BREAST: Status: ACTIVE | Noted: 2020-08-04

## 2024-06-10 ENCOUNTER — OUTPATIENT (OUTPATIENT)
Dept: OUTPATIENT SERVICES | Facility: HOSPITAL | Age: 69
LOS: 1 days | Discharge: HOME | End: 2024-06-10

## 2024-06-10 DIAGNOSIS — Z98.890 OTHER SPECIFIED POSTPROCEDURAL STATES: Chronic | ICD-10-CM

## 2024-06-10 DIAGNOSIS — Z90.710 ACQUIRED ABSENCE OF BOTH CERVIX AND UTERUS: Chronic | ICD-10-CM

## 2024-06-10 DIAGNOSIS — C50.919 MALIGNANT NEOPLASM OF UNSPECIFIED SITE OF UNSPECIFIED FEMALE BREAST: ICD-10-CM

## 2024-06-10 DIAGNOSIS — Z90.49 ACQUIRED ABSENCE OF OTHER SPECIFIED PARTS OF DIGESTIVE TRACT: Chronic | ICD-10-CM

## 2024-06-12 ENCOUNTER — NON-APPOINTMENT (OUTPATIENT)
Age: 69
End: 2024-06-12

## 2024-06-13 ENCOUNTER — APPOINTMENT (OUTPATIENT)
Dept: HEMATOLOGY ONCOLOGY | Facility: CLINIC | Age: 69
End: 2024-06-13
Payer: COMMERCIAL

## 2024-06-13 VITALS
HEIGHT: 62.01 IN | BODY MASS INDEX: 26.04 KG/M2 | HEART RATE: 76 BPM | SYSTOLIC BLOOD PRESSURE: 116 MMHG | WEIGHT: 143.3 LBS | RESPIRATION RATE: 16 BRPM | TEMPERATURE: 97.7 F | OXYGEN SATURATION: 98 % | DIASTOLIC BLOOD PRESSURE: 78 MMHG

## 2024-06-13 DIAGNOSIS — D72.820 LYMPHOCYTOSIS (SYMPTOMATIC): ICD-10-CM

## 2024-06-13 DIAGNOSIS — L03.90 CELLULITIS, UNSPECIFIED: ICD-10-CM

## 2024-06-13 DIAGNOSIS — Z17.0 MALIGNANT NEOPLASM OF UNSPECIFIED SITE OF RIGHT FEMALE BREAST: ICD-10-CM

## 2024-06-13 DIAGNOSIS — C50.911 MALIGNANT NEOPLASM OF UNSPECIFIED SITE OF RIGHT FEMALE BREAST: ICD-10-CM

## 2024-06-13 PROCEDURE — G2211 COMPLEX E/M VISIT ADD ON: CPT

## 2024-06-13 PROCEDURE — 99205 OFFICE O/P NEW HI 60 MIN: CPT

## 2024-06-13 NOTE — ASSESSMENT
[FreeTextEntry1] : Mammogram/breast ultrasound reports, hepatic function panel, 12/15/2023 medical oncology note, breast pathology reports reviewed. #1) Stage IIA (T1c N1a) RIGHT breast invasive ductal carcinoma, ER positive/ID positive/HER2 negative(0).  Status post right breast lumpectomy/SLNB 7/28/2020. A Mammaprint was sent off and returned with a high risk score. An Oncotype for node positive cancer was also sent off which resulted in a recurrence score of 23 translating to 19% risk of distant recurrence and an absolute benefit from chemotherapy.   9/4/2020 Started on treatment with DD doxorubicin/cyclophosphamide every 2 weeks followed by dose dense paclitaxel every 2 weeks x 4. Withdrew participation from [id number] Olanzapine antinausea study on 9/7/20 C1D4 S/P DD AC x 4 then DD Taxol #3 on 12/1/20. She developed gradually worsening CIPN and ultimately had gr 3 CIPN that did not resolve and DD Taxol # 4 / 4 was held / discontinued.  1/13/2021 - 2/16/2021-She completed adjuvant radiation - 4240cGy to R breast and regional LNs with a 1,000 boost to the R breast tumor bed under the care of Dr Evette Garcia.  Started on Anastrozole in 2/2021, and discontinued due to arthralgias.  Started on Exemestane in 6/2021. Unable to tolerate.  Switched to Tamoxifen 20 mg daily 7/23/2021. 4/19/22-Worsening over 4 mo. feeling depressed, didn't want to wake up in morning, had no energy, no motivation, mood lability. Decided to try Toremifene. 10/15/22-Developed body aches after 1 week. She d/c'd Toremifene. Patient decided on no further endocrine therapy.  --Reviewed diagnosis/prognosis and management options. -- Patient unable to tolerate AI's (anastrozole/exemestane), tamoxifen/toremifene, as above.  She has declined any further endocrine therapy, wishing for expectant surveillance. -- Clinically [facility]  11/8/2023-Mammogram/breast ultrasound-benign didftshd-SO-KIDP 2.  Next breast imaging ordered by surgeon.  #2) patient requested script for BDT-given to her  #3) relative increase in lymph %-discussed with patient-appears chronic, intermittent dating back at least to 2022 upon review of old lab work available, suggestive of benign process currently. However, will add PB flow cytometry to next blood work, as some heme disorders may evolve over time.  Patient was given the opportunity to ask questions.  Her questions have been answered to her apparent satisfaction at this time.  She expressed her understanding and willingness to comply with recommended follow-up.  -->RTO 6 months.

## 2024-06-13 NOTE — CONSULT LETTER
[Dear  ___] : Dear  [unfilled], [Consult Letter:] : I had the pleasure of evaluating your patient, [unfilled]. [Please see my note below.] : Please see my note below. [Consult Closing:] : Thank you very much for allowing me to participate in the care of this patient.  If you have any questions, please do not hesitate to contact me. [Sincerely,] : Sincerely, [DrBin  ___] : Dr. RICHARDSON [FreeTextEntry3] : Tabatha Regalado MD

## 2024-06-13 NOTE — HISTORY OF PRESENT ILLNESS
[Disease: _____________________] : Disease: [unfilled] [T: ___] : T[unfilled] [N: ___] : N[unfilled] [AJCC Stage: ____] : AJCC Stage: [unfilled] [de-identified] : 6/30/2020-Routine mammogram-postsurgical changes compatible with her history of reduction mammoplasty; there was a focus of architectural distortion in the slightly lateral right breast at the level of the nipple, measuring approximately 10 mm with no associated microcalcifications, and no abnormal findings in the left breast; no axillary adenopathy was seen. A right breast ultrasound performed on that same date demonstrated at the 9 o'clock axis 5 cm from the nipple, an irregularly marginated mass measuring 7 x 8 x 11 mm with ultrasound-guided core biopsy recommended; the left breast was unremarkable.   7/18/2020-Ultrasound-guided core biopsy of the right breast- finding at the 8 o'clock axis 5 cm from nipple of an invasive moderately differentiated ductal carcinoma, Mp score 6/9 with invasive tumor measuring at least 1 cm with evidence of DCIS, with microcalcifications identified in the invasive carcinoma and in the DCIS, with no evidence of lymphovascular invasion; the right axillary biopsy on that same date demonstrated invasive mammary carcinoma with lobular features, with necrosis and scattered lymphocytic infiltrate. Estrogen receptor returned positive greater than 90%, progesterone receptor positive greater than 90% HER-2/charlotte negative.  7/15/2020-MRI of the bilateral breasts-3 cm enlarged lymph node in the right axilla and no significant left axillary or internal mammary adenopathy; in the right breast, there was a 15 mm irregular enhancing mass with a second enhancing irregular mass located 2.5 cm anterior, medial, inferior from the index mass at the 8 o'clock axis, anterior depth and measuring 11 mm.   7/28/2020 S/P a right lumpectomy/sentinel lymph node biopsy with a finding of multifocal invasive carcinoma, moderately differentiated with tubulolobular features measuring 1.1 cm in addition to an invasive moderately differentiated carcinoma with tubulolobular features measuring 1.5 cm with multiple separate small foci of invasive carcinoma with single file arrangement of tumor cells E-cadherin positive, DCIS, LCIS, with further shave margins specifically in the right superior breast margin returning negative for carcinoma but with evidence of DCIS and a single isolated duct measuring 0.2 cm with margins negative for DCIS, an additional shave margins returning negative; in the right axilla, metastatic ductal carcinoma involved 3/7 lymph nodes with a carcinoma being E-cadherin positive.  A Mammaprint was sent off and returned with a high risk score. An Oncotype for node positive cancer was also sent off which resulted in a recurrence score of 23 translating to 19% risk of distant recurrence and an absolute benefit from chemotherapy.    9/4/2020 Started on treatment with DD doxorubicin/cyclophosphamide every 2 weeks followed by dose dense paclitaxel every 2 weeks x 4. Withdrew participation from [id number] Olanzapine antinausea study on 9/7/20 C1D4  S/P DD AC x 4 then DD Taxol #3 on 12/1/20. She developed gradually worsening CIPN and ultimately had gr 3 CIPN that did not resolve and DD Taxol # 4 / 4 was held / discontinued.  1/13/2021 - 2/16/2021-She completed adjuvant radiation - 4240cGy to R breast and regional LNs with a 1,000 boost to the R breast tumor bed under the care of Dr Evette Garcia.  Started on Anastrozole in 2/2021, and discontinued due to arthralgias.  Started on Exemestane in 6/2021. Unable to tolerate.  Switched to tamoxifen 20mg daily 7/23/2021.  4/19/22-Worsening over 4 mo feeling depressed, didn't want to wake up in morning, had no energy, no motivation, mood lability. Decided to try Toremifene.  10/15/22-Developed body aches after 1 week. She d/c'd Toremifene. Patient decided on no further endocrine therapy.  12/2023-She started to take "CBD-THC" paste orally / daily as well as "CBD oil" at night to sleep-improvement in sense of well-being.  Patient was under the oncology care of Dr. William until 1/2024. [de-identified] : Invasive ductal carcinoma [de-identified] : ER+(>90%)/TX+(>90%)/Her2-(0) [de-identified] : This is my initial office visit with patient who has been under the medical oncology care of Dr. William, who has relocated his practice. Patient currently feels generally well. 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 No cardiac/pulmonary/GI/ complaints.

## 2024-06-13 NOTE — PHYSICAL EXAM
[Fully active, able to carry on all pre-disease performance without restriction] : Status 0 - Fully active, able to carry on all pre-disease performance without restriction [Normal] : affect appropriate [de-identified] : no dominant mass; nipples flat (patient reports chronic) [de-identified] : no gross focal motor deficits

## 2024-06-13 NOTE — REVIEW OF SYSTEMS
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

## 2024-07-01 ENCOUNTER — APPOINTMENT (OUTPATIENT)
Dept: RADIOLOGY | Facility: IMAGING CENTER | Age: 69
End: 2024-07-01
Payer: COMMERCIAL

## 2024-07-01 ENCOUNTER — OUTPATIENT (OUTPATIENT)
Dept: OUTPATIENT SERVICES | Facility: HOSPITAL | Age: 69
LOS: 1 days | End: 2024-07-01
Payer: COMMERCIAL

## 2024-07-01 DIAGNOSIS — Z98.890 OTHER SPECIFIED POSTPROCEDURAL STATES: Chronic | ICD-10-CM

## 2024-07-01 DIAGNOSIS — Z00.8 ENCOUNTER FOR OTHER GENERAL EXAMINATION: ICD-10-CM

## 2024-07-01 DIAGNOSIS — Z17.0 ESTROGEN RECEPTOR POSITIVE STATUS [ER+]: ICD-10-CM

## 2024-07-01 DIAGNOSIS — C50.911 MALIGNANT NEOPLASM OF UNSPECIFIED SITE OF RIGHT FEMALE BREAST: ICD-10-CM

## 2024-07-01 DIAGNOSIS — Z90.49 ACQUIRED ABSENCE OF OTHER SPECIFIED PARTS OF DIGESTIVE TRACT: Chronic | ICD-10-CM

## 2024-07-01 DIAGNOSIS — Z90.710 ACQUIRED ABSENCE OF BOTH CERVIX AND UTERUS: Chronic | ICD-10-CM

## 2024-07-01 PROCEDURE — 77085 DXA BONE DENSITY AXL VRT FX: CPT

## 2024-07-01 PROCEDURE — 77085 DXA BONE DENSITY AXL VRT FX: CPT | Mod: 26

## 2024-07-02 DIAGNOSIS — Z92.89 PERSONAL HISTORY OF OTHER MEDICAL TREATMENT: ICD-10-CM

## 2024-07-09 NOTE — ED ADULT NURSE NOTE - PRO INTERPRETER NEED 2
Supplement recommendations:  Gina WS 1265 (60 Softgels) ([employer]): Please take  1 gel, twice / day  ongoing (with a full glass of water. If you get lavender burps and they are bothersome, you can but the whole package in the freezer and take the capsules from there. )  
English

## 2024-07-12 ENCOUNTER — APPOINTMENT (OUTPATIENT)
Dept: PHYSICAL MEDICINE AND REHAB | Facility: CLINIC | Age: 69
End: 2024-07-12

## 2024-08-07 ENCOUNTER — APPOINTMENT (OUTPATIENT)
Dept: PHYSICAL MEDICINE AND REHAB | Facility: CLINIC | Age: 69
End: 2024-08-07

## 2024-08-07 PROCEDURE — 99204 OFFICE O/P NEW MOD 45 MIN: CPT

## 2024-08-07 NOTE — PHYSICAL EXAM
[FreeTextEntry1] : GEN: AAOx3, NAD.  PSYCH: Normal mood and affect. Responds appropriately to commands.  EYES: Sclerae Anicteric. No discharge. EOMI.  RESP: Breathing unlabored.  CV: DP pulses 2+ and equal. No varicosities noted.  EXT: no edema  10 cm above elbow: 26.75 R  and 26.75  L   10 cm below elbow: 23 on the R, 23.5 cm on the L  SKIN: healed abdominal incisions.   STRENGTH: 5/5 bilateral  TONE: Normal, No clonus.  REFLEXES: 2+ symmetric patella  SENS: Grossly intact to light touch bilateral lower extremities.  INSP: Spine alignment is midline, with no evidence of scoliosis.  STANCE: No Trendelenburg with single leg stance.  able to perform single leg stance >10 seconds b/l  GAIT: Non antalgic, normal reciprocating heel to toe  HIP ROM: Full and pain free bilaterally.  Lumbar flexion to 90 degrees  SHOULDER: ROM: full ROM b/l  PALP: There is no tenderness over the midline spinous processes, paravertebral muscles, SIJ, or greater trochanters bilaterally. no tenderness in b/l upper extremities  + TTP b/l upper trap tenderness, cervical paraspinal ttp  SPECIAL: SLR test negative bilaterally.  tinels negative, phalens negative  .

## 2024-08-07 NOTE — HISTORY OF PRESENT ILLNESS
[FreeTextEntry1] : Chrissy Franco is a 69 year-old woman with pmh hypothyroid, GERD, HLD, R breast ca dx June 2020, s/p lumpectomy and axilllary lymph node sampling (3 of 7 LN involved), now s/p chemo and RT to R breast and regional LN. She was started on anastrazole but was unable to tolerate and switched to exemestane.   She was seen in 2021 for LUE swelling and shoulder pain, and had b/l CIPN in hands and feet.  In 2022 had appendix removed, was septic, also had hernia surgery in 2023, with seroma.  At that time she was attending PT for radicular back pain, declined epidural.   Still has numbness and tingling b/l hands, worse at night, improves if she shakes it out. Tried cts splints without improvement.    R leg no symptoms.  States LLE has pain radiating from ankle to lateral hip.  Worse with activity, but constant, never goes away.  Painful, burning. not numb or tingly.  Pain ranges from 5-8/10. Feels weak LLE.   Fell getting out of car two months ago in [place], states it was because she missed a step.  States she has osteoporosis also.   Takes advil prn.  States she is active, dancing, however worsens with activity.  States she had EMG in the past many years ago (states it was normal), does not want to have another EMG.  agreeable to MRI but refuses contrast

## 2024-08-07 NOTE — ASSESSMENT
[FreeTextEntry1] : prefers not to take medications but will consider gabapentin. sent to pharmacy. start PT for lumbar radiculopathy and upper back pain. include core strengthening   MRI ordered, patient refusing contrast but will obtain MRI if not improving patient does not want EMG/NCV upper extremity measurements stable  follow up in 4-6 weeks I spent a total of 45  minutes on this encounter including documentation, face to face time, care coordination and reviewing prior records.

## 2024-08-08 ENCOUNTER — APPOINTMENT (OUTPATIENT)
Dept: CARDIOLOGY | Facility: CLINIC | Age: 69
End: 2024-08-08

## 2024-08-08 PROBLEM — R06.09 DYSPNEA ON EXERTION: Status: ACTIVE | Noted: 2024-08-08

## 2024-08-08 PROCEDURE — 93015 CV STRESS TEST SUPVJ I&R: CPT

## 2024-08-08 PROCEDURE — 93306 TTE W/DOPPLER COMPLETE: CPT

## 2024-08-08 PROCEDURE — 99213 OFFICE O/P EST LOW 20 MIN: CPT | Mod: 25

## 2024-08-08 NOTE — PHYSICAL EXAM
[Well Developed] : well developed [Well Nourished] : well nourished [No Acute Distress] : no acute distress [Normal Venous Pressure] : normal venous pressure [No Carotid Bruit] : no carotid bruit [Normal S1, S2] : normal S1, S2 [No Murmur] : no murmur [No Rub] : no rub [Clear Lung Fields] : clear lung fields [Good Air Entry] : good air entry [No Respiratory Distress] : no respiratory distress  [Soft] : abdomen soft [Non Tender] : non-tender [No Masses/organomegaly] : no masses/organomegaly [Normal Bowel Sounds] : normal bowel sounds [Normal Gait] : normal gait [No Edema] : no edema [No Cyanosis] : no cyanosis [No Clubbing] : no clubbing [No Varicosities] : no varicosities [No Rash] : no rash [No Skin Lesions] : no skin lesions [Moves all extremities] : moves all extremities [No Focal Deficits] : no focal deficits [Normal Speech] : normal speech [Alert and Oriented] : alert and oriented [Normal memory] : normal memory [General Appearance - Well Developed] : well developed [Normal Appearance] : normal appearance [Well Groomed] : well groomed [General Appearance - Well Nourished] : well nourished [No Deformities] : no deformities [General Appearance - In No Acute Distress] : no acute distress [Normal Conjunctiva] : the conjunctiva exhibited no abnormalities [Normal Oral Mucosa] : normal oral mucosa [Normal Oropharynx] : normal oropharynx [Normal Jugular Venous A Waves Present] : normal jugular venous A waves present [Normal Jugular Venous V Waves Present] : normal jugular venous V waves present [No Jugular Venous Daniel A Waves] : no jugular venous daniel A waves [Respiration, Rhythm And Depth] : normal respiratory rhythm and effort [Exaggerated Use Of Accessory Muscles For Inspiration] : no accessory muscle use [Auscultation Breath Sounds / Voice Sounds] : lungs were clear to auscultation bilaterally [Bowel Sounds] : normal bowel sounds [Abdomen Soft] : soft [Abnormal Walk] : normal gait [Gait - Sufficient For Exercise Testing] : the gait was sufficient for exercise testing [Nail Clubbing] : no clubbing of the fingernails [Cyanosis, Localized] : no localized cyanosis [Skin Color & Pigmentation] : normal skin color and pigmentation [Skin Turgor] : normal skin turgor [Oriented To Time, Place, And Person] : oriented to person, place, and time [] : no rash [Impaired Insight] : insight and judgment were intact [Affect] : the affect was normal [Mood] : the mood was normal [No Anxiety] : not feeling anxious [5th Left ICS - MCL] : palpated at the 5th LICS in the midclavicular line [Normal] : normal [No Precordial Heave] : no precordial heave was noted [Normal Rate] : normal [Rhythm Regular] : regular [Normal S1] : normal S1 [Normal S2] : normal S2 [No Gallop] : no gallop heard [I] : a grade 1 [2+] : left 2+ [No Abnormalities] : the abdominal aorta was not enlarged and no bruit was heard [No Pitting Edema] : no pitting edema present [FreeTextEntry1] : No xanthomas [S3] : no S3 [S4] : no S4 [Right Carotid Bruit] : no bruit heard over the right carotid [Left Carotid Bruit] : no bruit heard over the left carotid [Right Femoral Bruit] : no bruit heard over the right femoral artery [Left Femoral Bruit] : no bruit heard over the left femoral artery

## 2024-08-08 NOTE — DISCUSSION/SUMMARY
[FreeTextEntry1] : This is a 69-year-old female with past medical history significant for hypothyroidism, dyspepsia, status post appendectomy complicated by peritonitis June 2022 chemotherapy for breast cancer, familial hypercholesterolemia, who comes in for a lipid/cardiac follow up evaluation.  She denies chest pain, shortness of breath, dizziness or syncope. The patient had normal exercise stress test August 8, 2004. She continues on Repatha 140 mg subcutaneously every 2 weeks without side effects.  Lipid panel done April 10, 2024 demonstrated cholesterol 177, triglycerides 239, HDL of 76, LDL cholesterol 63 and non-HDL cholesterol 101 mg/dL. Lipid panel done April 12, 2024 demonstrated hemoglobin A1c of 5.4, cholesterol 177, triglycerides 239, HDL of 76, LDL calculated 63 mg/dL non-HDL cholesterol under 1 mg/dL. The patient will continue on her current dose of Repatha 140 mg subcutaneously every 2 weeks.  Nonfasting lipid panel done May 2, 2023 demonstrated cholesterol 162, HDL 66, triglycerides 343, LDL calculated 27, non-HDL cholesterol 95, LDL direct 56 mg/dL. The patient will go for new blood work November 2023 in a fasting state. I have also recommended she work with our registered dietitian if her triglycerides remain elevated.  She will continue on her regular aerobic activity exercise program. The patient had ventral hernia and abdominal repair surgery in August 2023. She will have new blood work done today for lipid profile.  Prior lipid profile done October 7, 2022 demonstrated cholesterol 159, HDL 78, LDL calculated 52, non-HDL cholesterol 81, triglycerides 144 mg/dL. The patient is currently hemodynamically stable.  She will continue on her current dose of Repatha therapy. Lifestyle modification was reinforced.  I have also encouraged her to work with the registered dietitian as well. The patient reports that she has not seen a cardiologist in over 3 years and is transferring her care to [facility] from [facility]. Electrocardiogram done March 28, 2022 demonstrate normal sinus rhythm rate of 78 bpm is otherwise unremarkable. Lipid panel done October 7, 2022 demonstrated cholesterol 159, LDL calculated 52, HDL of 78 mg/dL, non-HDL cholesterol of 81 mg/dL triglycerides of 144 mg/dL. Blood work done October 13, 2021 demonstrated a cholesterol 134, direct LDL of 52 mg/dL, HDL 53 mg/dL, triglycerides 174 mg/dL, non-HDL cholesterol 81 mg/dL. Patient will also continue on her current diet and walking program She reports her mother had high cholesterol. She has no history of rheumatic fever.  She does not drink excessive caffeine or alcohol.  She reports that her diet is reasonable.  Blood work done October 8, 2019 demonstrated a total cholesterol of 328 mg/dL,  mg/dL, HDL 52 mg/dL, triglycerides 230 mg/dL.  These findings are consistent with heterozygous familial hypercholesterolemia.    She has been on Zocor, Crestor, Lipitor and Pravachol associated with significant incapacitating muscle aches.  All symptoms were relieved after discontinuing the medication. This patient is clearly statin intolerant. 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  Blood work on October 8, 2019 by her cardiologist demonstrated total cholesterol 328 mg/dL, triglycerides 230 mg/dL, HDL 52 mg/dL, LDL cholesterol calculated 230 mg/dL. Normal TSH.  The patient had blood work done April 16, 2013 which demonstrated an elevated C-reactive protein of 4.6, total cholesterol 280 mg/dL, LDL cholesterol calculated 173 mg/dL, HDL 51 mg/dL, triglycerides 281 mg/dL.  The patient has evidence for familial hypercholesterolemia. She has no physical stigmata of FH. I have advised the patient and  to have her children, and her own siblings tested for lipids.  She promises to do so.  The patient understands that aerobic exercises must be increased to 40 minutes 4 times per week. A detailed discussion of lifestyle modification was done today. The patient has a good understanding of the diagnosis, and treatment plan. Lifestyle modification was also outlined.

## 2024-08-24 NOTE — ASSESSMENT
[FreeTextEntry1] : I discussed activity with the patient.  She is to return in 1 month.
Greater than 75%
50-74%

## 2024-10-02 ENCOUNTER — APPOINTMENT (OUTPATIENT)
Dept: PHYSICAL MEDICINE AND REHAB | Facility: CLINIC | Age: 69
End: 2024-10-02

## 2024-10-21 ENCOUNTER — APPOINTMENT (OUTPATIENT)
Dept: DERMATOLOGY | Facility: CLINIC | Age: 69
End: 2024-10-21

## 2024-11-19 ENCOUNTER — APPOINTMENT (OUTPATIENT)
Dept: MAMMOGRAPHY | Facility: IMAGING CENTER | Age: 69
End: 2024-11-19
Payer: COMMERCIAL

## 2024-11-19 ENCOUNTER — APPOINTMENT (OUTPATIENT)
Dept: ULTRASOUND IMAGING | Facility: IMAGING CENTER | Age: 69
End: 2024-11-19
Payer: COMMERCIAL

## 2024-11-19 ENCOUNTER — OUTPATIENT (OUTPATIENT)
Dept: OUTPATIENT SERVICES | Facility: HOSPITAL | Age: 69
LOS: 1 days | End: 2024-11-19
Payer: COMMERCIAL

## 2024-11-19 DIAGNOSIS — Z90.710 ACQUIRED ABSENCE OF BOTH CERVIX AND UTERUS: Chronic | ICD-10-CM

## 2024-11-19 DIAGNOSIS — Z98.890 OTHER SPECIFIED POSTPROCEDURAL STATES: Chronic | ICD-10-CM

## 2024-11-19 DIAGNOSIS — Z90.49 ACQUIRED ABSENCE OF OTHER SPECIFIED PARTS OF DIGESTIVE TRACT: Chronic | ICD-10-CM

## 2024-11-19 DIAGNOSIS — Z00.8 ENCOUNTER FOR OTHER GENERAL EXAMINATION: ICD-10-CM

## 2024-11-19 PROCEDURE — G0279: CPT | Mod: 26

## 2024-11-19 PROCEDURE — G0279: CPT

## 2024-11-19 PROCEDURE — 76641 ULTRASOUND BREAST COMPLETE: CPT

## 2024-11-19 PROCEDURE — 76641 ULTRASOUND BREAST COMPLETE: CPT | Mod: 26,50

## 2024-11-19 PROCEDURE — 77066 DX MAMMO INCL CAD BI: CPT | Mod: 26

## 2024-11-19 PROCEDURE — 77066 DX MAMMO INCL CAD BI: CPT

## 2024-11-25 ENCOUNTER — APPOINTMENT (OUTPATIENT)
Dept: MRI IMAGING | Facility: IMAGING CENTER | Age: 69
End: 2024-11-25
Payer: COMMERCIAL

## 2024-11-25 ENCOUNTER — OUTPATIENT (OUTPATIENT)
Dept: OUTPATIENT SERVICES | Facility: HOSPITAL | Age: 69
LOS: 1 days | End: 2024-11-25
Payer: COMMERCIAL

## 2024-11-25 DIAGNOSIS — Z98.890 OTHER SPECIFIED POSTPROCEDURAL STATES: Chronic | ICD-10-CM

## 2024-11-25 DIAGNOSIS — Z00.8 ENCOUNTER FOR OTHER GENERAL EXAMINATION: ICD-10-CM

## 2024-11-25 DIAGNOSIS — Z90.710 ACQUIRED ABSENCE OF BOTH CERVIX AND UTERUS: Chronic | ICD-10-CM

## 2024-11-25 DIAGNOSIS — Z90.49 ACQUIRED ABSENCE OF OTHER SPECIFIED PARTS OF DIGESTIVE TRACT: Chronic | ICD-10-CM

## 2024-11-25 PROCEDURE — C8908: CPT

## 2024-11-25 PROCEDURE — 77049 MRI BREAST C-+ W/CAD BI: CPT | Mod: 26

## 2024-11-25 PROCEDURE — C8937: CPT

## 2024-11-25 PROCEDURE — A9585: CPT

## 2024-12-16 ENCOUNTER — OUTPATIENT (OUTPATIENT)
Dept: OUTPATIENT SERVICES | Facility: HOSPITAL | Age: 69
LOS: 1 days | Discharge: HOME | End: 2024-12-16

## 2024-12-16 DIAGNOSIS — Z98.890 OTHER SPECIFIED POSTPROCEDURAL STATES: Chronic | ICD-10-CM

## 2024-12-16 DIAGNOSIS — Z90.710 ACQUIRED ABSENCE OF BOTH CERVIX AND UTERUS: Chronic | ICD-10-CM

## 2024-12-16 DIAGNOSIS — Z90.49 ACQUIRED ABSENCE OF OTHER SPECIFIED PARTS OF DIGESTIVE TRACT: Chronic | ICD-10-CM

## 2024-12-17 ENCOUNTER — APPOINTMENT (OUTPATIENT)
Dept: HEMATOLOGY ONCOLOGY | Facility: CLINIC | Age: 69
End: 2024-12-17
Payer: COMMERCIAL

## 2024-12-17 VITALS
HEART RATE: 76 BPM | WEIGHT: 144 LBS | TEMPERATURE: 98 F | RESPIRATION RATE: 18 BRPM | OXYGEN SATURATION: 98 % | SYSTOLIC BLOOD PRESSURE: 115 MMHG | DIASTOLIC BLOOD PRESSURE: 75 MMHG | HEIGHT: 62 IN | BODY MASS INDEX: 26.5 KG/M2

## 2024-12-17 DIAGNOSIS — Z17.0 MALIGNANT NEOPLASM OF UNSPECIFIED SITE OF RIGHT FEMALE BREAST: ICD-10-CM

## 2024-12-17 DIAGNOSIS — D72.820 LYMPHOCYTOSIS (SYMPTOMATIC): ICD-10-CM

## 2024-12-17 DIAGNOSIS — C50.911 MALIGNANT NEOPLASM OF UNSPECIFIED SITE OF RIGHT FEMALE BREAST: ICD-10-CM

## 2024-12-17 PROCEDURE — 99214 OFFICE O/P EST MOD 30 MIN: CPT

## 2024-12-17 PROCEDURE — G2211 COMPLEX E/M VISIT ADD ON: CPT

## 2025-01-31 NOTE — H&P PST ADULT - SURGICAL SITE INCISION
"Received message from ,     \"Hi Nanette,    She also has a deductible to meet before she pays 10%. I'm not sure how many treatments she will need. Her first treatment she will need to meet her deductible so it will be approximately $528. Her second will be approximately $142.78. Each following estimate is $52.78. \"   " no

## 2025-03-06 NOTE — PATIENT PROFILE ADULT - DATE OF LAST VACCINATION
[FreeTextEntry1] : right lateral ankle ulceration s/p repeat debridement and graft application 2/25/25 graft still incorporating increased drainage and maceration today - prescribed short course of clindamycin redressed with silver alginate dressing, gential violet at wound borders.  keep dressing clean dry and intact until followup  Educated on sign and symptoms of infection including redness, swelling, drainage, worsening pain. Constitutional symptoms such as fever, nausea, vomiting, chills. Advised to call the office immediately if noted  I then counseled the patient on performing self-examination of the feet on a daily basis. I explained the importance of this due to the diminished sensation and the greater susceptibility of getting an infection and having additional complications due to the medical condition that exists, especially if they lack protective sensation on their feet. I advised the patient to notify the office right away if increased redness, swelling, pain, open wounds or discharge were observed. I explained the importance of checking the glucose regularly and of keeping the glucose level between 90 -110 and more importantly controlling the HbA1C keeping consistent and trying to achieve as close to normal and HbA1C as possible around 6.0. I told the patient to notify the MD if the glucose level changed to above or below those levels. Advised to check feet daily and do not walk barefoot #Painful neuropathy/neuralgia advised to increase gabapentin dosage to 300mg BID discussed risks/benefits - consider pain management referral if not improved PTR 1 week 
05-Dec-2021

## 2025-03-19 ENCOUNTER — RX RENEWAL (OUTPATIENT)
Age: 70
End: 2025-03-19

## 2025-03-24 ENCOUNTER — RX RENEWAL (OUTPATIENT)
Age: 70
End: 2025-03-24

## 2025-03-24 RX ORDER — EVOLOCUMAB 140 MG/ML
140 INJECTION, SOLUTION SUBCUTANEOUS
Qty: 3 | Refills: 0 | Status: ACTIVE | COMMUNITY
Start: 2025-03-24 | End: 1900-01-01

## 2025-04-28 ENCOUNTER — APPOINTMENT (OUTPATIENT)
Dept: ORTHOPEDIC SURGERY | Facility: CLINIC | Age: 70
End: 2025-04-28
Payer: COMMERCIAL

## 2025-04-28 ENCOUNTER — NON-APPOINTMENT (OUTPATIENT)
Age: 70
End: 2025-04-28

## 2025-04-28 DIAGNOSIS — M25.552 PAIN IN LEFT HIP: ICD-10-CM

## 2025-04-28 DIAGNOSIS — M70.62 TROCHANTERIC BURSITIS, LEFT HIP: ICD-10-CM

## 2025-04-28 DIAGNOSIS — M16.12 UNILATERAL PRIMARY OSTEOARTHRITIS, LEFT HIP: ICD-10-CM

## 2025-04-28 PROCEDURE — 73502 X-RAY EXAM HIP UNI 2-3 VIEWS: CPT | Mod: LT

## 2025-04-28 PROCEDURE — 99203 OFFICE O/P NEW LOW 30 MIN: CPT

## 2025-04-29 RX ORDER — MELOXICAM 15 MG/1
15 TABLET ORAL
Qty: 30 | Refills: 0 | Status: ACTIVE | COMMUNITY
Start: 2025-04-29 | End: 1900-01-01

## 2025-05-02 ENCOUNTER — APPOINTMENT (OUTPATIENT)
Dept: CARDIOLOGY | Facility: CLINIC | Age: 70
End: 2025-05-02
Payer: COMMERCIAL

## 2025-05-02 ENCOUNTER — LABORATORY RESULT (OUTPATIENT)
Age: 70
End: 2025-05-02

## 2025-05-02 ENCOUNTER — NON-APPOINTMENT (OUTPATIENT)
Age: 70
End: 2025-05-02

## 2025-05-02 VITALS
TEMPERATURE: 97.4 F | OXYGEN SATURATION: 97 % | WEIGHT: 144 LBS | RESPIRATION RATE: 16 BRPM | SYSTOLIC BLOOD PRESSURE: 118 MMHG | HEIGHT: 62 IN | DIASTOLIC BLOOD PRESSURE: 82 MMHG | HEART RATE: 74 BPM | BODY MASS INDEX: 26.5 KG/M2

## 2025-05-02 DIAGNOSIS — Z78.9 OTHER SPECIFIED HEALTH STATUS: ICD-10-CM

## 2025-05-02 DIAGNOSIS — E78.01 FAMILIAL HYPERCHOLESTEROLEMIA: ICD-10-CM

## 2025-05-02 DIAGNOSIS — I34.0 NONRHEUMATIC MITRAL (VALVE) INSUFFICIENCY: ICD-10-CM

## 2025-05-02 PROCEDURE — 93000 ELECTROCARDIOGRAM COMPLETE: CPT

## 2025-05-02 PROCEDURE — G2211 COMPLEX E/M VISIT ADD ON: CPT

## 2025-05-02 PROCEDURE — 99214 OFFICE O/P EST MOD 30 MIN: CPT

## 2025-05-05 ENCOUNTER — APPOINTMENT (OUTPATIENT)
Dept: SURGERY | Facility: CLINIC | Age: 70
End: 2025-05-05
Payer: COMMERCIAL

## 2025-05-05 PROCEDURE — 99213K: CUSTOM

## 2025-05-07 NOTE — HISTORY OF PRESENT ILLNESS
[Disease: _____________________] : Disease: [unfilled] [T: ___] : T[unfilled] [N: ___] : N[unfilled] 06-May-2025 20:30 [M: ___] : M[unfilled] [AJCC Stage: ____] : AJCC Stage: [unfilled] [de-identified] : The patient's history of present illness began on 06/30/2020 when she had a routine mammogram with a finding of postsurgical changes compatible with her history of reduction mammoplasty; there was a focus of architectural distortion in the slightly lateral right breast at the level of the nipple, measuring approximately 10 mm with no associated microcalcifications, and no abnormal findings in the left breast; no axillary adenopathy was seen.  A right breast ultrasound performed on that same date demonstrated at the 9 o'clock axis 5 cm from the nipple, an irregularly marginated mass measuring 7 x 8 x 11 mm with ultrasound-guided core biopsy recommended; the left breast was unremarkable.  The patient then went on to have an ultrasound-guided core biopsy of the right breast on 07/18/2020 with a finding at the 8 o'clock axis 5 cm from nipple of an invasive moderately differentiated ductal carcinoma, Mp score 6/9 with invasive tumor measuring at least 1 cm with evidence of DCIS, with microcalcifications identified in the invasive carcinoma and in the DCIS, with no evidence of lymphovascular invasion; the right axillary biopsy on that same date demonstrated invasive mammary carcinoma with lobular features, with necrosis and scattered lymphocytic infiltrate.  Estrogen receptor returned positive greater than 90%, progesterone receptor positive greater than 90% HER-2/charlotte negative.  An MRI of the bilateral breasts demonstrated a 3 cm enlarged lymph node in the right axilla and no significant left axillary or internal mammary adenopathy; in the right breast, there was a 15 mm irregular enhancing mass with a second enhancing irregular mass located 2.5 cm anterior, medial, inferior from the index mass at the 8 o'clock axis, anterior depth and measuring 11 mm.  The patient ultimately saw Dr. Liza Navarrete and on 07/28/2020 underwent a right lumpectomy/sentinel lymph node biopsy with a finding of multifocal invasive carcinoma, moderately differentiated with tubulolobular features measuring 1.1 cm in addition to an invasive moderately differentiated carcinoma with tubulolobular features measuring 1.5 cm with multiple separate small foci of invasive carcinoma with single file arrangement of tumor cells E-cadherin positive, DCIS, LCIS, with further shave margins specifically in the right superior breast margin returning negative for carcinoma but with evidence of DCIS and a single isolated duct measuring 0.2 cm with margins negative for DCIS, an additional shave margins returning negative; in the right axilla, metastatic ductal carcinoma involved 3/7 lymph nodes with a carcinoma being E-cadherin positive. \par \par The patient was seen in initial consultation on 8/3/20 regarding further treatment recommendations.  \par \par A Mammaprint was sent off and returned with a high risk score. An oncotype for node positive cancer was also sent off (unintentionally) which resulted in a recurrence score of 23 translating to 19%  risk of distant recurrence and an absolute benefit from chemotherapy. Results of both these tests were discussed with her extensively and she has decided to proceed with chemotherapy with dose dense doxorubicin/cyclophospmamide folllowed by dose dense paclitaxel every 2 weeks x 4 with Pegfilgrastim support. \par \par 9/4/20  started on  treatment with  Dose dense doxorubicin/cyclophosphmide every 2 weeks followed by dose dense paclitaxel every 2 weeks x 4, both with pegfilgrastim,\par Withdrew participation  from  [id number] Olanzapine antinausea study on 9/7/20 C1D4 [de-identified] : ER+ OH+ Her 2 charlotte - [de-identified] : 9/4/20  started on  treatment with  Dose dense doxorubicin/cyclophosphmide every 2 weeks followed by dose dense paclitaxel every 2 weeks x 4, both with pegfilgrastim,\par \par Withdrew participation  from  [id number] Olanzapine antinausea study on 9/7/20 C1D4\par \par Now on paclitaxel every 2 weeks with onpro. s/p cycle 2.\par  \par Had sig bone pain from days 1- 7. She took tramadol with moderate relief of bone pain. She now has much worsened peripheral neuropathy especially in her feet > R thumb>>other fingertips. It is painful for her to walk any distance. She feels off balance but has not fallen. She can perform tasks with her hands although it is uncomfortable.  \par \par She a L Picc line, being flushed daily/region care

## 2025-05-20 ENCOUNTER — TRANSCRIPTION ENCOUNTER (OUTPATIENT)
Age: 70
End: 2025-05-20

## 2025-07-14 ENCOUNTER — NON-APPOINTMENT (OUTPATIENT)
Age: 70
End: 2025-07-14

## 2025-08-04 ENCOUNTER — APPOINTMENT (OUTPATIENT)
Dept: ORTHOPEDIC SURGERY | Facility: CLINIC | Age: 70
End: 2025-08-04
Payer: COMMERCIAL

## 2025-08-04 DIAGNOSIS — M47.812 SPONDYLOSIS W/OUT MYELOPATHY OR RADICULOPATHY, CERVICAL REGION: ICD-10-CM

## 2025-08-04 DIAGNOSIS — Z87.39 PERSONAL HISTORY OF OTHER DISEASES OF THE MUSCULOSKELETAL SYSTEM AND CONNECTIVE TISSUE: ICD-10-CM

## 2025-08-04 DIAGNOSIS — M79.2 NEURALGIA AND NEURITIS, UNSPECIFIED: ICD-10-CM

## 2025-08-04 PROCEDURE — 99214 OFFICE O/P EST MOD 30 MIN: CPT

## 2025-08-04 PROCEDURE — 72040 X-RAY EXAM NECK SPINE 2-3 VW: CPT

## 2025-08-05 ENCOUNTER — APPOINTMENT (OUTPATIENT)
Dept: INTERNAL MEDICINE | Facility: CLINIC | Age: 70
End: 2025-08-05

## 2025-08-06 ENCOUNTER — RX RENEWAL (OUTPATIENT)
Age: 70
End: 2025-08-06

## 2025-08-12 ENCOUNTER — APPOINTMENT (OUTPATIENT)
Dept: HEMATOLOGY ONCOLOGY | Facility: CLINIC | Age: 70
End: 2025-08-12
Payer: COMMERCIAL

## 2025-08-12 VITALS
DIASTOLIC BLOOD PRESSURE: 81 MMHG | TEMPERATURE: 97.2 F | HEIGHT: 61.42 IN | WEIGHT: 145.5 LBS | SYSTOLIC BLOOD PRESSURE: 129 MMHG | HEART RATE: 73 BPM | BODY MASS INDEX: 27.12 KG/M2 | RESPIRATION RATE: 16 BRPM | OXYGEN SATURATION: 97 %

## 2025-08-12 PROCEDURE — G2211 COMPLEX E/M VISIT ADD ON: CPT

## 2025-08-12 PROCEDURE — 99213 OFFICE O/P EST LOW 20 MIN: CPT

## 2025-08-12 RX ORDER — MELOXICAM 15 MG/1
15 TABLET ORAL DAILY
Qty: 30 | Refills: 3 | Status: DISCONTINUED | COMMUNITY
Start: 2025-08-04 | End: 2025-08-12

## 2025-08-12 RX ORDER — ESOMEPRAZOLE MAGNESIUM 40 MG/1
40 CAPSULE, DELAYED RELEASE ORAL
Refills: 0 | Status: ACTIVE | COMMUNITY

## 2025-08-19 ENCOUNTER — APPOINTMENT (OUTPATIENT)
Dept: INTERNAL MEDICINE | Facility: CLINIC | Age: 70
End: 2025-08-19
Payer: COMMERCIAL

## 2025-08-19 VITALS
TEMPERATURE: 97.8 F | OXYGEN SATURATION: 97 % | HEART RATE: 70 BPM | RESPIRATION RATE: 16 BRPM | WEIGHT: 145 LBS | DIASTOLIC BLOOD PRESSURE: 80 MMHG | SYSTOLIC BLOOD PRESSURE: 130 MMHG | BODY MASS INDEX: 27.02 KG/M2

## 2025-08-19 DIAGNOSIS — Z00.00 ENCOUNTER FOR GENERAL ADULT MEDICAL EXAMINATION W/OUT ABNORMAL FINDINGS: ICD-10-CM

## 2025-08-19 DIAGNOSIS — Z17.0 MALIGNANT NEOPLASM OF UNSPECIFIED SITE OF RIGHT FEMALE BREAST: ICD-10-CM

## 2025-08-19 DIAGNOSIS — D72.820 LYMPHOCYTOSIS (SYMPTOMATIC): ICD-10-CM

## 2025-08-19 DIAGNOSIS — Z13.228 ENCOUNTER FOR SCREENING FOR OTHER METABOLIC DISORDERS: ICD-10-CM

## 2025-08-19 DIAGNOSIS — Z12.9 ENCOUNTER FOR SCREENING FOR MALIGNANT NEOPLASM, SITE UNSPECIFIED: ICD-10-CM

## 2025-08-19 DIAGNOSIS — E78.01 FAMILIAL HYPERCHOLESTEROLEMIA: ICD-10-CM

## 2025-08-19 DIAGNOSIS — I34.0 NONRHEUMATIC MITRAL (VALVE) INSUFFICIENCY: ICD-10-CM

## 2025-08-19 DIAGNOSIS — Z78.9 OTHER SPECIFIED HEALTH STATUS: ICD-10-CM

## 2025-08-19 DIAGNOSIS — C50.911 MALIGNANT NEOPLASM OF UNSPECIFIED SITE OF RIGHT FEMALE BREAST: ICD-10-CM

## 2025-08-19 DIAGNOSIS — M47.812 SPONDYLOSIS W/OUT MYELOPATHY OR RADICULOPATHY, CERVICAL REGION: ICD-10-CM

## 2025-08-19 DIAGNOSIS — Z71.85 ENCOUNTER FOR IMMUNIZATION SAFETY COUNSELING: ICD-10-CM

## 2025-08-19 DIAGNOSIS — E03.9 HYPOTHYROIDISM, UNSPECIFIED: ICD-10-CM

## 2025-08-19 PROCEDURE — 93000 ELECTROCARDIOGRAM COMPLETE: CPT

## 2025-08-19 PROCEDURE — 99387 INIT PM E/M NEW PAT 65+ YRS: CPT

## 2025-08-20 PROBLEM — Z12.9 CANCER SCREENING: Status: ACTIVE | Noted: 2025-08-19

## 2025-09-03 ENCOUNTER — APPOINTMENT (OUTPATIENT)
Dept: DERMATOLOGY | Facility: CLINIC | Age: 70
End: 2025-09-03

## (undated) DEVICE — DRSG OPSITE 13.75 X 4"

## (undated) DEVICE — DRSG VAC GRANUFOAM LARGE (BLACK)

## (undated) DEVICE — DRAIN BLAKE 15FR BARD CHANNEL

## (undated) DEVICE — MARKING PEN W RULER

## (undated) DEVICE — LIGASURE IMPACT

## (undated) DEVICE — WARMING BLANKET UPPER ADULT

## (undated) DEVICE — TUBING INSUFFLATION LAP FILTER 10FT

## (undated) DEVICE — STAPLER COVIDIEN ENDO GIA STANDARD HANDLE

## (undated) DEVICE — SUT BIOSYN 4-0 18" P-12

## (undated) DEVICE — TROCAR APPLIED MEDICAL KII BALLOON BLUNT TIP 12MM X 100MM

## (undated) DEVICE — DRAPE MAYO STAND 30"

## (undated) DEVICE — NDL HYPO REGULAR BEVEL 25G X 1.5" (BLUE)

## (undated) DEVICE — GLV 8 PROTEXIS (WHITE)

## (undated) DEVICE — GLV 7.5 PROTEXIS (WHITE)

## (undated) DEVICE — TUBING STRYKEFLOW II SUCTION / IRRIGATOR

## (undated) DEVICE — DRAIN RESERVOIR FOR JACKSON PRATT 100CC CARDINAL

## (undated) DEVICE — BLADE SCALPEL SAFETYLOCK #15

## (undated) DEVICE — SOL IRR POUR H2O 250ML

## (undated) DEVICE — ENDOCATCH 10MM SPECIMEN POUCH

## (undated) DEVICE — PACK MAJOR ABDOMINAL SUPINE

## (undated) DEVICE — POSITIONER FOAM EGG CRATE ULNAR 2PCS (PINK)

## (undated) DEVICE — GOWN TRIMAX LG

## (undated) DEVICE — LONE STAR ELASTIC STAY HOOK 5MM SHARP

## (undated) DEVICE — STAPLER SKIN VISI-STAT 35 WIDE

## (undated) DEVICE — ELCTR BOVIE PENCIL HANDPIECE

## (undated) DEVICE — APPLICATOR SURGICEL LAP TROCAR POINT 2.5MM X 150MM

## (undated) DEVICE — PACKING GAUZE IODOFORM 1"

## (undated) DEVICE — PREP CHLORAPREP HI-LITE ORANGE 26ML

## (undated) DEVICE — DRSG VAC WHITEFOAM LARGE (WHITE)

## (undated) DEVICE — VENODYNE/SCD SLEEVE CALF LARGE

## (undated) DEVICE — DRSG ADAPTIC 3 X 3"

## (undated) DEVICE — DRAPE LIGHT HANDLE COVER (BLUE)

## (undated) DEVICE — CANISTER KCI 500ML GEL SENSA TRAC

## (undated) DEVICE — DRSG PREVENA PLUS SYSTEM

## (undated) DEVICE — DRSG BIOPATCH DISK W CHG 1" W 7.0MM HOLE

## (undated) DEVICE — DRAPE TOWEL BLUE 17" X 24"

## (undated) DEVICE — PACK ADVANCED LAPAROSCOPIC NS

## (undated) DEVICE — SUT QUILL MONODERM 2-0 30CM 18MM

## (undated) DEVICE — SUT PDS II 1 48" TP-1

## (undated) DEVICE — SUT POLYSORB 0 36" GU-46

## (undated) DEVICE — MEDICATION LABELS W MARKER

## (undated) DEVICE — Device

## (undated) DEVICE — DRAPE INSTRUMENT POUCH 6.75" X 11"

## (undated) DEVICE — D HELP - CLEARVIEW CLEARIFY SYSTEM

## (undated) DEVICE — GLV 7 PROTEXIS (WHITE)

## (undated) DEVICE — LONE STAR RETRACTOR SQUARE 14.1CMX14.1CM DISP

## (undated) DEVICE — DRAPE IOBAN 23" X 23"

## (undated) DEVICE — DRSG VAC GRANUFOAM MEDIUM (BLACK)

## (undated) DEVICE — DRSG VAC GRANUFOAM SMALL (BLACK)

## (undated) DEVICE — FOLEY TRAY 16FR 5CC LTX UMETER CLOSED

## (undated) DEVICE — LAP PAD 18 X 18"

## (undated) DEVICE — DRAPE 3/4 SHEET W REINFORCEMENT 56X77"

## (undated) DEVICE — SOL IRR POUR NS 0.9% 500ML

## (undated) DEVICE — SPECIMEN CONTAINER 100ML

## (undated) DEVICE — DRSG VAC WHITEFOAM SMALL (WHITE) PVA

## (undated) DEVICE — TUBING SUCTION 20FT

## (undated) DEVICE — ABDOMINAL BINDER MED/LG 12" X 45"-62"

## (undated) DEVICE — GLV 8.5 PROTEXIS (WHITE)

## (undated) DEVICE — SUT SURGIPRO 1 30" GS-21

## (undated) DEVICE — NDL HYPO REGULAR BEVEL 22G X 1.5" (TURQUOISE)

## (undated) DEVICE — DRAIN CHANNEL 19FR ROUND FULL FLUTED

## (undated) DEVICE — DRAPE 1/2 SHEET 40X57"

## (undated) DEVICE — ABDOMINAL BINDER XL 9" X 62"-74"

## (undated) DEVICE — GLV 6.5 PROTEXIS (WHITE)

## (undated) DEVICE — SUT SOFSILK 2-0 18" V-20 (POP-OFF)

## (undated) DEVICE — DRAIN JACKSON PRATT 10MM FLAT FULL NO TROCAR

## (undated) DEVICE — BLADE SCALPEL SAFETYLOCK #10

## (undated) DEVICE — SUT PDS II 1 54" TP-1

## (undated) DEVICE — ONETRAC LIGHTED RETRACTOR 135 X 30MM DISP

## (undated) DEVICE — TROCAR COVIDIEN BLUNT TIP HASSAN 12MM STANDARD

## (undated) DEVICE — DRSG TEGADERM 6"X8"

## (undated) DEVICE — WARMING BLANKET LOWER ADULT

## (undated) DEVICE — DRSG CURITY GAUZE SPONGE 4 X 4" 12-PLY

## (undated) DEVICE — SUCTION YANKAUER NO CONTROL VENT

## (undated) DEVICE — DRSG STERISTRIPS 0.5 X 4"

## (undated) DEVICE — DRAIN JACKSON PRATT 7MM FLAT FULL NO TROCAR

## (undated) DEVICE — SUT SOFSILK 3-0 18" V-20 (POP-OFF)

## (undated) DEVICE — LIGASURE MARYLAND 37CM

## (undated) DEVICE — TROCAR APPLIED MEDICAL KII BALLOON BLUNT TIP 12MM X 130MM

## (undated) DEVICE — TROCAR COVIDIEN VERSAPORT BLADELESS OPTICAL 5MM STANDARD

## (undated) DEVICE — STAPLER COVIDIEN ENDO GIA SHORT HANDLE

## (undated) DEVICE — SYR LUER LOK 10CC

## (undated) DEVICE — DRSG OPSITE 2.5 X 2"

## (undated) DEVICE — COVER PROBE W/GEL 18X120CM STRL 50/BX

## (undated) DEVICE — PACK BASIN SPECIAL PROCEDURE

## (undated) DEVICE — VISITEC 4X4

## (undated) DEVICE — DRSG VAC GRANUFOAM MEDIUM (SILVER)